# Patient Record
Sex: MALE | Race: WHITE | NOT HISPANIC OR LATINO | Employment: OTHER | URBAN - METROPOLITAN AREA
[De-identification: names, ages, dates, MRNs, and addresses within clinical notes are randomized per-mention and may not be internally consistent; named-entity substitution may affect disease eponyms.]

---

## 2017-05-09 ENCOUNTER — TRANSFERRED RECORDS (OUTPATIENT)
Dept: HEALTH INFORMATION MANAGEMENT | Facility: CLINIC | Age: 37
End: 2017-05-09

## 2017-07-11 ENCOUNTER — TRANSFERRED RECORDS (OUTPATIENT)
Dept: HEALTH INFORMATION MANAGEMENT | Facility: CLINIC | Age: 37
End: 2017-07-11

## 2017-11-27 ENCOUNTER — TRANSFERRED RECORDS (OUTPATIENT)
Dept: HEALTH INFORMATION MANAGEMENT | Facility: CLINIC | Age: 37
End: 2017-11-27

## 2017-12-26 ENCOUNTER — TRANSFERRED RECORDS (OUTPATIENT)
Dept: HEALTH INFORMATION MANAGEMENT | Facility: CLINIC | Age: 37
End: 2017-12-26

## 2018-01-09 ENCOUNTER — OFFICE VISIT (OUTPATIENT)
Dept: INTERNAL MEDICINE | Facility: CLINIC | Age: 38
End: 2018-01-09
Payer: COMMERCIAL

## 2018-01-09 ENCOUNTER — HOSPITAL ENCOUNTER (OUTPATIENT)
Dept: GENERAL RADIOLOGY | Facility: CLINIC | Age: 38
Discharge: HOME | End: 2018-01-09
Attending: INTERNAL MEDICINE | Admitting: INTERNAL MEDICINE
Payer: COMMERCIAL

## 2018-01-09 VITALS
OXYGEN SATURATION: 97 % | BODY MASS INDEX: 26.68 KG/M2 | SYSTOLIC BLOOD PRESSURE: 124 MMHG | HEART RATE: 100 BPM | TEMPERATURE: 97.4 F | HEIGHT: 66 IN | RESPIRATION RATE: 16 BRPM | WEIGHT: 166 LBS | DIASTOLIC BLOOD PRESSURE: 84 MMHG

## 2018-01-09 DIAGNOSIS — M25.521 RIGHT ELBOW PAIN: ICD-10-CM

## 2018-01-09 DIAGNOSIS — M25.572 PAIN IN JOINT, ANKLE AND FOOT, LEFT: ICD-10-CM

## 2018-01-09 DIAGNOSIS — G47.00 INSOMNIA, UNSPECIFIED TYPE: Primary | ICD-10-CM

## 2018-01-09 DIAGNOSIS — R23.2 ABNORMAL FLUSHING AND SWEATING: ICD-10-CM

## 2018-01-09 DIAGNOSIS — R61 ABNORMAL FLUSHING AND SWEATING: ICD-10-CM

## 2018-01-09 LAB
CORTIS SERPL-MCNC: 18.8 UG/DL (ref 4–22)
FERRITIN SERPL-MCNC: 92 NG/ML (ref 26–388)
TSH SERPL DL<=0.005 MIU/L-ACNC: 0.89 MU/L (ref 0.4–4)

## 2018-01-09 PROCEDURE — 73610 X-RAY EXAM OF ANKLE: CPT | Mod: TC

## 2018-01-09 PROCEDURE — 99000 SPECIMEN HANDLING OFFICE-LAB: CPT | Performed by: INTERNAL MEDICINE

## 2018-01-09 PROCEDURE — 82384 ASSAY THREE CATECHOLAMINES: CPT | Mod: 90 | Performed by: INTERNAL MEDICINE

## 2018-01-09 PROCEDURE — 82533 TOTAL CORTISOL: CPT | Performed by: INTERNAL MEDICINE

## 2018-01-09 PROCEDURE — 36415 COLL VENOUS BLD VENIPUNCTURE: CPT | Performed by: INTERNAL MEDICINE

## 2018-01-09 PROCEDURE — 82728 ASSAY OF FERRITIN: CPT | Performed by: INTERNAL MEDICINE

## 2018-01-09 PROCEDURE — 84443 ASSAY THYROID STIM HORMONE: CPT | Performed by: INTERNAL MEDICINE

## 2018-01-09 PROCEDURE — 99214 OFFICE O/P EST MOD 30 MIN: CPT | Performed by: INTERNAL MEDICINE

## 2018-01-09 RX ORDER — ZOLPIDEM TARTRATE 5 MG/1
5 TABLET ORAL
Qty: 30 TABLET | Refills: 0 | Status: SHIPPED | OUTPATIENT
Start: 2018-01-09 | End: 2018-02-19

## 2018-01-09 ASSESSMENT — PAIN SCALES - GENERAL: PAINLEVEL: MILD PAIN (3)

## 2018-01-09 NOTE — MR AVS SNAPSHOT
"              After Visit Summary   2018    Dipak Swartz    MRN: [id number]           Patient Information     Date Of Birth          1980        Visit Information        Provider Department      2018 8:30 AM Jason García MD [facility]         Follow-ups after your visit        Who to contact     If you have questions or need follow up information about today's clinic visit or your schedule please contact [facility] directly at [phone].  Normal or non-critical lab and imaging results will be communicated to you by [employer], letter or phone within 4 business days after the clinic has received the results. If you do not hear from us within 7 days, please contact the clinic through [employer] or phone. If you have a critical or abnormal lab result, we will notify you by phone as soon as possible.  Submit refill requests through [employer] or call your pharmacy and they will forward the refill request to us. Please allow 3 business days for your refill to be completed.          Additional Information About Your Visit        [employer] Information     [employer] lets you send messages to your doctor, view your test results, renew your prescriptions, schedule appointments and more. To sign up, go to [email or web address] . Click on \"Log in\" on the left side of the screen, which will take you to the Welcome page. Then click on \"Sign up Now\" on the right side of the page.     You will be asked to enter the access code listed below, as well as some personal information. Please follow the directions to create your username and password.     Your access code is: [email or web address]  Expires: 2018  8:50 AM     Your access code will  in 90 days. If you need help or a new code, please call your [facility] or [phone].        Care EveryWhere ID     This is your Care EveryWhere ID. This could be used by other organizations to access your [place] medical " "records  [id number]        Your Vitals Were     Pulse Temperature Respirations Height Pulse Oximetry BMI (Body Mass Index)    100 97.4  F (36.3  C) (Temporal) 16 5' 6\" (1.676 m) 97% 26.79 kg/m2       Blood Pressure from Last 3 Encounters:   01/09/18 124/84   05/27/16 (!) 138/92   04/08/16 120/74    Weight from Last 3 Encounters:   01/09/18 166 lb (75.3 kg)   06/21/16 148 lb (67.1 kg)   05/27/16 148 lb (67.1 kg)              Today, you had the following     No orders found for display       Primary Care Provider Office Phone # Fax #    Lazarus Jacob [phone]       [address] [place] [address]        Equal Access to Services     [facility]: Hadii toma dobson Sograce, waaxda luqadaha, qaybta kaalmada adeegyada, ivelisse servin . So [facility] [phone].    ATENCIÓN: Si habla español, tiene a johnson disposición servicios gratuitos de asistencia lingüística. Llame al [phone].    We comply with applicable federal civil rights laws and [place] laws. We do not discriminate on the basis of race, color, national origin, age, disability, sex, sexual orientation, or gender identity.            Thank you!     Thank you for choosing [facility]  for your care. Our goal is always to provide you with excellent care. Hearing back from our patients is one way we can continue to improve our services. Please take a few minutes to complete the written survey that you may receive in the mail after your visit with us. Thank you!             Your Updated Medication List - Protect others around you: Learn how to safely use, store and throw away your medicines at www.disposemymeds.org.      Notice  As of 1/9/2018  8:50 AM    You have not been prescribed any medications.      "

## 2018-01-09 NOTE — PROGRESS NOTES
"  SUBJECTIVE:   Dipak Swartz is a 37 year old male who presents to clinic today for the following health issues:    Chief Complaint   Patient presents with     Establish Care     right lower leg amputee - pain, stopped pain meds, not sleeping       Issues with sleep.  He was on oxycodone, gabapentin, and baclofen.  Basically stopped then a week ago, had weaned down some of the gabapentin.      This week sleep is worse, can't sleep much.      He has tried different sleep medications before, insomnia since his accident he says.      Worries about abd pain, more on the left side, like sore muscles.      Falls asleep, then wakes up easily.  Tries the dark room, quiet.      September stepped on something and twisted his good ankle, still sore and bothers him.  Still sore after 4 months.     Right elbow pain as well at night.  Hard to move his elbow.      Past Medical History:   Diagnosis Date     [place] 8[place]     Right leg amputee    Current Outpatient Prescriptions   Medication     zolpidem (AMBIEN) 5 MG tablet     No current facility-administered medications for this visit.      Review of Systems  Constitutional-No fevers, chills, or weight changes..  ENT-No earpain, sore throat, voice changes or rhinitis.  Cardiac-No chest pain or palpitations. Some sweats at times  Respiratory-No cough, sob, or hemoptysis.  GI-No nausea, vomitting, diarrhea, constipation, or blood in the stool.  Musculoskeletal-left ankle pain, right elbow pains.    Physical Exam  /84  Pulse 100  Temp 97.4  F (36.3  C) (Temporal)  Resp 16  Ht 5' 6\" (1.676 m)  Wt 166 lb (75.3 kg)  SpO2 97%  BMI 26.79 kg/m2  General Appearance-healthy, alert, no distress  Left ankle has normal range of motion, no swelling, minimal pain on palpation  Right elbow has good range of motion, no pain over the lateral epicondyle    ASSESSMENT:  Patient was a history of a right leg amputation.  He had chronic pain for 3 years with this.  He has been " in the pain clinic been on gabapentin, oxycodone, and baclofen.  He stopped all of these a week ago.  He reports always having issues with insomnia sleep.  But now this week is worse he can fall asleep but wakes up within half an hour to have some little irritation such as a TV or furnace.  She is very tired during the day.  He does not think he has obstructive sleep apnea.  We will try him on low-dose Ambien for 30 days with a plan of not using this long-term.  But hopefully them over the changes of stopping his other medications.    Patient also has left ankle pain from an injury, with his right leg amputated we will x-ray his ankle to make sure it is okay today.    Right elbow appears fine on exam he can use a brace at night to limit the pain.    Combination of insomnia and the flushing or sweating.  We will check some labs including thyroid, ferritin for restless legs, cortisol and catecholamines.  Eventually he may need a sleep study for possible sleep apnea or restless legs evaluation.    Electronically signed by Jason García MD

## 2018-01-09 NOTE — NURSING NOTE
"Chief Complaint   Patient presents with     \[facility]\"" Care     right lower leg amputee - pain, stopped pain meds, not sleeping       Initial /84  Pulse 100  Temp 97.4  F (36.3  C) (Temporal)  Resp 16  Ht 5' 6\" (1.676 m)  Wt 166 lb (75.3 kg)  SpO2 97%  BMI 26.79 kg/m2 Estimated body mass index is 26.79 kg/(m^2) as calculated from the following:    Height as of this encounter: 5' 6\" (1.676 m).    Weight as of this encounter: 166 lb (75.3 kg).  Medication Reconciliation: complete    "

## 2018-01-12 LAB
CATECHOLS PLAS-IMP: NORMAL
DOPAMINE SERPL-MCNC: <20 PG/ML (ref 0–20)
EPINEPH PLAS-MCNC: 23 PG/ML (ref 10–200)
NOREPINEPH PLAS-MCNC: 182 PG/ML (ref 80–520)

## 2018-02-19 DIAGNOSIS — G47.00 INSOMNIA, UNSPECIFIED TYPE: ICD-10-CM

## 2018-02-19 NOTE — TELEPHONE ENCOUNTER
ambien      Last Written Prescription Date:  1/9/18  Last Fill Quantity: 30,   # refills: 0  Last Office Visit: 1/9/18  Future Office visit:       Routing refill request to provider for review/approval because:  Drug not on the [facility], P or [facility] refill protocol or controlled substance

## 2018-02-21 RX ORDER — ZOLPIDEM TARTRATE 5 MG/1
5 TABLET ORAL
Qty: 30 TABLET | Refills: 0 | Status: SHIPPED | OUTPATIENT
Start: 2018-02-21 | End: 2020-08-05

## 2018-06-06 ENCOUNTER — OFFICE VISIT (OUTPATIENT)
Dept: FAMILY MEDICINE | Facility: CLINIC | Age: 38
End: 2018-06-06
Payer: COMMERCIAL

## 2018-06-06 VITALS
SYSTOLIC BLOOD PRESSURE: 112 MMHG | HEIGHT: 65 IN | TEMPERATURE: 97.7 F | BODY MASS INDEX: 28.51 KG/M2 | OXYGEN SATURATION: 99 % | DIASTOLIC BLOOD PRESSURE: 70 MMHG | WEIGHT: 171.1 LBS | HEART RATE: 85 BPM

## 2018-06-06 DIAGNOSIS — H10.32 ACUTE CONJUNCTIVITIS OF LEFT EYE, UNSPECIFIED ACUTE CONJUNCTIVITIS TYPE: Primary | ICD-10-CM

## 2018-06-06 PROCEDURE — 99213 OFFICE O/P EST LOW 20 MIN: CPT | Performed by: FAMILY MEDICINE

## 2018-06-06 RX ORDER — NEOMYCIN SULFATE, POLYMYXIN B SULFATE AND DEXAMETHASONE 3.5; 10000; 1 MG/ML; [USP'U]/ML; MG/ML
1-2 SUSPENSION/ DROPS OPHTHALMIC EVERY 4 HOURS
Qty: 1 BOTTLE | Refills: 0 | Status: SHIPPED | OUTPATIENT
Start: 2018-06-06 | End: 2020-09-14

## 2018-06-06 NOTE — PROGRESS NOTES
SUBJECTIVE:   Dipak Swartz is a 37 year old male who presents to clinic today for the following health issues:      Concern - Foreign body   Onset: 36 hours ago     Description:   Patient had gotten a foreign body in his left eye. He thought he had it out but it is still bothering him. Patient belives that it is a piece of stone in his eye.     Intensity: severe    Progression of Symptoms:  intermittent    Accompanying Signs & Symptoms:  None     Previous history of similar problem:   None     Precipitating factors:   Worsened by: nothing     Alleviating factors:  Improved by: nothing     Therapies Tried and outcome: flushing his eye         Problem list and histories reviewed & adjusted, as indicated.  Additional history: as documented        Reviewed and updated as needed this visit by clinical staff       Reviewed and updated as needed this visit by Provider        SUBJECTIVE:  Dipak  is a 37 year old male who presents for: Foreign body sensation in his left eye.  He was grinding some rock and felt he got something in there 2 days ago.  He thought he got everything out but yesterday was bothering him some again.  He has not had any mattering in the morning.  Still bothering him a little bit.    Past Medical History:   Diagnosis Date     [place]ident [place] farm 8/2014     Past Surgical History:   Procedure Laterality Date     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     Broken Right arm  1992     LEG SURGERY Right 8/2014     Social History   Substance Use Topics     Smoking status: Former Smoker     Quit date: 4/28/2003     Smokeless tobacco: Current User     Types: Chew     Alcohol use 0.0 oz/week     0 Standard drinks or equivalent per week      Comment: occ.     Current Outpatient Prescriptions   Medication Sig Dispense Refill     neomycin-polymyxin-dexamethasone (MAXITROL) 3.5-66493-1.1 SUSP ophthalmic susp Place 1-2 drops Into the left eye every 4 hours 1 Bottle 0     zolpidem (AMBIEN) 5 MG tablet Take 1  "tablet (5 mg) by mouth nightly as needed for sleep 30 tablet 0       REVIEW OF SYSTEMS:   5 point ROS negative except as noted above in HPI, including Gen., Resp, CV, GI &  system review.     OBJECTIVE:  Vitals: /70 (BP Location: Right arm, Patient Position: Chair, Cuff Size: Adult Large)  Pulse 85  Temp 97.7  F (36.5  C) (Temporal)  Ht 5' 5\" (1.651 m)  Wt 171 lb 1.6 oz (77.6 kg)  SpO2 99%  BMI 28.47 kg/m2  BMI= Body mass index is 28.47 kg/(m^2).  His left eye appears normal there is no conjunctival injection PERRLA.  Difficult to do eye exam on him he is very resistant.  What I can see by retracting the lids I can see no foreign objects.  No uptake of fluorescein stain.    ASSESSMENT:  Foreign body sensation    PLAN:  Treated with some Maxitrol eyedrops he may just have some irritation from this.  I recommended follow-up with ophthalmology in the next 36 hours or so if not improving.        Johnathon Arroyo MD  [facility]              "

## 2018-06-06 NOTE — MR AVS SNAPSHOT
"              After Visit Summary   6/6/2018    Dipak Swartz    MRN: [id number]           Patient Information     Date Of Birth          1980        Visit Information        Provider Department      6/6/2018 7:40 AM Johnathon Arroyo MD [facility]        Today's Diagnoses     Acute conjunctivitis of left eye, unspecified acute conjunctivitis type    -  1       Follow-ups after your visit        Who to contact     If you have questions or need follow up information about today's clinic visit or your schedule please contact [facility] directly at [phone].  Normal or non-critical lab and imaging results will be communicated to you by MyChart, letter or phone within 4 business days after the clinic has received the results. If you do not hear from us within 7 days, please contact the clinic through MyChart or phone. If you have a critical or abnormal lab result, we will notify you by phone as soon as possible.  Submit refill requests through [employer] or call your pharmacy and they will forward the refill request to us. Please allow 3 business days for your refill to be completed.          Additional Information About Your Visit        Care EveryWhere ID     This is your Care EveryWhere ID. This could be used by other organizations to access your [facility] medical records  [id number]        Your Vitals Were     Pulse Temperature Height Pulse Oximetry BMI (Body Mass Index)       85 97.7  F (36.5  C) (Temporal) 5' 5\" (1.651 m) 99% 28.47 kg/m2        Blood Pressure from Last 3 Encounters:   06/06/18 112/70   01/09/18 124/84   05/27/16 (!) 138/92    Weight from Last 3 Encounters:   06/06/18 171 lb 1.6 oz (77.6 kg)   01/09/18 166 lb (75.3 kg)   06/21/16 148 lb (67.1 kg)              Today, you had the following     No orders found for display         Today's Medication Changes          These changes are accurate as of 6/6/18  8:25 AM.  If you have any questions, ask your nurse " or doctor.               Start taking these medicines.        Dose/Directions    neomycin-polymyxin-dexamethasone 3.5-23868-4.1 Susp ophthalmic susp   Commonly known as:  MAXITROL   Used for:  Acute conjunctivitis of left eye, unspecified acute conjunctivitis type   Started by:  Johnathon Arroyo MD        Dose:  1-2 drop   Place 1-2 drops Into the left eye every 4 hours   Quantity:  1 Bottle   Refills:  0            Where to get your medicines      These medications were sent to [facility] - [place], [place] - [address]   [address] [facility] [address]     Phone:  [phone]     neomycin-polymyxin-dexamethasone 3.5-12120-2.1 Susp ophthalmic susp                Primary Care Provider Office Phone # Fax #    Lazarus Jacob [phone]       [address] [place] [address]        Equal Access to Services     [facility]: Hadii toma wilkerson hadasho Soomaali, waaxda luqadaha, qaybta kaalmada adeegyada, ivelisse kiran hayryan servin . So [facility] [phone].    ATENCIÓN: Si habla español, tiene a johnson disposición servicios gratuitos de asistencia lingüística. Llame al [phone].    We comply with applicable federal civil rights laws and [place] laws. We do not discriminate on the basis of race, color, national origin, age, disability, sex, sexual orientation, or gender identity.            Thank you!     Thank you for choosing [facility]  for your care. Our goal is always to provide you with excellent care. Hearing back from our patients is one way we can continue to improve our services. Please take a few minutes to complete the written survey that you may receive in the mail after your visit with us. Thank you!             Your Updated Medication List - Protect others around you: Learn how to safely use, store and throw away your medicines at www.disposemymeds.org.          This list is accurate as of 6/6/18  8:25 AM.  Always use  your most recent med list.                   Brand Name Dispense Instructions for use Diagnosis    neomycin-polymyxin-dexamethasone 3.5-37073-7.1 Susp ophthalmic susp    MAXITROL    1 Bottle    Place 1-2 drops Into the left eye every 4 hours    Acute conjunctivitis of left eye, unspecified acute conjunctivitis type       zolpidem 5 MG tablet    AMBIEN    30 tablet    Take 1 tablet (5 mg) by mouth nightly as needed for sleep    Insomnia, unspecified type

## 2020-08-05 ENCOUNTER — TELEPHONE (OUTPATIENT)
Dept: FAMILY MEDICINE | Facility: OTHER | Age: 40
End: 2020-08-05

## 2020-08-05 ENCOUNTER — VIRTUAL VISIT (OUTPATIENT)
Dept: FAMILY MEDICINE | Facility: OTHER | Age: 40
End: 2020-08-05
Payer: COMMERCIAL

## 2020-08-05 DIAGNOSIS — G47.00 INSOMNIA, UNSPECIFIED TYPE: ICD-10-CM

## 2020-08-05 DIAGNOSIS — Z89.511 HISTORY OF AMPUTATION OF RIGHT LEG THROUGH TIBIA AND FIBULA (H): Primary | ICD-10-CM

## 2020-08-05 DIAGNOSIS — M25.522 PAIN OF BOTH ELBOWS: ICD-10-CM

## 2020-08-05 DIAGNOSIS — M25.521 PAIN OF BOTH ELBOWS: ICD-10-CM

## 2020-08-05 DIAGNOSIS — E16.2 HYPOGLYCEMIA: Primary | ICD-10-CM

## 2020-08-05 DIAGNOSIS — R20.2 PARESTHESIAS: ICD-10-CM

## 2020-08-05 LAB
ALBUMIN SERPL-MCNC: 4 G/DL (ref 3.4–5)
ALP SERPL-CCNC: 72 U/L (ref 40–150)
ALT SERPL W P-5'-P-CCNC: 26 U/L (ref 0–70)
ANION GAP SERPL CALCULATED.3IONS-SCNC: 3 MMOL/L (ref 3–14)
AST SERPL W P-5'-P-CCNC: 13 U/L (ref 0–45)
BILIRUB SERPL-MCNC: <0.1 MG/DL (ref 0.2–1.3)
BUN SERPL-MCNC: 14 MG/DL (ref 7–30)
CALCIUM SERPL-MCNC: 9.3 MG/DL (ref 8.5–10.1)
CHLORIDE SERPL-SCNC: 105 MMOL/L (ref 94–109)
CO2 SERPL-SCNC: 30 MMOL/L (ref 20–32)
CREAT SERPL-MCNC: 1.12 MG/DL (ref 0.66–1.25)
CRP SERPL-MCNC: 4.4 MG/L (ref 0–8)
ERYTHROCYTE [DISTWIDTH] IN BLOOD BY AUTOMATED COUNT: 13.5 % (ref 10–15)
ERYTHROCYTE [SEDIMENTATION RATE] IN BLOOD BY WESTERGREN METHOD: 7 MM/H (ref 0–15)
GFR SERPL CREATININE-BSD FRML MDRD: 82 ML/MIN/{1.73_M2}
GLUCOSE SERPL-MCNC: 50 MG/DL (ref 70–99)
HCT VFR BLD AUTO: 45.1 % (ref 40–53)
HGB BLD-MCNC: 14.8 G/DL (ref 13.3–17.7)
MCH RBC QN AUTO: 30.9 PG (ref 26.5–33)
MCHC RBC AUTO-ENTMCNC: 32.8 G/DL (ref 31.5–36.5)
MCV RBC AUTO: 94 FL (ref 78–100)
PLATELET # BLD AUTO: 320 10E9/L (ref 150–450)
POTASSIUM SERPL-SCNC: 3.8 MMOL/L (ref 3.4–5.3)
PROT SERPL-MCNC: 8.2 G/DL (ref 6.8–8.8)
RBC # BLD AUTO: 4.79 10E12/L (ref 4.4–5.9)
SODIUM SERPL-SCNC: 138 MMOL/L (ref 133–144)
VIT B12 SERPL-MCNC: 298 PG/ML (ref 193–986)
WBC # BLD AUTO: 7.1 10E9/L (ref 4–11)

## 2020-08-05 PROCEDURE — 80053 COMPREHEN METABOLIC PANEL: CPT | Performed by: PHYSICIAN ASSISTANT

## 2020-08-05 PROCEDURE — 85652 RBC SED RATE AUTOMATED: CPT | Performed by: PHYSICIAN ASSISTANT

## 2020-08-05 PROCEDURE — 86140 C-REACTIVE PROTEIN: CPT | Performed by: PHYSICIAN ASSISTANT

## 2020-08-05 PROCEDURE — 82607 VITAMIN B-12: CPT | Performed by: PHYSICIAN ASSISTANT

## 2020-08-05 PROCEDURE — 99214 OFFICE O/P EST MOD 30 MIN: CPT | Mod: 95 | Performed by: PHYSICIAN ASSISTANT

## 2020-08-05 PROCEDURE — 36415 COLL VENOUS BLD VENIPUNCTURE: CPT | Performed by: PHYSICIAN ASSISTANT

## 2020-08-05 PROCEDURE — 85027 COMPLETE CBC AUTOMATED: CPT | Performed by: PHYSICIAN ASSISTANT

## 2020-08-05 PROCEDURE — 86618 LYME DISEASE ANTIBODY: CPT | Performed by: PHYSICIAN ASSISTANT

## 2020-08-05 RX ORDER — ZOLPIDEM TARTRATE 5 MG/1
5 TABLET ORAL
Qty: 30 TABLET | Refills: 2 | Status: SHIPPED | OUTPATIENT
Start: 2020-08-05 | End: 2021-01-12 | Stop reason: ALTCHOICE

## 2020-08-05 NOTE — PROGRESS NOTES
"Dipak Swartz is a 39 year old male who is being evaluated via a billable telephone visit.      The patient has been notified of following:     \"This telephone visit will be conducted via a call between you and your physician/provider. We have found that certain health care needs can be provided without the need for a physical exam.  This service lets us provide the care you need with a short phone conversation.  If a prescription is necessary we can send it directly to your pharmacy.  If lab work is needed we can place an order for that and you can then stop by our lab to have the test done at a later time.    Telephone visits are billed at different rates depending on your insurance coverage. During this emergency period, for some insurers they may be billed the same as an in-person visit.  Please reach out to your insurance provider with any questions.    If during the course of the call the physician/provider feels a telephone visit is not appropriate, you will not be charged for this service.\"    Patient has given verbal consent for Telephone visit?  Yes    What phone number would you like to be contacted at? [phone]    How would you like to obtain your AVS? Mail a copy    Subjective     Dipak Swartz is a 39 year old male who presents via phone visit today for the following health issues:    HPI    Concern - New prosthetic needed       Description:   Needs a dispensing prescription for a lower right leg prostethic.  Patients weight has fluctuated a lot in the past year and it isn't fitting correctly. 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       He has been experiencing symptoms of bilateral elbow pain for the past 1.5 months or so and it can be severe at times. He denies any injury to his elbows. A few weeks ago, he started to experience symptoms of intermittent painful tingling in his arms and legs randomly throughout the day and at night along with the " elbow pain. It seems to be a different extremity every time and it is causing a lot of difficult sleeping. He feels his  strength is affected when the symptoms occur. It always seems to occur at rest and never with activity. He denies any neck or low back pain or any saddle anaesthesia. He has never experienced these symptoms before until recently. He was researching his symptoms and is concerned about possible Lyme disease. He does a lot of work outdoors and has found multiple ticks on his body but none of them have been embedded as far as he knows. He has been more tired lately but has a history of chronic insomnia which has been worsened but his pain and paraesthesias. He was on Ambien a few years ago which seemed to work well. He denies rash, fevers, or chills. He has tried NSAIDs including naproxen without benefit.       Reviewed and updated as needed this visit by Provider  Allergies  Meds  Problems  Med Hx     Review of Systems   GENERAL: +Fatigue. Denies fever, weakness, weight gain, or weight loss.  CARDIOVASCULAR: Denies chest pain, shortness of breath, irregular heartbeats, palpitations, or edema.  RESPIRATORY: Denies cough, hemoptysis, and shortness of breath.  MUSCULOSKELETAL: +Intermittent bilateral elbow pain.   SKIN: Denies easy bruising, redness, rash, or dry skin.   NEUROLOGIC: +Intermittent painful tingling in his arms and legs, intermittent  strength weakness. Denies headache, fainting, dizziness, memory loss, or seizures.  PSYCHIATRIC: Denies depression, anxiety, mood swings, and thoughts of suicide.    Objective   Reported vitals:  There were no vitals taken for this visit.   General: alert and no distress over the phone  PSYCH: Alert and oriented times 3; coherent speech, normal   rate and volume, able to articulate logical thoughts, able   to abstract reason, no tangential thoughts, no hallucinations   or delusions. His affect is normal  RESP: No cough, no audible wheezing, able to  talk in full sentences  Remainder of exam unable to be completed due to telephone visits      Assessment/Plan:    ICD-10-CM    1. 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 Insomnia, unspecified type  G47.00 zolpidem (AMBIEN) 5 MG tablet   3. Pain of both elbows  M25.521 CBC with platelets    M25.522 Comprehensive metabolic panel (BMP + Alb, Alk Phos, ALT, AST, Total. Bili, TP)     ESR: Erythrocyte sedimentation rate     CRP, inflammation     Lyme Disease Danielle with reflex to WB Serum     Vitamin B12   4. Paresthesias  R20.2 CBC with platelets     Comprehensive metabolic panel (BMP + Alb, Alk Phos, ALT, AST, Total. Bili, TP)     ESR: Erythrocyte sedimentation rate     CRP, inflammation     Lyme Disease Danielle with reflex to WB Serum     Vitamin B12       1. Will place a DME order for a new right lower extremity prosthetic since weight changes have caused his current prosthetic not to fit properly.    2. Will refill Ambien to use nightly as needed given his chronic insomnia which is worse lately given his elbow pain and painful paraesthesias. Will refill for the next few months and then recommend follow up to discuss more chronic use of this medication.    3-4. New bilateral elbow pain and intermittent painful extremity paraesthesias over the past month or so. This certainly can be seen with Lyme disease but want to rule out other infectious or inflammatory causes as well so will start with labs including Lyme testing, CMP, CBC, ESR, CRP and vitamin B12. I recommend Tylenol or NSAIDs as needed and he is trying to stay better hydrated. If labs are normal, will need a face to face visit for a more thorough examination.     Phone call duration: 12 minutes    Nacho Abdi PA-C

## 2020-08-05 NOTE — TELEPHONE ENCOUNTER
Huddled with VALENTINA:    can you call and tell him that it is low and that he should make sure he eats and drinks something sugary/carb loaded  tell him we are still awaiting the rest of the labs and would like to recheck his glucose again tomorrow    I spoke with the pt who states he had eaten a quarter pounder with cheese. States he feels great. He agrees to recheck lab tomorrow at the [place] location. VALENTINA updated.     Shira Soria, MSN, RN

## 2020-08-06 DIAGNOSIS — E16.2 HYPOGLYCEMIA: ICD-10-CM

## 2020-08-06 LAB
B BURGDOR IGG+IGM SER QL: 0.09 (ref 0–0.89)
HBA1C MFR BLD: 5.2 % (ref 0–5.6)

## 2020-08-06 PROCEDURE — 36415 COLL VENOUS BLD VENIPUNCTURE: CPT | Performed by: PHYSICIAN ASSISTANT

## 2020-08-06 PROCEDURE — 82947 ASSAY GLUCOSE BLOOD QUANT: CPT | Performed by: PHYSICIAN ASSISTANT

## 2020-08-06 PROCEDURE — 83036 HEMOGLOBIN GLYCOSYLATED A1C: CPT | Performed by: PHYSICIAN ASSISTANT

## 2020-08-07 ENCOUNTER — TELEPHONE (OUTPATIENT)
Dept: FAMILY MEDICINE | Facility: OTHER | Age: 40
End: 2020-08-07

## 2020-08-07 LAB — GLUCOSE SERPL-MCNC: 77 MG/DL (ref 70–99)

## 2020-08-07 NOTE — TELEPHONE ENCOUNTER
----- Message from Nacho Abdi PA-C sent at 8/7/2020  9:43 AM CDT -----  Please call and notify patient that his glucose and hemoglobin A1c are completely normal so no signs of diabetes. I think the low glucose a few days ago was a false reading. Thanks.    Nacho Abdi PA-C

## 2020-08-11 ENCOUNTER — TELEPHONE (OUTPATIENT)
Dept: FAMILY MEDICINE | Facility: OTHER | Age: 40
End: 2020-08-11

## 2020-08-11 NOTE — TELEPHONE ENCOUNTER
Reason for Call:  Form, our goal is to have forms completed with 72 hours, however, some forms may require a visit or additional information.    Type of letter, form or note:  Code Specific for insurance    Who is the form from?: [employer] (if other please explain)    Where did the form come from: form was faxed in    What clinic location was the form placed at?: [facility] - [phone]    Where the form was placed: Dr. Bhatt/Folder    What number is listed as a contact on the form?: [phone]       Additional comments: Please review the form, sign,date and  fax to [phone]. Thank you    Call taken on 8/11/2020 at 4:17 PM by Christal Arroyo

## 2020-08-12 ENCOUNTER — MEDICAL CORRESPONDENCE (OUTPATIENT)
Dept: HEALTH INFORMATION MANAGEMENT | Facility: CLINIC | Age: 40
End: 2020-08-12

## 2020-08-12 NOTE — TELEPHONE ENCOUNTER
Cecilia called back again regarding the form from below. VALENTINA's signature can not be a stamp, he needs to actually sign it.

## 2020-08-12 NOTE — TELEPHONE ENCOUNTER
Signature was not stamped, signed by VALENTINA and sent through his doximity. I called and spoke to [employer] [employer] to speak with Cecilia, she said they cannot accept that signature, so will be resending form because they have to update the provider information and will give to AE to sign. This will be coming through on the pod [phone] fax number if it does not come through before I leave [facility], another MA will have to give it to AE from fax machine. She is aware this is coming.    Rissa Henderson MA

## 2020-08-12 NOTE — TELEPHONE ENCOUNTER
Cecilia called today and is wondering if we can refax over the form she said they received it but it only came as lines Thank you

## 2020-08-24 DIAGNOSIS — G47.00 INSOMNIA, UNSPECIFIED TYPE: ICD-10-CM

## 2020-08-24 RX ORDER — ZOLPIDEM TARTRATE 5 MG/1
5 TABLET ORAL
Qty: 30 TABLET | Refills: 2 | OUTPATIENT
Start: 2020-08-24

## 2020-08-24 NOTE — TELEPHONE ENCOUNTER
Reason for Call:  Medication or medication refill:    Do you use a [facility]?  Name of the pharmacy and phone number for the current request:  [facility]    Name of the medication requested: Ambien    Other request:     Can we leave a detailed message on this number? NO    Phone number patient can be reached at: [phone]    Best Time:     Call taken on 8/24/2020 at 9:45 AM by Zunilda Hill

## 2020-09-14 ENCOUNTER — VIRTUAL VISIT (OUTPATIENT)
Dept: INTERNAL MEDICINE | Facility: CLINIC | Age: 40
End: 2020-09-14
Payer: COMMERCIAL

## 2020-09-14 DIAGNOSIS — M79.642 BILATERAL HAND PAIN: Primary | ICD-10-CM

## 2020-09-14 DIAGNOSIS — G47.01 INSOMNIA DUE TO MEDICAL CONDITION: ICD-10-CM

## 2020-09-14 DIAGNOSIS — M79.641 BILATERAL HAND PAIN: Primary | ICD-10-CM

## 2020-09-14 PROCEDURE — 99213 OFFICE O/P EST LOW 20 MIN: CPT | Mod: 95 | Performed by: INTERNAL MEDICINE

## 2020-09-14 RX ORDER — ZOLPIDEM TARTRATE 6.25 MG/1
6.25 TABLET, FILM COATED, EXTENDED RELEASE ORAL
Qty: 30 TABLET | Refills: 3 | Status: SHIPPED | OUTPATIENT
Start: 2020-09-14 | End: 2021-01-12

## 2020-09-14 NOTE — PROGRESS NOTES
"Dipak Swartz is a 39 year old male who is being evaluated via a billable telephone visit.      The patient has been notified of following:     \"This telephone visit will be conducted via a call between you and your physician/provider. We have found that certain health care needs can be provided without the need for a physical exam.  This service lets us provide the care you need with a short phone conversation.  If a prescription is necessary we can send it directly to your pharmacy.  If lab work is needed we can place an order for that and you can then stop by our lab to have the test done at a later time.    Telephone visits are billed at different rates depending on your insurance coverage. During this emergency period, for some insurers they may be billed the same as an in-person visit.  Please reach out to your insurance provider with any questions.    If during the course of the call the physician/provider feels a telephone visit is not appropriate, you will not be charged for this service.\"    Patient has given verbal consent for Telephone visit?  Yes    What phone number would you like to be contacted at? [phone]    How would you like to obtain your AVS?     Subjective     Dipak Swartz is a 39 year old male who presents via phone visit today for the following health issues:    HPI    Chief Complaint   Patient presents with     Telephone     discuss ambien, increasing dose       He was in for labs in August and glucose was 50 and then recheck was ok.  He has lost quite a bit of weight went from 180 to 128.  Eats what ever he wants and not gaining weight, feels good.      Sleep issues at night, elbows hurt.  Arms go numb.  Elbow down to the hands with driving    Restarted ambien in August and legs now don't go numb. Ambien is working at 5 mg at 9 pm, wakes up at 12:30, then takes a second one. Wakes up at 5 AM,  With taking two then runs out.          Review of Systems   Constitutional, HEENT, " cardiovascular, pulmonary, gi and gu systems are negative, except as otherwise noted.       Objective          Vitals:  No vitals were obtained today due to virtual visit.    healthy, alert and no distress  PSYCH: Alert and oriented times 3; coherent speech, normal   rate and volume, able to articulate logical thoughts, able   to abstract reason, no tangential thoughts, no hallucinations   or delusions  His affect is normal  RESP: No cough, no audible wheezing, able to talk in full sentences  Remainder of exam unable to be completed due to telephone visits    Orders Only on 08/06/2020   Component Date Value Ref Range Status     Hemoglobin A1C 08/06/2020 5.2  0 - 5.6 % Final    Comment: Normal <5.7% Prediabetes 5.7-6.4%  Diabetes 6.5% or higher - adopted from ADA   consensus guidelines.       Glucose 08/06/2020 77  70 - 99 mg/dL Final           Assessment/Plan:    Assessment & Plan     Dipak was seen today for telephone.    Diagnoses and all orders for this visit:    Bilateral hand pain  -     NEUROLOGY ADULT REFERRAL    Insomnia due to medical condition  -     zolpidem ER (AMBIEN CR) 6.25 MG CR tablet; Take 1 tablet (6.25 mg) by mouth nightly as needed for sleep      Patient has insomnia due to arm pain.  He has been on Ambien 5 mg but it does not last long enough therefore he has taken 1 Ambien at midnight again.  We will change him to a controlled release 6.25 and see if this works for him.  If that is not covered by insurance would then try him on the 5 mg twice at night.  Hopefully if his arms get treated he will not need the Ambien long-term    Bilateral hand pain certainly could be carpal tunnel goes from his elbows down to his fingertips.  Discussed carpal tunnel and ulnar tunnel disease.  We will get an EMG of both arms to evaluate and probable orthopedics referral this fall.    B12 was slightly on the low side to 98 recommended oral B12 supplement to help, does not need B12 shots but this may help his  nerve irritation.        Return in about 2 weeks (around 9/28/2020), or emg.    Jason García MD  [facility]    Phone call duration:  14 minutes

## 2020-09-22 ENCOUNTER — TRANSFERRED RECORDS (OUTPATIENT)
Dept: HEALTH INFORMATION MANAGEMENT | Facility: CLINIC | Age: 40
End: 2020-09-22

## 2020-09-28 ENCOUNTER — TELEPHONE (OUTPATIENT)
Dept: INTERNAL MEDICINE | Facility: CLINIC | Age: 40
End: 2020-09-28

## 2020-09-28 NOTE — TELEPHONE ENCOUNTER
Received EMG report.  Dr. García reviewed and shows mild left carpal tunnel.  Pt can try wrist braces at night or see ortho for consult.  Pt informed of results and Dr. García's response/orders.  Pt will try braces at night and see how how he does, will call if he chooses to see ortho.

## 2021-01-12 ENCOUNTER — HOSPITAL ENCOUNTER (OUTPATIENT)
Dept: GENERAL RADIOLOGY | Facility: CLINIC | Age: 41
Discharge: HOME | End: 2021-01-12
Attending: INTERNAL MEDICINE | Admitting: INTERNAL MEDICINE
Payer: COMMERCIAL

## 2021-01-12 ENCOUNTER — OFFICE VISIT (OUTPATIENT)
Dept: INTERNAL MEDICINE | Facility: CLINIC | Age: 41
End: 2021-01-12
Payer: COMMERCIAL

## 2021-01-12 VITALS
SYSTOLIC BLOOD PRESSURE: 128 MMHG | HEART RATE: 78 BPM | OXYGEN SATURATION: 100 % | BODY MASS INDEX: 25.83 KG/M2 | TEMPERATURE: 97.1 F | HEIGHT: 65 IN | DIASTOLIC BLOOD PRESSURE: 74 MMHG | RESPIRATION RATE: 16 BRPM | WEIGHT: 155 LBS

## 2021-01-12 DIAGNOSIS — M79.642 PAIN IN BOTH HANDS: ICD-10-CM

## 2021-01-12 DIAGNOSIS — M25.522 PAIN OF BOTH ELBOWS: ICD-10-CM

## 2021-01-12 DIAGNOSIS — G47.01 INSOMNIA DUE TO MEDICAL CONDITION: ICD-10-CM

## 2021-01-12 DIAGNOSIS — M25.521 PAIN OF BOTH ELBOWS: Primary | ICD-10-CM

## 2021-01-12 DIAGNOSIS — M79.641 PAIN IN BOTH HANDS: ICD-10-CM

## 2021-01-12 DIAGNOSIS — M25.522 PAIN OF BOTH ELBOWS: Primary | ICD-10-CM

## 2021-01-12 DIAGNOSIS — M25.521 PAIN OF BOTH ELBOWS: ICD-10-CM

## 2021-01-12 PROCEDURE — 73130 X-RAY EXAM OF HAND: CPT | Mod: 50

## 2021-01-12 PROCEDURE — 99213 OFFICE O/P EST LOW 20 MIN: CPT | Performed by: INTERNAL MEDICINE

## 2021-01-12 RX ORDER — ZOLPIDEM TARTRATE 6.25 MG/1
6.25 TABLET, FILM COATED, EXTENDED RELEASE ORAL
Qty: 30 TABLET | Refills: 3 | Status: SHIPPED | OUTPATIENT
Start: 2021-01-12 | End: 2021-07-15

## 2021-01-12 ASSESSMENT — MIFFLIN-ST. JEOR: SCORE: 1539.96

## 2021-01-12 NOTE — PROGRESS NOTES
"  Assessment & Plan     Pain of both elbows  Nothing on the EMG, will check labs for tick disease and sed rate CRP and rheumatoid factor.  May need referral to rheumatology if nothing is found.  - XR Hand Bilateral G/E 3 Views; Future  - ESR: Erythrocyte sedimentation rate  - CRP, inflammation  - Rheumatoid factor  - Ehrlichia Anaplasma Sp by PCR  - Babesia Species by PCR    Pain in both hands  Hand pain we will check labs as mentioned above.  Also check x-rays of the hand for any joint erosions.  - XR Hand Bilateral G/E 3 Views; Future  - ESR: Erythrocyte sedimentation rate  - CRP, inflammation  - Rheumatoid factor  - Ehrlichia Anaplasma Sp by PCR  - Babesia Species by PCR    Insomnia due to medical condition  Insomnia we will continue Ambien refilled for the next 3 months.  - zolpidem ER (AMBIEN CR) 6.25 MG CR tablet; Take 1 tablet (6.25 mg) by mouth nightly as needed for sleep                       BMI:   Estimated body mass index is 25.79 kg/m  as calculated from the following:    Height as of this encounter: 1.651 m (5' 5\").    Weight as of this encounter: 70.3 kg (155 lb).             No follow-ups on file.    Jason García MD  [facility]BRANDO [facility]uesada is a 40 year old who presents to clinic today for the following health issues     HPI       Chief Complaint   Patient presents with     Elbow Pain     bilateral elbow pain     Elbows are painful.      EMG was ok, left median had mild carpel tunnel syndrome.      Hands swell, both sides, he is right handed.  Nighttime is worse with exerting strength, worse with more use.      Weight was up to 186, got exercising and got weight down to 155.       Daughter diagnosed with juvenile arthritis.     Legs were going numb but that has stopped.      No skin lesions.     Chronic sleep issues, ambien helps him, needs refill.     Past Medical History:   Diagnosis Date     [place]ident [place] 8/2014     Current Outpatient Medications " "  Medication     zolpidem ER (AMBIEN CR) 6.25 MG CR tablet     order for DME     No current facility-administered medications for this visit.      Social History     Tobacco Use     Smoking status: Former Smoker     Quit date: 2003     Years since quittin.7     Smokeless tobacco: Current User     Types: Chew   Substance Use Topics     Alcohol use: Yes     Alcohol/week: 0.0 standard drinks     Comment: occ.     Drug use: No         Review of Systems   CONSTITUTIONAL: NEGATIVE for fever, chills, change in weight  INTEGUMENTARY/SKIN: NEGATIVE for worrisome rashes, moles or lesions  EYES: NEGATIVE for vision changes or irritation  ENT/MOUTH: NEGATIVE for ear, mouth and throat problems  RESP: NEGATIVE for significant cough or SOB  CV: NEGATIVE for chest pain, palpitations or peripheral edema  GI: NEGATIVE for nausea, abdominal pain, heartburn, or change in bowel habits  : NEGATIVE for frequency, dysuria, or hematuria  MUSCULOSKELETAL:hand and elbow pains   NEURO: NEGATIVE for weakness, dizziness or paresthesias  ENDOCRINE: NEGATIVE for temperature intolerance, skin/hair changes  HEME: NEGATIVE for bleeding problems  PSYCHIATRIC: NEGATIVE for changes in mood or affect      Objective    /74   Pulse 78   Temp 97.1  F (36.2  C) (Temporal)   Resp 16   Ht 1.651 m (5' 5\")   Wt 70.3 kg (155 lb)   SpO2 100%   BMI 25.79 kg/m    Body mass index is 25.79 kg/m .  Physical Exam   No acute distress, obvious weight loss  Elbows are nontender without any swelling no olecranon bursitis today.  No tennis elbow  Hands and wrists have no swelling no nodes noted on the PIP or DIP joints.    Reviewed EMG and old labs.             "

## 2021-01-13 ENCOUNTER — TELEPHONE (OUTPATIENT)
Dept: INTERNAL MEDICINE | Facility: CLINIC | Age: 41
End: 2021-01-13

## 2021-01-13 NOTE — TELEPHONE ENCOUNTER
----- Message from Jason García MD sent at 1/13/2021 12:33 PM CST -----  Please let him know his x-rays look okay without significant arthritis.  Does show an old fracture or injury on the right little finger.

## 2021-01-13 NOTE — TELEPHONE ENCOUNTER
Called and LM for patient to call back. Please relay results below to patient from Dr. García.   Ciarra Rogel MA

## 2021-07-14 DIAGNOSIS — G47.01 INSOMNIA DUE TO MEDICAL CONDITION: ICD-10-CM

## 2021-07-14 NOTE — TELEPHONE ENCOUNTER
Zolpidem        Last Written Prescription Date:  1/12/2021  Last Fill Quantity: 30,   # refills: 3  Last Office Visit: 1/12/2021  Future Office visit:       Routing refill request to provider for review/approval because:  Drug not on the [facility], P or [facility] refill protocol or controlled substance

## 2021-07-15 RX ORDER — ZOLPIDEM TARTRATE 6.25 MG/1
TABLET, FILM COATED, EXTENDED RELEASE ORAL
Qty: 30 TABLET | Refills: 3 | Status: SHIPPED | OUTPATIENT
Start: 2021-07-15 | End: 2021-10-26

## 2021-08-05 ENCOUNTER — NURSE TRIAGE (OUTPATIENT)
Dept: INTERNAL MEDICINE | Facility: CLINIC | Age: 41
End: 2021-08-05

## 2021-08-05 NOTE — TELEPHONE ENCOUNTER
Reason for Disposition    Cough has been present for > 3 weeks    Additional Information    Negative: Bluish (or gray) lips or face    Negative: Severe difficulty breathing (e.g., struggling for each breath, speaks in single words)    Negative: Rapid onset of cough and has hives    Negative: Coughing started suddenly after medicine, an allergic food or bee sting    Negative: Difficulty breathing after exposure to flames, smoke, or fumes    Negative: Sounds like a life-threatening emergency to the triager    Negative: Previous asthma attacks and this feels like asthma attack    Negative: Chest pain present when not coughing    Negative: Difficulty breathing    Negative: Passed out (i.e., fainted, collapsed and was not responding)    Negative: Patient sounds very sick or weak to the triager    Negative: Coughed up > 1 tablespoon (15 ml) blood (Exception: blood-tinged sputum)    Negative: Fever > 103 F (39.4 C)    Negative: Fever > 101 F (38.3 C) and over 60 years of age    Negative: Fever > 100.0 F (37.8 C) and has diabetes mellitus or a weak immune system (e.g., HIV positive, cancer chemotherapy, organ transplant, splenectomy, chronic steroids)    Negative: Fever > 100.0 F (37.8 C) and bedridden (e.g., nursing home patient, stroke, chronic illness, recovering from surgery)    Negative: Increasing ankle swelling    Negative: Wheezing is present    Negative: SEVERE coughing spells (e.g., whooping sound after coughing, vomiting after coughing)    Negative: Coughing up ted-colored (reddish-brown) or blood-tinged sputum    Negative: Fever present > 3 days (72 hours)    Negative: Fever returns after gone for over 24 hours and symptoms worse or not improved    Negative: Using nasal washes and pain medicine > 24 hours and sinus pain persists    Negative: Known COPD or other severe lung disease (i.e., bronchiectasis, cystic fibrosis, lung surgery) and worsening symptoms (i.e., increased sputum purulence or amount,  increased breathing difficulty)    Negative: Continuous (nonstop) coughing interferes with work or school and no improvement using cough treatment per Care Advice    Negative: Patient wants to be seen    Protocols used: COUGH-A-OH

## 2021-09-28 ENCOUNTER — VIRTUAL VISIT (OUTPATIENT)
Dept: FAMILY MEDICINE | Facility: OTHER | Age: 41
End: 2021-09-28
Payer: COMMERCIAL

## 2021-09-28 DIAGNOSIS — F32.1 MODERATE MAJOR DEPRESSION (H): ICD-10-CM

## 2021-09-28 DIAGNOSIS — F41.1 GAD (GENERALIZED ANXIETY DISORDER): Primary | ICD-10-CM

## 2021-09-28 PROCEDURE — 99214 OFFICE O/P EST MOD 30 MIN: CPT | Mod: 95 | Performed by: PHYSICIAN ASSISTANT

## 2021-09-28 RX ORDER — HYDROXYZINE PAMOATE 50 MG/1
50-100 CAPSULE ORAL 3 TIMES DAILY PRN
Qty: 60 CAPSULE | Refills: 1 | Status: SHIPPED | OUTPATIENT
Start: 2021-09-28 | End: 2021-11-26

## 2021-09-28 RX ORDER — CITALOPRAM HYDROBROMIDE 10 MG/1
10 TABLET ORAL EVERY MORNING
Qty: 30 TABLET | Refills: 1 | Status: SHIPPED | OUTPATIENT
Start: 2021-09-28 | End: 2021-10-26

## 2021-09-28 ASSESSMENT — ANXIETY QUESTIONNAIRES
5. BEING SO RESTLESS THAT IT IS HARD TO SIT STILL: NEARLY EVERY DAY
6. BECOMING EASILY ANNOYED OR IRRITABLE: NEARLY EVERY DAY
7. FEELING AFRAID AS IF SOMETHING AWFUL MIGHT HAPPEN: NEARLY EVERY DAY
1. FEELING NERVOUS, ANXIOUS, OR ON EDGE: NEARLY EVERY DAY
3. WORRYING TOO MUCH ABOUT DIFFERENT THINGS: NEARLY EVERY DAY
2. NOT BEING ABLE TO STOP OR CONTROL WORRYING: NEARLY EVERY DAY
IF YOU CHECKED OFF ANY PROBLEMS ON THIS QUESTIONNAIRE, HOW DIFFICULT HAVE THESE PROBLEMS MADE IT FOR YOU TO DO YOUR WORK, TAKE CARE OF THINGS AT HOME, OR GET ALONG WITH OTHER PEOPLE: EXTREMELY DIFFICULT
GAD7 TOTAL SCORE: 21

## 2021-09-28 ASSESSMENT — PATIENT HEALTH QUESTIONNAIRE - PHQ9
SUM OF ALL RESPONSES TO PHQ QUESTIONS 1-9: 24
5. POOR APPETITE OR OVEREATING: NEARLY EVERY DAY

## 2021-09-28 NOTE — PATIENT INSTRUCTIONS
- Start Celexa 10 mg, 1 tablet in AM   - Hydroxyzine 1-2 tablets up to three times a day as needed for anxiety/sleep   - Recheck 1 month

## 2021-09-28 NOTE — PROGRESS NOTES
Dipak is a 40 year old who is being evaluated via a billable telephone visit.      What phone number would you like to be contacted at? [phone]  How would you like to obtain your AVS? Mail a copy    Assessment & Plan     ICD-10-CM    1. JESSICA (generalized anxiety disorder)  F41.1 hydrOXYzine (VISTARIL) 50 MG capsule     citalopram (CELEXA) 10 MG tablet   2. Moderate major depression (H)  F32.1 hydrOXYzine (VISTARIL) 50 MG capsule     citalopram (CELEXA) 10 MG tablet      - New diagnosis      Thinks has struggled for years (after losing his leg) but worse in last 2 weeks since found out wife is having an affair   - Discussed SSRI therapy at length     Will start low dose Celexa 10 mg, discussed use and side effects   - Will also give Hydroxyzine to use for panic/sleep      Discussed use and side effects  - Is already seeking out counseling, going alone and with wife      Encouraged this   - Encouraged cutting back on ETOH intake    - Recheck monthly until stable       Review of the result(s) of each unique test - PHQ9 & GAD7   Diagnosis or treatment significantly limited by social determinants of health - Depression     20 minutes spent on the date of the encounter doing chart review, history and exam, documentation and further activities as noted above    The patient indicates understanding of these issues and agrees with the plan.    Return in about 1 month (around 10/28/2021).    JOSE Sherman [facility]   Dipak is a 40 year old who presents for the following health issues     HPI   Abnormal Mood Symptoms  Onset/Duration: 2 weeks  Description: found out wife was having an affair   Depression (if yes, do PHQ-9): YES  Anxiety (if yes, do JESSICA-7): YES  Accompanying Signs & Symptoms:  Still participating in activities that you used to enjoy: YES  Fatigue: YES  Irritability: YES  Difficulty concentrating: YES  Changes in appetite: YES  Problems with  sleep: YES  Heart racing/beating fast: YES  Abnormally elevated, expansive, or irritable mood: YES  Persistently increased activity or energy: no  Thoughts of hurting yourself or others: no  History:  Recent stress or major life event: YES - found out wife was having an affair  Prior depression or anxiety: Not diagnosed but did lose his leg in 2014 and feels that his attitude has been very poor  Family history of depression or anxiety: YES - his mother   Alcohol/drug use: YES - alcohol   Difficulty sleeping: YES  Precipitating or alleviating factors: None  Therapies tried and outcome: none    - Trying to work on things, marriage counseling   - 2 small children   - Mood was bad before this, thinks bad since lost leg     Poor outlook on life due to this, lying to himself about this   - Stopped drinking   - Not sleeping   - Panic attacks - can't breathe, start sweating, heart feels like going to beat out of chest, last 10-15 min, other times seems to last all day   - Mom has depression/anxiety       PHQ 9/28/2021   PHQ-9 Total Score 24   Q9: Thoughts of better off dead/self-harm past 2 weeks Not at all     JESSICA-7 SCORE 9/28/2021   Total Score 21           Review of Systems   Constitutional, HEENT, cardiovascular, pulmonary, gi and gu systems are negative, except as otherwise noted.      Objective       Vitals:  No vitals were obtained today due to virtual visit.    Physical Exam   healthy, alert and no distress  PSYCH: Alert and oriented times 3; coherent speech, normal   rate and volume, able to articulate logical thoughts, able   to abstract reason, no tangential thoughts, no hallucinations   or delusions  His affect is normal  RESP: No cough, no audible wheezing, able to talk in full sentences  Remainder of exam unable to be completed due to telephone visits    Diagnostics: None         Phone call duration: 13 minutes and 15 seconds

## 2021-09-29 ASSESSMENT — ANXIETY QUESTIONNAIRES: GAD7 TOTAL SCORE: 21

## 2021-09-30 ENCOUNTER — TELEPHONE (OUTPATIENT)
Dept: INTERNAL MEDICINE | Facility: CLINIC | Age: 41
End: 2021-09-30

## 2021-09-30 NOTE — TELEPHONE ENCOUNTER
Yes, decrease or stop Vistaril. It is a PRN medication so doesn't have to take that high of an amount. But if having these thoughts, then should just stop and wait for the Celexa to work    Kong Farmer PA-C  [facility]

## 2021-09-30 NOTE — TELEPHONE ENCOUNTER
Patient is still taking medication says it works well for him other than the thoughts which he has not had today.  He will try to decrease to 2 a day instead of 3 and keep us posted on how he is feeling.

## 2021-09-30 NOTE — TELEPHONE ENCOUNTER
Received call from patient. He had a question about taking his medications. He began taking the Celexa two days ago.    Noticed after taking the newly prescribed Vistaril he was very sleepy, and while it was effective for his anxiety he noticed what he thought was a change in his thought process. After taking the full 100 mg x3 times in a day he had the thought and desire to ask his wife for a divorce which was something he says he had never thought about or asked her for. He felt this was different than he would have done without taking the Vistaril.    Discussed that sometimes medications that are sedating can cause changes in thinking and action, and advised he stop taking the Vistaril at this time.    Routing to provider for further recommendation.    Adrien Camargo, RN, BSN

## 2021-10-19 PROBLEM — F32.9 MAJOR DEPRESSION: Status: ACTIVE | Noted: 2021-09-28

## 2021-10-26 ENCOUNTER — VIRTUAL VISIT (OUTPATIENT)
Dept: FAMILY MEDICINE | Facility: OTHER | Age: 41
End: 2021-10-26
Payer: COMMERCIAL

## 2021-10-26 DIAGNOSIS — F41.1 GAD (GENERALIZED ANXIETY DISORDER): ICD-10-CM

## 2021-10-26 DIAGNOSIS — F32.1 MODERATE MAJOR DEPRESSION (H): ICD-10-CM

## 2021-10-26 DIAGNOSIS — G47.01 INSOMNIA DUE TO MEDICAL CONDITION: ICD-10-CM

## 2021-10-26 PROCEDURE — 99214 OFFICE O/P EST MOD 30 MIN: CPT | Mod: 95 | Performed by: PHYSICIAN ASSISTANT

## 2021-10-26 RX ORDER — CITALOPRAM HYDROBROMIDE 10 MG/1
10 TABLET ORAL EVERY MORNING
Qty: 30 TABLET | Refills: 1 | Status: SHIPPED | OUTPATIENT
Start: 2021-10-26 | End: 2021-12-14

## 2021-10-26 RX ORDER — ZOLPIDEM TARTRATE 6.25 MG/1
6.25 TABLET, FILM COATED, EXTENDED RELEASE ORAL
Qty: 30 TABLET | Refills: 5 | Status: SHIPPED | OUTPATIENT
Start: 2021-11-05 | End: 2022-05-12

## 2021-10-26 ASSESSMENT — PATIENT HEALTH QUESTIONNAIRE - PHQ9
SUM OF ALL RESPONSES TO PHQ QUESTIONS 1-9: 12
5. POOR APPETITE OR OVEREATING: NEARLY EVERY DAY

## 2021-10-26 ASSESSMENT — ANXIETY QUESTIONNAIRES
1. FEELING NERVOUS, ANXIOUS, OR ON EDGE: MORE THAN HALF THE DAYS
3. WORRYING TOO MUCH ABOUT DIFFERENT THINGS: NEARLY EVERY DAY
GAD7 TOTAL SCORE: 16
IF YOU CHECKED OFF ANY PROBLEMS ON THIS QUESTIONNAIRE, HOW DIFFICULT HAVE THESE PROBLEMS MADE IT FOR YOU TO DO YOUR WORK, TAKE CARE OF THINGS AT HOME, OR GET ALONG WITH OTHER PEOPLE: SOMEWHAT DIFFICULT
6. BECOMING EASILY ANNOYED OR IRRITABLE: MORE THAN HALF THE DAYS
5. BEING SO RESTLESS THAT IT IS HARD TO SIT STILL: NEARLY EVERY DAY
2. NOT BEING ABLE TO STOP OR CONTROL WORRYING: NEARLY EVERY DAY
7. FEELING AFRAID AS IF SOMETHING AWFUL MIGHT HAPPEN: NOT AT ALL

## 2021-10-26 ASSESSMENT — PAIN SCALES - GENERAL: PAINLEVEL: NO PAIN (0)

## 2021-10-26 NOTE — PROGRESS NOTES
Dipak is a 40 year old who is being evaluated via a billable telephone visit.      What phone number would you like to be contacted at? [phone]  How would you like to obtain your AVS? MyChart    Assessment & Plan     ICD-10-CM    1. JESSICA (generalized anxiety disorder)  F41.1 citalopram (CELEXA) 10 MG tablet   2. Moderate major depression (H)  F32.1 citalopram (CELEXA) 10 MG tablet   3. Insomnia due to medical condition  G47.01 zolpidem ER (AMBIEN CR) 6.25 MG CR tablet      - New diagnosis at last visit on 9/28/21      Thinks has struggled for years (after losing his leg) but worse since found out wife is having an affair      Was started on Celexa 10 mg and PRN Hydroxyzine      This has gone well on the Celexa but had weird side effects from Hydroxyzine so got rid of it      Discussed increase of Celexa vs. Giving more time since only been 1 month      Patient would like to give more time, reports better outlook on life now, getting  but this is a good thing   - Continues counseling, encouraged this   - Encouraged cutting back on ETOH intake    - Recheck 1-2 months   - Ambien       reviewed, was getting from previous provider      States has needed most nights since lost leg      Reviewed use and side effects, refilled     Recheck every 6 months for this medication due to controlled substance      Review of the result(s) of each unique test - PHQ9 & GAD7   Diagnosis or treatment significantly limited by social determinants of health - Depression     20 minutes spent on the date of the encounter doing chart review, history and exam, documentation and further activities as noted above    The patient indicates understanding of these issues and agrees with the plan.    Return in about 6 weeks (around 12/7/2021) for Recheck.    JOSE Sherman [facility]   Dipak is a 40 year old who presents for the following health issues     HPI     Depression  and Anxiety Follow-Up    How are you doing with your depression since your last visit? Improved     How are you doing with your anxiety since your last visit?  Improved     Are you having other symptoms that might be associated with depression or anxiety? Yes:  not sleeping    Have you had a significant life event? No     Do you have any concerns with your use of alcohol or other drugs? No    - Hydroxyzine tablets - was taking 3/day   - Had side effects so threw away     - Things going better at home     Getting       But this is a good thing      Kids are doing ok     - Ambien off and on since lost leg      Takes nightly (5-6 nights per week)      No side effects            Social History     Tobacco Use     Smoking status: Former Smoker     Quit date: 2003     Years since quittin.5     Smokeless tobacco: Current User     Types: Chew   Vaping Use     Vaping Use: Never used   Substance Use Topics     Alcohol use: Yes     Alcohol/week: 0.0 standard drinks     Comment: occ.     Drug use: No     PHQ 2021 10/26/2021   PHQ-9 Total Score 24 12   Q9: Thoughts of better off dead/self-harm past 2 weeks Not at all Not at all     JESSICA-7 SCORE 2021 10/26/2021   Total Score 21 16     Last PHQ-9 10/26/2021   1.  Little interest or pleasure in doing things 2   2.  Feeling down, depressed, or hopeless 0   3.  Trouble falling or staying asleep, or sleeping too much 3   4.  Feeling tired or having little energy 0   5.  Poor appetite or overeating 2   6.  Feeling bad about yourself 0   7.  Trouble concentrating 3   8.  Moving slowly or restless 2   Q9: Thoughts of better off dead/self-harm past 2 weeks 0   PHQ-9 Total Score 12   Difficulty at work, home, or with people Somewhat difficult     JESSICA-7  10/26/2021   1. Feeling nervous, anxious, or on edge 2   2. Not being able to stop or control worrying 3   3. Worrying too much about different things 3   4. Trouble relaxing 3   5. Being so restless that it is  hard to sit still 3   6. Becoming easily annoyed or irritable 2   7. Feeling afraid, as if something awful might happen 0   JESSICA-7 Total Score 16   If you checked any problems, how difficult have they made it for you to do your work, take care of things at home, or get along with other people? Somewhat difficult       Review of Systems   Constitutional, HEENT, cardiovascular, pulmonary, gi and gu systems are negative, except as otherwise noted.      Objective    Vitals - Patient Reported  Pain Score: No Pain (0)  Vitals:  No vitals were obtained today due to virtual visit.    Physical Exam   healthy, alert and no distress  PSYCH: Alert and oriented times 3; coherent speech, normal   rate and volume, able to articulate logical thoughts, able   to abstract reason, no tangential thoughts, no hallucinations   or delusions  His affect is normal  RESP: No cough, no audible wheezing, able to talk in full sentences  Remainder of exam unable to be completed due to telephone visits    Diagnostics: None         Phone call duration: 7 minutes and 30 seconds

## 2021-10-27 ASSESSMENT — ANXIETY QUESTIONNAIRES: GAD7 TOTAL SCORE: 16

## 2021-11-26 ENCOUNTER — VIRTUAL VISIT (OUTPATIENT)
Dept: FAMILY MEDICINE | Facility: CLINIC | Age: 41
End: 2021-11-26
Payer: COMMERCIAL

## 2021-11-26 ENCOUNTER — NURSE TRIAGE (OUTPATIENT)
Dept: NURSING | Facility: CLINIC | Age: 41
End: 2021-11-26
Payer: COMMERCIAL

## 2021-11-26 DIAGNOSIS — R05.3 PERSISTENT COUGH: Primary | ICD-10-CM

## 2021-11-26 DIAGNOSIS — R10.31 ABDOMINAL PAIN, RIGHT LOWER QUADRANT: ICD-10-CM

## 2021-11-26 DIAGNOSIS — R06.02 SHORTNESS OF BREATH: ICD-10-CM

## 2021-11-26 PROCEDURE — 99207 PR NO BILLABLE SERVICE THIS VISIT: CPT | Performed by: FAMILY MEDICINE

## 2021-11-26 NOTE — TELEPHONE ENCOUNTER
Triage Call:     3 weeks ago patient developed a cough  Not vaccinated  Dry Cough continuously   He reports that he is an active person and has noticed that when he exerts himself that he has SOB.   Pt stated he had COVID 1.5 years ago, but this feels different and more mild    Disposition: Call PCP within 24 hours. Pt was given care advice for his symptoms and was transferred to scheduling to make a virtual appt. With a provider to discuss.     Soledad Cramer RN  [facility] Nurse Advisor 10:54 AM 11/26/2021      Reason for Disposition    [1] Continuous (nonstop) coughing interferes with work or school AND [2] no improvement using cough treatment per protocol    Additional Information    Negative: SEVERE difficulty breathing (e.g., struggling for each breath, speaks in single words)    Negative: Difficult to awaken or acting confused (e.g., disoriented, slurred speech)    Negative: Bluish (or gray) lips or face now    Negative: Shock suspected (e.g., cold/pale/clammy skin, too weak to stand, low BP, rapid pulse)    Negative: Sounds like a life-threatening emergency to the triager    Negative: [1] COVID-19 exposure AND [2] no symptoms    Negative: COVID-19 vaccine reaction suspected (e.g., fever, headache, muscle aches) occurring 1 to 3 days after getting vaccine    Negative: COVID-19 vaccine, questions about    Negative: [1] Lives with someone known to have influenza (flu test positive) AND [2] flu-like symptoms (e.g., cough, runny nose, sore throat, SOB; with or without fever)    Negative: [1] Adult with possible COVID-19 symptoms AND [2] triager concerned about severity of symptoms or other causes    Negative: COVID-19 and breastfeeding, questions about    Negative: SEVERE or constant chest pain or pressure (Exception: mild central chest pain, present only when coughing)    Negative: MODERATE difficulty breathing (e.g., speaks in phrases, SOB even at rest, pulse 100-120)    Negative: [1] Headache AND [2] stiff  neck (can't touch chin to chest)    Negative: MILD difficulty breathing (e.g., minimal/no SOB at rest, SOB with walking, pulse <100)    Negative: Chest pain or pressure    Negative: Patient sounds very sick or weak to the triager    Negative: Fever > 103 F (39.4 C)    Negative: [1] Fever > 101 F (38.3 C) AND [2] age > 60 years    Negative: [1] Fever > 100.0 F (37.8 C) AND [2] bedridden (e.g., nursing home patient, CVA, chronic illness, recovering from surgery)    Negative: HIGH RISK for severe COVID complications (e.g., age > 64 years, obesity with BMI > 25, pregnant, chronic lung disease or other chronic medical condition)  (Exception: Already seen by PCP and no new or worsening symptoms.)    Negative: [1] HIGH RISK patient AND [2] influenza is widespread in the community AND [3] ONE OR MORE respiratory symptoms: cough, sore throat, runny or stuffy nose    Negative: [1] HIGH RISK patient AND [2] influenza exposure within the last 7 days AND [3] ONE OR MORE respiratory symptoms: cough, sore throat, runny or stuffy nose    Negative: [1] COVID-19 infection suspected by caller or triager AND [2] mild symptoms (cough, fever, or others) AND [3] negative COVID-19 rapid test    Negative: Fever present > 3 days (72 hours)    Negative: [1] Fever returns after gone for over 24 hours AND [2] symptoms worse or not improved    Protocols used: CORONAVIRUS (COVID-19) DIAGNOSED OR [id number] 8.25.2021    COVID 19 Nurse Triage Plan/Patient Instructions    Please be aware that novel coronavirus (COVID-19) may be circulating in the community. If you develop symptoms such as fever, cough, or SOB or if you have concerns about the presence of another infection including coronavirus (COVID-19), please contact your health care provider or visit [email or web address].     Disposition/Instructions    Virtual Visit with provider recommended. Reference Visit Selection Guide.    Thank you for taking steps to prevent the spread of  this virus.  o Limit your contact with others.  o Wear a simple mask to cover your cough.  o Wash your hands well and often.    Resources    [facility]: About COVID-19: [email or web address]    CDC: What to Do If You're Sick: www.cdc.gov/coronavirus/2019-ncov/about/steps-when-sick.html    CDC: Ending Home Isolation: www.cdc.gov/coronavirus/2019-ncov/hcp/disposition-in-home-patients.html     CDC: Caring for Someone: www.cdc.gov/coronavirus/2019-ncov/if-you-are-sick/care-for-someone.html     [facility]: Interim Guidance for Hospital Discharge to Home: [email or web address]/[email or web address]    [facility] clinical trials (COVID-19 research studies): [email or web address]     Below are the COVID-19 hotlines at the [place] [facility] ([facility]). Interpreters are available.   o For health questions: Call 588-592-3264 or 1-779.640.4505 (7 a.m. to 7 p.m.)  o For questions about schools and childcare: Call 789-603-4823 or 1-407.342.3740 (7 a.m. to 7 p.m.)

## 2021-11-26 NOTE — PATIENT INSTRUCTIONS
Patient Education     Abdominal Pain  Abdominal pain is pain in the stomach or belly area. Everyone has this pain from time to time. In many cases it goes away on its own. But abdominal pain can sometimes be due to a serious problem, such as appendicitis. So it s important to know when to get help.    Causes of abdominal pain  There are many possible causes of abdominal pain. Common causes in adults include:    Constipation, diarrhea, or gas    Stomach acid flowing back up into the esophagus (acid reflux or heartburn)    Severe acid reflux, called GERD (gastroesophageal reflux disease)    A sore in the lining of the stomach or small intestine (peptic ulcer)    Inflammation of the gallbladder, liver, or pancreas    Gallstones or kidney stones    Appendicitis     Intestinal blockage     An internal organ pushing through a muscle or other tissue (hernia)    Urinary tract infections    In women, menstrual cramps, fibroids, ovarian cysts, pelvic inflammatory disease, or endometriosis    Inflammation or infection of the intestines, including Crohn's disease and ulcerative colitis    Irritable bowel syndrome  Diagnosing the cause of abdominal pain  Your healthcare provider will give you a physical exam help find the cause of your pain. If needed, you will have tests. Belly pain has many possible causes. So it can be hard to find the reason for your pain. Giving details about your pain can help. Tell your provider where and when you feel the pain, and what makes it better or worse. Also let your provider know if you have other symptoms such as:    Fever    Tiredness    Upset stomach (nausea)    Vomiting    Changes in bathroom habits    Blood in the stool or black, tarry stool    Weight loss that you can't explain (involuntary weight loss?)  Also report any family history of stomach or intestinal problems, or cancers. Tell your provider about all your alcohol use and drug use. Tell your provider about all medicines you  use, including herbs, vitamins, and supplements.  Treating abdominal pain  Some causes of pain need emergency medical treatment right away. These include appendicitis or a bowel blockage. Other problems can be treated with rest, fluids, or medicines. Your healthcare provider can give you specific instructions for treatment or self-care based on what is causing your pain.     If you have vomiting or diarrhea, sip water or other clear fluids. When you are ready to eat solid foods again, start with small amounts of easy-to-digest, low-fat foods. These include apple sauce, toast, or crackers.  When to get medical care  Call 911 or go to the hospital right away if you:    Can t pass stool and are vomiting    Are vomiting blood or have bloody diarrhea or black, tarry diarrhea    Have chest, neck, or shoulder pain    Feel like you might pass out    Have pain in your shoulder blades with nausea    Have sudden, severe belly pain    Have new, severe pain unlike any you have felt before    Have a belly that is rigid, hard, and hurts to touch  Call your healthcare provider if you have:    Pain for more than 5 days    Bloating for more than 2 days    Diarrhea for more than 5 days    A fever of 100.4 F (38 C) or higher, or as directed by your healthcare provider    Pain that gets worse    Weight loss for no reason    Continued lack of appetite    Blood in your stool  How to prevent abdominal pain  Here are some tips to help prevent abdominal pain:    Eat smaller amounts of food at each meal.    Don't eat greasy, fried, or other high-fat foods.    Don't eat foods that give you gas.    Exercise regularly.    Drink plenty of fluids.  To help prevent GERD symptoms:    Quit smoking.    Reduce alcohol and foods that increase stomach acid.    Don't use aspirin or over-the-counter pain and fever medicines, if possible. This includes nonsteroidal anti-inflammatory drugs (NSAIDs).    Lose excess weight.    Finish eating at least 2 hours  before you go to bed or lie down.    Raise the head of your bed.  [facility] last reviewed this educational content on 4/1/2019 2000-2021 [employer]. All rights reserved. This information is not intended as a substitute for professional medical care. Always follow your healthcare professional's instructions.           Patient Education     Chronic Cough with Uncertain Cause (Adult)  Everyone has had a cough as part of the common cold, flu, or bronchitis. This kind of cough occurs along with an achy feeling, low-grade fever, nasal and sinus congestion, and a scratchy or sore throat. This usually gets better in 2 to 3 weeks. A cough that lasts longer than 3 weeks may be due to other causes. Your healthcare provider may refer to this as a chronic cough.   If your cough does not improve over the next 2 weeks, further testing may be needed. Follow up with your healthcare provider as advised. Cough suppressants may be recommended. Based on your exam today, the exact cause of your cough is not certain. Below are some common causes for persistent cough.   Smoker's cough  Smoker s cough doesn t go away. If you continue to smoke, it only gets worse. The cough is from irritation in the air passages. Talk to your healthcare provider about quitting. Medicines or nicotine-replacement products, like gum or the patch, may make quitting easier.   Postnasal drip  A cough that is worse at night may be due to postnasal drip. Excess mucus in the nose drains from the back of your nose to your throat. This triggers the cough reflex. Postnasal drip may be due to a sinus infection or allergy. Common allergens include dust, tobacco smoke (both inhaled and secondhand smoke), environmental pollutants, pollen, mold, pets, cleaning agents, room deodorizers, and chemical fumes. Over-the-counter antihistamines or decongestants may be helpful for allergies. A sinus infection may requires antibiotic treatment. See your healthcare  provider if symptoms continue.     Medicines  Certain prescribed medicines can cause a chronic cough in some people:    ACE inhibitors for high blood pressure. These include benazepril, captopril, enalapril, fosinopril, lisinopril, quinapril, ramipril, and others.    Beta-blockers for high blood pressure and other conditions. These include propranolol, atenolol, metoprolol, nadolol, and others.  Let your healthcare provider know if you are taking any of these. The chronic cough may mean your medicine needs to be changed.   Asthma  Cough may be the only sign of mild asthma. You may have tests to find out if asthma is causing your cough. You may also take asthma medicine on a trial basis.   Acid reflux (heartburn, GERD)  The esophagus is the tube that carries food from the mouth to the stomach. A valve at its lower end prevents stomach acids from flowing upward. If this valve does not work properly, acid from the stomach enters the esophagus. This may cause a burning pain in the upper abdomen or lower chest, belching, or cough. Symptoms are often worse when lying flat. Avoid eating or drinking before bedtime. Try using extra pillows to raise your upper body, or place 4-inch blocks under the head of your bed. You may try an over-the-counter (OTC) antacid or an acid-blocking medicine such as famotidine, cimetidine, esomeprazole, lansoprazole, or omeprazole. Stronger medicines for this condition can be prescribed by your healthcare provider. Ask your healthcare provider which OTC medicine to use. Depending on your current medicines, some OTC medicines may cause drug interactions and should be avoided.   Follow-up care  Follow up with your healthcare provider, or as advised, if your cough does not improve. Further testing may be needed.   Note: If an X-ray was taken, a specialist will review it. You will be notified of any new findings that may affect your care.   When to seek medical advice  Call your healthcare provider  right away if any of these occur:    Mild wheezing or difficulty breathing    Fever of 100.4 F (38 C) or higher, or as directed by your healthcare provider    Unexpected weight loss    Coughing up large amounts of colored sputum or blood-tinged sputum    Night sweats (sheets and pajamas get soaking wet)  Call 911  Call 911 if any of these occur:     Coughing up blood    Moderate to severe trouble breathing or wheezing  [employer] last reviewed this educational content on 6/1/2018 2000-2021 [employer]. All rights reserved. This information is not intended as a substitute for professional medical care. Always follow your healthcare professional's instructions.

## 2021-11-26 NOTE — PROGRESS NOTES
"Dipak is a 41 year old who is being evaluated via a billable telephone visit.      What phone number would you like to be contacted at? home  How would you like to obtain your AVS? MyChart    Assessment & Plan     Persistent cough  Recommend evaluation in clinic with exam and chest xray     Shortness of breath  Worsening and this requires in person check.     Abdominal pain, right lower quadrant  Unable to stand due to pain. Recommend emergency department evaluation and treatment at this time.               BMI:   Estimated body mass index is 25.79 kg/m  as calculated from the following:    Height as of 1/12/21: 1.651 m (5' 5\").    Weight as of 1/12/21: 70.3 kg (155 lb).       CONSULTATION/REFERRAL to emergency department   FUTURE APPOINTMENTS:       - Follow-up visit in 1-2 weeks with primary care provider   See Patient Instructions    No follow-ups on file.    Kathrine Solis MD  [facility]    Alicia Quesada is a 41 year old who presents for the following health issues  accompanied by his .    HPI     Cough for past month that has been getting worse, non-productive with increased dyspnea on exertion. Dyspneic last night while trying to sleep. No fever or chills. No exposure to COVID but daughter had hand-foot and mouth disease last month. Also developed right lower abdominal pain in the last 4 hours.          Review of Systems   Constitutional, HEENT, cardiovascular, pulmonary, gi and gu systems are negative, except as otherwise noted.      Objective           Vitals:  No vitals were obtained today due to virtual visit.    Physical Exam   healthy, alert and no distress  PSYCH: Alert and oriented times 3; coherent speech, normal   rate and volume, able to articulate logical thoughts, able   to abstract reason, no tangential thoughts, no hallucinations   or delusions  His affect is normal  RESP: No cough, no audible wheezing, able to talk in full sentences  Remainder of exam " unable to be completed due to telephone visits    Orders Only on 08/06/2020   Component Date Value Ref Range Status     Hemoglobin A1C 08/06/2020 5.2  0 - 5.6 % Final    Comment: Normal <5.7% Prediabetes 5.7-6.4%  Diabetes 6.5% or higher - adopted from ADA   consensus guidelines.       Glucose 08/06/2020 77  70 - 99 mg/dL Final               Phone call duration: 8 minutes

## 2021-11-29 ENCOUNTER — HOSPITAL ENCOUNTER (OUTPATIENT)
Dept: GENERAL RADIOLOGY | Facility: CLINIC | Age: 41
Discharge: HOME | End: 2021-11-29
Attending: FAMILY MEDICINE | Admitting: FAMILY MEDICINE
Payer: COMMERCIAL

## 2021-11-29 ENCOUNTER — OFFICE VISIT (OUTPATIENT)
Dept: FAMILY MEDICINE | Facility: CLINIC | Age: 41
End: 2021-11-29
Payer: COMMERCIAL

## 2021-11-29 VITALS
HEART RATE: 107 BPM | RESPIRATION RATE: 10 BRPM | OXYGEN SATURATION: 100 % | WEIGHT: 164 LBS | TEMPERATURE: 98.1 F | DIASTOLIC BLOOD PRESSURE: 80 MMHG | SYSTOLIC BLOOD PRESSURE: 106 MMHG | BODY MASS INDEX: 27.29 KG/M2

## 2021-11-29 DIAGNOSIS — I51.7 CARDIOMEGALY: ICD-10-CM

## 2021-11-29 DIAGNOSIS — R05.9 COUGH: Primary | ICD-10-CM

## 2021-11-29 DIAGNOSIS — R05.9 COUGH: ICD-10-CM

## 2021-11-29 LAB
ALBUMIN SERPL-MCNC: 3.3 G/DL (ref 3.4–5)
ALP SERPL-CCNC: 63 U/L (ref 40–150)
ALT SERPL W P-5'-P-CCNC: 66 U/L (ref 0–70)
ANION GAP SERPL CALCULATED.3IONS-SCNC: 1 MMOL/L (ref 3–14)
AST SERPL W P-5'-P-CCNC: 24 U/L (ref 0–45)
BASOPHILS # BLD AUTO: 0.1 10E3/UL (ref 0–0.2)
BASOPHILS NFR BLD AUTO: 1 %
BILIRUB SERPL-MCNC: 0.5 MG/DL (ref 0.2–1.3)
BUN SERPL-MCNC: 11 MG/DL (ref 7–30)
CALCIUM SERPL-MCNC: 8.3 MG/DL (ref 8.5–10.1)
CHLORIDE BLD-SCNC: 108 MMOL/L (ref 94–109)
CO2 SERPL-SCNC: 30 MMOL/L (ref 20–32)
CREAT SERPL-MCNC: 0.99 MG/DL (ref 0.66–1.25)
CRP SERPL-MCNC: 4.7 MG/L (ref 0–8)
D DIMER PPP FEU-MCNC: 0.45 UG/ML FEU (ref 0–0.5)
EOSINOPHIL # BLD AUTO: 0.1 10E3/UL (ref 0–0.7)
EOSINOPHIL NFR BLD AUTO: 1 %
ERYTHROCYTE [DISTWIDTH] IN BLOOD BY AUTOMATED COUNT: 13.4 % (ref 10–15)
ERYTHROCYTE [SEDIMENTATION RATE] IN BLOOD BY WESTERGREN METHOD: 6 MM/HR (ref 0–15)
GFR SERPL CREATININE-BSD FRML MDRD: >90 ML/MIN/1.73M2
GLUCOSE BLD-MCNC: 115 MG/DL (ref 70–99)
HCT VFR BLD AUTO: 42 % (ref 40–53)
HGB BLD-MCNC: 14.1 G/DL (ref 13.3–17.7)
IMM GRANULOCYTES # BLD: 0 10E3/UL
IMM GRANULOCYTES NFR BLD: 0 %
LYMPHOCYTES # BLD AUTO: 1.8 10E3/UL (ref 0.8–5.3)
LYMPHOCYTES NFR BLD AUTO: 20 %
MCH RBC QN AUTO: 32.6 PG (ref 26.5–33)
MCHC RBC AUTO-ENTMCNC: 33.6 G/DL (ref 31.5–36.5)
MCV RBC AUTO: 97 FL (ref 78–100)
MONOCYTES # BLD AUTO: 0.5 10E3/UL (ref 0–1.3)
MONOCYTES NFR BLD AUTO: 6 %
NEUTROPHILS # BLD AUTO: 6.3 10E3/UL (ref 1.6–8.3)
NEUTROPHILS NFR BLD AUTO: 72 %
NRBC # BLD AUTO: 0 10E3/UL
NRBC BLD AUTO-RTO: 0 /100
PLATELET # BLD AUTO: 254 10E3/UL (ref 150–450)
POTASSIUM BLD-SCNC: 4.3 MMOL/L (ref 3.4–5.3)
PROT SERPL-MCNC: 6.8 G/DL (ref 6.8–8.8)
RBC # BLD AUTO: 4.33 10E6/UL (ref 4.4–5.9)
SODIUM SERPL-SCNC: 139 MMOL/L (ref 133–144)
WBC # BLD AUTO: 8.8 10E3/UL (ref 4–11)

## 2021-11-29 PROCEDURE — 86140 C-REACTIVE PROTEIN: CPT | Performed by: FAMILY MEDICINE

## 2021-11-29 PROCEDURE — 99214 OFFICE O/P EST MOD 30 MIN: CPT | Performed by: FAMILY MEDICINE

## 2021-11-29 PROCEDURE — 36415 COLL VENOUS BLD VENIPUNCTURE: CPT | Performed by: FAMILY MEDICINE

## 2021-11-29 PROCEDURE — 85025 COMPLETE CBC W/AUTO DIFF WBC: CPT | Performed by: FAMILY MEDICINE

## 2021-11-29 PROCEDURE — 85652 RBC SED RATE AUTOMATED: CPT | Performed by: FAMILY MEDICINE

## 2021-11-29 PROCEDURE — 71046 X-RAY EXAM CHEST 2 VIEWS: CPT

## 2021-11-29 PROCEDURE — 80053 COMPREHEN METABOLIC PANEL: CPT | Performed by: FAMILY MEDICINE

## 2021-11-29 PROCEDURE — 85379 FIBRIN DEGRADATION QUANT: CPT | Performed by: FAMILY MEDICINE

## 2021-11-29 NOTE — PROGRESS NOTES
Assessment & Plan     Cough  Persistent nonproductive cough.  Dyspnea on exertion.  Now some right-sided chest pain right at the lateral costochondral border.  With movement and twisting.  Also with coughing and deep breath.  Chest x-ray shows cardiomegaly.  Labs are otherwise unremarkable.  - XR Chest 2 Views; Future  - D dimer, quantitative; Future  - CBC with platelets and differential; Future  - ESR: Erythrocyte sedimentation rate; Future  - CRP, inflammation; Future  - Comprehensive metabolic panel (BMP + Alb, Alk Phos, ALT, AST, Total. Bili, TP); Future  - D dimer, quantitative  - CBC with platelets and differential  - ESR: Erythrocyte sedimentation rate  - CRP, inflammation  - Comprehensive metabolic panel (BMP + Alb, Alk Phos, ALT, AST, Total. Bili, TP)  - Echocardiogram Complete; Future    Cardiomegaly  We will work the enlarged cardiac shadow up on the x-ray with an echocardiogram.  - Echocardiogram Complete; Future      Addendum 12/3/2021 spoke with patient on the phone and gave the results of the echocardiogram.  We contacted cardiology here who was going to make arrangements for him to be seen.  But at the end of the day today we had not heard anything.  I did tell the patient to not do any strenuous activity over the weekend and if he becomes short of breath or chest pain he needs to come in to the emergency room at once.  He understands.  He has been stable his O2 sats were 100% when I saw him and he is feeling pretty good still discontinue his cough.  I called him back at 4:30 in the afternoon and he had not heard anything from cardiology.  He is okay with waiting till Monday and will get right on it.           No follow-ups on file.    Johnathon Arroyo MD  [facility]ada is a 41 year old who presents for the following health issues: As noted below.  Has been persistent.  Certainly worse with activities.  Also has dyspnea on exertion but no chest pain.   He has what feels like chest wall pain on the right anterior lateral edge of the rib cage and just below.  Twisting makes it worse deep breathing makes it worse with coughing sneezing makes it worse.  Intermittent.  No fevers.  No loss of appetite or taste.  Nonproductive cough.  No change in his bowel movements.  No environmental exposures of any significance.  Does work in [employer].    HPI     Concern - Cough and right side pain  Onset: Spring for cough, 4 days for side pain  Description: Cough is  non productive, getting worse per patient  Intensity: moderate  Progression of Symptoms:  worsening  Accompanying Signs & Symptoms: Short of breath  Previous history of similar problem: none  Precipitating factors:        Worsened by: no  Alleviating factors:        Improved by: no  Therapies tried and outcome: Cough drops, hot toddy        Review of Systems   Constitutional, HEENT, cardiovascular, pulmonary, gi and gu systems are negative, except as otherwise noted.      Objective    /80   Pulse 107   Temp 98.1  F (36.7  C)   Resp 10   Wt 74.4 kg (164 lb)   SpO2 100%   BMI 27.29 kg/m    Body mass index is 27.29 kg/m .  Physical Exam   GENERAL: healthy, alert and no distress  EYES: Eyes grossly normal to inspection, PERRL and conjunctivae and sclerae normal  NECK: no adenopathy, no asymmetry, masses, or scars and thyroid normal to palpation  RESP: lungs clear to auscultation - no rales, rhonchi or wheezes  CV: occasional premature beats, normal S1 S2, no S3 or S4, no murmur, click or rub, peripheral pulses strong and no peripheral edema  ABDOMEN: bowel sounds normal and slight discomfort to deep palpation lateral right upper quadrant just below the costochondral margin.  MS: no gross musculoskeletal defects noted, no edema  SKIN: no suspicious lesions or rashes  NEURO: Normal strength and tone, mentation intact and speech normal    Xray - Reviewed and interpreted by me.  Chest x-ray with  cardiomegaly  Results for orders placed or performed in visit on 11/29/21 (from the past 24 hour(s))   D dimer, quantitative   Result Value Ref Range    D-Dimer Quantitative 0.45 0.00 - 0.50 ug/mL FEU    Narrative    This D-dimer assay is intended for use in conjunction with a clinical pretest probability assessment model to exclude pulmonary embolism (PE) and deep venous thrombosis (DVT) in outpatients suspected of PE or DVT. The cut-off value is 0.50 ug/mL FEU.   CBC with platelets and differential    Narrative    The following orders were created for panel order CBC with platelets and differential.  Procedure                               Abnormality         Status                     ---------                               -----------         ------                     CBC with platelets and d...[[id number]]  Abnormal            Final result                 Please view results for these tests on the individual orders.   ESR: Erythrocyte sedimentation rate   Result Value Ref Range    Erythrocyte Sedimentation Rate 6 0 - 15 mm/hr   CRP, inflammation   Result Value Ref Range    CRP Inflammation 4.7 0.0 - 8.0 mg/L   Comprehensive metabolic panel (BMP + Alb, Alk Phos, ALT, AST, Total. Bili, TP)   Result Value Ref Range    Sodium 139 133 - 144 mmol/L    Potassium 4.3 3.4 - 5.3 mmol/L    Chloride 108 94 - 109 mmol/L    Carbon Dioxide (CO2) 30 20 - 32 mmol/L    Anion Gap 1 (L) 3 - 14 mmol/L    Urea Nitrogen 11 7 - 30 mg/dL    Creatinine 0.99 0.66 - 1.25 mg/dL    Calcium 8.3 (L) 8.5 - 10.1 mg/dL    Glucose 115 (H) 70 - 99 mg/dL    Alkaline Phosphatase 63 40 - 150 U/L    AST 24 0 - 45 U/L    ALT 66 0 - 70 U/L    Protein Total 6.8 6.8 - 8.8 g/dL    Albumin 3.3 (L) 3.4 - 5.0 g/dL    Bilirubin Total 0.5 0.2 - 1.3 mg/dL    GFR Estimate >90 >60 mL/min/1.73m2   CBC with platelets and differential   Result Value Ref Range    WBC Count 8.8 4.0 - 11.0 10e3/uL    RBC Count 4.33 (L) 4.40 - 5.90 10e6/uL    Hemoglobin 14.1 13.3 - 17.7  g/dL    Hematocrit 42.0 40.0 - 53.0 %    MCV 97 78 - 100 fL    MCH 32.6 26.5 - 33.0 pg    MCHC 33.6 31.5 - 36.5 g/dL    RDW 13.4 10.0 - 15.0 %    Platelet Count 254 150 - 450 10e3/uL    % Neutrophils 72 %    % Lymphocytes 20 %    % Monocytes 6 %    % Eosinophils 1 %    % Basophils 1 %    % Immature Granulocytes 0 %    NRBCs per 100 WBC 0 <1 /100    Absolute Neutrophils 6.3 1.6 - 8.3 10e3/uL    Absolute Lymphocytes 1.8 0.8 - 5.3 10e3/uL    Absolute Monocytes 0.5 0.0 - 1.3 10e3/uL    Absolute Eosinophils 0.1 0.0 - 0.7 10e3/uL    Absolute Basophils 0.1 0.0 - 0.2 10e3/uL    Absolute Immature Granulocytes 0.0 <=0.0 10e3/uL    Absolute NRBCs 0.0 10e3/uL

## 2021-12-02 ENCOUNTER — TELEPHONE (OUTPATIENT)
Dept: FAMILY MEDICINE | Facility: CLINIC | Age: 41
End: 2021-12-02

## 2021-12-02 ENCOUNTER — HOSPITAL ENCOUNTER (OUTPATIENT)
Dept: CARDIOLOGY | Facility: CLINIC | Age: 41
Discharge: HOME | End: 2021-12-02
Attending: FAMILY MEDICINE | Admitting: FAMILY MEDICINE
Payer: COMMERCIAL

## 2021-12-02 DIAGNOSIS — R05.9 COUGH: ICD-10-CM

## 2021-12-02 DIAGNOSIS — I51.7 CARDIOMEGALY: ICD-10-CM

## 2021-12-02 LAB — LVEF ECHO: NORMAL

## 2021-12-02 PROCEDURE — 93306 TTE W/DOPPLER COMPLETE: CPT

## 2021-12-02 PROCEDURE — 93306 TTE W/DOPPLER COMPLETE: CPT | Mod: 26 | Performed by: INTERNAL MEDICINE

## 2021-12-02 NOTE — TELEPHONE ENCOUNTER
Patient had an Echo today, 12/2/21.  Results are abnormal and the cardiologist that read results is calling to report patient should be referred to Cardiology.    Routing to Dr. Arroyo as he ordered the Echo.  AMALIA RodriguezN, RN

## 2021-12-06 ENCOUNTER — OFFICE VISIT (OUTPATIENT)
Dept: CARDIOLOGY | Facility: CLINIC | Age: 41
End: 2021-12-06
Payer: COMMERCIAL

## 2021-12-06 ENCOUNTER — HOSPITAL ENCOUNTER (EMERGENCY)
Facility: CLINIC | Age: 41
Discharge: ANOTHER HOSPITAL | End: 2021-12-08
Attending: EMERGENCY MEDICINE | Admitting: EMERGENCY MEDICINE
Payer: COMMERCIAL

## 2021-12-06 VITALS
BODY MASS INDEX: 25.87 KG/M2 | DIASTOLIC BLOOD PRESSURE: 82 MMHG | HEIGHT: 67 IN | OXYGEN SATURATION: 99 % | SYSTOLIC BLOOD PRESSURE: 128 MMHG | RESPIRATION RATE: 12 BRPM | HEART RATE: 112 BPM | WEIGHT: 164.8 LBS

## 2021-12-06 DIAGNOSIS — F43.23 ADJUSTMENT DISORDER WITH MIXED ANXIETY AND DEPRESSED MOOD: ICD-10-CM

## 2021-12-06 DIAGNOSIS — F10.10 ETOH ABUSE: ICD-10-CM

## 2021-12-06 DIAGNOSIS — I51.7 CARDIOMEGALY: Primary | ICD-10-CM

## 2021-12-06 DIAGNOSIS — I50.21 ACUTE SYSTOLIC CONGESTIVE HEART FAILURE (H): ICD-10-CM

## 2021-12-06 DIAGNOSIS — I20.0 UNSTABLE ANGINA PECTORIS (H): Primary | ICD-10-CM

## 2021-12-06 DIAGNOSIS — I42.9 CARDIOMYOPATHY, UNSPECIFIED TYPE (H): ICD-10-CM

## 2021-12-06 LAB
ALBUMIN SERPL-MCNC: 3.8 G/DL (ref 3.4–5)
ALBUMIN UR-MCNC: NEGATIVE MG/DL
ALP SERPL-CCNC: 61 U/L (ref 40–150)
ALT SERPL W P-5'-P-CCNC: 51 U/L (ref 0–70)
AMPHETAMINES UR QL: NOT DETECTED
ANION GAP SERPL CALCULATED.3IONS-SCNC: 5 MMOL/L (ref 3–14)
APPEARANCE UR: CLEAR
APTT PPP: 26 SECONDS (ref 22–38)
AST SERPL W P-5'-P-CCNC: 28 U/L (ref 0–45)
BARBITURATES UR QL SCN: NOT DETECTED
BASOPHILS # BLD AUTO: 0 10E3/UL (ref 0–0.2)
BASOPHILS NFR BLD AUTO: 0 %
BENZODIAZ UR QL SCN: NOT DETECTED
BILIRUB SERPL-MCNC: 0.7 MG/DL (ref 0.2–1.3)
BILIRUB UR QL STRIP: NEGATIVE
BUN SERPL-MCNC: 16 MG/DL (ref 7–30)
BUPRENORPHINE UR QL: NOT DETECTED
CALCIUM SERPL-MCNC: 8.5 MG/DL (ref 8.5–10.1)
CANNABINOIDS UR QL: NOT DETECTED
CHLORIDE BLD-SCNC: 106 MMOL/L (ref 94–109)
CO2 SERPL-SCNC: 25 MMOL/L (ref 20–32)
COCAINE UR QL SCN: NOT DETECTED
COLOR UR AUTO: ABNORMAL
CREAT SERPL-MCNC: 0.99 MG/DL (ref 0.66–1.25)
CRP SERPL-MCNC: <2.9 MG/L (ref 0–8)
D-METHAMPHET UR QL: NOT DETECTED
EOSINOPHIL # BLD AUTO: 0.1 10E3/UL (ref 0–0.7)
EOSINOPHIL NFR BLD AUTO: 1 %
ERYTHROCYTE [DISTWIDTH] IN BLOOD BY AUTOMATED COUNT: 13.3 % (ref 10–15)
ERYTHROCYTE [SEDIMENTATION RATE] IN BLOOD BY WESTERGREN METHOD: 5 MM/HR (ref 0–15)
ETHANOL SERPL-MCNC: <0.01 G/DL
GFR SERPL CREATININE-BSD FRML MDRD: >90 ML/MIN/1.73M2
GLUCOSE BLD-MCNC: 93 MG/DL (ref 70–99)
GLUCOSE UR STRIP-MCNC: NEGATIVE MG/DL
HCT VFR BLD AUTO: 46.1 % (ref 40–53)
HGB BLD-MCNC: 15.8 G/DL (ref 13.3–17.7)
HGB UR QL STRIP: NEGATIVE
HOLD SPECIMEN: NORMAL
IMM GRANULOCYTES # BLD: 0 10E3/UL
IMM GRANULOCYTES NFR BLD: 0 %
INR PPP: 0.92 (ref 0.85–1.15)
KETONES UR STRIP-MCNC: NEGATIVE MG/DL
LEUKOCYTE ESTERASE UR QL STRIP: NEGATIVE
LYMPHOCYTES # BLD AUTO: 2.2 10E3/UL (ref 0.8–5.3)
LYMPHOCYTES NFR BLD AUTO: 22 %
MCH RBC QN AUTO: 32.8 PG (ref 26.5–33)
MCHC RBC AUTO-ENTMCNC: 34.3 G/DL (ref 31.5–36.5)
MCV RBC AUTO: 96 FL (ref 78–100)
METHADONE UR QL SCN: NOT DETECTED
MONOCYTES # BLD AUTO: 0.7 10E3/UL (ref 0–1.3)
MONOCYTES NFR BLD AUTO: 7 %
MUCOUS THREADS #/AREA URNS LPF: PRESENT /LPF
NEUTROPHILS # BLD AUTO: 7.1 10E3/UL (ref 1.6–8.3)
NEUTROPHILS NFR BLD AUTO: 70 %
NITRATE UR QL: NEGATIVE
NRBC # BLD AUTO: 0 10E3/UL
NRBC BLD AUTO-RTO: 0 /100
NT-PROBNP SERPL-MCNC: 2588 PG/ML (ref 0–450)
OPIATES UR QL SCN: NOT DETECTED
OXYCODONE UR QL SCN: NOT DETECTED
PCP UR QL SCN: NOT DETECTED
PH UR STRIP: 7 [PH] (ref 5–7)
PLATELET # BLD AUTO: 316 10E3/UL (ref 150–450)
POTASSIUM BLD-SCNC: 4 MMOL/L (ref 3.4–5.3)
PROPOXYPH UR QL: NOT DETECTED
PROT SERPL-MCNC: 7.6 G/DL (ref 6.8–8.8)
RBC # BLD AUTO: 4.81 10E6/UL (ref 4.4–5.9)
RBC URINE: <1 /HPF
SARS-COV-2 RNA RESP QL NAA+PROBE: NEGATIVE
SODIUM SERPL-SCNC: 136 MMOL/L (ref 133–144)
SP GR UR STRIP: 1.01 (ref 1–1.03)
TRICYCLICS UR QL SCN: NOT DETECTED
TROPONIN I SERPL HS-MCNC: 53 NG/L
TROPONIN I SERPL HS-MCNC: 55 NG/L
UROBILINOGEN UR STRIP-MCNC: NORMAL MG/DL
WBC # BLD AUTO: 10.2 10E3/UL (ref 4–11)
WBC URINE: 0 /HPF

## 2021-12-06 PROCEDURE — 99285 EMERGENCY DEPT VISIT HI MDM: CPT | Mod: 25 | Performed by: EMERGENCY MEDICINE

## 2021-12-06 PROCEDURE — 96366 THER/PROPH/DIAG IV INF ADDON: CPT | Performed by: EMERGENCY MEDICINE

## 2021-12-06 PROCEDURE — 80306 DRUG TEST PRSMV INSTRMNT: CPT | Performed by: EMERGENCY MEDICINE

## 2021-12-06 PROCEDURE — 93000 ELECTROCARDIOGRAM COMPLETE: CPT | Performed by: INTERNAL MEDICINE

## 2021-12-06 PROCEDURE — 85730 THROMBOPLASTIN TIME PARTIAL: CPT | Performed by: EMERGENCY MEDICINE

## 2021-12-06 PROCEDURE — 250N000011 HC RX IP 250 OP 636: Performed by: EMERGENCY MEDICINE

## 2021-12-06 PROCEDURE — 36415 COLL VENOUS BLD VENIPUNCTURE: CPT | Performed by: EMERGENCY MEDICINE

## 2021-12-06 PROCEDURE — 84484 ASSAY OF TROPONIN QUANT: CPT | Performed by: EMERGENCY MEDICINE

## 2021-12-06 PROCEDURE — 85652 RBC SED RATE AUTOMATED: CPT | Performed by: EMERGENCY MEDICINE

## 2021-12-06 PROCEDURE — 81001 URINALYSIS AUTO W/SCOPE: CPT | Mod: 91 | Performed by: EMERGENCY MEDICINE

## 2021-12-06 PROCEDURE — 250N000013 HC RX MED GY IP 250 OP 250 PS 637: Performed by: EMERGENCY MEDICINE

## 2021-12-06 PROCEDURE — 87635 SARS-COV-2 COVID-19 AMP PRB: CPT | Performed by: EMERGENCY MEDICINE

## 2021-12-06 PROCEDURE — 82077 ASSAY SPEC XCP UR&BREATH IA: CPT | Performed by: EMERGENCY MEDICINE

## 2021-12-06 PROCEDURE — 86140 C-REACTIVE PROTEIN: CPT | Performed by: EMERGENCY MEDICINE

## 2021-12-06 PROCEDURE — 85025 COMPLETE CBC W/AUTO DIFF WBC: CPT | Performed by: EMERGENCY MEDICINE

## 2021-12-06 PROCEDURE — 96375 TX/PRO/DX INJ NEW DRUG ADDON: CPT | Performed by: EMERGENCY MEDICINE

## 2021-12-06 PROCEDURE — 85520 HEPARIN ASSAY: CPT | Performed by: EMERGENCY MEDICINE

## 2021-12-06 PROCEDURE — 82040 ASSAY OF SERUM ALBUMIN: CPT | Performed by: EMERGENCY MEDICINE

## 2021-12-06 PROCEDURE — 83880 ASSAY OF NATRIURETIC PEPTIDE: CPT | Performed by: EMERGENCY MEDICINE

## 2021-12-06 PROCEDURE — 85610 PROTHROMBIN TIME: CPT | Performed by: EMERGENCY MEDICINE

## 2021-12-06 PROCEDURE — 99205 OFFICE O/P NEW HI 60 MIN: CPT | Performed by: INTERNAL MEDICINE

## 2021-12-06 PROCEDURE — C9803 HOPD COVID-19 SPEC COLLECT: HCPCS | Performed by: EMERGENCY MEDICINE

## 2021-12-06 PROCEDURE — 96365 THER/PROPH/DIAG IV INF INIT: CPT | Performed by: EMERGENCY MEDICINE

## 2021-12-06 RX ORDER — LISINOPRIL 2.5 MG/1
5 TABLET ORAL 2 TIMES DAILY
Status: DISCONTINUED | OUTPATIENT
Start: 2021-12-06 | End: 2021-12-08 | Stop reason: HOSPADM

## 2021-12-06 RX ORDER — CARVEDILOL 3.12 MG/1
3.12 TABLET ORAL 2 TIMES DAILY WITH MEALS
Status: DISCONTINUED | OUTPATIENT
Start: 2021-12-06 | End: 2021-12-08 | Stop reason: HOSPADM

## 2021-12-06 RX ORDER — FUROSEMIDE 10 MG/ML
20 INJECTION INTRAMUSCULAR; INTRAVENOUS ONCE
Status: COMPLETED | OUTPATIENT
Start: 2021-12-06 | End: 2021-12-06

## 2021-12-06 RX ORDER — HEPARIN SODIUM 10000 [USP'U]/100ML
0-5000 INJECTION, SOLUTION INTRAVENOUS CONTINUOUS
Status: DISCONTINUED | OUTPATIENT
Start: 2021-12-06 | End: 2021-12-08 | Stop reason: HOSPADM

## 2021-12-06 RX ADMIN — FUROSEMIDE 20 MG: 10 INJECTION, SOLUTION INTRAVENOUS at 17:33

## 2021-12-06 RX ADMIN — HEPARIN SODIUM 900 UNITS/HR: 10000 INJECTION, SOLUTION INTRAVENOUS at 17:38

## 2021-12-06 RX ADMIN — CARVEDILOL 3.12 MG: 3.12 TABLET, FILM COATED ORAL at 17:32

## 2021-12-06 RX ADMIN — LISINOPRIL 5 MG: 2.5 TABLET ORAL at 20:51

## 2021-12-06 ASSESSMENT — ENCOUNTER SYMPTOMS
EYES NEGATIVE: 1
GASTROINTESTINAL NEGATIVE: 1
PALPITATIONS: 0
FATIGUE: 1
MUSCULOSKELETAL NEGATIVE: 1
FEVER: 0
ACTIVITY CHANGE: 1
PSYCHIATRIC NEGATIVE: 1
COUGH: 0
SHORTNESS OF BREATH: 1

## 2021-12-06 ASSESSMENT — MIFFLIN-ST. JEOR: SCORE: 1611.16

## 2021-12-06 ASSESSMENT — PAIN SCALES - GENERAL: PAINLEVEL: NO PAIN (0)

## 2021-12-06 NOTE — PROGRESS NOTES
"HISTORY:    Dipak Swartz is a pleasant 41-year-old male who is unaccompanied today.  He has a history of chest pain dating back to 2016 at which time he underwent a ECG, echocardiogram, and nuclear stress test all of which are reviewed and were normal.  He recently developed more complaints and had a echocardiogram repeated.  This study showed an ejection fraction of 15 to 20% with left ventricular enlargement, moderate to severe RV systolic dysfunction, borderline biatrial enlargement but no significant valvular disease.    Dipak describes dull midsternal chest pain for the past few months brought on by exertion and frequently at rest.  There is a pleuritic component to this discomfort at times.  It often radiates to the left arm and lasts about 15 to 20 minutes at a time.  He believes this is similar to the chest discomfort he experienced 5 years ago when his stress test was done and was normal.  His chest discomfort is associated with worsening shortness of breath but not nausea.  He generally is a person that sweats heavily but he thinks this is much worse in the past 6 months, particularly when he started on Celexa recently.    As for the frequency of his chest pain, this has become particularly more frequent over the last week or 2, with frequent episodes occurring at rest.  His last rest episode was a few hours ago.  He had been doing some mild chores in his garage and was fatigued and short of breath and sat down to rest and developed chest pain while sitting quietly.  Again that lasted 15 or 20 minutes.    For the past 3 days the patient has had difficulty sleeping because he wakes up short of breath almost immediately on trying to lie flat.  He also has noticed a fairly dramatic decrease in his exercise capacity.  He has also had about a 15 pound weight gain and has noticed more \"swelling\".  He gets short of breath with mild activity in the recent past.  He has not had syncope or near syncope.    ECG " done today shows sinus tachycardia with a nonspecific conduction delay, left anterior hemiblock, inferior ST depression, lateral Q waves, poor R wave progression across the precordium.  This is markedly changed from his last ECG 5 years ago.    Dipak is a fairly heavy drinker, having 6-10 drinks for 5 times a week.  The last time he gave up alcohol for a week or more was this summer and he had no problems with alcohol withdrawal.  He reports that he has been a heavy drinker on and off for the years.  As discussed above he had an echocardiogram done 5 years ago and he thinks that over the past 5 years he probably drank heavy 2 of those years and minimally 3 of the years.    Dipak does not have a history of hypertension, hyperlipidemia (although the last lipid profile was done 5 years ago), diabetes, or current smoking (quit 2003).  There is no family history of coronary artery disease.      ASSESSMENT/PLAN:    1.  Heart failure with reduced ejection fraction.  Currently volume overloaded with class IV heart failure.  He will require diuresis and initiation of beta-blocker and ACE inhibitor.  The cause of reduced EF is unknown.  Most likely statistically is coronary artery disease although this patient does not have as many risk factors as we would expect to see in a 41-year-old with severe disease.  It is also possible that this is a viral cardiomyopathy or even alcoholic cardiomyopathy, although the latter seems unlikely given the totality of his drinking history (at least as he tells it).  2.  Chest pain.  This patient has been having exertional chest pain dating back a couple of months with intermittent resting chest pain, his chest pain has worsened in the last few days with resting chest pain as recently as a few hours ago.  I am concerned that this may represent unstable angina and be life-threatening.  He needs observation and evaluation before going home today.  I am sending him to the emergency room for  diuresis and further evaluation.  He will require coronary angiography, although if his troponin is negative and his symptoms resolve with diuresis, he could be sent home with plans for outpatient coronary angiography.    Given the recent worsening of both his heart failure and chest pain symptoms, I do not believe it is safe for this patient to go home, so I am sending him to the emergency room.  I understand that currently the medical system is backed up and his care will likely be delayed, but I still believe that is the best and safest alternative for him.    Thank you for inviting me to participate in the care of your patient.  Please don't hesitate to call if I can be of further assistance.  >60 minutes were spent today reviewing the chart and other records, interviewing and examining the patient, coordinating care, and documenting our visit.    Chart documentation was completed, in part, with [employer] voice-recognition software. Even though reviewed, some grammatical, spelling, and word errors may remain.       No orders of the defined types were placed in this encounter.    No orders of the defined types were placed in this encounter.    There are no discontinued medications.    10 year ASCVD risk: The ASCVD Risk score ([place] DC Jr., et al., 2013) failed to calculate for the following reasons:    Cannot find a previous HDL lab    Cannot find a previous total cholesterol lab    No diagnosis found.    CURRENT MEDICATIONS:  Current Outpatient Medications   Medication Sig Dispense Refill     zolpidem ER (AMBIEN CR) 6.25 MG CR tablet Take 1 tablet (6.25 mg) by mouth nightly as needed for sleep 30 tablet 5     citalopram (CELEXA) 10 MG tablet Take 1 tablet (10 mg) by mouth every morning (Patient not taking: Reported on 11/29/2021) 30 tablet 1     order for DME Equipment being ordered: right lower extremity prosthetic     [employer] [facility]  [phone] (Patient not taking: Reported on 11/29/2021) 1 each 0        ALLERGIES     Allergies   Allergen Reactions     No Known Allergies        PAST MEDICAL HISTORY:  Past Medical History:   Diagnosis Date     [place] 2014       PAST SURGICAL HISTORY:  Past Surgical History:   Procedure Laterality Date     AMPUTATION BELOW KNEE RT/LT Right 2014     Broken Right arm  1992     LEG SURGERY Right 2014       FAMILY HISTORY:  No family history on file.    SOCIAL HISTORY:  Social History     Socioeconomic History     Marital status:      Spouse name: Not on file     Number of children: Not on file     Years of education: Not on file     Highest education level: Not on file   Occupational History     Employer: UNKNOWN   Tobacco Use     Smoking status: Former Smoker     Quit date: 2003     Years since quittin.6     Smokeless tobacco: Current User     Types: Chew   Vaping Use     Vaping Use: Never used   Substance and Sexual Activity     Alcohol use: Yes     Alcohol/week: 0.0 standard drinks     Comment: occ.     Drug use: No     Sexual activity: Yes     Partners: Female   Other Topics Concern     Parent/sibling w/ CABG, MI or angioplasty before 65F 55M? Not Asked   Social History Narrative     Not on file     Social Determinants of Health     Financial Resource Strain: Not on file   Food Insecurity: Not on file   Transportation Needs: Not on file   Physical Activity: Not on file   Stress: Not on file   Social Connections: Not on file   Intimate Partner Violence: Not on file   Housing Stability: Not on file       Review of Systems:  Skin:  Negative     Eyes:  Negative    ENT:  Negative    Respiratory:  Positive for shortness of breath;dyspnea on exertion;cough;wheezing  Cardiovascular:  Negative Positive for;palpitations;chest pain;edema;fatigue;lightheadedness  Gastroenterology: Positive for heartburn  Genitourinary:  Negative    Musculoskeletal:  Positive for back pain;neck pain;joint pain  Neurologic:  Positive for numbness or tingling of  "hands  Psychiatric:  Positive for sleep disturbances;anxiety  Heme/Lymph/Imm:  Negative    Endocrine:  Negative      Physical Exam:  Vitals: /82 (BP Location: Left arm, Cuff Size: Adult Regular)   Pulse 112   Resp 12   Ht 1.702 m (5' 7\")   Wt 74.8 kg (164 lb 12.8 oz)   SpO2 99%   BMI 25.81 kg/m      Constitutional:  cooperative, alert and oriented, well developed, well nourished, in no acute distress        Skin:  warm and dry to the touch, no apparent skin lesions or masses noted        Head:  normocephalic        Eyes:  sclera white;no xanthalasma        ENT:  no pallor or cyanosis   masked    Neck:  no carotid bruit JVP elevated      Chest:      fine basilar crackles    Cardiac: regular rhythm   S3;S4;apical impulse displaced   systolic murmur;grade 1          Abdomen:  abdomen soft;BS normoactive        Vascular: pulses full and equal                                      Extremities and Muscular Skeletal:        bilateral LE edema;trace     Neurological:  no gross motor deficits        Psych:  affect appropriate, oriented to time, person and place     Recent Lab Results:  LIPID RESULTS:  Lab Results   Component Value Date    CHOL 208 (H) 03/03/2016    HDL 99 03/03/2016    LDL 98 03/03/2016    TRIG 53 03/03/2016       LIVER ENZYME RESULTS:  Lab Results   Component Value Date    AST 24 11/29/2021    AST 13 08/05/2020    ALT 66 11/29/2021    ALT 26 08/05/2020       CBC RESULTS:  Lab Results   Component Value Date    WBC 8.8 11/29/2021    WBC 7.1 08/05/2020    RBC 4.33 (L) 11/29/2021    RBC 4.79 08/05/2020    HGB 14.1 11/29/2021    HGB 14.8 08/05/2020    HCT 42.0 11/29/2021    HCT 45.1 08/05/2020    MCV 97 11/29/2021    MCV 94 08/05/2020    MCH 32.6 11/29/2021    MCH 30.9 08/05/2020    MCHC 33.6 11/29/2021    MCHC 32.8 08/05/2020    RDW 13.4 11/29/2021    RDW 13.5 08/05/2020     11/29/2021     08/05/2020       BMP RESULTS:  Lab Results   Component Value Date     11/29/2021     " 08/05/2020    POTASSIUM 4.3 11/29/2021    POTASSIUM 3.8 08/05/2020    CHLORIDE 108 11/29/2021    CHLORIDE 105 08/05/2020    CO2 30 11/29/2021    CO2 30 08/05/2020    ANIONGAP 1 (L) 11/29/2021    ANIONGAP 3 08/05/2020     (H) 11/29/2021    GLC 77 08/06/2020    BUN 11 11/29/2021    BUN 14 08/05/2020    CR 0.99 11/29/2021    CR 1.12 08/05/2020    GFRESTIMATED >90 11/29/2021    GFRESTIMATED 82 08/05/2020    GFRESTBLACK >90 08/05/2020    SHAYNA 8.3 (L) 11/29/2021    SHAYNA 9.3 08/05/2020        A1C RESULTS:  Lab Results   Component Value Date    A1C 5.2 08/06/2020       INR RESULTS:  No results found for: INR      Roger Irizarry MD, [facility]    CC  No referring provider defined for this encounter.

## 2021-12-06 NOTE — LETTER
12/6/2021    Jason García MD  [address], [place] [address]    RE: Dipak Swartz       Dear Colleague,     I had the pleasure of seeing Dipak Swartz in the [facility].  HISTORY:    Dipak Swartz is a pleasant 41-year-old male who is unaccompanied today.  He has a history of chest pain dating back to 2016 at which time he underwent a ECG, echocardiogram, and nuclear stress test all of which are reviewed and were normal.  He recently developed more complaints and had a echocardiogram repeated.  This study showed an ejection fraction of 15 to 20% with left ventricular enlargement, moderate to severe RV systolic dysfunction, borderline biatrial enlargement but no significant valvular disease.    Dipak describes dull midsternal chest pain for the past few months brought on by exertion and frequently at rest.  There is a pleuritic component to this discomfort at times.  It often radiates to the left arm and lasts about 15 to 20 minutes at a time.  He believes this is similar to the chest discomfort he experienced 5 years ago when his stress test was done and was normal.  His chest discomfort is associated with worsening shortness of breath but not nausea.  He generally is a person that sweats heavily but he thinks this is much worse in the past 6 months, particularly when he started on Celexa recently.    As for the frequency of his chest pain, this has become particularly more frequent over the last week or 2, with frequent episodes occurring at rest.  His last rest episode was a few hours ago.  He had been doing some mild chores in his garage and was fatigued and short of breath and sat down to rest and developed chest pain while sitting quietly.  Again that lasted 15 or 20 minutes.    For the past 3 days the patient has had difficulty sleeping because he wakes up short of breath almost immediately on trying to lie flat.  He also has noticed a fairly dramatic decrease in his  "exercise capacity.  He has also had about a 15 pound weight gain and has noticed more \"swelling\".  He gets short of breath with mild activity in the recent past.  He has not had syncope or near syncope.    ECG done today shows sinus tachycardia with a nonspecific conduction delay, left anterior hemiblock, inferior ST depression, lateral Q waves, poor R wave progression across the precordium.  This is markedly changed from his last ECG 5 years ago.    Dipak is a fairly heavy drinker, having 6-10 drinks for 5 times a week.  The last time he gave up alcohol for a week or more was this summer and he had no problems with alcohol withdrawal.  He reports that he has been a heavy drinker on and off for the years.  As discussed above he had an echocardiogram done 5 years ago and he thinks that over the past 5 years he probably drank heavy 2 of those years and minimally 3 of the years.    Dipak does not have a history of hypertension, hyperlipidemia (although the last lipid profile was done 5 years ago), diabetes, or current smoking (quit 2003).  There is no family history of coronary artery disease.      ASSESSMENT/PLAN:    1.  Heart failure with reduced ejection fraction.  Currently volume overloaded with class IV heart failure.  He will require diuresis and initiation of beta-blocker and ACE inhibitor.  The cause of reduced EF is unknown.  Most likely statistically is coronary artery disease although this patient does not have as many risk factors as we would expect to see in a 41-year-old with severe disease.  It is also possible that this is a viral cardiomyopathy or even alcoholic cardiomyopathy, although the latter seems unlikely given the totality of his drinking history (at least as he tells it).  2.  Chest pain.  This patient has been having exertional chest pain dating back a couple of months with intermittent resting chest pain, his chest pain has worsened in the last few days with resting chest pain as recently " as a few hours ago.  I am concerned that this may represent unstable angina and be life-threatening.  He needs observation and evaluation before going home today.  I am sending him to the emergency room for diuresis and further evaluation.  He will require coronary angiography, although if his troponin is negative and his symptoms resolve with diuresis, he could be sent home with plans for outpatient coronary angiography.    Given the recent worsening of both his heart failure and chest pain symptoms, I do not believe it is safe for this patient to go home, so I am sending him to the emergency room.  I understand that currently the medical system is backed up and his care will likely be delayed, but I still believe that is the best and safest alternative for him.    Thank you for inviting me to participate in the care of your patient.  Please don't hesitate to call if I can be of further assistance.  >60 minutes were spent today reviewing the chart and other records, interviewing and examining the patient, coordinating care, and documenting our visit.    Chart documentation was completed, in part, with [employer] voice-recognition software. Even though reviewed, some grammatical, spelling, and word errors may remain.       No orders of the defined types were placed in this encounter.    No orders of the defined types were placed in this encounter.    There are no discontinued medications.    10 year ASCVD risk: The ASCVD Risk score ([place] DC Jr., et al., 2013) failed to calculate for the following reasons:    Cannot find a previous HDL lab    Cannot find a previous total cholesterol lab    No diagnosis found.    CURRENT MEDICATIONS:  Current Outpatient Medications   Medication Sig Dispense Refill     zolpidem ER (AMBIEN CR) 6.25 MG CR tablet Take 1 tablet (6.25 mg) by mouth nightly as needed for sleep 30 tablet 5     citalopram (CELEXA) 10 MG tablet Take 1 tablet (10 mg) by mouth every morning (Patient not taking:  Reported on 2021) 30 tablet 1     order for DME Equipment being ordered: right lower extremity prosthetic     [employer] [facility]  [phone] (Patient not taking: Reported on 2021) 1 each 0       ALLERGIES     Allergies   Allergen Reactions     No Known Allergies        PAST MEDICAL HISTORY:  Past Medical History:   Diagnosis Date     [place] 2014       PAST SURGICAL HISTORY:  Past Surgical History:   Procedure Laterality Date     AMPUTATION BELOW KNEE RT/LT Right 2014     Broken Right arm  1992     LEG SURGERY Right 2014       FAMILY HISTORY:  No family history on file.    SOCIAL HISTORY:  Social History     Socioeconomic History     Marital status:      Spouse name: Not on file     Number of children: Not on file     Years of education: Not on file     Highest education level: Not on file   Occupational History     Employer: UNKNOWN   Tobacco Use     Smoking status: Former Smoker     Quit date: 2003     Years since quittin.6     Smokeless tobacco: Current User     Types: Chew   Vaping Use     Vaping Use: Never used   Substance and Sexual Activity     Alcohol use: Yes     Alcohol/week: 0.0 standard drinks     Comment: occ.     Drug use: No     Sexual activity: Yes     Partners: Female   Other Topics Concern     Parent/sibling w/ CABG, MI or angioplasty before 65F 55M? Not Asked   Social History Narrative     Not on file     Social Determinants of Health     Financial Resource Strain: Not on file   Food Insecurity: Not on file   Transportation Needs: Not on file   Physical Activity: Not on file   Stress: Not on file   Social Connections: Not on file   Intimate Partner Violence: Not on file   Housing Stability: Not on file       Review of Systems:  Skin:  Negative     Eyes:  Negative    ENT:  Negative    Respiratory:  Positive for shortness of breath;dyspnea on exertion;cough;wheezing  Cardiovascular:  Negative Positive for;palpitations;chest  "pain;edema;fatigue;lightheadedness  Gastroenterology: Positive for heartburn  Genitourinary:  Negative    Musculoskeletal:  Positive for back pain;neck pain;joint pain  Neurologic:  Positive for numbness or tingling of hands  Psychiatric:  Positive for sleep disturbances;anxiety  Heme/Lymph/Imm:  Negative    Endocrine:  Negative      Physical Exam:  Vitals: /82 (BP Location: Left arm, Cuff Size: Adult Regular)   Pulse 112   Resp 12   Ht 1.702 m (5' 7\")   Wt 74.8 kg (164 lb 12.8 oz)   SpO2 99%   BMI 25.81 kg/m      Constitutional:  cooperative, alert and oriented, well developed, well nourished, in no acute distress        Skin:  warm and dry to the touch, no apparent skin lesions or masses noted        Head:  normocephalic        Eyes:  sclera white;no xanthalasma        ENT:  no pallor or cyanosis   masked    Neck:  no carotid bruit JVP elevated      Chest:      fine basilar crackles    Cardiac: regular rhythm   S3;S4;apical impulse displaced   systolic murmur;grade 1          Abdomen:  abdomen soft;BS normoactive        Vascular: pulses full and equal                                      Extremities and Muscular Skeletal:        bilateral LE edema;trace     Neurological:  no gross motor deficits        Psych:  affect appropriate, oriented to time, person and place     Recent Lab Results:  LIPID RESULTS:  Lab Results   Component Value Date    CHOL 208 (H) 03/03/2016    HDL 99 03/03/2016    LDL 98 03/03/2016    TRIG 53 03/03/2016       LIVER ENZYME RESULTS:  Lab Results   Component Value Date    AST 24 11/29/2021    AST 13 08/05/2020    ALT 66 11/29/2021    ALT 26 08/05/2020       CBC RESULTS:  Lab Results   Component Value Date    WBC 8.8 11/29/2021    WBC 7.1 08/05/2020    RBC 4.33 (L) 11/29/2021    RBC 4.79 08/05/2020    HGB 14.1 11/29/2021    HGB 14.8 08/05/2020    HCT 42.0 11/29/2021    HCT 45.1 08/05/2020    MCV 97 11/29/2021    MCV 94 08/05/2020    MCH 32.6 11/29/2021    MCH 30.9 08/05/2020    " MCHC 33.6 11/29/2021    MCHC 32.8 08/05/2020    RDW 13.4 11/29/2021    RDW 13.5 08/05/2020     11/29/2021     08/05/2020       BMP RESULTS:  Lab Results   Component Value Date     11/29/2021     08/05/2020    POTASSIUM 4.3 11/29/2021    POTASSIUM 3.8 08/05/2020    CHLORIDE 108 11/29/2021    CHLORIDE 105 08/05/2020    CO2 30 11/29/2021    CO2 30 08/05/2020    ANIONGAP 1 (L) 11/29/2021    ANIONGAP 3 08/05/2020     (H) 11/29/2021    GLC 77 08/06/2020    BUN 11 11/29/2021    BUN 14 08/05/2020    CR 0.99 11/29/2021    CR 1.12 08/05/2020    GFRESTIMATED >90 11/29/2021    GFRESTIMATED 82 08/05/2020    GFRESTBLACK >90 08/05/2020    SHAYNA 8.3 (L) 11/29/2021    SHAYNA 9.3 08/05/2020        A1C RESULTS:  Lab Results   Component Value Date    A1C 5.2 08/06/2020       INR RESULTS:  No results found for: INR        Roger Irizarry MD, [facility]

## 2021-12-06 NOTE — ED PROVIDER NOTES
History     Chief Complaint   Patient presents with     Chest Pain     HPI  Dipak Swartz is a 41 year old male who was transferred from the cardiology clinic to the emergency department to facilitate placement to the hospital for further cardiac work-up.    Patient has had exertional dyspnea has been progressive over the last 2-3 months.  In the last week he has had difficulty climbing a flight of stairs.  He was seen by his primary care provider last week that noted some cardiomegaly.  He was referred to cardiology today who completed an echocardiogram that shows EF to be estimated between 15-20%.  He was found to be in a sinus tachycardia with a rate of 112 bpm and /82.  He is demonstrated no hypoxia.  Identified of no valvular heart disease.  Patient has had no acute febrile illness including no Covid-like symptoms in the last 2-4months.  He has had no chest discomfort or palpitations with exertion or at rest.  Patient does acknowledge that he drinks alcohol on a fairly heavy basis.  States that a liter of vodka will last approximate 4 days in a case of beer approximately 5 to 6days.  Usually has 6-12 drinks on average between 4 to 5 days/week.  He has had no known prior health complications from his alcohol use.  He states that he has used cocaine but has only been on 3 occasions.  It was almost 2 decades ago.  He did snort some cocaine about 2 months ago on 1 occasion.  He denies any methamphetamine use.  No travel to the country to suggest helminthic etiology.  No priors for Lyme disease.      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    Social History:  Marital Status:   [2]  Social History     Tobacco Use     Smoking status: Former Smoker     Quit date: 2003     Years since quittin.6     Smokeless tobacco: Current User     Types: Chew   Vaping Use     Vaping Use: Never used   Substance Use Topics     Alcohol use: Yes     Alcohol/week: 0.0 standard drinks     Comment: occ.     Drug use: No        Medications:    citalopram (CELEXA) 10 MG tablet  order for DME  zolpidem ER (AMBIEN CR) 6.25 MG CR tablet          Review of Systems   Constitutional: Positive for activity change and fatigue. Negative for fever.   HENT: Negative.    Eyes: Negative.    Respiratory: Positive for shortness of breath. Negative for cough.    Cardiovascular: Negative for chest pain, palpitations and leg swelling.   Gastrointestinal: Negative.    Genitourinary: Negative.    Musculoskeletal: Negative.    Psychiatric/Behavioral: Negative.    All other systems reviewed and are negative.      Physical Exam   BP: (!) 138/105  Pulse: 111  Temp: 98.4  F (36.9  C)  Resp: 19  Weight: 76.6 kg (168 lb 14.4 oz)  SpO2: 99 %      Physical Exam  Vitals and nursing note reviewed.   Constitutional:       Appearance: He is normal weight. He is not toxic-appearing.   HENT:      Head: Normocephalic.   Eyes:      General: No scleral icterus.     Conjunctiva/sclera:      Right eye: Right conjunctiva is not injected.      Left eye: Left conjunctiva is not injected.      Pupils: Pupils are equal,  round, and reactive to light.   Neck:      Thyroid: No thyromegaly.      Vascular: No JVD.   Cardiovascular:      Rate and Rhythm: Regular rhythm. Tachycardia present.      Heart sounds: Normal heart sounds.   Pulmonary:      Effort: Pulmonary effort is normal.      Breath sounds: Normal breath sounds. No decreased breath sounds, wheezing, rhonchi or rales.   Abdominal:      Palpations: There is no hepatomegaly or splenomegaly.   Musculoskeletal:         General: Normal range of motion.      Cervical back: Normal range of motion and neck supple.      Left lower leg: No tenderness. No edema.      Comments: Right lower extremity is a prosthesis in the knee  down   Lymphadenopathy:      Cervical: No cervical adenopathy.   Skin:     General: Skin is warm and dry.      Coloration: Skin is not cyanotic.      Comments: No jaundice   Neurological:      Mental Status: He is alert.         ED Course                 Procedures         EKG:  Interpretation by Amilcar Arroyo DO.   Indication:  Sinus  Tachycardia  - frequent PAC s   # PACs = 4.  -Left atrial enlargement.    -Lateral Q-waves may be septal -consider lateral infarct  -Intraventricular conduction defect -may be secondary to infarct.     ABNORMAL     XR CHEST TWO VIEWS   11/29/2021 11:01 AM      HISTORY: Cough     COMPARISON: Chest x-rays dated 5/27/2016     FINDINGS:  The cardiac silhouette is enlarged, which represents a  change since the prior study. There are increased interstitial  markings in the bilateral lungs with Kerley B lines noted bilaterally.  Lungs are otherwise clear. No pneumothorax, significant pleural fluid  collection, or acute osseous fracture.                                                                      IMPRESSION:  1. Mild cardiomegaly and interstitial edema could be associated with  mild congestive heart failure. No pleural effusions, however, to  confirm congestive heart failure. Given the new apparent cardiomegaly  and the patient's  young age, further evaluation with echocardiogram is  recommended.  2. Subtle infiltrative process in the lungs is considered less likely,  but possible.     MAGDALENE MEIER MD                Results for orders placed or performed during the hospital encounter of 12/06/21 (from the past 24 hour(s))   [place] Draw    Narrative    The following orders were created for panel order [place] Draw.  Procedure                               Abnormality         Status                     ---------                               -----------         ------                     Extra Blue Top Tube[[id number]]                              In process                 Extra Green Top (Lithium...[[id number]]                      In process                 Extra Purple Top Tube[[id number]]                            In process                   Please view results for these tests on the individual orders.   CBC with platelets differential    Narrative    The following orders were created for panel order CBC with platelets differential.  Procedure                               Abnormality         Status                     ---------                               -----------         ------                     CBC with platelets and d...[[id number]]                      Final result                 Please view results for these tests on the individual orders.   CBC with platelets and differential   Result Value Ref Range    WBC Count 10.2 4.0 - 11.0 10e3/uL    RBC Count 4.81 4.40 - 5.90 10e6/uL    Hemoglobin 15.8 13.3 - 17.7 g/dL    Hematocrit 46.1 40.0 - 53.0 %    MCV 96 78 - 100 fL    MCH 32.8 26.5 - 33.0 pg    MCHC 34.3 31.5 - 36.5 g/dL    RDW 13.3 10.0 - 15.0 %    Platelet Count 316 150 - 450 10e3/uL    % Neutrophils 70 %    % Lymphocytes 22 %    % Monocytes 7 %    % Eosinophils 1 %    % Basophils 0 %    % Immature Granulocytes 0 %    NRBCs per 100 WBC 0 <1 /100    Absolute Neutrophils 7.1 1.6 - 8.3 10e3/uL    Absolute Lymphocytes 2.2 0.8 -  5.3 10e3/uL    Absolute Monocytes 0.7 0.0 - 1.3 10e3/uL    Absolute Eosinophils 0.1 0.0 - 0.7 10e3/uL    Absolute Basophils 0.0 0.0 - 0.2 10e3/uL    Absolute Immature Granulocytes 0.0 <=0.4 10e3/uL    Absolute NRBCs 0.0 10e3/uL       Medications   heparin loading dose for LOW INTENSITY TREATMENT * Give BEFORE starting heparin infusion (has no administration in time range)   heparin 25,000 units in 0.45% NaCl 250 mL ANTICOAGULANT infusion (has no administration in time range)   carvedilol (COREG) tablet 3.125 mg (has no administration in time range)   furosemide (LASIX) injection 20 mg (has no administration in time range)   lisinopril (ZESTRIL) tablet 5 mg (has no administration in time range)       Assessments & Plan (with Medical Decision Making)  Dipak is 41 years of age.  Sent from the cardiology department for evaluation of a cardiomyopathy.  He is presenting with progressive dyspnea over the last few months.  Now transitioning to include orthopnea.  He has had some resting chest discomfort described as pressure.  Transthoracic echocardiogram today identified EF to be estimated at 15-20%.  He was noted to be in a sinus tachycardia.  No valvular heart disease identified.  Differential diagnosis includes   EtOH cardiomyopathy, ischemic cardiomyopathy.  No history  To suggest infectious, autoimmune process.  Plan: Hospitalization.  Will remain a border until we have available cardiac bed.  BMP, troponin, CRP, sed rate, urine toxicology screen, EtOH.  Start carvedilol 3.125 mg twice daily and lisinopril 5 mg twice daily on with Lasix 20 g IV per cardiology.  Heparin low intensity initiated.  Covid screening.  Patient will eventually require transfer to a Medical Center this got cardiology for consultation and availability of the Cath Lab.  Screen serial troponins.  6:43 PM  Care plan initiated.  No further complaint any chest discomfort.  Currently not complaining any dyspnea.  At this time he remains a border in  the ED overnight.  We continue to search for inpatient bed/facility with cardiology consultation and possible cardiac cath capabilities.     I have reviewed the nursing notes.    I have reviewed the findings, diagnosis, plan and need for follow up with the patient.      New Prescriptions    No medications on file       Final diagnoses:   Cardiomyopathy, unspecified type (H) - Alcohol toxicity versus ischemic-EF 15-20% per transthoracic echocardiogram completed 12/6/2021   ETOH abuse   Adjustment disorder with mixed anxiety and depressed mood       12/6/2021   [facility] EMERGENCY DEPT     Amilcar Arroyo,   12/06/21 [id number]

## 2021-12-07 LAB
TROPONIN I SERPL HS-MCNC: 28 NG/L
TROPONIN I SERPL HS-MCNC: 38 NG/L
UFH PPP CHRO-ACNC: 0.13 IU/ML
UFH PPP CHRO-ACNC: 0.18 IU/ML
UFH PPP CHRO-ACNC: 0.19 IU/ML
UFH PPP CHRO-ACNC: 0.42 IU/ML

## 2021-12-07 PROCEDURE — 93010 ELECTROCARDIOGRAM REPORT: CPT | Performed by: EMERGENCY MEDICINE

## 2021-12-07 PROCEDURE — 96366 THER/PROPH/DIAG IV INF ADDON: CPT | Performed by: EMERGENCY MEDICINE

## 2021-12-07 PROCEDURE — 84484 ASSAY OF TROPONIN QUANT: CPT | Performed by: EMERGENCY MEDICINE

## 2021-12-07 PROCEDURE — 250N000011 HC RX IP 250 OP 636: Performed by: EMERGENCY MEDICINE

## 2021-12-07 PROCEDURE — 93005 ELECTROCARDIOGRAM TRACING: CPT | Performed by: EMERGENCY MEDICINE

## 2021-12-07 PROCEDURE — 250N000013 HC RX MED GY IP 250 OP 250 PS 637: Performed by: EMERGENCY MEDICINE

## 2021-12-07 PROCEDURE — 85520 HEPARIN ASSAY: CPT | Performed by: FAMILY MEDICINE

## 2021-12-07 PROCEDURE — 36415 COLL VENOUS BLD VENIPUNCTURE: CPT | Performed by: EMERGENCY MEDICINE

## 2021-12-07 PROCEDURE — 36415 COLL VENOUS BLD VENIPUNCTURE: CPT | Performed by: FAMILY MEDICINE

## 2021-12-07 PROCEDURE — 250N000013 HC RX MED GY IP 250 OP 250 PS 637: Performed by: FAMILY MEDICINE

## 2021-12-07 PROCEDURE — 96376 TX/PRO/DX INJ SAME DRUG ADON: CPT | Performed by: EMERGENCY MEDICINE

## 2021-12-07 RX ORDER — ZOLPIDEM TARTRATE 5 MG/1
5 TABLET ORAL
Status: DISCONTINUED | OUTPATIENT
Start: 2021-12-07 | End: 2021-12-08 | Stop reason: HOSPADM

## 2021-12-07 RX ADMIN — LISINOPRIL 5 MG: 2.5 TABLET ORAL at 20:49

## 2021-12-07 RX ADMIN — ZOLPIDEM TARTRATE 5 MG: 5 TABLET ORAL at 01:14

## 2021-12-07 RX ADMIN — CARVEDILOL 3.12 MG: 3.12 TABLET, FILM COATED ORAL at 18:19

## 2021-12-07 RX ADMIN — HEPARIN SODIUM 1350 UNITS/HR: 10000 INJECTION, SOLUTION INTRAVENOUS at 20:53

## 2021-12-07 RX ADMIN — CARVEDILOL 3.12 MG: 3.12 TABLET, FILM COATED ORAL at 08:38

## 2021-12-07 RX ADMIN — LISINOPRIL 5 MG: 2.5 TABLET ORAL at 08:37

## 2021-12-07 RX ADMIN — HEPARIN SODIUM 1200 UNITS/HR: 10000 INJECTION, SOLUTION INTRAVENOUS at 07:43

## 2021-12-07 RX ADMIN — ZOLPIDEM TARTRATE 5 MG: 5 TABLET ORAL at 23:32

## 2021-12-07 NOTE — PROGRESS NOTES
Pt currently denies any chest pain or arm radiation only minor pain at IV site on left arm.  NO other complaints at this time.  Provided a charge block and sack lunch for patient for dinner.

## 2021-12-07 NOTE — ED PROVIDER NOTES
Going into the patient's room to introduce myself and to evaluate the patient.  Patient was signed out to me at shift change by Dr. Josue Arroyo.  Dr. Arroyo stated the patient presented to the ER from the cardiology clinic after being seen by the cardiologist, Dr. Maverick sosa this morning.  Patient had an echocardiogram secondary to concerns of exertional dyspnea that is been present for 3 months but getting worse over the last 2 weeks.  Patient was found to have significant global hypokinesis with cardiomyopathy with ejection fraction of 20%.  Felt that he is possibly cardiomyopathy secondary to either viral or possible alcohol etiology.  Patient reportedly drinks about 6-10 alcoholic beverages a day about 5 to 6 days a week.  His last drink was about 36 hours ago.  He has had no prior history for any alcohol withdrawal symptoms.  Patient was initiated on Lasix, lisinopril, heparin, and Coreg.  Patient is awaiting transfer to a facility with a cardiologist and cardiac cath facility.  It was reported that the patient's tropes have been negative here in the emergency room.  Patient states that he is doing well and has no concerns at this time.  His major concern was making sure that he gets his breakfast in the morning.  We will continue to monitor through the night or until a cardiac bed is found for transfer.    6:15 AM  Patient has had an uneventful night.  We still have not been able to identify a facility with a cardiac bed available.  He continues to board in the emergency room.  Patient will be signed out to Dr. Shawn Doty at shift change.    Electronically signed, Shahab Bloom,   12/07/21 0615

## 2021-12-07 NOTE — ED PROVIDER NOTES
Patient was signed out to me at change of shift to find placement and disposition for the patient.  Patient had no issues throughout my stay while here.  Patient remained on the heparin remained stable.  Patient continues on the carvedilol and lisinopril and has tolerated this well.  Unfortunately we have called all over the [facility] and have been unable to find a bed with a Cath Lab.  We have called multiple times throughout my shift with no luck.  We will continue to monitor the patient will try and find bed placement.  Patient remained stable.  Patient will be signed out at change of shift.     Shawn Doty MD  12/07/21 1331

## 2021-12-07 NOTE — ED PROVIDER NOTES
Emergency Department Patient Sign-out       Brief HPI:  This is a 41 year old male signed out to me by Dr. Doty .  See initial ED Provider note for details of the presentation.            Significant Events prior to my assuming care:   41 year old male who was signed out to me at shift change, and patient has been present in the emergency department now for 25 hours.  Awaiting bed availability at hospital with catheterization lab.    Patient's history is significant for presentation to the emergency department after patient had been seen in cardiology clinic by .  Patient had echocardiogram that was performed secondary to exertional dyspnea present over the prior 3 months, worsened over the the last 2 weeks, and found to have global hypokinesis with cardiomyopathy and ejection fraction of 15 to 20%.  This was thought to be either secondary to alcohol versus viral etiology.  Patient does drink 6-10 alcoholic drinks 5 to 6 days/week.  No prior history of alcohol withdrawal symptoms.  Patient started on Lasix, lisinopril, heparin, and Coreg.  Troponins have been negative.    Plan is to continue patient on heparin infusion.  If patient continues in the emergency department, we will repeat laboratory tests in the morning.  Still awaiting bed availability at facility with catheterization lab.    Cardiology rec's:   ASSESSMENT/PLAN:     1.  Heart failure with reduced ejection fraction.  Currently volume overloaded with class IV heart failure.  He will require diuresis and initiation of beta-blocker and ACE inhibitor.  The cause of reduced EF is unknown.  Most likely statistically is coronary artery disease although this patient does not have as many risk factors as we would expect to see in a 41-year-old with severe disease.  It is also possible that this is a viral cardiomyopathy or even alcoholic cardiomyopathy, although the latter seems unlikely given the totality of his drinking history (at least as he  tells it).  2.  Chest pain.  This patient has been having exertional chest pain dating back a couple of months with intermittent resting chest pain, his chest pain has worsened in the last few days with resting chest pain as recently as a few hours ago.  I am concerned that this may represent unstable angina and be life-threatening.  He needs observation and evaluation before going home today.  I am sending him to the emergency room for diuresis and further evaluation.  He will require coronary angiography, although if his troponin is negative and his symptoms resolve with diuresis, he could be sent home with plans for outpatient coronary angiography.     Given the recent worsening of both his heart failure and chest pain symptoms, I do not believe it is safe for this patient to go home, so I am sending him to the emergency room.  I understand that currently the medical system is backed up and his care will likely be delayed, but I still believe that is the best and safest alternative for him.        Echo 12/2/21:  Interpretation Summary     1. Moderate-severe LV dilation with severe global hypokinesis. LVEF estimated  at 15-20%.  2. RV is normal size with moderate-severe RV systolic dysfunction.  3. Borderline bi-atrial dilation.  4. No evidence of significant valve disease.  5. Trivial pericardial effusion.     Compared to the prior study from 3/21/2016, the severe bi-venticular  dysfunction is new.    Exam:   Patient Vitals for the past 24 hrs:   BP Pulse Resp SpO2   12/07/21 1819 112/67 95 -- --   12/07/21 1800 112/67 92 16 94 %   12/07/21 1730 112/65 89 17 97 %   12/07/21 1700 111/64 96 10 95 %   12/07/21 1630 113/71 87 10 96 %   12/07/21 1600 112/63 87 11 94 %   12/07/21 1530 102/64 81 9 98 %   12/07/21 1500 92/67 89 19 90 %   12/07/21 1430 103/69 95 15 96 %   12/07/21 1400 (!) 87/61 106 16 --   12/07/21 1330 93/72 87 13 95 %   12/07/21 1300 97/70 92 8 --   12/07/21 1230 104/76 104 25 --   12/07/21 1200 102/65  91 9 --   12/07/21 1130 99/63 81 10 --   12/07/21 1030 112/77 93 18 --   12/07/21 1000 97/52 94 13 --   12/07/21 0930 (!) 145/109 91 20 --   12/07/21 0900 (!) 122/103 95 23 --   12/07/21 0830 113/75 95 11 96 %   12/07/21 0800 104/69 94 -- 96 %   12/07/21 0730 94/84 90 -- 95 %   12/07/21 0700 (!) 122/93 74 -- 96 %   12/07/21 0630 112/70 87 -- 96 %   12/07/21 0600 113/77 88 12 94 %   12/07/21 0530 (!) 128/94 97 15 94 %   12/07/21 0400 133/73 77 18 94 %   12/07/21 0300 (!) 135/93 85 -- 96 %   12/07/21 0230 125/89 88 17 96 %   12/07/21 0130 115/77 92 18 95 %   12/07/21 0100 118/79 93 -- 96 %   12/07/21 0030 113/78 92 -- 95 %   12/07/21 0000 100/80 90 17 94 %   12/06/21 2330 131/81 90 -- 96 %   12/06/21 2300 122/81 96 23 98 %   12/06/21 2230 107/73 95 -- 97 %   12/06/21 2200 111/75 98 (!) 7 93 %   12/06/21 2100 123/83 93 19 95 %   12/06/21 2051 126/80 -- -- --           ED RESULTS:   Results for orders placed or performed during the hospital encounter of 12/06/21 (from the past 24 hour(s))   Heparin Unfractionated Anti Xa Level     Status: Normal    Collection Time: 12/06/21 11:09 PM   Result Value Ref Range    Anti Xa Unfractionated Heparin 0.13 For Reference Range, See Comment IU/mL    Narrative    Therapeutic Range: UFH: 0.25-0.50 IU/mL for low intensity dosing,  0.30-0.70 IU/mL for high intensity dosing DVT and PE.  This test is not validated for other direct factor X inhibitors (e.g. rivaroxaban, apixaban, edoxaban, betrixaban, fondaparinux) and should not be used for monitoring of other medications.   Troponin I + q6 & q12 hrs     Status: Normal    Collection Time: 12/06/21 11:09 PM   Result Value Ref Range    Troponin I High Sensitivity 55 <79 ng/L   Troponin I + q6 & q12 hrs     Status: Normal    Collection Time: 12/07/21  7:09 AM   Result Value Ref Range    Troponin I High Sensitivity 38 <79 ng/L   Heparin Unfractionated Anti Xa Level     Status: Normal    Collection Time: 12/07/21  7:09 AM   Result Value Ref  Range    Anti Xa Unfractionated Heparin 0.19 For Reference Range, See Comment IU/mL    Narrative    Therapeutic Range: UFH: 0.25-0.50 IU/mL for low intensity dosing,  0.30-0.70 IU/mL for high intensity dosing DVT and PE.  This test is not validated for other direct factor X inhibitors (e.g. rivaroxaban, apixaban, edoxaban, betrixaban, fondaparinux) and should not be used for monitoring of other medications.   Troponin I + q6 & q12 hrs     Status: Normal    Collection Time: 12/07/21  1:25 PM   Result Value Ref Range    Troponin I High Sensitivity 28 <79 ng/L   Heparin Unfractionated Anti Xa Level     Status: Normal    Collection Time: 12/07/21  1:25 PM   Result Value Ref Range    Anti Xa Unfractionated Heparin 0.42 For Reference Range, See Comment IU/mL    Narrative    Therapeutic Range: UFH: 0.25-0.50 IU/mL for low intensity dosing,  0.30-0.70 IU/mL for high intensity dosing DVT and PE.  This test is not validated for other direct factor X inhibitors (e.g. rivaroxaban, apixaban, edoxaban, betrixaban, fondaparinux) and should not be used for monitoring of other medications.       ED MEDICATIONS:   Medications   heparin 25,000 units in 0.45% NaCl 250 mL ANTICOAGULANT infusion (1,200 Units/hr Intravenous Rate/Dose Verify 12/7/21 1639)   carvedilol (COREG) tablet 3.125 mg (3.125 mg Oral Given 12/7/21 1819)   lisinopril (ZESTRIL) tablet 5 mg (5 mg Oral Given 12/7/21 0837)   zolpidem (AMBIEN) tablet 5 mg (5 mg Oral Given 12/7/21 0114)   heparin loading dose for LOW INTENSITY TREATMENT * Give BEFORE starting heparin infusion (4,600 Units Intravenous Given 12/6/21 1738)   furosemide (LASIX) injection 20 mg (20 mg Intravenous Given 12/6/21 1733)   heparin - BOLUS DOSE from infusion (2,300 Units Intravenous Given 12/7/21 0048)   heparin - BOLUS DOSE from infusion (2,300 Units Intravenous Given 12/7/21 0738)         Impression:    ICD-10-CM    1. Cardiomyopathy, unspecified type (H)  I42.9     Alcohol toxicity versus  ischemic-EF 15-20% per transthoracic echocardiogram completed 12/6/2021   2. ETOH abuse  F10.10    3. Adjustment disorder with mixed anxiety and depressed mood  F43.23          MD Kings Petty David James, MD  12/07/21 2017

## 2021-12-08 ENCOUNTER — HOSPITAL ENCOUNTER (INPATIENT)
Facility: CLINIC | Age: 41
LOS: 2 days | Discharge: HOME | End: 2021-12-10
Attending: INTERNAL MEDICINE | Admitting: INTERNAL MEDICINE
Payer: COMMERCIAL

## 2021-12-08 VITALS
DIASTOLIC BLOOD PRESSURE: 78 MMHG | RESPIRATION RATE: 20 BRPM | SYSTOLIC BLOOD PRESSURE: 101 MMHG | WEIGHT: 168.9 LBS | TEMPERATURE: 98 F | BODY MASS INDEX: 26.45 KG/M2 | OXYGEN SATURATION: 100 % | HEART RATE: 93 BPM

## 2021-12-08 DIAGNOSIS — Z72.0 TOBACCO ABUSE: ICD-10-CM

## 2021-12-08 DIAGNOSIS — I50.20 HFREF (HEART FAILURE WITH REDUCED EJECTION FRACTION) (H): Primary | ICD-10-CM

## 2021-12-08 LAB
ANION GAP SERPL CALCULATED.3IONS-SCNC: 5 MMOL/L (ref 3–14)
BUN SERPL-MCNC: 20 MG/DL (ref 7–30)
CALCIUM SERPL-MCNC: 8 MG/DL (ref 8.5–10.1)
CHLORIDE BLD-SCNC: 109 MMOL/L (ref 94–109)
CO2 SERPL-SCNC: 24 MMOL/L (ref 20–32)
CREAT SERPL-MCNC: 1.06 MG/DL (ref 0.66–1.25)
ERYTHROCYTE [DISTWIDTH] IN BLOOD BY AUTOMATED COUNT: 13.6 % (ref 10–15)
GFR SERPL CREATININE-BSD FRML MDRD: 87 ML/MIN/1.73M2
GLUCOSE BLD-MCNC: 109 MG/DL (ref 70–99)
HCT VFR BLD AUTO: 44.8 % (ref 40–53)
HGB BLD-MCNC: 15.1 G/DL (ref 13.3–17.7)
MCH RBC QN AUTO: 32.4 PG (ref 26.5–33)
MCHC RBC AUTO-ENTMCNC: 33.7 G/DL (ref 31.5–36.5)
MCV RBC AUTO: 96 FL (ref 78–100)
NT-PROBNP SERPL-MCNC: 592 PG/ML (ref 0–450)
PLATELET # BLD AUTO: 306 10E3/UL (ref 150–450)
POTASSIUM BLD-SCNC: 4.2 MMOL/L (ref 3.4–5.3)
RBC # BLD AUTO: 4.66 10E6/UL (ref 4.4–5.9)
SODIUM SERPL-SCNC: 138 MMOL/L (ref 133–144)
TROPONIN I SERPL HS-MCNC: 21 NG/L
TROPONIN I SERPL HS-MCNC: 30 NG/L
UFH PPP CHRO-ACNC: 0.2 IU/ML
UFH PPP CHRO-ACNC: 0.31 IU/ML
UFH PPP CHRO-ACNC: 0.56 IU/ML
WBC # BLD AUTO: 7.8 10E3/UL (ref 4–11)

## 2021-12-08 PROCEDURE — 84484 ASSAY OF TROPONIN QUANT: CPT | Performed by: EMERGENCY MEDICINE

## 2021-12-08 PROCEDURE — 250N000013 HC RX MED GY IP 250 OP 250 PS 637: Performed by: EMERGENCY MEDICINE

## 2021-12-08 PROCEDURE — 250N000011 HC RX IP 250 OP 636: Performed by: EMERGENCY MEDICINE

## 2021-12-08 PROCEDURE — 85027 COMPLETE CBC AUTOMATED: CPT | Performed by: PHYSICIAN ASSISTANT

## 2021-12-08 PROCEDURE — 99223 1ST HOSP IP/OBS HIGH 75: CPT | Mod: AI | Performed by: INTERNAL MEDICINE

## 2021-12-08 PROCEDURE — 80048 BASIC METABOLIC PNL TOTAL CA: CPT | Performed by: EMERGENCY MEDICINE

## 2021-12-08 PROCEDURE — 250N000013 HC RX MED GY IP 250 OP 250 PS 637: Performed by: PHYSICIAN ASSISTANT

## 2021-12-08 PROCEDURE — 250N000011 HC RX IP 250 OP 636: Performed by: PHYSICIAN ASSISTANT

## 2021-12-08 PROCEDURE — 85520 HEPARIN ASSAY: CPT | Performed by: PHYSICIAN ASSISTANT

## 2021-12-08 PROCEDURE — 258N000003 HC RX IP 258 OP 636: Performed by: PHYSICIAN ASSISTANT

## 2021-12-08 PROCEDURE — 36415 COLL VENOUS BLD VENIPUNCTURE: CPT | Performed by: PHYSICIAN ASSISTANT

## 2021-12-08 PROCEDURE — 85520 HEPARIN ASSAY: CPT | Performed by: EMERGENCY MEDICINE

## 2021-12-08 PROCEDURE — 85520 HEPARIN ASSAY: CPT | Mod: 91 | Performed by: EMERGENCY MEDICINE

## 2021-12-08 PROCEDURE — 99207 PR APP CREDIT; MD BILLING SHARED VISIT: CPT | Performed by: PHYSICIAN ASSISTANT

## 2021-12-08 PROCEDURE — 83880 ASSAY OF NATRIURETIC PEPTIDE: CPT | Performed by: EMERGENCY MEDICINE

## 2021-12-08 PROCEDURE — 96366 THER/PROPH/DIAG IV INF ADDON: CPT | Performed by: EMERGENCY MEDICINE

## 2021-12-08 PROCEDURE — 96376 TX/PRO/DX INJ SAME DRUG ADON: CPT | Performed by: EMERGENCY MEDICINE

## 2021-12-08 PROCEDURE — 36415 COLL VENOUS BLD VENIPUNCTURE: CPT | Performed by: EMERGENCY MEDICINE

## 2021-12-08 PROCEDURE — 36415 COLL VENOUS BLD VENIPUNCTURE: CPT | Mod: 59 | Performed by: EMERGENCY MEDICINE

## 2021-12-08 PROCEDURE — 210N000002 HC R&B HEART CARE

## 2021-12-08 RX ORDER — AMOXICILLIN 250 MG
2 CAPSULE ORAL 2 TIMES DAILY PRN
Status: DISCONTINUED | OUTPATIENT
Start: 2021-12-08 | End: 2021-12-10 | Stop reason: HOSPADM

## 2021-12-08 RX ORDER — NICOTINE 21 MG/24HR
1 PATCH, TRANSDERMAL 24 HOURS TRANSDERMAL DAILY PRN
Status: DISCONTINUED | OUTPATIENT
Start: 2021-12-08 | End: 2021-12-10 | Stop reason: HOSPADM

## 2021-12-08 RX ORDER — ONDANSETRON 4 MG/1
4 TABLET, ORALLY DISINTEGRATING ORAL EVERY 6 HOURS PRN
Status: DISCONTINUED | OUTPATIENT
Start: 2021-12-08 | End: 2021-12-10 | Stop reason: HOSPADM

## 2021-12-08 RX ORDER — ACETAMINOPHEN 650 MG/1
650 SUPPOSITORY RECTAL EVERY 4 HOURS PRN
Status: DISCONTINUED | OUTPATIENT
Start: 2021-12-08 | End: 2021-12-10 | Stop reason: HOSPADM

## 2021-12-08 RX ORDER — ONDANSETRON 2 MG/ML
4 INJECTION INTRAMUSCULAR; INTRAVENOUS EVERY 6 HOURS PRN
Status: DISCONTINUED | OUTPATIENT
Start: 2021-12-08 | End: 2021-12-10 | Stop reason: HOSPADM

## 2021-12-08 RX ORDER — HEPARIN SODIUM 10000 [USP'U]/100ML
0-5000 INJECTION, SOLUTION INTRAVENOUS CONTINUOUS
Status: DISCONTINUED | OUTPATIENT
Start: 2021-12-08 | End: 2021-12-09

## 2021-12-08 RX ORDER — AMOXICILLIN 250 MG
1 CAPSULE ORAL 2 TIMES DAILY PRN
Status: DISCONTINUED | OUTPATIENT
Start: 2021-12-08 | End: 2021-12-10 | Stop reason: HOSPADM

## 2021-12-08 RX ORDER — OXYCODONE HYDROCHLORIDE 5 MG/1
5 TABLET ORAL EVERY 4 HOURS PRN
Status: DISCONTINUED | OUTPATIENT
Start: 2021-12-08 | End: 2021-12-09

## 2021-12-08 RX ORDER — NALOXONE HYDROCHLORIDE 0.4 MG/ML
0.2 INJECTION, SOLUTION INTRAMUSCULAR; INTRAVENOUS; SUBCUTANEOUS
Status: DISCONTINUED | OUTPATIENT
Start: 2021-12-08 | End: 2021-12-09

## 2021-12-08 RX ORDER — ZOLPIDEM TARTRATE 6.25 MG/1
6.25 TABLET, FILM COATED, EXTENDED RELEASE ORAL
Status: DISCONTINUED | OUTPATIENT
Start: 2021-12-08 | End: 2021-12-10 | Stop reason: HOSPADM

## 2021-12-08 RX ORDER — CARVEDILOL 3.12 MG/1
3.12 TABLET ORAL 2 TIMES DAILY WITH MEALS
Status: DISCONTINUED | OUTPATIENT
Start: 2021-12-08 | End: 2021-12-10 | Stop reason: HOSPADM

## 2021-12-08 RX ORDER — ACETAMINOPHEN 325 MG/1
650 TABLET ORAL EVERY 4 HOURS PRN
Status: DISCONTINUED | OUTPATIENT
Start: 2021-12-08 | End: 2021-12-10 | Stop reason: HOSPADM

## 2021-12-08 RX ORDER — MAGNESIUM HYDROXIDE/ALUMINUM HYDROXICE/SIMETHICONE 120; 1200; 1200 MG/30ML; MG/30ML; MG/30ML
30 SUSPENSION ORAL EVERY 4 HOURS PRN
Status: DISCONTINUED | OUTPATIENT
Start: 2021-12-08 | End: 2021-12-10 | Stop reason: HOSPADM

## 2021-12-08 RX ORDER — NALOXONE HYDROCHLORIDE 0.4 MG/ML
0.4 INJECTION, SOLUTION INTRAMUSCULAR; INTRAVENOUS; SUBCUTANEOUS
Status: DISCONTINUED | OUTPATIENT
Start: 2021-12-08 | End: 2021-12-09

## 2021-12-08 RX ORDER — NITROGLYCERIN 0.4 MG/1
0.4 TABLET SUBLINGUAL EVERY 5 MIN PRN
Status: DISCONTINUED | OUTPATIENT
Start: 2021-12-08 | End: 2021-12-10 | Stop reason: HOSPADM

## 2021-12-08 RX ORDER — LISINOPRIL 5 MG/1
5 TABLET ORAL 2 TIMES DAILY
Status: DISCONTINUED | OUTPATIENT
Start: 2021-12-08 | End: 2021-12-10 | Stop reason: HOSPADM

## 2021-12-08 RX ORDER — POLYETHYLENE GLYCOL 3350 17 G/17G
17 POWDER, FOR SOLUTION ORAL DAILY PRN
Status: DISCONTINUED | OUTPATIENT
Start: 2021-12-08 | End: 2021-12-10 | Stop reason: HOSPADM

## 2021-12-08 RX ORDER — ASPIRIN 81 MG/1
81 TABLET ORAL DAILY
Status: DISCONTINUED | OUTPATIENT
Start: 2021-12-09 | End: 2021-12-10 | Stop reason: HOSPADM

## 2021-12-08 RX ORDER — HYDROMORPHONE HCL IN WATER/PF 6 MG/30 ML
0.2 PATIENT CONTROLLED ANALGESIA SYRINGE INTRAVENOUS
Status: DISCONTINUED | OUTPATIENT
Start: 2021-12-08 | End: 2021-12-10 | Stop reason: HOSPADM

## 2021-12-08 RX ORDER — BISACODYL 10 MG
10 SUPPOSITORY, RECTAL RECTAL DAILY PRN
Status: DISCONTINUED | OUTPATIENT
Start: 2021-12-08 | End: 2021-12-10 | Stop reason: HOSPADM

## 2021-12-08 RX ORDER — LIDOCAINE 40 MG/G
CREAM TOPICAL
Status: DISCONTINUED | OUTPATIENT
Start: 2021-12-08 | End: 2021-12-10 | Stop reason: HOSPADM

## 2021-12-08 RX ADMIN — ZOLPIDEM TARTRATE 6.25 MG: 6.25 TABLET, FILM COATED, EXTENDED RELEASE ORAL at 22:47

## 2021-12-08 RX ADMIN — CARVEDILOL 3.12 MG: 3.12 TABLET, FILM COATED ORAL at 07:43

## 2021-12-08 RX ADMIN — CARVEDILOL 3.12 MG: 3.12 TABLET, FILM COATED ORAL at 20:00

## 2021-12-08 RX ADMIN — NICOTINE 1 PATCH: 14 PATCH, EXTENDED RELEASE TRANSDERMAL at 20:11

## 2021-12-08 RX ADMIN — HEPARIN SODIUM 1300 UNITS/HR: 1000 INJECTION, SOLUTION INTRAVENOUS; SUBCUTANEOUS at 20:01

## 2021-12-08 RX ADMIN — LISINOPRIL 5 MG: 5 TABLET ORAL at 20:11

## 2021-12-08 RX ADMIN — HEPARIN SODIUM 1350 UNITS/HR: 10000 INJECTION, SOLUTION INTRAVENOUS at 01:40

## 2021-12-08 ASSESSMENT — ACTIVITIES OF DAILY LIVING (ADL)
DOING_ERRANDS_INDEPENDENTLY_DIFFICULTY: NO
ADLS_ACUITY_SCORE: 3
ADLS_ACUITY_SCORE: 3
DRESSING/BATHING_DIFFICULTY: NO
ADLS_ACUITY_SCORE: 3
CONCENTRATING,_REMEMBERING_OR_MAKING_DECISIONS_DIFFICULTY: NO
EQUIPMENT_CURRENTLY_USED_AT_HOME: PROSTHESIS
ADLS_ACUITY_SCORE: 3
HEARING_DIFFICULTY_OR_DEAF: NO
DIFFICULTY_COMMUNICATING: NO
TOILETING_ISSUES: NO
ADLS_ACUITY_SCORE: 3
DIFFICULTY_EATING/SWALLOWING: NO
WALKING_OR_CLIMBING_STAIRS_DIFFICULTY: NO
FALL_HISTORY_WITHIN_LAST_SIX_MONTHS: NO
WEAR_GLASSES_OR_BLIND: NO

## 2021-12-08 NOTE — H&P
[facility]    History and Physical - Hospitalist Service       Date of Admission:  12/8/2021  PRIMARY CARE PROVIDER:    Jason García & Ashia CALHOUN Maxime is a 41 year old male admitted on 12/8/2021.    Past medical history significant for MDD, JESSICA, Insomnia, Tobacco use D/O, Alcohol use D/O who was directly admitted from [facility] ED with newly diagnosed HFrEF, new LBBB and suspected unstable angina.      Patient presented to [facility] ED on 12/6/2021 after being evaluated by Cardiology as an outpatient for exertional dyspnea.  Patient reported experiencing exertional dyspnea for the past 2-3 months.  He underwent a complete ECHO in clinic that revealed a reduced EF of 15-20% which was new.  He was also found to be in sinus tachycardia.      In the ED, patient stated he does consume alcohol regularly and heavily.  He indicated a liter of vodka lasts him approximately 4 days and a case of beer lasts 5-6 days.  He typically consumes 6-12 drinks 4-5 days a week.  He also used cocaine about 2 months ago and in total 3 times over the last 2 decades.  EKG in the ED confirmed sinus tachycardia with frequent PAC's and lateral Q-waves present.  Chest Xray with mild cardiomegaly and interstitial edema noted as well as subtle infiltrative process.  CBC with differential was unremarkable.  CMP was unremarkable.  Serial troponin collected and negative x5.  Pro-BNP elevated at 2,588.  INR and PTT within normal limits.  UA and urine drug screen negative.      Patient has been boarding in the ED since 12/6 and repeat labs show a creatinine of 1.06 with GFR of 87, calcium of 8.0 and glucose of 109.  Pro-BNP improved to 592.  He has since been started on Coreg 3.125 mg BID, lisinopril 5 mg BID and single dose of IV lasix 20 mg as well as started on heparin drip.      Unstable angina  New LBBB  New HFrEF  - Cardiology consult requested.    - Heparin drip continued.    -  Continue Coreg 3.125 mg BID and lisinopril 5 mg BID.  Hold parameters in place.    - Start ASA 81 mg/d.    - Check lipid panel.    - Daily weights.  - I's and O's.    - Telemetry.      MDD  JESSICA  Insomnia  - Resumed Ambien 6.25 mg PRN at bedtime (he stated he takes this nightly per report).    - After discussion with the patient he is no longer taking Celexa and as such will not resume.      Tobacco use D/O  He is currently using chewing tobacco.    - Counseled regarding cessation.    - PRN Nicoderm ordered.      Alcohol use D/O  - Counseled regarding cutting back/cessation.    - Monitor for withdrawal symptoms.      Clinically Significant Risk Factors Present on Admission          # Hypocalcemia: Ca = 8.0 mg/dL (Ref range: 8.5 - 10.1 mg/dL) and/or iCa = N/A on admission, will replace as needed               Diet: 2 Gram Sodium Diet  DVT Prophylaxis: Heparin drip  Ybarra Catheter: Not present  Code Status: Full Code       Disposition Plan   Expected Discharge: 12/10/2021   Anticipated discharge location:  Awaiting care coordination huddle   Delays: Cardiac work-up    Entered: Tim Henderson PA-C 12/08/2021, 6:44 PM     The patient's care was discussed with the Bedside Nurse and Patient.    The patient has been discussed with Dr. Stafford, who agrees with the assessment and plan at this time.    Tim Henderson PA-C  [facility]  Securely message with the Vocera Web Console (learn more here)  Text page via [employer] Paging/Directory    ______________________________________________________________________    Chief Complaint   Direct admit due to newly diagnosed HFrEF, new LBBB and suspected unstable angina.    History is obtained from the patient and EMR.      History of Present Illness   Dipak Swartz is a 41 year old male with a past medical history significant for MDD, JESSICA, Insomnia, Tobacco use D/O, Alcohol use D/O who was directly admitted from [facility] ED with  newly diagnosed HFrEF, new LBBB and suspected unstable angina.        Patient presented to [facility] ED on 12/6/2021 after being evaluated by Cardiology as an outpatient for exertional dyspnea.  Patient reported experiencing exertional dyspnea for the past 2-3 months.  He underwent a complete ECHO in clinic that revealed a reduced EF of 15-20% which was new.  He was also found to be in sinus tachycardia.      In the ED, patient stated he does consume alcohol regularly and heavily.  He indicated a liter of vodka lasts him approximately 4 days and a case of beer lasts 5-6 days.  He typically consumes 6-12 drinks 4-5 days a week.  He also used cocaine about 2 months ago and in total 3 times over the last 2 decades.  EKG in the ED confirmed sinus tachycardia with frequent PAC's and lateral Q-waves present.  Chest Xray with mild cardiomegaly and interstitial edema noted as well as subtle infiltrative process.  CBC with differential was unremarkable.  CMP was unremarkable.  Serial troponin collected and negative x5.  Pro-BNP elevated at 2,588.  INR and PTT within normal limits.  UA and urine rug screen negative.      Patient has been boarding in the ED since 12/6 and repeat labs show a creatinine of 1.06 with GFR of 87, calcium of 8.0 and glucose of 109.  Pro-BNP improved to 592.  He has since been started on Coreg 3.125 mg BID, lisinopril 5 mg BID and single dose of IV lasix 20 mg as well as started on heparin drip.     Patient was seen in his hospital room where he was resting comfortably in bed upon arrival.  Initially, we reviewed medical and surgical history as well as events that led to admission.      Upon questioning, he indicated he has nightly sweating spells.  He has had worsening fatigue and weakness for some time.  He has had a mild headache since Monday afternoon.  He has a had a dry cough since August and occasional abdominal pain.  When asked if he has had a seizure, he answer yes and then stated it  occurred about 3 months ago and was related to a new anxiety med which he is no longer taking.      He has been having chest pain that is mild in severity and comes and goes.  He described it as both a pressure like discomfort with stabbing.  It does not radiate anywhere but does seem to cause shortness of breath, clamminess and light headedness due to being short of breath.  He has also been having shortness of breath for some time which is occurring at rest and with activity.  He described having swelling everywhere and stated his right lower extremity prosthesis had to be changed because of it.  Patient has also been experiencing palpitations.      Review of Systems    The 10 point Review of Systems is negative other than noted in the HPI.      Past Medical History    I have reviewed this patient's medical history and updated it with pertinent information if needed.   Past Medical History:   Diagnosis Date     [place]ident [place] 2014   MDD, JESSICA, Insomnia, Alcohol use D/O, Tobacco use D/O    Past Surgical History   I have reviewed this patient's surgical history and updated it with pertinent information if needed.  Past Surgical History:   Procedure Laterality Date     AMPUTATION BELOW KNEE RT/LT Right 2014     Broken Right arm  1992     LEG SURGERY Right 2014       Social History   I have reviewed this patient's social history and updated it with pertinent information if needed.  Patient resides in a house in [place], [place] with his wife and 2 kids.  He has never smoked cigarettes but is using chewing tobacco.  He consume alcohol regularly and heavily.  He indicated a liter of vodka lasts him approximately 4 days and a case of beer lasts 5-6 days.  He typically consumes 6-12 drinks 4-5 days a week.  He also used cocaine about 2 months ago and in total 3 times over the last 2 decades.  Social History     Tobacco Use     Smoking status: Former Smoker     Quit date: 2003     Years since quittin.6      Smokeless tobacco: Current User     Types: Chew   Vaping Use     Vaping Use: Never used   Substance Use Topics     Alcohol use: Yes     Alcohol/week: 0.0 standard drinks     Comment: occ.     Drug use: No        Family History   I have reviewed this patient's family history and updated it with pertinent information if needed.   No family history on file.   When asked he denied any cardiac history, diabetes, CVA's or cancer with his immediate family (parents or siblings).      Prior to Admission Medications   Prior to Admission Medications   Prescriptions Last Dose Informant Patient Reported? Taking?   Multiple Vitamins-Minerals (MENS MULTIVITAMIN) TABS  Self Yes No   Sig: Take 1 tablet by mouth daily   citalopram (CELEXA) 10 MG tablet  Self No No   Sig: Take 1 tablet (10 mg) by mouth every morning   order for DME  Self No No   Sig: Equipment being ordered: right lower extremity prosthetic     [employer] [facility]  [phone]   zolpidem ER (AMBIEN CR) 6.25 MG CR tablet  Self No No   Sig: Take 1 tablet (6.25 mg) by mouth nightly as needed for sleep      Facility-Administered Medications: None     Allergies   Allergies   Allergen Reactions     No Known Allergies        Physical Exam   /64   Pulse 86   Temp 97.6  F (36.4  C) ([place])   Resp 20   Wt 69.2 kg (152 lb 8 oz)   SpO2 96%   BMI 23.88 kg/m      Constitutional: Awake, alert, cooperative, no apparent distress.    ENT: Normocephalic, without obvious abnormality, atraumatic, oral pharynx with moist mucus membranes, tonsils without erythema or exudates.  Eyes pupils are equal, round and reactive to light; extra occular movements intact.  Normal sclera.    Neck: Supple, symmetrical, trachea midline, no adenopathy.  Pulmonary: No increased work of breathing, good air exchange, clear to auscultation bilaterally, no crackles or wheezing.  Cardiovascular: Regular rate and rhythm, normal S1 and S2, no S3 or S4, and no murmur noted.  GI: Normal bowel sounds,  soft, non-distended, non-tender.    Skin/Integumen: Clear.  Neuro: CN II-XII grossly intact.  Upper and lower extremities strength, coordination and sensation intact bilaterally.    Psych:  Alert and oriented x 3. Normal affect.  Extremities: No lower extremity edema noted, and left calf is non-tender to palpation. Right BKA noted.      Data   Data reviewed today: I reviewed all medications, new labs and imaging results over the last 24 hours. I personally reviewed no images or EKG's today.    Recent Labs   Lab 12/08/21  0557 12/06/21  1634   WBC  --  10.2   HGB  --  15.8   MCV  --  96   PLT  --  316   INR  --  0.92    136   POTASSIUM 4.2 4.0   CHLORIDE 109 106   CO2 24 25   BUN 20 16   CR 1.06 0.99   ANIONGAP 5 5   SHAYNA 8.0* 8.5   * 93   ALBUMIN  --  3.8   PROTTOTAL  --  7.6   BILITOTAL  --  0.7   ALKPHOS  --  61   ALT  --  51   AST  --  28     No results found for this or any previous visit (from the past 24 hour(s)).

## 2021-12-08 NOTE — ED PROVIDER NOTES
Emergency Department Patient Sign-out       Brief HPI:  This is a 41 year old male signed out to me by Dr. Ku .  See initial ED Provider note for details of the presentation.      ==================================================================    CHART REVIEW:    Interpretation Summary     1. Moderate-severe LV dilation with severe global hypokinesis. LVEF estimated  at 15-20%.  2. RV is normal size with moderate-severe RV systolic dysfunction.  3. Borderline bi-atrial dilation.  4. No evidence of significant valve disease.  5. Trivial pericardial effusion.     Compared to the prior study from 3/21/2016, the severe bi-venticular  dysfunction is new.      Cardiology note:  1.  Heart failure with reduced ejection fraction.  Currently volume overloaded with class IV heart failure.  He will require diuresis and initiation of beta-blocker and ACE inhibitor.  The cause of reduced EF is unknown.  Most likely statistically is coronary artery disease although this patient does not have as many risk factors as we would expect to see in a 41-year-old with severe disease.  It is also possible that this is a viral cardiomyopathy or even alcoholic cardiomyopathy, although the latter seems unlikely given the totality of his drinking history (at least as he tells it).  2.  Chest pain.  This patient has been having exertional chest pain dating back a couple of months with intermittent resting chest pain, his chest pain has worsened in the last few days with resting chest pain as recently as a few hours ago.  I am concerned that this may represent unstable angina and be life-threatening.  He needs observation and evaluation before going home today.  I am sending him to the emergency room for diuresis and further evaluation.  He will require coronary angiography, although if his troponin is negative and his symptoms resolve with diuresis, he could be sent home with plans for outpatient coronary angiography.     Given the  recent worsening of both his heart failure and chest pain symptoms, I do not believe it is safe for this patient to go home, so I am sending him to the emergency room.  I understand that currently the medical system is backed up and his care will likely be delayed, but I still believe that is the best and safest alternative for him.    END CHART REVIEW  ==================================================================        Significant Events prior to my assuming care: awaiting transfer      Exam:   Patient Vitals for the past 24 hrs:   BP Pulse Resp SpO2   12/07/21 2200 103/74 94 16 98 %   12/07/21 2000 108/67 90 12 93 %   12/07/21 1819 112/67 95 -- --   12/07/21 1800 112/67 92 16 94 %   12/07/21 1730 112/65 89 17 97 %   12/07/21 1700 111/64 96 10 95 %   12/07/21 1630 113/71 87 10 96 %   12/07/21 1600 112/63 87 11 94 %   12/07/21 1530 102/64 81 9 98 %   12/07/21 1500 92/67 89 19 90 %   12/07/21 1430 103/69 95 15 96 %   12/07/21 1400 (!) 87/61 106 16 --   12/07/21 1330 93/72 87 13 95 %   12/07/21 1300 97/70 92 8 --   12/07/21 1230 104/76 104 25 --   12/07/21 1200 102/65 91 9 --   12/07/21 1130 99/63 81 10 --   12/07/21 1030 112/77 93 18 --   12/07/21 1000 97/52 94 13 --   12/07/21 0930 (!) 145/109 91 20 --   12/07/21 0900 (!) 122/103 95 23 --   12/07/21 0830 113/75 95 11 96 %   12/07/21 0800 104/69 94 -- 96 %   12/07/21 0730 94/84 90 -- 95 %   12/07/21 0700 (!) 122/93 74 -- 96 %   12/07/21 0630 112/70 87 -- 96 %   12/07/21 0600 113/77 88 12 94 %   12/07/21 0530 (!) 128/94 97 15 94 %   12/07/21 0400 133/73 77 18 94 %   12/07/21 0300 (!) 135/93 85 -- 96 %   12/07/21 0230 125/89 88 17 96 %   12/07/21 0130 115/77 92 18 95 %   12/07/21 0100 118/79 93 -- 96 %   12/07/21 0030 113/78 92 -- 95 %   12/07/21 0000 100/80 90 17 94 %   12/06/21 2330 131/81 90 -- 96 %   12/06/21 2300 122/81 96 23 98 %           ED RESULTS:   Results for orders placed or performed during the hospital encounter of 12/06/21 (from the past 24  hour(s))   Heparin Unfractionated Anti Xa Level     Status: Normal    Collection Time: 12/06/21 11:09 PM   Result Value Ref Range    Anti Xa Unfractionated Heparin 0.13 For Reference Range, See Comment IU/mL    Narrative    Therapeutic Range: UFH: 0.25-0.50 IU/mL for low intensity dosing,  0.30-0.70 IU/mL for high intensity dosing DVT and PE.  This test is not validated for other direct factor X inhibitors (e.g. rivaroxaban, apixaban, edoxaban, betrixaban, fondaparinux) and should not be used for monitoring of other medications.   Troponin I + q6 & q12 hrs     Status: Normal    Collection Time: 12/06/21 11:09 PM   Result Value Ref Range    Troponin I High Sensitivity 55 <79 ng/L   Troponin I + q6 & q12 hrs     Status: Normal    Collection Time: 12/07/21  7:09 AM   Result Value Ref Range    Troponin I High Sensitivity 38 <79 ng/L   Heparin Unfractionated Anti Xa Level     Status: Normal    Collection Time: 12/07/21  7:09 AM   Result Value Ref Range    Anti Xa Unfractionated Heparin 0.19 For Reference Range, See Comment IU/mL    Narrative    Therapeutic Range: UFH: 0.25-0.50 IU/mL for low intensity dosing,  0.30-0.70 IU/mL for high intensity dosing DVT and PE.  This test is not validated for other direct factor X inhibitors (e.g. rivaroxaban, apixaban, edoxaban, betrixaban, fondaparinux) and should not be used for monitoring of other medications.   Troponin I + q6 & q12 hrs     Status: Normal    Collection Time: 12/07/21  1:25 PM   Result Value Ref Range    Troponin I High Sensitivity 28 <79 ng/L   Heparin Unfractionated Anti Xa Level     Status: Normal    Collection Time: 12/07/21  1:25 PM   Result Value Ref Range    Anti Xa Unfractionated Heparin 0.42 For Reference Range, See Comment IU/mL    Narrative    Therapeutic Range: UFH: 0.25-0.50 IU/mL for low intensity dosing,  0.30-0.70 IU/mL for high intensity dosing DVT and PE.  This test is not validated for other direct factor X inhibitors (e.g. rivaroxaban, apixaban,  edoxaban, betrixaban, fondaparinux) and should not be used for monitoring of other medications.   Heparin Unfractionated Anti Xa Level     Status: Normal    Collection Time: 12/07/21  8:24 PM   Result Value Ref Range    Anti Xa Unfractionated Heparin 0.18 For Reference Range, See Comment IU/mL    Narrative    Therapeutic Range: UFH: 0.25-0.50 IU/mL for low intensity dosing,  0.30-0.70 IU/mL for high intensity dosing DVT and PE.  This test is not validated for other direct factor X inhibitors (e.g. rivaroxaban, apixaban, edoxaban, betrixaban, fondaparinux) and should not be used for monitoring of other medications.       ED MEDICATIONS:   Medications   heparin 25,000 units in 0.45% NaCl 250 mL ANTICOAGULANT infusion (1,350 Units/hr Intravenous New Bag 12/7/21 2053)   carvedilol (COREG) tablet 3.125 mg (3.125 mg Oral Given 12/7/21 1819)   lisinopril (ZESTRIL) tablet 5 mg (5 mg Oral Given 12/7/21 2049)   zolpidem (AMBIEN) tablet 5 mg (5 mg Oral Given 12/7/21 0114)   heparin loading dose for LOW INTENSITY TREATMENT * Give BEFORE starting heparin infusion (4,600 Units Intravenous Given 12/6/21 1738)   furosemide (LASIX) injection 20 mg (20 mg Intravenous Given 12/6/21 1733)   heparin - BOLUS DOSE from infusion (2,300 Units Intravenous Given 12/7/21 0048)   heparin - BOLUS DOSE from infusion (2,300 Units Intravenous Given 12/7/21 0738)   heparin - BOLUS DOSE from infusion (2,300 Units Intravenous Given 12/7/21 2055)         Impression:    ICD-10-CM    1. Cardiomyopathy, unspecified type (H)  I42.9     Alcohol toxicity versus ischemic-EF 15-20% per transthoracic echocardiogram completed 12/6/2021   2. ETOH abuse  F10.10    3. Adjustment disorder with mixed anxiety and depressed mood  F43.23        Plan:    Pending studies include none. Awaiting transfer for cath lab evaluation of new onset heart failure. AM labs ordered - may benefit from further diuresis.       12:14 AM: Nurse had evaluated the patient and he complained  of 1/10 mild chest discomfort.  I requested a repeat EKG.  Repeat EKG performed and continues to show a left bundle branch block.  The ST elevations on the previous one have improved as have the depressions inferiorly.  Still with Q waves in V1 and V2 as well as laterally in lead I and aVL.  These dynamic changes suggests there may be a more subacute process going on.  He has had 2 - troponins so far and is on a heparin drip.    7:34 AM: Patient was signed out at shift change to Dr Bernal.     MD David Yao, Geremias Presley MD  12/08/21 0750

## 2021-12-09 LAB
ANION GAP SERPL CALCULATED.3IONS-SCNC: 5 MMOL/L (ref 3–14)
BUN SERPL-MCNC: 18 MG/DL (ref 7–30)
CALCIUM SERPL-MCNC: 8.9 MG/DL (ref 8.5–10.1)
CHLORIDE BLD-SCNC: 111 MMOL/L (ref 94–109)
CHOLEST SERPL-MCNC: 229 MG/DL
CO2 SERPL-SCNC: 22 MMOL/L (ref 20–32)
CREAT SERPL-MCNC: 1.01 MG/DL (ref 0.66–1.25)
ERYTHROCYTE [DISTWIDTH] IN BLOOD BY AUTOMATED COUNT: 13.5 % (ref 10–15)
GFR SERPL CREATININE-BSD FRML MDRD: >90 ML/MIN/1.73M2
GLUCOSE BLD-MCNC: 106 MG/DL (ref 70–99)
HCT VFR BLD AUTO: 44.2 % (ref 40–53)
HDLC SERPL-MCNC: 77 MG/DL
HGB BLD-MCNC: 14.9 G/DL (ref 13.3–17.7)
LDLC SERPL CALC-MCNC: 131 MG/DL
MCH RBC QN AUTO: 32.5 PG (ref 26.5–33)
MCHC RBC AUTO-ENTMCNC: 33.7 G/DL (ref 31.5–36.5)
MCV RBC AUTO: 97 FL (ref 78–100)
NONHDLC SERPL-MCNC: 152 MG/DL
PLATELET # BLD AUTO: 274 10E3/UL (ref 150–450)
POTASSIUM BLD-SCNC: 4.2 MMOL/L (ref 3.4–5.3)
RBC # BLD AUTO: 4.58 10E6/UL (ref 4.4–5.9)
SODIUM SERPL-SCNC: 138 MMOL/L (ref 133–144)
TRIGL SERPL-MCNC: 105 MG/DL
UFH PPP CHRO-ACNC: 0.19 IU/ML
UFH PPP CHRO-ACNC: 0.23 IU/ML
UFH PPP CHRO-ACNC: 0.48 IU/ML
WBC # BLD AUTO: 9.1 10E3/UL (ref 4–11)

## 2021-12-09 PROCEDURE — 36415 COLL VENOUS BLD VENIPUNCTURE: CPT | Performed by: PHYSICIAN ASSISTANT

## 2021-12-09 PROCEDURE — 250N000011 HC RX IP 250 OP 636: Performed by: INTERNAL MEDICINE

## 2021-12-09 PROCEDURE — 85520 HEPARIN ASSAY: CPT | Performed by: PHYSICIAN ASSISTANT

## 2021-12-09 PROCEDURE — 250N000013 HC RX MED GY IP 250 OP 250 PS 637: Performed by: PHYSICIAN ASSISTANT

## 2021-12-09 PROCEDURE — 99254 IP/OBS CNSLTJ NEW/EST MOD 60: CPT | Mod: 25 | Performed by: INTERNAL MEDICINE

## 2021-12-09 PROCEDURE — C1894 INTRO/SHEATH, NON-LASER: HCPCS | Performed by: INTERNAL MEDICINE

## 2021-12-09 PROCEDURE — 85520 HEPARIN ASSAY: CPT | Performed by: INTERNAL MEDICINE

## 2021-12-09 PROCEDURE — 250N000009 HC RX 250: Performed by: INTERNAL MEDICINE

## 2021-12-09 PROCEDURE — 93454 CORONARY ARTERY ANGIO S&I: CPT | Performed by: INTERNAL MEDICINE

## 2021-12-09 PROCEDURE — 272N000001 HC OR GENERAL SUPPLY STERILE: Performed by: INTERNAL MEDICINE

## 2021-12-09 PROCEDURE — 80061 LIPID PANEL: CPT | Performed by: PHYSICIAN ASSISTANT

## 2021-12-09 PROCEDURE — 85027 COMPLETE CBC AUTOMATED: CPT | Performed by: PHYSICIAN ASSISTANT

## 2021-12-09 PROCEDURE — 82310 ASSAY OF CALCIUM: CPT | Performed by: PHYSICIAN ASSISTANT

## 2021-12-09 PROCEDURE — 210N000002 HC R&B HEART CARE

## 2021-12-09 PROCEDURE — 99207 PR APP CREDIT; MD BILLING SHARED VISIT: CPT | Performed by: PHYSICIAN ASSISTANT

## 2021-12-09 PROCEDURE — B2111ZZ FLUOROSCOPY OF MULTIPLE CORONARY ARTERIES USING LOW OSMOLAR CONTRAST: ICD-10-PCS | Performed by: INTERNAL MEDICINE

## 2021-12-09 PROCEDURE — C1887 CATHETER, GUIDING: HCPCS | Performed by: INTERNAL MEDICINE

## 2021-12-09 PROCEDURE — 258N000003 HC RX IP 258 OP 636: Performed by: INTERNAL MEDICINE

## 2021-12-09 PROCEDURE — 250N000013 HC RX MED GY IP 250 OP 250 PS 637: Performed by: INTERNAL MEDICINE

## 2021-12-09 PROCEDURE — 99152 MOD SED SAME PHYS/QHP 5/>YRS: CPT | Performed by: INTERNAL MEDICINE

## 2021-12-09 PROCEDURE — 99232 SBSQ HOSP IP/OBS MODERATE 35: CPT | Performed by: HOSPITALIST

## 2021-12-09 PROCEDURE — 93454 CORONARY ARTERY ANGIO S&I: CPT | Mod: 26 | Performed by: INTERNAL MEDICINE

## 2021-12-09 RX ORDER — EPTIFIBATIDE 2 MG/ML
180 INJECTION, SOLUTION INTRAVENOUS EVERY 10 MIN PRN
Status: DISCONTINUED | OUTPATIENT
Start: 2021-12-09 | End: 2021-12-09 | Stop reason: HOSPADM

## 2021-12-09 RX ORDER — OXYCODONE HYDROCHLORIDE 5 MG/1
5 TABLET ORAL EVERY 4 HOURS PRN
Status: DISCONTINUED | OUTPATIENT
Start: 2021-12-09 | End: 2021-12-10 | Stop reason: HOSPADM

## 2021-12-09 RX ORDER — NALOXONE HYDROCHLORIDE 0.4 MG/ML
0.4 INJECTION, SOLUTION INTRAMUSCULAR; INTRAVENOUS; SUBCUTANEOUS
Status: DISCONTINUED | OUTPATIENT
Start: 2021-12-09 | End: 2021-12-09

## 2021-12-09 RX ORDER — NALOXONE HYDROCHLORIDE 0.4 MG/ML
0.4 INJECTION, SOLUTION INTRAMUSCULAR; INTRAVENOUS; SUBCUTANEOUS
Status: DISCONTINUED | OUTPATIENT
Start: 2021-12-09 | End: 2021-12-10 | Stop reason: HOSPADM

## 2021-12-09 RX ORDER — ACETAMINOPHEN 325 MG/1
650 TABLET ORAL EVERY 4 HOURS PRN
Status: DISCONTINUED | OUTPATIENT
Start: 2021-12-09 | End: 2021-12-09

## 2021-12-09 RX ORDER — OXYCODONE HYDROCHLORIDE 5 MG/1
10 TABLET ORAL EVERY 4 HOURS PRN
Status: DISCONTINUED | OUTPATIENT
Start: 2021-12-09 | End: 2021-12-10 | Stop reason: HOSPADM

## 2021-12-09 RX ORDER — NALOXONE HYDROCHLORIDE 0.4 MG/ML
0.2 INJECTION, SOLUTION INTRAMUSCULAR; INTRAVENOUS; SUBCUTANEOUS
Status: DISCONTINUED | OUTPATIENT
Start: 2021-12-09 | End: 2021-12-10 | Stop reason: HOSPADM

## 2021-12-09 RX ORDER — FUROSEMIDE 10 MG/ML
20 INJECTION INTRAMUSCULAR; INTRAVENOUS EVERY 8 HOURS
Status: DISCONTINUED | OUTPATIENT
Start: 2021-12-09 | End: 2021-12-10

## 2021-12-09 RX ORDER — DOBUTAMINE HYDROCHLORIDE 200 MG/100ML
2-20 INJECTION INTRAVENOUS CONTINUOUS PRN
Status: DISCONTINUED | OUTPATIENT
Start: 2021-12-09 | End: 2021-12-09 | Stop reason: HOSPADM

## 2021-12-09 RX ORDER — LORAZEPAM 2 MG/ML
0.5 INJECTION INTRAMUSCULAR
Status: DISCONTINUED | OUTPATIENT
Start: 2021-12-09 | End: 2021-12-09 | Stop reason: HOSPADM

## 2021-12-09 RX ORDER — EPTIFIBATIDE 2 MG/ML
2 INJECTION, SOLUTION INTRAVENOUS CONTINUOUS PRN
Status: DISCONTINUED | OUTPATIENT
Start: 2021-12-09 | End: 2021-12-09 | Stop reason: HOSPADM

## 2021-12-09 RX ORDER — LORAZEPAM 0.5 MG/1
0.5 TABLET ORAL
Status: DISCONTINUED | OUTPATIENT
Start: 2021-12-09 | End: 2021-12-09 | Stop reason: HOSPADM

## 2021-12-09 RX ORDER — IOPAMIDOL 755 MG/ML
INJECTION, SOLUTION INTRAVASCULAR
Status: DISCONTINUED | OUTPATIENT
Start: 2021-12-09 | End: 2021-12-09 | Stop reason: HOSPADM

## 2021-12-09 RX ORDER — SODIUM CHLORIDE 9 MG/ML
INJECTION, SOLUTION INTRAVENOUS CONTINUOUS
Status: DISCONTINUED | OUTPATIENT
Start: 2021-12-09 | End: 2021-12-09 | Stop reason: HOSPADM

## 2021-12-09 RX ORDER — HEPARIN SODIUM 1000 [USP'U]/ML
INJECTION, SOLUTION INTRAVENOUS; SUBCUTANEOUS
Status: DISCONTINUED | OUTPATIENT
Start: 2021-12-09 | End: 2021-12-09 | Stop reason: HOSPADM

## 2021-12-09 RX ORDER — NALOXONE HYDROCHLORIDE 0.4 MG/ML
0.2 INJECTION, SOLUTION INTRAMUSCULAR; INTRAVENOUS; SUBCUTANEOUS
Status: DISCONTINUED | OUTPATIENT
Start: 2021-12-09 | End: 2021-12-09

## 2021-12-09 RX ORDER — VERAPAMIL HYDROCHLORIDE 2.5 MG/ML
INJECTION, SOLUTION INTRAVENOUS
Status: DISCONTINUED | OUTPATIENT
Start: 2021-12-09 | End: 2021-12-09

## 2021-12-09 RX ORDER — LIDOCAINE 40 MG/G
CREAM TOPICAL
Status: DISCONTINUED | OUTPATIENT
Start: 2021-12-09 | End: 2021-12-09 | Stop reason: HOSPADM

## 2021-12-09 RX ORDER — FENTANYL CITRATE 50 UG/ML
25 INJECTION, SOLUTION INTRAMUSCULAR; INTRAVENOUS
Status: DISCONTINUED | OUTPATIENT
Start: 2021-12-09 | End: 2021-12-10 | Stop reason: HOSPADM

## 2021-12-09 RX ORDER — ASPIRIN 325 MG
325 TABLET ORAL ONCE
Status: COMPLETED | OUTPATIENT
Start: 2021-12-09 | End: 2021-12-09

## 2021-12-09 RX ORDER — ATORVASTATIN CALCIUM 40 MG/1
40 TABLET, FILM COATED ORAL EVERY EVENING
Status: DISCONTINUED | OUTPATIENT
Start: 2021-12-09 | End: 2021-12-10 | Stop reason: HOSPADM

## 2021-12-09 RX ORDER — FLUMAZENIL 0.1 MG/ML
0.2 INJECTION, SOLUTION INTRAVENOUS
Status: ACTIVE | OUTPATIENT
Start: 2021-12-09 | End: 2021-12-10

## 2021-12-09 RX ORDER — POTASSIUM CHLORIDE 1500 MG/1
20 TABLET, EXTENDED RELEASE ORAL
Status: DISCONTINUED | OUTPATIENT
Start: 2021-12-09 | End: 2021-12-09 | Stop reason: HOSPADM

## 2021-12-09 RX ORDER — ATROPINE SULFATE 0.1 MG/ML
0.5 INJECTION INTRAVENOUS
Status: ACTIVE | OUTPATIENT
Start: 2021-12-09 | End: 2021-12-10

## 2021-12-09 RX ORDER — DOPAMINE HYDROCHLORIDE 160 MG/100ML
2-20 INJECTION, SOLUTION INTRAVENOUS CONTINUOUS PRN
Status: DISCONTINUED | OUTPATIENT
Start: 2021-12-09 | End: 2021-12-09 | Stop reason: HOSPADM

## 2021-12-09 RX ORDER — ARGATROBAN 1 MG/ML
350 INJECTION, SOLUTION INTRAVENOUS
Status: DISCONTINUED | OUTPATIENT
Start: 2021-12-09 | End: 2021-12-09 | Stop reason: HOSPADM

## 2021-12-09 RX ORDER — FENTANYL CITRATE 50 UG/ML
INJECTION, SOLUTION INTRAMUSCULAR; INTRAVENOUS
Status: DISCONTINUED | OUTPATIENT
Start: 2021-12-09 | End: 2021-12-09 | Stop reason: HOSPADM

## 2021-12-09 RX ORDER — SODIUM CHLORIDE 9 MG/ML
75 INJECTION, SOLUTION INTRAVENOUS CONTINUOUS
Status: ACTIVE | OUTPATIENT
Start: 2021-12-09 | End: 2021-12-09

## 2021-12-09 RX ORDER — NITROGLYCERIN 5 MG/ML
VIAL (ML) INTRAVENOUS
Status: DISCONTINUED | OUTPATIENT
Start: 2021-12-09 | End: 2021-12-10 | Stop reason: HOSPADM

## 2021-12-09 RX ORDER — HEPARIN SODIUM 10000 [USP'U]/100ML
100-1000 INJECTION, SOLUTION INTRAVENOUS CONTINUOUS PRN
Status: DISCONTINUED | OUTPATIENT
Start: 2021-12-09 | End: 2021-12-09 | Stop reason: HOSPADM

## 2021-12-09 RX ORDER — ARGATROBAN 1 MG/ML
150 INJECTION, SOLUTION INTRAVENOUS
Status: DISCONTINUED | OUTPATIENT
Start: 2021-12-09 | End: 2021-12-09 | Stop reason: HOSPADM

## 2021-12-09 RX ORDER — ASPIRIN 81 MG/1
243 TABLET, CHEWABLE ORAL ONCE
Status: COMPLETED | OUTPATIENT
Start: 2021-12-09 | End: 2021-12-09

## 2021-12-09 RX ADMIN — ATORVASTATIN CALCIUM 40 MG: 40 TABLET, FILM COATED ORAL at 20:40

## 2021-12-09 RX ADMIN — FUROSEMIDE 20 MG: 10 INJECTION, SOLUTION INTRAVENOUS at 20:40

## 2021-12-09 RX ADMIN — LISINOPRIL 5 MG: 5 TABLET ORAL at 20:40

## 2021-12-09 RX ADMIN — CARVEDILOL 3.12 MG: 3.12 TABLET, FILM COATED ORAL at 18:03

## 2021-12-09 RX ADMIN — ASPIRIN 81 MG CHEWABLE TABLET 243 MG: 81 TABLET CHEWABLE at 13:34

## 2021-12-09 RX ADMIN — LISINOPRIL 5 MG: 5 TABLET ORAL at 08:54

## 2021-12-09 RX ADMIN — ASPIRIN 81 MG: 81 TABLET, COATED ORAL at 08:54

## 2021-12-09 RX ADMIN — FUROSEMIDE 20 MG: 10 INJECTION, SOLUTION INTRAVENOUS at 13:34

## 2021-12-09 RX ADMIN — SODIUM CHLORIDE: 9 INJECTION, SOLUTION INTRAVENOUS at 13:34

## 2021-12-09 RX ADMIN — ZOLPIDEM TARTRATE 6.25 MG: 6.25 TABLET, FILM COATED, EXTENDED RELEASE ORAL at 23:07

## 2021-12-09 ASSESSMENT — ACTIVITIES OF DAILY LIVING (ADL)
ADLS_ACUITY_SCORE: 3

## 2021-12-09 NOTE — PRE-PROCEDURE
GENERAL PRE-PROCEDURE:     Risks and benefits: Risks, benefits and alternatives were discussed    Patient states understanding of procedure being performed: Yes    Patient's understanding of procedure matches consent: Yes    Procedure consent matches procedure scheduled: Yes    Appropriately NPO:  Yes  Mallampati  :  Grade 2- soft palate, base of uvula, tonsillar pillars, and portion of posterior pharyngeal wall visible  Lungs:  Lungs clear with good breath sounds bilaterally  Heart:  Normal heart sounds and rate  History & Physical reviewed:  History and physical reviewed and no updates needed  Statement of review:  I have reviewed the lab findings, diagnostic data, medications, and the plan for sedation

## 2021-12-09 NOTE — PROGRESS NOTES
[facility]    Medicine Progress Note - Hospitalist Service       Date of Admission:  12/8/2021  Assessment & Plan   Dipak Swartz is a 41 year old male admitted on 12/8/2021.     Past medical history significant for MDD, JESSICA, Insomnia, Tobacco use D/O, Alcohol use D/O who was directly admitted from [facility] ED with newly diagnosed HFrEF, new LBBB and suspected unstable angina.        Unstable angina  New LBBB  New HFrEF - 15-20%  Presented to [facility] ED 12/6 from outpt cardiology clinic for eval of RAMOS x2-3 months. ECHO in clinic revealed reduced EF 15-20% which was new and sinus tachycardia.   In the ED, pt reported EtOH consumption regularly and heavily - 1L vodka every 4 days, case of beer lasts 5-6 days, ~6-12 drinks 4-5 days a week. Reported using cocaine x2 months ago, in total x3 over the last 2 decades. EKG confirmed ST with with frequent PAC's and lateral Q-waves present. CXR mild cardiomegaly and interstitial edema noted as well as subtle infiltrative process. Basic labs unremarkable. Serial troponin Negative x5. Pro-BNP elevated at 2,588. INR/PTT WNL. UA and urine drug screen negative. Pt boarded in the ED 1286-12/8 when transferred to [facility]. Pro-BNP improved to 592. He was started on Coreg 3.125 mg BID, lisinopril 5 mg BID and single dose of IV lasix 20 mg, and heparin gtt.   * FLP: , HDL 77, ,   * ProBNP 2,588-->592  - Admitted to heart center  - Telemetry  - Cardiology consult requested.    - Heparin drip continued.    - Continue Coreg 3.125 mg BID and lisinopril 5 mg BID.  Hold parameters in place.    - Started ASA 81 mg/d.  - Start atorvastatin 40mg/d for  and cardiac risk    - Daily weights. Uncear if weights are accurate?  - I's and O's.    - NPO until seen by cardiology     Wt Readings from Last 4 Encounters:   12/09/21 69.2 kg (152 lb 9.6 oz)   12/06/21 76.6 kg (168 lb 14.4 oz)   12/06/21 74.8 kg (164 lb 12.8 oz)   11/29/21 74.4 kg  (164 lb)     Vitals:    12/08/21 1738 12/09/21 0600   Weight: 69.2 kg (152 lb 8 oz) 69.2 kg (152 lb 9.6 oz)       Intake/Output Summary (Last 24 hours) at 12/9/2021 1004  Last data filed at 12/9/2021 0020  Gross per 24 hour   Intake 240 ml   Output 425 ml   Net -185 ml        MDD  JESSICA  Insomnia  - Resumed Ambien 6.25 mg PRN at bedtime (he stated he takes this nightly per report).    - After discussion with the patient he is no longer taking Celexa and as such will not resume.       Tobacco use D/O  He is currently using chewing tobacco.    - Counseled regarding cessation.    - PRN Nicoderm ordered.       Alcohol use D/O  - Counseled regarding cutting back/cessation.    - Monitor for withdrawal symptoms. CIWA scale Q shift, if any concerns will order full CIWA protocol    Asymptomatic COVID-19 admission screen: Negative 12/6/2021.  - No vaccine record in [facility], will encourage vaccination especially given high risk conditions as above for severe COVID-19.      Diet: NPO per Anesthesia Guidelines for Procedure/Surgery Except for: Meds    DVT Prophylaxis: heparin gtt  Ybarra Catheter: Not present  Code Status: Full Code      Disposition Plan   Expected Discharge: 12/11/2021     Anticipated discharge location:  Awaiting care coordination huddle  Delays:     Specialist Consult      Entered: Kelley Perdomo PA-C 12/09/2021, 10:11 AM        The patient's care was discussed with the Attending Physician, Dr. Salcido, Bedside Nurse, Patient and Patient's Family.    Kelley Perdomo ([place]), JOSE BRUNER  Hospitalist ESTEPHANIA  [facility]  Securely message with the Vocera Web Console (learn more here)  Text page via [facility] Paging/Directory  ______________________________________________________________________    Interval History   Seen and examined.  Reports he continues to have intermittent chest pain approximately twice per day which last seconds to 5 to 10 minutes, dull substernal in nature.  Resolves  on its own however worse when exerting himself, also occurs while at rest.  He notes some heart racing and shortness of breath at times, sometimes during bouts of chest pain and sometimes separate.  No nausea or vomiting.  He endorses some lightheadedness this morning.  No N/V. Denies any BLE edema however reports his RLE BKE stump has been more swollen. Awaiting cardiology consultation. Plan discussed with patient and his wife at the bedside.    Data reviewed today: I reviewed all medications, new labs and imaging results over the last 24 hours. I personally reviewed no images or EKG's today.    Physical Exam   Vital Signs: Temp: 98  F (36.7  C) Temp src: Oral BP: 100/65 Pulse: 95   Resp: 18 SpO2: 97 % O2 Device: None (Room air)    Weight: 152 lbs 9.6 oz    Physical Exam    General: Awake, alert, very pleasant middle aged man who appears stated age. Looks comfortable sitting up in bed. No acute distress.  HEENT: Normocephalic, atraumatic. Extraocular movements intact. Pupils equal round and reactive to light bilaterally. Oropharynx clear without exudates.   Respiratory: Clear to auscultation bilaterally, no rales, wheezing, or rhonchi.  Cardiovascular: Regular rate and rhythm, +S1 and S2, no murmur auscultated. No peripheral edema to LLE. RLE with BKA, stump with mild edema, non pitting.  Gastrointestinal: Soft, non-tender, non-distended. Bowel sounds present.  Skin: Warm, dry. No obvious rashes or lesions on exposed skin. Dorsalis pedis pulses palpable bilaterally.  Musculoskeletal: No joint swelling, erythema or tenderness. Moves all extremities equally.   Neurologic: AAO x3. Cranial nerves 2-12 grossly intact, normal strength and sensation.  Psychiatric: Appropriate mood and affect. No obvious anxiety or depression.    Data   Recent Labs   Lab 12/09/21  0558 12/08/21  1849 12/08/21  0557 12/06/21  1634   WBC 9.1 7.8  --  10.2   HGB 14.9 15.1  --  15.8   MCV 97 96  --  96    306  --  316   INR  --   --    --  0.92     --  138 136   POTASSIUM 4.2  --  4.2 4.0   CHLORIDE 111*  --  109 106   CO2 22  --  24 25   BUN 18  --  20 16   CR 1.01  --  1.06 0.99   ANIONGAP 5  --  5 5   SHAYNA 8.9  --  8.0* 8.5   *  --  109* 93   ALBUMIN  --   --   --  3.8   PROTTOTAL  --   --   --  7.6   BILITOTAL  --   --   --  0.7   ALKPHOS  --   --   --  61   ALT  --   --   --  51   AST  --   --   --  28     No results found for this or any previous visit (from the past 24 hour(s)).  Medications     heparin 1,450 Units/hr (12/09/21 0336)     - MEDICATION INSTRUCTIONS -       - MEDICATION INSTRUCTIONS -         aspirin  81 mg Oral Daily     atorvastatin  40 mg Oral QPM     carvedilol  3.125 mg Oral BID w/meals     lisinopril  5 mg Oral BID     nicotine   Transdermal Q8H     sodium chloride (PF)  3 mL Intracatheter Q8H

## 2021-12-09 NOTE — PLAN OF CARE
See Flowsheets for more specific documentation, lab and imaging results, and vitals.    Shift Summary    Ambulation: Independent w/ prosthesis  Neuro: No Neuro deficits  Respiratory: LS clear equal bilat on RA. Pt reports dyspnea on exertion  Cardiac: SR w/ BBB ST elevation - no change since admission  GI: WDL NPO for angio  : WDL  Musculoskeletal: Right BKA  Skin: WDL    LDAs: 2 PIVs    Vital signs:  Temp: 98.7  F (37.1  C) Temp src: Oral BP: 102/71 Pulse: 94   Resp: 20 SpO2: 97 % O2 Device: None (Room air)     Significant Labs:   Significant Events: No significant events overnight. Pt slept well. Heparin rate increased and bolus given. Recheck ordered for 0930  Treatment/Care Plan: possible angio tomorrow. Pt NPO    Discharge Disposition: home TBD

## 2021-12-09 NOTE — CONSULTS
Cardiology Consultation      Dipak Swartz MRN# [id number]   YOB: 1980 Age: 41 year old   Date of Admission: 12/8/2021     Reason for consult:            Assessment and Plan:   Active Problems:    HFrEF (heart failure with reduced ejection fraction) (H)        1. Acute systolic heart failure    Etiology unclear DDx including CAD, ETOH, Cocaine, idiopathic    Plan coronary angiogram, [facility] today. R & B explained    Diuresis, low dose ACE inhibitor    BB when euvolemic      2. ETOH abuse    Amount used varies tells me 6-12 beers every 2-3 days    Much larger amount obtained thru H & P    Abstinence      3. Hyperlipidemia              4. Right BKA    traumatic             Chief Complaint:   No chief complaint on file.           History of Present Illness:   This patient is a 41 year old male who follows in our clinic with Dr. Roger Irizarry at [facility].  He has a previous history of chest pain evaluation in 2016 with an echocardiogram and nuclear stress test all of which were normal.  He actually saw Dr. Irizarry in clinic 2 days ago and was complaining of  2 month history of increasing shortness of breath dyspnea on exertion fatigue and nonproductive cough.  It has gotten so bad that he has orthopnea and PND and has noted some swelling of his prosthetic right below the knee amputation.    Austin is referred to Dr. Irizarry' excellent clinic note for further details.  He has had intermittent brief episodes of chest discomfort was actually worse couple of years ago but still has intermittent upper chest discomfort.  EKG shows nonspecific ventricular conduction delay which I personally reviewed as well as echocardiogram which shows marked LV dysfunction with ejection fraction of 20% as well as right ventricular dysfunction.  His GFR is normal.  He has hyperlipidemia with total cholesterol 229 and LDL of 131.  His BNP is elevated at 592.  Troponins are normal.         Physical Exam:   Vitals were  reviewed  Blood pressure 94/58, pulse 80, temperature 97.8  F (36.6  C), temperature source Oral, resp. rate 18, weight 69.2 kg (152 lb 9.6 oz), SpO2 97 %.  Temperatures:  Current - Temp: 97.8  F (36.6  C); Max - Temp  Av  F (36.7  C)  Min: 97.6  F (36.4  C)  Max: 98.7  F (37.1  C)  Respiration range: Resp  Av.9  Min: 17  Max: 20  Pulse range: Pulse  Av  Min: 80  Max: 95  Blood pressure range: Systolic (24hrs), Av , Min:94 , Max:113   ; Diastolic (24hrs), Av, Min:58, Max:86    Pulse oximetry range: SpO2  Av.7 %  Min: 93 %  Max: 100 %    Intake/Output Summary (Last 24 hours) at 2021 1244  Last data filed at 2021 0020  Gross per 24 hour   Intake 240 ml   Output 425 ml   Net -185 ml     Constitutional:   awake, alert, cooperative, no apparent distress, and appears stated age     Eyes:   Lids and lashes normal, pupils equal, round and reactive to light, extra ocular muscles intact, sclera clear, conjunctiva normal     Neck:   supple, symmetrical, trachea midline, no JVD     Back:   symmetric     Lungs:   No increased work of breathing, good air exchange, clear to auscultation bilaterally, no crackles or wheezing  crackles right base and left base     Cardiovascular:     displaced, regular rate and rhythm, no murmur noted, pulses 2 plus all extermities bilaterally  carotids without bruits bilaterally     Abdomen:   non-tender     Musculoskeletal:   motor strength is 5 out of 5 all extremities bilaterally     Neurologic:   Grossly nonfocal     Skin:   no bruising or bleeding     Additional findings:   Edema                Past Medical History:   I have reviewed this patient's past medical history  Past Medical History:   Diagnosis Date     Accident on [place] 2014             Past Surgical History:   I have reviewed this patient's past surgical history  Past Surgical History:   Procedure Laterality Date     AMPUTATION BELOW KNEE RT/LT Right 2014     Broken Right arm  1992     LEG  SURGERY Right 2014               Social History:   I have reviewed this patient's social history  Social History     Tobacco Use     Smoking status: Former Smoker     Quit date: 2003     Years since quittin.6     Smokeless tobacco: Current User     Types: Chew   Substance Use Topics     Alcohol use: Yes     Alcohol/week: 0.0 standard drinks     Comment: occ.             Family History:   I have reviewed this patient's family history  No family history on file.          Allergies:     Allergies   Allergen Reactions     No Known Allergies              Medications:   I have reviewed this patient's current medications  Medications Prior to Admission   Medication Sig Dispense Refill Last Dose     Multiple Vitamins-Minerals (MENS MULTIVITAMIN) TABS Take 1 tablet by mouth daily   2021     zolpidem ER (AMBIEN CR) 6.25 MG CR tablet Take 1 tablet (6.25 mg) by mouth nightly as needed for sleep 30 tablet 5 2021 at HS     citalopram (CELEXA) 10 MG tablet Take 1 tablet (10 mg) by mouth every morning (Patient not taking: Reported on 2021) 30 tablet 1 Not Taking at Unknown time     order for DME Equipment being ordered: right lower extremity prosthetic     [employer] [facility]  [phone] 1 each 0      Current Facility-Administered Medications Ordered in Epic   Medication Dose Route Frequency Last Rate Last Admin     acetaminophen (TYLENOL) Suppository 650 mg  650 mg Rectal Q4H PRN         acetaminophen (TYLENOL) tablet 650 mg  650 mg Oral Q4H PRN         alum & mag hydroxide-simethicone (MAALOX) suspension 30 mL  30 mL Oral Q4H PRN         aspirin EC tablet 81 mg  81 mg Oral Daily   81 mg at 21 0854     atorvastatin (LIPITOR) tablet 40 mg  40 mg Oral QPM         bisacodyl (DULCOLAX) Suppository 10 mg  10 mg Rectal Daily PRN         carvedilol (COREG) tablet 3.125 mg  3.125 mg Oral BID w/meals   3.125 mg at 21 2000     heparin infusion 25,000 units in D5W 250 mL ANTICOAGULANT  0-5,000  Units/hr Intravenous Continuous 14.5 mL/hr at 12/09/21 0339 1,450 Units/hr at 12/09/21 0339     HYDROmorphone (DILAUDID) injection 0.2 mg  0.2 mg Intravenous Q2H PRN         lidocaine (LMX4) cream   Topical Q1H PRN         lidocaine 1 % 0.1-1 mL  0.1-1 mL Other Q1H PRN         lisinopril (ZESTRIL) tablet 5 mg  5 mg Oral BID   5 mg at 12/09/21 0854     medication instruction   Does not apply Continuous PRN         naloxone (NARCAN) injection 0.2 mg  0.2 mg Intravenous Q2 Min PRN        Or     naloxone (NARCAN) injection 0.4 mg  0.4 mg Intravenous Q2 Min PRN        Or     naloxone (NARCAN) injection 0.2 mg  0.2 mg Intramuscular Q2 Min PRN        Or     naloxone (NARCAN) injection 0.4 mg  0.4 mg Intramuscular Q2 Min PRN         nicotine (NICODERM CQ) 14 MG/24HR 24 hr patch 1 patch  1 patch Transdermal Daily PRN   1 patch at 12/08/21 2011     nicotine Patch in Place   Transdermal Q8H         nitroGLYcerin (NITROSTAT) sublingual tablet 0.4 mg  0.4 mg Sublingual Q5 Min PRN         ondansetron (ZOFRAN-ODT) ODT tab 4 mg  4 mg Oral Q6H PRN        Or     ondansetron (ZOFRAN) injection 4 mg  4 mg Intravenous Q6H PRN         oxyCODONE (ROXICODONE) tablet 5 mg  5 mg Oral Q4H PRN         Patient is already receiving anticoagulation with heparin, enoxaparin (LOVENOX), warfarin (COUMADIN)  or other anticoagulant medication   Does not apply Continuous PRN         polyethylene glycol (MIRALAX) Packet 17 g  17 g Oral Daily PRN         senna-docusate (SENOKOT-S/PERICOLACE) 8.6-50 MG per tablet 1 tablet  1 tablet Oral BID PRN        Or     senna-docusate (SENOKOT-S/PERICOLACE) 8.6-50 MG per tablet 2 tablet  2 tablet Oral BID PRN         sodium chloride (PF) 0.9% PF flush 3 mL  3 mL Intracatheter Q8H   3 mL at 12/09/21 0017     sodium chloride (PF) 0.9% PF flush 3 mL  3 mL Intracatheter q1 min prn         zolpidem ER (AMBIEN CR) CR tablet 6.25 mg  6.25 mg Oral At Bedtime PRN   6.25 mg at 12/08/21 1120     No current Epic-ordered  outpatient medications on file.             Review of Systems:   The 10 point Review of Systems is negative other than noted in the HPI            Data:   All laboratory data reviewed  Results for orders placed or performed during the hospital encounter of 12/08/21 (from the past 24 hour(s))   CBC with platelets   Result Value Ref Range    WBC Count 7.8 4.0 - 11.0 10e3/uL    RBC Count 4.66 4.40 - 5.90 10e6/uL    Hemoglobin 15.1 13.3 - 17.7 g/dL    Hematocrit 44.8 40.0 - 53.0 %    MCV 96 78 - 100 fL    MCH 32.4 26.5 - 33.0 pg    MCHC 33.7 31.5 - 36.5 g/dL    RDW 13.6 10.0 - 15.0 %    Platelet Count 306 150 - 450 10e3/uL   Heparin Unfractionated Anti Xa Level   Result Value Ref Range    Anti Xa Unfractionated Heparin 0.31 For Reference Range, See Comment IU/mL    Narrative    Therapeutic Range: UFH: 0.25-0.50 IU/mL for low intensity dosing,  0.30-0.70 IU/mL for high intensity dosing DVT and PE.  This test is not validated for other direct factor X inhibitors (e.g. rivaroxaban, apixaban, edoxaban, betrixaban, fondaparinux) and should not be used for monitoring of other medications.   Heparin Unfractionated Anti Xa Level   Result Value Ref Range    Anti Xa Unfractionated Heparin 0.19 For Reference Range, See Comment IU/mL    Narrative    Therapeutic Range: UFH: 0.25-0.50 IU/mL for low intensity dosing,  0.30-0.70 IU/mL for high intensity dosing DVT and PE.  This test is not validated for other direct factor X inhibitors (e.g. rivaroxaban, apixaban, edoxaban, betrixaban, fondaparinux) and should not be used for monitoring of other medications.   Lipid panel reflex to direct LDL   Result Value Ref Range    Cholesterol 229 (H) <200 mg/dL    Triglycerides 105 <150 mg/dL    Direct Measure HDL 77 >=40 mg/dL    LDL Cholesterol Calculated 131 (H) <=100 mg/dL    Non HDL Cholesterol 152 (H) <130 mg/dL    Narrative    Cholesterol  Desirable:  <200 mg/dL    Triglycerides  Normal:  Less than 150 mg/dL  Borderline High:  150-199  mg/dL  High:  200-499 mg/dL  Very High:  Greater than or equal to 500 mg/dL    Direct Measure HDL  Female:  Greater than or equal to 50 mg/dL   Male:  Greater than or equal to 40 mg/dL    LDL Cholesterol  Desirable:  <100mg/dL  Above Desirable:  100-129 mg/dL   Borderline High:  130-159 mg/dL   High:  160-189 mg/dL   Very High:  >= 190 mg/dL    Non HDL Cholesterol  Desirable:  130 mg/dL  Above Desirable:  130-159 mg/dL  Borderline High:  160-189 mg/dL  High:  190-219 mg/dL  Very High:  Greater than or equal to 220 mg/dL   Basic metabolic panel   Result Value Ref Range    Sodium 138 133 - 144 mmol/L    Potassium 4.2 3.4 - 5.3 mmol/L    Chloride 111 (H) 94 - 109 mmol/L    Carbon Dioxide (CO2) 22 20 - 32 mmol/L    Anion Gap 5 3 - 14 mmol/L    Urea Nitrogen 18 7 - 30 mg/dL    Creatinine 1.01 0.66 - 1.25 mg/dL    Calcium 8.9 8.5 - 10.1 mg/dL    Glucose 106 (H) 70 - 99 mg/dL    GFR Estimate >90 >60 mL/min/1.73m2   CBC with platelets   Result Value Ref Range    WBC Count 9.1 4.0 - 11.0 10e3/uL    RBC Count 4.58 4.40 - 5.90 10e6/uL    Hemoglobin 14.9 13.3 - 17.7 g/dL    Hematocrit 44.2 40.0 - 53.0 %    MCV 97 78 - 100 fL    MCH 32.5 26.5 - 33.0 pg    MCHC 33.7 31.5 - 36.5 g/dL    RDW 13.5 10.0 - 15.0 %    Platelet Count 274 150 - 450 10e3/uL   Heparin Unfractionated Anti Xa Level   Result Value Ref Range    Anti Xa Unfractionated Heparin 0.48 For Reference Range, See Comment IU/mL    Narrative    Therapeutic Range: UFH: 0.25-0.50 IU/mL for low intensity dosing,  0.30-0.70 IU/mL for high intensity dosing DVT and PE.  This test is not validated for other direct factor X inhibitors (e.g. rivaroxaban, apixaban, edoxaban, betrixaban, fondaparinux) and should not be used for monitoring of other medications.     EKG results:   I have reviewed this patient's EKG with the following interpretation:        See HPPI     Echocardiology:   See HPI.  Personally reviewed.         Results:

## 2021-12-09 NOTE — PLAN OF CARE
[id number] Rcvd pt from [place] for SOB/CP. A&Ox4, denies pain. Tele SR BBB, VSS on RA. Non productive cough, RAMOS. Heparin infusing at 1300 units/hr, recheck at 0100. Independent in room. Cardiology consult pending, angiogram likely tomorrow.

## 2021-12-09 NOTE — PHARMACY-ADMISSION MEDICATION HISTORY
Pharmacy Medication History  Admission medication history interview status for the 12/8/2021  admission is complete. See EPIC admission navigator for prior to admission medications     Location of Interview: Patient room  Medication history sources: Patient    Significant changes made to the medication list:      In the past week, patient estimated taking medication this percent of the time:    Takes AMBIEN most nights  Has not been using Citalopram x 3 weeks    Additional medication history information:   Pt stopped Citalopram as he was associating sweating and chest discomfort to that med.    Medication reconciliation completed by provider prior to medication history? Yes    Time spent in this activity: 10 minutes    Prior to Admission medications    Medication Sig Last Dose Taking? Auth Provider   Multiple Vitamins-Minerals (MENS MULTIVITAMIN) TABS Take 1 tablet by mouth daily 12/5/2021 Yes Reported, Patient   zolpidem ER (AMBIEN CR) 6.25 MG CR tablet Take 1 tablet (6.25 mg) by mouth nightly as needed for sleep 12/7/2021 at HS Yes Lois Farmer PA-C   citalopram (CELEXA) 10 MG tablet Take 1 tablet (10 mg) by mouth every morning  Patient not taking: Reported on 12/8/2021 Not Taking at Unknown time  Lois Farmer PA-C   order for DME Equipment being ordered: right lower extremity prosthetic     [employer] [facility]  [phone]   Nacho Abdi PA-C       The information provided in this note is only as accurate as the sources available at the time of update(s)

## 2021-12-10 ENCOUNTER — MEDICAL CORRESPONDENCE (OUTPATIENT)
Dept: HEALTH INFORMATION MANAGEMENT | Facility: CLINIC | Age: 41
End: 2021-12-10
Payer: COMMERCIAL

## 2021-12-10 VITALS
HEART RATE: 91 BPM | SYSTOLIC BLOOD PRESSURE: 118 MMHG | RESPIRATION RATE: 18 BRPM | OXYGEN SATURATION: 99 % | DIASTOLIC BLOOD PRESSURE: 79 MMHG | WEIGHT: 153 LBS | TEMPERATURE: 97.7 F | BODY MASS INDEX: 23.96 KG/M2

## 2021-12-10 LAB
ALBUMIN SERPL-MCNC: 3.5 G/DL (ref 3.4–5)
ALP SERPL-CCNC: 57 U/L (ref 40–150)
ALT SERPL W P-5'-P-CCNC: 46 U/L (ref 0–70)
ANION GAP SERPL CALCULATED.3IONS-SCNC: 7 MMOL/L (ref 3–14)
AST SERPL W P-5'-P-CCNC: 22 U/L (ref 0–45)
BILIRUB SERPL-MCNC: 0.3 MG/DL (ref 0.2–1.3)
BUN SERPL-MCNC: 20 MG/DL (ref 7–30)
CALCIUM SERPL-MCNC: 8.9 MG/DL (ref 8.5–10.1)
CHLORIDE BLD-SCNC: 107 MMOL/L (ref 94–109)
CO2 SERPL-SCNC: 23 MMOL/L (ref 20–32)
CREAT SERPL-MCNC: 1.15 MG/DL (ref 0.66–1.25)
GFR SERPL CREATININE-BSD FRML MDRD: 79 ML/MIN/1.73M2
GLUCOSE BLD-MCNC: 112 MG/DL (ref 70–99)
MAGNESIUM SERPL-MCNC: 2.3 MG/DL (ref 1.6–2.3)
POTASSIUM BLD-SCNC: 3.8 MMOL/L (ref 3.4–5.3)
PROT SERPL-MCNC: 6.8 G/DL (ref 6.8–8.8)
SODIUM SERPL-SCNC: 137 MMOL/L (ref 133–144)

## 2021-12-10 PROCEDURE — 99239 HOSP IP/OBS DSCHRG MGMT >30: CPT | Performed by: HOSPITALIST

## 2021-12-10 PROCEDURE — 250N000013 HC RX MED GY IP 250 OP 250 PS 637: Performed by: PHYSICIAN ASSISTANT

## 2021-12-10 PROCEDURE — 250N000013 HC RX MED GY IP 250 OP 250 PS 637: Performed by: NURSE PRACTITIONER

## 2021-12-10 PROCEDURE — 83735 ASSAY OF MAGNESIUM: CPT | Performed by: PHYSICIAN ASSISTANT

## 2021-12-10 PROCEDURE — 99232 SBSQ HOSP IP/OBS MODERATE 35: CPT | Performed by: INTERNAL MEDICINE

## 2021-12-10 PROCEDURE — 80053 COMPREHEN METABOLIC PANEL: CPT | Performed by: PHYSICIAN ASSISTANT

## 2021-12-10 PROCEDURE — 36415 COLL VENOUS BLD VENIPUNCTURE: CPT | Performed by: PHYSICIAN ASSISTANT

## 2021-12-10 PROCEDURE — 99207 PR APP CREDIT; MD BILLING SHARED VISIT: CPT | Performed by: PHYSICIAN ASSISTANT

## 2021-12-10 PROCEDURE — 250N000011 HC RX IP 250 OP 636: Performed by: INTERNAL MEDICINE

## 2021-12-10 RX ORDER — NICOTINE 21 MG/24HR
1 PATCH, TRANSDERMAL 24 HOURS TRANSDERMAL DAILY PRN
Qty: 30 PATCH | Refills: 0 | Status: SHIPPED | OUTPATIENT
Start: 2021-12-10 | End: 2022-03-10

## 2021-12-10 RX ORDER — FUROSEMIDE 40 MG
40 TABLET ORAL DAILY
Qty: 30 TABLET | Refills: 3 | Status: SHIPPED | OUTPATIENT
Start: 2021-12-11 | End: 2021-12-14

## 2021-12-10 RX ORDER — CARVEDILOL 3.12 MG/1
3.12 TABLET ORAL 2 TIMES DAILY WITH MEALS
Qty: 60 TABLET | Refills: 3 | Status: SHIPPED | OUTPATIENT
Start: 2021-12-10 | End: 2021-12-14

## 2021-12-10 RX ORDER — ATORVASTATIN CALCIUM 40 MG/1
40 TABLET, FILM COATED ORAL EVERY EVENING
Qty: 30 TABLET | Refills: 3 | Status: SHIPPED | OUTPATIENT
Start: 2021-12-10 | End: 2021-12-14

## 2021-12-10 RX ORDER — FUROSEMIDE 40 MG
40 TABLET ORAL DAILY
Status: DISCONTINUED | OUTPATIENT
Start: 2021-12-10 | End: 2021-12-10 | Stop reason: HOSPADM

## 2021-12-10 RX ORDER — LISINOPRIL 5 MG/1
5 TABLET ORAL 2 TIMES DAILY
Qty: 30 TABLET | Refills: 3 | Status: SHIPPED | OUTPATIENT
Start: 2021-12-10 | End: 2021-12-14

## 2021-12-10 RX ADMIN — FUROSEMIDE 40 MG: 40 TABLET ORAL at 10:08

## 2021-12-10 RX ADMIN — CARVEDILOL 3.12 MG: 3.12 TABLET, FILM COATED ORAL at 10:08

## 2021-12-10 RX ADMIN — ASPIRIN 81 MG: 81 TABLET, COATED ORAL at 10:08

## 2021-12-10 RX ADMIN — LISINOPRIL 5 MG: 5 TABLET ORAL at 10:08

## 2021-12-10 RX ADMIN — FUROSEMIDE 20 MG: 10 INJECTION, SOLUTION INTRAVENOUS at 05:05

## 2021-12-10 ASSESSMENT — ACTIVITIES OF DAILY LIVING (ADL)
ADLS_ACUITY_SCORE: 3

## 2021-12-10 NOTE — PLAN OF CARE
Heart Failure Care Map  GOALS TO BE MET BEFORE DISCHARGE:    1. Decrease congestion and/or edema with diuretic therapy to achieve near optimal volume status.     Dyspnea improved: Yes, satisfactory for discharge.   Edema improved: Yes, satisfactory for discharge.        Net I/O and Weights since admission:   11/10 0700 - 12/10 0659  In: 240 [P.O.:240]  Out: 1120 [Urine:1120]  Net: -880     Vitals:    12/08/21 1738 12/09/21 0600 12/10/21 0502   Weight: 69.2 kg (152 lb 8 oz) 69.2 kg (152 lb 9.6 oz) 69.4 kg (153 lb)       2.  O2 sats > 90% on room air, or at prior home O2 therapy level.      Able to wean O2 this shift to keep sats above 90%?: Yes, satisfactory for discharge.   Does patient use Home O2? No          Current oxygenation status:   SpO2: 96 %     O2 Device: None (Room air),      3.  Tolerates ambulation and mobility near baseline.     Ambulation: Yes, satisfactory for discharge.   Times patient ambulated with staff this shift: 0    Please review the Heart Failure Care Map for additional HF goal outcomes.    Bre Funk RN  12/10/2021       Patient is alert and oriented, independent in room. Denies chest pain and shortness of breath. Fine crackles in bases, remains on room air. Right radial anigo site clean, dry, and intact. Continues on IV lasix with good output.

## 2021-12-10 NOTE — PLAN OF CARE
A&O, VSS on room air. Tele:  w/ BBB, HR 80's. Denies CP and SOB. Cardiology signed off. Adequate for discharge per hospitalist, discharge orders placed. Independent. R radial site C/D/I, CMS intact, good pulses. Education material printed and reviewed with pt regarding radial site restrictions post angiogram, CHF education and new medication education. Discharge medications sent up from discharge pharmacy and given to pt. All belongings have been collected and sent home with pt and family. Wife here to drive pt home. IV's removed. All questions have been answered. Pt to discharge with lifevest per cardiology recommendation, lifevest has been approved by insurance and rep will be reaching out to pt today to arrange time to deliver to patient at home and review education with pt and wife.

## 2021-12-10 NOTE — DISCHARGE INSTRUCTIONS
Cardiac Angiogram Discharge Instructions - Radial    After you go home:      Have an adult stay with you until tomorrow.    Drink extra fluids for 2 days.    You may resume your normal diet.    No smoking       For 24 hours - due to the sedation you received:    Relax and take it easy.    Do NOT make any important or legal decisions.    Do NOT drive or operate machines at home or at work.    Do NOT drink alcohol.    Care of Wrist Puncture Site:      For the first 24 hrs - check the puncture site every 1-2 hours while awake.    It is normal to have soreness at the puncture site and mild tingling in your hand for up to 3 days.    Remove the bandaid after 24 hours. If there is minor oozing, apply another bandaid and remove it after 12 hours.    You may shower tomorrow.  Do NOT take a bath, or use a hot tub or pool for at least 3 days. Do NOT scrub the site. Do not use lotion or powder near the puncture site.           Activity:        For 2 days:     do not use your hand or arm to support your weight (such as rising from a chair)     do not bend your wrist (such as lifting a garage door).    do not lift more than 5 pounds or exercise your arm (such as tennis, golf or bowling).    Do NOT do any heavy activity such as exercise, lifting, or straining.     Bleeding:      If you start bleeding from the site in your wrist, sit down and press firmly on/above the site for 10 minutes.     Once bleeding stops, keep arm still for 2 hours.     Call [facility] as soon as you can.       Call 911 right away if you have heavy bleeding or bleeding that does not stop.      Medicines:      If you are taking an antiplatelet medication such as Plavix, Brilinta or Effient, do not stop taking it until you talk to your cardiologist.        If you are on Metformin (Glucophage), do not restart it until you have blood tests (within 2 to 3 days after discharge).  After you have your blood drawn, you may restart the Metformin.     Take your  medications, including blood thinners, unless your provider tells you not to.      If you take Coumadin (Warfarin), have your INR checked by your provider in  3-5 days. Call your clinic to schedule this.    If you have stopped any medicines, check with your provider about when to restart them.    Follow Up Appointments:      Follow up with [facility] Nurse Practitioner at [facility] of patient preference in 7-10 days.    Call the clinic if:      You have a large or growing hard lump around the site.    The site is red, swollen, hot or tender.    Blood or fluid is draining from the site.    You have chills or a fever greater than 101 F (38 C).    Your arm feels numb, cool or changes color.    You have hives, a rash or unusual itching.    Any questions or concerns.          [facility] [place]:    [phone] [facility] (7 days a week)

## 2021-12-10 NOTE — PROGRESS NOTES
[facility]  Cardiology Progress Note    Date of Service (when I saw the patient): 12/10/2021     Assessment & Plan   Dipak Swartz is a 41 year old male who was admitted on 12/8/2021.     1.  : Acute systolic heart failure  - Nt pro , 2588 (12/6/2021)  - Echocardiogram showed moderate-severe global hypokinesis, severely reduced EF 15-20%, moderate/severe RV systolic dysfunction, no valvular disease.   - Breathing significantly improved with IV lasix 20mg TID  - net neg 695, Wt 153 (baseline 148)  - Euvolemic upon exam  - Continue low dose lisinopril and carvedilol.   - Strict low sodium diet <1500 mg   - Daily weights  - Fluid restriction 50oz/day    -2.  : NICM  - Coronary angiogram negative for CAD    3.  : ETOH/ Tobacco abuse  - Abstinence  4. Hyperlipidemia  - On statin  5. R BKA    Plan  1. Euvolemic upon exam, Switch lasix to PO 40mg daily  2.Continue Aspirin, lisinopril and carvedilol. Will continue with uptitration of GDMT outpatient   3. Recommend Lifevest if insurance approves. Patient agreeable.   4. Follow up with [facility] in 1 week as scheduled   5. OK to discharge per cardiology     JOSE Judd CNP  Text Page  (M-F, 7:30 am - 4:00 pm)    Interval History   Breathing now normal. Continue with mild cough. Denies chest pain, palpitations, edema or presyncope. Transition to PO diuretics. Anticipate home today with Lifevest if able.     Physical Exam   Temp: 97.7  F (36.5  C) Temp src: Oral BP: 110/84 Pulse: 84   Resp: 18 SpO2: 96 % O2 Device: None (Room air)    Vitals:    12/08/21 1738 12/09/21 0600 12/10/21 0502   Weight: 69.2 kg (152 lb 8 oz) 69.2 kg (152 lb 9.6 oz) 69.4 kg (153 lb)     Vital Signs with Ranges  Temp:  [97.7  F (36.5  C)-97.8  F (36.6  C)] 97.7  F (36.5  C)  Pulse:  [77-93] 84  Resp:  [16-20] 18  BP: ()/(58-84) 110/84  SpO2:  [96 %-98 %] 96 %  I/O last 3 completed shifts:  In: -   Out: 695 [Urine:695]    GEN:  In general, this is a well  nourished male in no acute distress  Patient ambulatory.  HEENT:  Pupils equal, round. Sclerae nonicteric. Clear oropharynx. Mucous membranes moist.  NECK: Supple, no masses appreciated. Trachea midline. No JVD .  C/V:  Regular rate and rhythm, no murmur, rub or gallop. No S3 or RV heave.   RESP: Respirations are unlabored. No use of accessory muscles. Clear to auscultation bilaterally without wheezing, rales, or rhonchi.  GI: Abdomen soft, nontender, nondistended. No HSM appreciated.   EXTREM: No LE edema. No cyanosis or clubbing.  NEURO: Alert and oriented, cooperative.  No obvious focal deficits.   PSYCH: Normal affect.  SKIN: Warm and dry. No rashes or petechiae appreciated.       Medications     - MEDICATION INSTRUCTIONS -       - MEDICATION INSTRUCTIONS -         aspirin  81 mg Oral Daily     atorvastatin  40 mg Oral QPM     carvedilol  3.125 mg Oral BID w/meals     furosemide  40 mg Oral Daily     lisinopril  5 mg Oral BID     nicotine   Transdermal Q8H     sodium chloride (PF)  3 mL Intracatheter Q8H       Data   Reviewed       Molly Carrasco, JOSE CNP 12/10/2021

## 2021-12-10 NOTE — PLAN OF CARE
A&O x4. Pt denied pain. VSS, on RA, sbp 90's-110's. Up w/ IND. Tele: SR BBB. Angio today w/ no intervention. R radial site WDL, W/ 11. Plan for CMRI tomorrow to assess for myopericarditis. Continue to monitor.

## 2021-12-10 NOTE — PROGRESS NOTES
Orders for Lifevest and supporting documentation faxed and emailed to Gita Merritt and Rosangela Bray for Gita.    Kaelyn Adams RN  Care Coordinator  [facility]  [phone] (text or call)

## 2021-12-10 NOTE — PROGRESS NOTES
Per Molly CÁRDENAS with cardiology, ok to have Lifevest placed at patient's home by Gita conner this evening.   Rec'd call from Rosangela with [employer] and patient was approved by insurance for LifeVest and Anuradha (rep) will meet patient at his home to fit for Lifevest this evening.    Kaelyn Admas RN  Care Coordinator  [facility]  [phone] (text or call)

## 2021-12-10 NOTE — DISCHARGE SUMMARY
[facility]  Hospitalist Discharge Summary      Date of Admission:  12/8/2021  Date of Discharge:  12/10/2021  Discharging Provider: Kelley Perdomo PA-C      Discharge Diagnoses   Chest pain without coronary artery disease  Acute systolic HFrEF  Non ischemic cardiomopathy - 15-20%  New LBBB  Hyperlipidemia  Tobacco use D/O  Alcohol use D/O    Follow-ups Needed After Discharge   Follow-up Appointments     Follow-up and recommended labs and tests       Follow up with primary care provider, Jason García, within 7 days to   evaluate medication change and for hospital follow- up.  The following   labs/tests are recommended: repeat BMP, CBC in 1 month.    Follow up with [facility] in 1 week as scheduled             Discharge Disposition   Discharged to home  Condition at discharge: Stable    Hospital Course   Dipak Swartz is a 41 year old male with PMH significant for MDD, JESSICA, Insomnia, Tobacco use D/O, Alcohol use D/O who was directly admitted on 12/8/21 from [facility] ED with newly diagnosed HFrEF, new LBBB and suspected unstable angina. Fur full HPI please see H&P from Anthony [place], dated 12/8/21.     Chest pain without coronary artery disease  Acute HFrEF  Non ischemic cardiomopathy - 15-20%  New LBBB  Hyperlipidemia  Presented to [facility] ED 12/6 from outpt cardiology clinic for eval of RAMOS x2-3 months. ECHO in clinic revealed reduced EF 15-20% which was new and sinus tachycardia.   In the ED, pt reported EtOH consumption regularly and heavily - 1L vodka every 4 days, case of beer lasts 5-6 days, ~6-12 drinks 4-5 days a week. Reported using cocaine x2 months ago, in total x3 over the last 2 decades. EKG confirmed ST with with frequent PAC's and lateral Q-waves present. CXR mild cardiomegaly and interstitial edema noted as well as subtle infiltrative process. Basic labs unremarkable. Serial troponin Negative x5. Pro-BNP elevated at 2,588. INR/PTT WNL. UA and urine drug screen  negative. Pt boarded in the ED 1286-12/8 when transferred to [facility]. Pro-BNP improved to 592. He was started on Coreg 3.125 mg BID, lisinopril 5 mg BID and single dose of IV lasix 20 mg, and heparin gtt. Underwent cardiac cath on 12/9 and no stents required. Etiology of new cardiomyopathy unclear - EtOH, cocaine, idiopathic ddx. Started on diuresis by cardiology.  * FLP: , HDL 77, ,   * ProBNP 2,588-->592  * Coronary angiogram 12/9: No stents and clean coronaries.  Patient was admitted to the heart center and underwent the above testing.  He is not telemetry.  Cardiology was consulted.  Patient was initially started on a heparin drip which was discontinued: Post cath as patient had clean coronaries.  He was started on IV diuresis with furosemide 20 mg every 8 hours which was transitioned to 40 mg p.o. daily on discharge.  He is instructed to have daily weights and close follow-up with core clinic, appointment scheduled for next week.  He was initiated on aspirin 81 mg, atorvastatin 40 mg, lisinopril 5 mg twice daily, and carvedilol 3.125 mg twice daily.  He was instructed to have alcohol, tobacco, and drug abstinence.  Is recommended to have LifeVest if insurance approves and this was arranged for patient prior to discharge.           Wt Readings from Last 4 Encounters:   12/09/21 69.2 kg (152 lb 9.6 oz)   12/06/21 76.6 kg (168 lb 14.4 oz)   12/06/21 74.8 kg (164 lb 12.8 oz)   11/29/21 74.4 kg (164 lb)             Vitals:    12/08/21 1738 12/09/21 0600 12/10/21 0502   Weight: 69.2 kg (152 lb 8 oz) 69.2 kg (152 lb 9.6 oz) 69.4 kg (153 lb)       Intake/Output Summary (Last 24 hours) at 12/10/2021 1146  Last data filed at 12/10/2021 0540  Gross per 24 hour   Intake --   Output 1095 ml   Net -1095 ml        Alcohol use D/O  Counseled regarding cessation. No withdrawal symptoms while hospitalized. Recommended complete cessation on discharge, family support and can consider AA if  needed.    MDD  JESSICA  Insomnia  Resumed Ambien 6.25 mg PRN at bedtime (he stated he takes this nightly per report).  After discussion with the patient he is no longer taking Celexa and as such will not resume.       Tobacco use D/O  He is currently using chewing tobacco. Counseled regarding cessation. PRN Nicoderm ordered in house and 1 month supply given on discharge. Follow up with PCP.       Right traumatic BKA      Asymptomatic COVID-19 admission screen: Negative 12/6/2021.  - No vaccine record in [facility], will encourage vaccination especially given high risk conditions as above for severe COVID-19.       Consultations This Hospital Stay   CARDIOLOGY IP CONSULT  PHARMACY IP CONSULT  PHARMACY IP CONSULT  PHARMACY IP CONSULT  PHARMACY IP CONSULT  SMOKING CESSATION PROGRAM IP CONSULT  SMOKING CESSATION PROGRAM IP CONSULT    Code Status   Full Code    Time Spent on this Encounter   I, Kelley Perdomo PA-C, personally saw the patient today and spent greater than 30 minutes discharging this patient.       Kelley Perdomo PA-C  [facility]  [address] [place], [place] [address], [address] [address]  Phone: [phone]  ______________________________________________________________________    Physical Exam   Vital Signs: Temp: 97.7  F (36.5  C) Temp src: Oral BP: 118/79 Pulse: 91   Resp: 18 SpO2: 99 % O2 Device: None (Room air)    Weight: 153 lbs 0 oz    General: Awake, alert, very pleasant middle aged man who appears stated age. Looks comfortable sitting up in bed. No acute distress.  HEENT: Normocephalic, atraumatic. Extraocular movements intact.   Respiratory: Clear to auscultation bilaterally, no rales, wheezing, or rhonchi.  Cardiovascular: Regular rate and rhythm, +S1 and S2, no murmur auscultated. No peripheral edema to LLE. RLE with BKA, stump with mild edema, non pitting.  Gastrointestinal: Soft, non-tender, non-distended. Bowel sounds present.  Skin: Warm, dry. No obvious  rashes or lesions on exposed skin. Dorsalis pedis pulses palpable bilaterally.  Musculoskeletal: No joint swelling, erythema or tenderness. Moves all extremities equally.   Neurologic: AAO x3. Cranial nerves 2-12 grossly intact, normal strength and sensation.  Psychiatric: Appropriate mood and affect. No obvious anxiety or depression.       Primary Care Physician   Jason García    Discharge Orders      Discharge Order: F/U with Cardiac  [facility]      Reason for your hospital stay    You were admitted to the hospital for chest pain, your blood vessels looked good but your heart pumping is low. You were started on new medications and seen by the heart doctors.     Follow-up and recommended labs and tests     Follow up with primary care provider, Jason García, within 7 days to evaluate medication change and for hospital follow- up.  The following labs/tests are recommended: repeat BMP, CBC in 1 month.    Follow up with [facility] in 1 week as scheduled     Activity    Your activity upon discharge: activity as tolerated     Diet    Follow this diet upon discharge: Orders Placed This Encounter      Low Saturated Fat Na <2400 mg       Significant Results and Procedures   Results for orders placed or performed during the hospital encounter of 12/08/21   Cardiac Catheterization    Narrative    1. Normal coronary arteries  2. 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 Jason García with phone number [phone].  Associated Diagnoses: HFrEF (heart failure with reduced ejection fraction) (H)      atorvastatin (LIPITOR) 40 MG tablet Take 1 tablet (40 mg) by mouth every evening  Qty: 30 tablet, Refills: 3    Comments: Future refills by PCP Dr. Jason García with phone  number [phone].  Associated Diagnoses: HFrEF (heart failure with reduced ejection fraction) (H)      carvedilol (COREG) 3.125 MG tablet Take 1 tablet (3.125 mg) by mouth 2 times daily (with meals)  Qty: 60 tablet, Refills: 3    Comments: Future refills by PCP Dr. Jason García with phone number [phone].  Associated Diagnoses: HFrEF (heart failure with reduced ejection fraction) (H)      furosemide (LASIX) 40 MG tablet Take 1 tablet (40 mg) by mouth daily  Qty: 30 tablet, Refills: 3    Comments: Future refills by PCP Dr. Jason García with phone number [phone].  Associated Diagnoses: HFrEF (heart failure with reduced ejection fraction) (H)      lisinopril (ZESTRIL) 5 MG tablet Take 1 tablet (5 mg) by mouth 2 times daily  Qty: 30 tablet, Refills: 3    Comments: Future refills by PCP Dr. Jason García with phone number [phone].  Associated Diagnoses: HFrEF (heart failure with reduced ejection fraction) (H)      nicotine (NICODERM CQ) 14 MG/24HR 24 hr patch Place 1 patch onto the skin daily as needed for smoking cessation  Qty: 30 patch, Refills: 0    Comments: Future refills by PCP Dr. Jason García with phone number [phone].  Associated Diagnoses: Tobacco abuse         CONTINUE these medications which have NOT CHANGED    Details   Multiple Vitamins-Minerals (MENS MULTIVITAMIN) TABS Take 1 tablet by mouth daily      zolpidem ER (AMBIEN CR) 6.25 MG CR tablet Take 1 tablet (6.25 mg) by mouth nightly as needed for sleep  Qty: 30 tablet, Refills: 5    Associated Diagnoses: Insomnia due to medical condition      citalopram (CELEXA) 10 MG tablet Take 1 tablet (10 mg) by mouth every morning  Qty: 30 tablet, Refills: 1    Associated Diagnoses: JESSICA (generalized anxiety disorder); Moderate major depression (H)      order for DME Equipment being ordered: right lower extremity prosthetic     [employer] [facility]  [phone]  Qty: 1 each, Refills: 0    Associated Diagnoses: History of amputation of  right leg through tibia and fibula (H)           Allergies   Allergies   Allergen Reactions     No Known Allergies

## 2021-12-11 ENCOUNTER — PATIENT OUTREACH (OUTPATIENT)
Dept: CARE COORDINATION | Facility: CLINIC | Age: 41
End: 2021-12-11
Payer: COMMERCIAL

## 2021-12-11 DIAGNOSIS — Z71.89 OTHER SPECIFIED COUNSELING: ICD-10-CM

## 2021-12-11 NOTE — PROGRESS NOTES
Clinic Care Coordination Contact  [facility]: Post-Discharge Note  SITUATION                                                      Admission:    Admission Date: 12/08/21   Reason for Admission: Chest pain without coronary artery diseaseAcute systolic HFrEF  Discharge:   Discharge Date: 12/10/21  Discharge Diagnosis: Chest pain without coronary artery diseaseAcute systolic HFrEF    BACKGROUND                                                       Dipak Swartz is a 41 year old male with a past medical history significant for MDD, JESSICA, Insomnia, Tobacco use D/O, Alcohol use D/O who was directly admitted from [facility] ED with newly diagnosed HFrEF, new LBBB and suspected unstable angina.         Patient presented to [facility] ED on 12/6/2021 after being evaluated by Cardiology as an outpatient for exertional dyspnea.  Patient reported experiencing exertional dyspnea for the past 2-3 months.  He underwent a complete ECHO in clinic that revealed a reduced EF of 15-20% which was new.  He was also found to be in sinus tachycardia.       In the ED, patient stated he does consume alcohol regularly and heavily.  He indicated a liter of vodka lasts him approximately 4 days and a case of beer lasts 5-6 days.  He typically consumes 6-12 drinks 4-5 days a week.  He also used cocaine about 2 months ago and in total 3 times over the last 2 decades.  EKG in the ED confirmed sinus tachycardia with frequent PAC's and lateral Q-waves present.  Chest Xray with mild cardiomegaly and interstitial edema noted as well as subtle infiltrative process.  CBC with differential was unremarkable.  CMP was unremarkable.  Serial troponin collected and negative x5.  Pro-BNP elevated at 2,588.  INR and PTT within normal limits.  UA and urine rug screen negative.       Patient has been boarding in the ED since 12/6 and repeat labs show a creatinine of 1.06 with GFR of 87, calcium of 8.0 and glucose of 109.  Pro-BNP improved to 592.  " He has since been started on Coreg 3.125 mg BID, lisinopril 5 mg BID and single dose of IV lasix 20 mg as well as started on heparin drip.      Patient was seen in his hospital room where he was resting comfortably in bed upon arrival.  Initially, we reviewed medical and surgical history as well as events that led to admission.       Upon questioning, he indicated he has nightly sweating spells.  He has had worsening fatigue and weakness for some time.  He has had a mild headache since Monday afternoon.  He has a had a dry cough since August and occasional abdominal pain.  When asked if he has had a seizure, he answer yes and then stated it occurred about 3 months ago and was related to a new anxiety med which he is no longer taking.       He has been having chest pain that is mild in severity and comes and goes.  He described it as both a pressure like discomfort with stabbing.  It does not radiate anywhere but does seem to cause shortness of breath, clamminess and light headedness due to being short of breath.  He has also been having shortness of breath for some time which is occurring at rest and with activity.  He described having swelling everywhere and stated his right lower extremity prosthesis had to be changed because of it.  Patient has also been experiencing palpitations.      ASSESSMENT      Enrollment  Primary Care Care Coordination Status: Declined    Discharge Assessment  How are you doing now that you are home?: \" I'm doing well just at a [employer] party right now\"  How are your symptoms? (Red Flag symptoms escalate to triage hotline per guidelines): Improved  Do you feel your condition is stable enough to be safe at home until your provider visit?: Yes  Does the patient have their discharge instructions? : Yes  Does the patient have questions regarding their discharge instructions? : No  Were you started on any new medications or were there changes to any of your previous medications? : " Yes  Does the patient have all of their medications?: Yes  Do you have questions regarding any of your medications? : No  Do you have all of your needed medical supplies or equipment (DME)?  (i.e. oxygen tank, CPAP, cane, etc.): Yes                  PLAN                                                      Outpatient Plan:    Follow up with primary care provider, Jason García, within 7 days to evaluate medication change and for hospital follow- up.  The following labs/tests are recommended: repeat BMP, CBC in 1 month.    Follow up with [facility] in 1 week as scheduled          Activity     Your activity upon discharge: activity as tolerated          Diet     Follow this diet upon discharge: Orders Placed This Encounter      Low Saturated Fat Na <2400 mg          Future Appointments   Date Time Provider Department Center   12/14/2021  9:30 AM Joanna Camacho, [facility]N [facility]         For any urgent concerns, please contact our 24 hour nurse triage line: [phone] ([phone])         Dede Morgan MA

## 2021-12-14 ENCOUNTER — OFFICE VISIT (OUTPATIENT)
Dept: CARDIOLOGY | Facility: CLINIC | Age: 41
End: 2021-12-14
Payer: COMMERCIAL

## 2021-12-14 ENCOUNTER — TELEPHONE (OUTPATIENT)
Dept: CARDIOLOGY | Facility: CLINIC | Age: 41
End: 2021-12-14

## 2021-12-14 ENCOUNTER — LAB (OUTPATIENT)
Dept: LAB | Facility: CLINIC | Age: 41
End: 2021-12-14
Payer: COMMERCIAL

## 2021-12-14 VITALS
BODY MASS INDEX: 25.35 KG/M2 | OXYGEN SATURATION: 100 % | SYSTOLIC BLOOD PRESSURE: 105 MMHG | DIASTOLIC BLOOD PRESSURE: 75 MMHG | HEIGHT: 67 IN | HEART RATE: 96 BPM | WEIGHT: 161.5 LBS

## 2021-12-14 DIAGNOSIS — I50.20 HFREF (HEART FAILURE WITH REDUCED EJECTION FRACTION) (H): Primary | ICD-10-CM

## 2021-12-14 DIAGNOSIS — I42.8 NONISCHEMIC CARDIOMYOPATHY (H): ICD-10-CM

## 2021-12-14 DIAGNOSIS — I50.20 HFREF (HEART FAILURE WITH REDUCED EJECTION FRACTION) (H): ICD-10-CM

## 2021-12-14 DIAGNOSIS — F10.10 ALCOHOL ABUSE: ICD-10-CM

## 2021-12-14 DIAGNOSIS — Z72.0 TOBACCO ABUSE: ICD-10-CM

## 2021-12-14 DIAGNOSIS — I50.82 BIVENTRICULAR HEART FAILURE (H): ICD-10-CM

## 2021-12-14 DIAGNOSIS — E78.5 HYPERLIPIDEMIA LDL GOAL <100: ICD-10-CM

## 2021-12-14 LAB
ANION GAP SERPL CALCULATED.3IONS-SCNC: 4 MMOL/L (ref 3–14)
BUN SERPL-MCNC: 22 MG/DL (ref 7–30)
CALCIUM SERPL-MCNC: 9.8 MG/DL (ref 8.5–10.1)
CHLORIDE BLD-SCNC: 98 MMOL/L (ref 94–109)
CO2 SERPL-SCNC: 29 MMOL/L (ref 20–32)
CREAT SERPL-MCNC: 1.21 MG/DL (ref 0.66–1.25)
GFR SERPL CREATININE-BSD FRML MDRD: 74 ML/MIN/1.73M2
GLUCOSE BLD-MCNC: 102 MG/DL (ref 70–99)
POTASSIUM BLD-SCNC: 5.2 MMOL/L (ref 3.4–5.3)
SODIUM SERPL-SCNC: 131 MMOL/L (ref 133–144)

## 2021-12-14 PROCEDURE — 99215 OFFICE O/P EST HI 40 MIN: CPT | Performed by: NURSE PRACTITIONER

## 2021-12-14 PROCEDURE — 36415 COLL VENOUS BLD VENIPUNCTURE: CPT

## 2021-12-14 PROCEDURE — 80048 BASIC METABOLIC PNL TOTAL CA: CPT

## 2021-12-14 RX ORDER — CARVEDILOL 3.12 MG/1
3.12 TABLET ORAL 2 TIMES DAILY WITH MEALS
Qty: 180 TABLET | Refills: 3 | Status: SHIPPED | OUTPATIENT
Start: 2021-12-14 | End: 2022-02-10

## 2021-12-14 RX ORDER — LISINOPRIL 5 MG/1
5 TABLET ORAL 2 TIMES DAILY
Qty: 180 TABLET | Refills: 3 | Status: SHIPPED | OUTPATIENT
Start: 2021-12-14 | End: 2022-01-04

## 2021-12-14 RX ORDER — FUROSEMIDE 40 MG
40 TABLET ORAL DAILY
Qty: 90 TABLET | Refills: 3 | Status: SHIPPED | OUTPATIENT
Start: 2021-12-14 | End: 2021-12-15 | Stop reason: DRUGHIGH

## 2021-12-14 RX ORDER — ATORVASTATIN CALCIUM 40 MG/1
40 TABLET, FILM COATED ORAL EVERY EVENING
Qty: 90 TABLET | Refills: 3 | Status: SHIPPED | OUTPATIENT
Start: 2021-12-14 | End: 2022-02-10

## 2021-12-14 ASSESSMENT — MIFFLIN-ST. JEOR: SCORE: 1596.19

## 2021-12-14 NOTE — LETTER
12/14/2021    Jason García MD  [address] [facility] [address]    RE: Dipak Swartz       Dear Colleague,     I had the pleasure of seeing Dipak Swartz in the [facility].  Cardiology Clinic Progress Note  Dipak Swartz MRN# [id number]   YOB: 1980 Age: 41 year old   Primary Cardiologist: Dr. Irizarry Reason for visit: CORE enrollment            Assessment and Plan:   Dipak Swartz is a very pleasant 41 year old male with a history of systolic heart failure, hypertension, hyperlipidemia, tobacco use and alcohol use.     1.  Chronic biventricular heart failure/HFrEF, nonischemic cardiomyopathy - LVEF 15-20%,  RV normal size with moderate-severe RV systolic dysfunction.   - NYHA class II, stage C   - Etiology : nonischemic, alcohol vs. idiopathic   - Fluid status : euvolemic (home weight ~ 147-149#, clinic weight higher due to 12# prosthetic).    - Diuretic regimen : furosemide 40mg daily    - Ischemic evaluation : cardiac cath 12/9/21    - Guideline directed medical therapy    - Betablocker: carvedilol 3.125mg BID     - ACEI/ARB/ARNI: lisinopril 5mg BID    - Aldactone antagonist: will consider in future depending on blood pressure   - Sudden cardiac death prophylaxis : Lifevest in place, reassess LVEF in 3 months after GDMT optimized, if LVEF remains < 35% will have evaluated for ICD consideration.   2. Hypertension   3. Hyperlipidemia   - continue atorvastatin 40mg daily   4.Tobacco use  6. Alcohol use   7. Right BKA - r/t farm accident 2014    I saw Jorge L today for CORE enrollment, he is doing well from a heart failure standpoint since hospital discharge. Continuing to note improvement in symptoms. Clinic weight today showed weight of 161# but that is with his 12# prosthetic. He reports home weight has been 147-149#. His blood pressure in clinic today is 105/75 with complaints of dizziness/lightheadedness, more prominent with position changes. I had no labs  available since hospital discharge. Given soft blood pressures and lightheadedness complaints will get BMP on way out of clinic today. Depending on results will consider decrease in diuretic if decline in renal function otherwise will possibly titrate his GDMT. LifeVest in place today. We reviewed HF education and I counseled him on alcohol sobriety and smoking cessation. Provided patient with home BP monitor today.      Will reach place pharmacy liaison request to price out Entresto.     Changes today: none, will determine after labs are reviewed if any medication changes are needed.     Follow up plan:     Labs after clinic visit today - BMP    CORE follow up in [place] with NICOLA Serrano PA-C on 1/4/21 @ 9:45am    Pharmacy liaison consultation for Entresto pricing     Consideration for MTM referral for SGLT2 inhibitor consideration in the future.     Repeat echocardiogram in 3month         History of Presenting Illness:    Dipak Swartz is a very pleasant 41 year old male with a history of systolic heart failure, hypertension, hyperlipidemia, tobacco use and alcohol use.     Primary cardiologist Dr. Irizarry whom he has followed for many years due to history of chest pain, initially being evaluated in 2016 at which time he underwent an EKG, echocardiogram and nuclear stress test, all of which were normal. He saw Dr. Irizarry 12/6/21 with worsening chest pain. He had underwent an echocardiogram a few days earlier after family medicine visit. Echocardiogram showed LVEF 15-20%, RV normal size with moderate-severe RV systolic dysfunction, no significant valvular disease. He had complaints of 15# weight gain, increased swelling and dyspnea. Given his volume overload state with worsening heart failure and chest pain symptoms he was advised to go to the emergency department for further evaluation.     He was hospitalized at 12/8-12/10/21 acute systolic heart failure. NTproBNP 2,588. 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 Admission weight 168# and discharge weight 153#. He was started on carvedilol 3.125mg BID, lisinopril 5mg BID, furosemide 40mg daily, aspirin 81mg and atorvastatin 40mg daily. He was also discharged with a LifeVest.     Patient is here today for CORE enrollment.     Patient reports feeling good. Monitoring weights daily a home he reports has been stable 147-149#. Weight today in clinic is 161# (this is with prosthetic which weighs ~ 12#). Increased edema in right stump. Feels his breathing is improved, but still having some exertional dyspnea. Denies orthopnea or PND. Some \"light\" \"infrequent\" chest pain which is a significant improvement. Feeling dizzy/lightheadedness at times, \"comes and goes\", noticing more with position changes. Denies tachycardia or palpitations. Taking medications daily.     No labs since hospital discharge, [facility] 12/10 showed stable kidney function and electrolytes. Blood pressure 105/75 and HR 96 in clinic today.    Appetite good, following low sodium diet, \"hard to find things to eat that aren't drenched in salt\". Only eats dinner. Drinking 1 cup of coffee, water and pineapple juice, trying to limit fluid intake. Riding exercise bicycle for 20mins each morning. Drank 2 drinks Saturday and 2 drinks last night. Had been drinking 1L vodka every 4 days, case of beer lasts 5-6 days and drinking ~ 6-12 drinks 4-5 days per week. States has had 2 cigarettes since hospital discharge, had been smoking 5-7 per day.         Social History    , 3 children, self-employed .   Social History     Socioeconomic History     Marital status:      Spouse name: Not on file     Number of children: Not on file     Years of education: Not on file     Highest education level: Not on file   Occupational History     Employer: UNKNOWN   Tobacco Use     Smoking status: Former Smoker     Quit date: " "2003     Years since quittin.6     Smokeless tobacco: Current User     Types: Chew   Vaping Use     Vaping Use: Never used   Substance and Sexual Activity     Alcohol use: Yes     Alcohol/week: 0.0 standard drinks     Comment: occ.     Drug use: No     Sexual activity: Yes     Partners: Female   Other Topics Concern     Parent/sibling w/ CABG, MI or angioplasty before 65F 55M? Not Asked   Social History Narrative     Not on file     Social Determinants of Health     Financial Resource Strain: Not on file   Food Insecurity: Not on file   Transportation Needs: Not on file   Physical Activity: Not on file   Stress: Not on file   Social Connections: Not on file   Intimate Partner Violence: Not on file   Housing Stability: Not on file            Review of Systems:   Skin:  Negative     Eyes:  Negative    ENT:  Negative    Respiratory:  Positive for shortness of breath;dyspnea on exertion;cough;wheezing  Cardiovascular:  Negative Positive for;palpitations;chest pain;edema;fatigue;lightheadedness  Gastroenterology: Positive for heartburn  Genitourinary:  Negative    Musculoskeletal:  Positive for back pain;neck pain;joint pain  Neurologic:  Positive for numbness or tingling of hands  Psychiatric:  Positive for sleep disturbances;anxiety  Heme/Lymph/Imm:  Negative    Endocrine:  Negative           Physical Exam:   Vitals: /75   Pulse 96   Ht 1.702 m (5' 7\")   Wt 73.3 kg (161 lb 8 oz)   SpO2 100%   BMI 25.29 kg/m     Wt Readings from Last 4 Encounters:   21 73.3 kg (161 lb 8 oz)   12/10/21 69.4 kg (153 lb)   21 76.6 kg (168 lb 14.4 oz)   21 74.8 kg (164 lb 12.8 oz)     GEN: well nourished, in no acute distress.  HEENT:  Pupils equal, round. Sclerae nonicteric.   NECK: Supple, no masses appreciated. JVP appears normal  C/V:  Regular rate and rhythm, no murmur, rub or gallop.    RESP: Respirations are unlabored. Clear to auscultation bilaterally without wheezing, rales, or rhonchi.  GI: " Abdomen soft, nontender.  EXTREM: No LLE edema. Trace LE in right below the knee stump  NEURO: Alert and oriented, cooperative.  SKIN: Warm and dry.        Data:     LIPID RESULTS:  Lab Results   Component Value Date    CHOL 229 (H) 12/09/2021    CHOL 208 (H) 03/03/2016    HDL 77 12/09/2021    HDL 99 03/03/2016     (H) 12/09/2021    LDL 98 03/03/2016    TRIG 105 12/09/2021    TRIG 53 03/03/2016     LIVER ENZYME RESULTS:  Lab Results   Component Value Date    AST 22 12/10/2021    AST 13 08/05/2020    ALT 46 12/10/2021    ALT 26 08/05/2020     CBC RESULTS:  Lab Results   Component Value Date    WBC 9.1 12/09/2021    WBC 7.1 08/05/2020    RBC 4.58 12/09/2021    RBC 4.79 08/05/2020    HGB 14.9 12/09/2021    HGB 14.8 08/05/2020    HCT 44.2 12/09/2021    HCT 45.1 08/05/2020    MCV 97 12/09/2021    MCV 94 08/05/2020    MCH 32.5 12/09/2021    MCH 30.9 08/05/2020    MCHC 33.7 12/09/2021    MCHC 32.8 08/05/2020    RDW 13.5 12/09/2021    RDW 13.5 08/05/2020     12/09/2021     08/05/2020     BMP RESULTS:  Lab Results   Component Value Date     12/10/2021     08/05/2020    POTASSIUM 3.8 12/10/2021    POTASSIUM 3.8 08/05/2020    CHLORIDE 107 12/10/2021    CHLORIDE 105 08/05/2020    CO2 23 12/10/2021    CO2 30 08/05/2020    ANIONGAP 7 12/10/2021    ANIONGAP 3 08/05/2020     (H) 12/10/2021    GLC 77 08/06/2020    BUN 20 12/10/2021    BUN 14 08/05/2020    CR 1.15 12/10/2021    CR 1.12 08/05/2020    GFRESTIMATED 79 12/10/2021    GFRESTIMATED 82 08/05/2020    GFRESTBLACK >90 08/05/2020    SHAYNA 8.9 12/10/2021    SHAYNA 9.3 08/05/2020      A1C RESULTS:  Lab Results   Component Value Date    A1C 5.2 08/06/2020     INR RESULTS:  Lab Results   Component Value Date    INR 0.92 12/06/2021            Medications     Current Outpatient Medications   Medication Sig Dispense Refill     aspirin (ASA) 81 MG EC tablet Take 1 tablet (81 mg) by mouth daily 30 tablet 3     atorvastatin (LIPITOR) 40 MG tablet Take 1  tablet (40 mg) by mouth every evening 30 tablet 3     carvedilol (COREG) 3.125 MG tablet Take 1 tablet (3.125 mg) by mouth 2 times daily (with meals) 60 tablet 3     furosemide (LASIX) 40 MG tablet Take 1 tablet (40 mg) by mouth daily 30 tablet 3     lisinopril (ZESTRIL) 5 MG tablet Take 1 tablet (5 mg) by mouth 2 times daily 30 tablet 3     Multiple Vitamins-Minerals (MENS MULTIVITAMIN) TABS Take 1 tablet by mouth daily       nicotine (NICODERM CQ) 14 MG/24HR 24 hr patch Place 1 patch onto the skin daily as needed for smoking cessation 30 patch 0     order for DME Equipment being ordered: right lower extremity prosthetic     [employer]  [phone] 1 each 0     zolpidem ER (AMBIEN CR) 6.25 MG CR tablet Take 1 tablet (6.25 mg) by mouth nightly as needed for sleep 30 tablet 5          Past Medical History     Past Medical History:   Diagnosis Date     Accident on farm 8/2014     Past Surgical History:   Procedure Laterality Date     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     Broken Right arm  1992     CV HEART CATHETERIZATION WITH POSSIBLE INTERVENTION N/A 12/9/2021    Procedure: Heart Catheterization with Possible Intervention;  Surgeon: Leonard Granados MD;  Location:  HEART CARDIAC CATH LAB     LEG SURGERY Right 8/2014     No family history on file.         Allergies   No known allergies    65 minutes spent on the date of the encounter doing chart review, history and exam, documentation and further activities as noted above      JOSE Mcmanus CNP  [facility]  Pager: [phone]    Thank you for allowing me to participate in the care of your patient.      Sincerely,     JOSE Mcmanus [facility]  cc:   No referring provider defined for this encounter.

## 2021-12-14 NOTE — PROGRESS NOTES
Cardiology Clinic Progress Note  Dipak Swartz MRN# [id number]   YOB: 1980 Age: 41 year old   Primary Cardiologist: Dr. Irizarry Reason for visit: CORE enrollment            Assessment and Plan:   Dipak Swartz is a very pleasant 41 year old male with a history of systolic heart failure, hypertension, hyperlipidemia, tobacco use and alcohol use.     1.  Chronic systolic heart failure/HFrEF, nonischemic cardiomyopathy   - NYHA class ***, stage C   - Etiology ***   - Fluid status  ***   - Diuretic regimen : furosemide 40mg daily    - Ischemic evaluation *** complete/pending/not within the goals of care   - Guideline directed medical therapy    - Betablocker: carvedilol 3.125mg BID     - ACEI/ARB/ARNI:     - Aldactone antagonist:    - Sudden cardiac death prophylaxis ***   - Sleep apnea *** yes uses CPAP/ yes refuses CPAP/ no/pending evaluation  2. Hypertension   3. Hyperlipidemia  4.Tobacco use  6. Alcohol use        Changes today: ***    Follow up plan:     Labs after clinic visit today    CORE follow up in [place] with NICOLA Serrano PA-C on 1/4/21 @ 9:45am    Pharmacy liaison consultation for Entresto pricing     Consideration for MTM referral for SGLT2 inhibitor consideration          History of Presenting Illness:    Dipak Swartz is a very pleasant 41 year old male with a history of systolic heart failure, hypertension, hyperlipidemia, tobacco use and alcohol use.     Primary cardiologist Dr. Irizarry whom he has followed for many years due to history of chest pain, initially being evaluated in 2016 at which time he underwent an EKG, echocardiogram and nuclear stress test, all of which were normal. He saw Dr. Irizarry 12/6/21 with worsening chest pain. He had underwent an echocardiogram a few days earlier after family medicine visit. Echocardiogram showed LVEF 15-20%, RV normal size with moderate-severe RV systolic dysfunction, no significant valvular disease. He had complaints of 15# weight  gain, increased swelling and dyspnea. Given his volume overload state with worsening heart failure and chest pain symptoms he was advised to go to the emergency department for further evaluation.     He was hospitalized at -12/10/21 acute systolic heart failure. NTproBNP 2,588. 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 Admission weight 168# and discharge weight 153#. He was started on carvedilol 3.125mg BID, lisinopril 5mg BID, furosemide 40mg daily, aspirin 81mg and atorvastatin 40mg daily. He was also discharged with a LifeVest.     Patient is here today for CORE enrollment.     Patient reports feeling good. Monitoring weights daily a home***. Weight today in clinic is back up to 161#.     Patient denies chest pain or chest tightness. Denies dizziness, lightheadedness or other presyncopal symptoms. Denies tachycardia or palpitations.     No labs since hospital discharge, BMP 12/10 showed stable kidney function and electrolytes. Blood pressure 105/75 and HR 96 in clinic today.    Diet ***  Exercise ***  Alcohol***. Had been drinking 1L vodka every 4 days, case of beer lasts 5-6 days and drinking ~ 6-12 drinks 4-5 days per week.   Tobacco***        Social History    *** , ***children, ***grandchildren, ***living, ***enjoys *** in their spare time.   Social History     Socioeconomic History     Marital status:      Spouse name: Not on file     Number of children: Not on file     Years of education: Not on file     Highest education level: Not on file   Occupational History     Employer: UNKNOWN   Tobacco Use     Smoking status: Former Smoker     Quit date: 2003     Years since quittin.6     Smokeless tobacco: Current User     Types: Chew   Vaping Use     Vaping Use: Never used   Substance and Sexual Activity     Alcohol use: Yes     Alcohol/week: 0.0 standard drinks     Comment: occ.     Drug use: No  "    Sexual activity: Yes     Partners: Female   Other Topics Concern     Parent/sibling w/ CABG, MI or angioplasty before 65F 55M? Not Asked   Social History Narrative     Not on file     Social Determinants of Health     Financial Resource Strain: Not on file   Food Insecurity: Not on file   Transportation Needs: Not on file   Physical Activity: Not on file   Stress: Not on file   Social Connections: Not on file   Intimate Partner Violence: Not on file   Housing Stability: Not on file            Review of Systems:   Skin:  Negative     Eyes:  Negative    ENT:  Negative    Respiratory:  Positive for shortness of breath;dyspnea on exertion;cough;wheezing  Cardiovascular:  Negative Positive for;palpitations;chest pain;edema;fatigue;lightheadedness  Gastroenterology: Positive for heartburn  Genitourinary:  Negative    Musculoskeletal:  Positive for back pain;neck pain;joint pain  Neurologic:  Positive for numbness or tingling of hands  Psychiatric:  Positive for sleep disturbances;anxiety  Heme/Lymph/Imm:  Negative    Endocrine:  Negative           Physical Exam:   Vitals: /75   Pulse 96   Ht 1.702 m (5' 7\")   Wt 73.3 kg (161 lb 8 oz)   SpO2 100%   BMI 25.29 kg/m     Wt Readings from Last 4 Encounters:   12/14/21 73.3 kg (161 lb 8 oz)   12/10/21 69.4 kg (153 lb)   12/06/21 76.6 kg (168 lb 14.4 oz)   12/06/21 74.8 kg (164 lb 12.8 oz)     GEN: well nourished, in no acute distress.  HEENT:  Pupils equal, round. Sclerae nonicteric.   NECK: Supple, no masses appreciated. ***JVD with patient ***.  C/V:  Regular rate and rhythm, no murmur, rub or gallop.  ***  RESP: Respirations are unlabored. Clear to auscultation bilaterally without wheezing, rales, or rhonchi.  GI: Abdomen soft, nontender.  EXTREM: *** LE edema.  NEURO: Alert and oriented, cooperative.  SKIN: Warm and dry. ***       Data:     LIPID RESULTS:  Lab Results   Component Value Date    CHOL 229 (H) 12/09/2021    CHOL 208 (H) 03/03/2016    HDL 77 " 12/09/2021    HDL 99 03/03/2016     (H) 12/09/2021    LDL 98 03/03/2016    TRIG 105 12/09/2021    TRIG 53 03/03/2016     LIVER ENZYME RESULTS:  Lab Results   Component Value Date    AST 22 12/10/2021    AST 13 08/05/2020    ALT 46 12/10/2021    ALT 26 08/05/2020     CBC RESULTS:  Lab Results   Component Value Date    WBC 9.1 12/09/2021    WBC 7.1 08/05/2020    RBC 4.58 12/09/2021    RBC 4.79 08/05/2020    HGB 14.9 12/09/2021    HGB 14.8 08/05/2020    HCT 44.2 12/09/2021    HCT 45.1 08/05/2020    MCV 97 12/09/2021    MCV 94 08/05/2020    MCH 32.5 12/09/2021    MCH 30.9 08/05/2020    MCHC 33.7 12/09/2021    MCHC 32.8 08/05/2020    RDW 13.5 12/09/2021    RDW 13.5 08/05/2020     12/09/2021     08/05/2020     BMP RESULTS:  Lab Results   Component Value Date     12/10/2021     08/05/2020    POTASSIUM 3.8 12/10/2021    POTASSIUM 3.8 08/05/2020    CHLORIDE 107 12/10/2021    CHLORIDE 105 08/05/2020    CO2 23 12/10/2021    CO2 30 08/05/2020    ANIONGAP 7 12/10/2021    ANIONGAP 3 08/05/2020     (H) 12/10/2021    GLC 77 08/06/2020    BUN 20 12/10/2021    BUN 14 08/05/2020    CR 1.15 12/10/2021    CR 1.12 08/05/2020    GFRESTIMATED 79 12/10/2021    GFRESTIMATED 82 08/05/2020    GFRESTBLACK >90 08/05/2020    SHAYNA 8.9 12/10/2021    SHAYNA 9.3 08/05/2020      A1C RESULTS:  Lab Results   Component Value Date    A1C 5.2 08/06/2020     INR RESULTS:  Lab Results   Component Value Date    INR 0.92 12/06/2021            Medications     Current Outpatient Medications   Medication Sig Dispense Refill     aspirin (ASA) 81 MG EC tablet Take 1 tablet (81 mg) by mouth daily 30 tablet 3     atorvastatin (LIPITOR) 40 MG tablet Take 1 tablet (40 mg) by mouth every evening 30 tablet 3     carvedilol (COREG) 3.125 MG tablet Take 1 tablet (3.125 mg) by mouth 2 times daily (with meals) 60 tablet 3     furosemide (LASIX) 40 MG tablet Take 1 tablet (40 mg) by mouth daily 30 tablet 3     lisinopril (ZESTRIL) 5 MG  tablet Take 1 tablet (5 mg) by mouth 2 times daily 30 tablet 3     Multiple Vitamins-Minerals (MENS MULTIVITAMIN) TABS Take 1 tablet by mouth daily       nicotine (NICODERM CQ) 14 MG/24HR 24 hr patch Place 1 patch onto the skin daily as needed for smoking cessation 30 patch 0     order for DME Equipment being ordered: right lower extremity prosthetic     [employer]  [phone] 1 each 0     zolpidem ER (AMBIEN CR) 6.25 MG CR tablet Take 1 tablet (6.25 mg) by mouth nightly as needed for sleep 30 tablet 5          Past Medical History     Past Medical History:   Diagnosis Date     [place]     Past Surgical History:   Procedure Laterality Date     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     Broken Right arm  1992     CV HEART CATHETERIZATION WITH POSSIBLE INTERVENTION N/A 12/9/2021    Procedure: Heart Catheterization with Possible Intervention;  Surgeon: Leonard Granados MD;  Location:  HEART CARDIAC CATH LAB     LEG SURGERY Right 8/2014     No family history on file.         Allergies   No known allergies    *** minutes spent on the date of the encounter doing chart review, history and exam, documentation and further activities as noted above      JOSE Mcmanus [facility] HEART CARE  Pager: [phone]

## 2021-12-14 NOTE — PROGRESS NOTES
Cardiology Clinic Progress Note  Dipak Swartz MRN# [id number]   YOB: 1980 Age: 41 year old   Primary Cardiologist: Dr. Irizarry Reason for visit: CORE enrollment            Assessment and Plan:   Dipak Swartz is a very pleasant 41 year old male with a history of systolic heart failure, hypertension, hyperlipidemia, tobacco use and alcohol use.     1.  Chronic biventricular heart failure/HFrEF, nonischemic cardiomyopathy - LVEF 15-20%,  RV normal size with moderate-severe RV systolic dysfunction.   - NYHA class II, stage C   - Etiology : nonischemic, alcohol vs. idiopathic   - Fluid status : euvolemic (home weight ~ 147-149#, clinic weight higher due to 12# prosthetic).    - Diuretic regimen : furosemide 40mg daily    - Ischemic evaluation : cardiac cath 12/9/21    - Guideline directed medical therapy    - Betablocker: carvedilol 3.125mg BID     - ACEI/ARB/ARNI: lisinopril 5mg BID    - Aldactone antagonist: will consider in future depending on blood pressure   - Sudden cardiac death prophylaxis : Lifevest in place, reassess LVEF in 3 months after GDMT optimized, if LVEF remains < 35% will have evaluated for ICD consideration.   2. Hypertension   3. Hyperlipidemia   - continue atorvastatin 40mg daily   4.Tobacco use  6. Alcohol use   7. Right BKA - r/t farm accident 2014    I saw Jorge L today for CORE enrollment, he is doing well from a heart failure standpoint since hospital discharge. Continuing to note improvement in symptoms. Clinic weight today showed weight of 161# but that is with his 12# prosthetic. He reports home weight has been 147-149#. His blood pressure in clinic today is 105/75 with complaints of dizziness/lightheadedness, more prominent with position changes. I had no labs available since hospital discharge. Given soft blood pressures and lightheadedness complaints will get BMP on way out of clinic today. Depending on results will consider decrease in diuretic if decline in renal  function otherwise will possibly titrate his GDMT. LifeVest in place today. We reviewed HF education and I counseled him on alcohol sobriety and smoking cessation. Provided patient with home BP monitor today.      Will reach place pharmacy liaison request to price out Entresto.     Changes today: none, will determine after labs are reviewed if any medication changes are needed.     Follow up plan:     Labs after clinic visit today - [facility]    CORE follow up in [place] with NICOLA Serrano PA-C on 1/4/21 @ 9:45am    Pharmacy liaison consultation for Entresto pricing     Consideration for MTM referral for SGLT2 inhibitor consideration in the future.     Repeat echocardiogram in 3month         History of Presenting Illness:    Dipak Swartz is a very pleasant 41 year old male with a history of systolic heart failure, hypertension, hyperlipidemia, tobacco use and alcohol use.     Primary cardiologist Dr. Irizarry whom he has followed for many years due to history of chest pain, initially being evaluated in 2016 at which time he underwent an EKG, echocardiogram and nuclear stress test, all of which were normal. He saw Dr. Irizarry 12/6/21 with worsening chest pain. He had underwent an echocardiogram a few days earlier after family medicine visit. Echocardiogram showed LVEF 15-20%, RV normal size with moderate-severe RV systolic dysfunction, no significant valvular disease. He had complaints of 15# weight gain, increased swelling and dyspnea. Given his volume overload state with worsening heart failure and chest pain symptoms he was advised to go to the emergency department for further evaluation.     He was hospitalized at 12/8-12/10/21 acute systolic heart failure. NTproBNP 2,588. 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 Admission weight 168# and discharge weight 153#. He was started on carvedilol 3.125mg BID, lisinopril 5mg BID,  "furosemide 40mg daily, aspirin 81mg and atorvastatin 40mg daily. He was also discharged with a LifeVest.     Patient is here today for CORE enrollment.     Patient reports feeling good. Monitoring weights daily a home he reports has been stable 147-149#. Weight today in clinic is 161# (this is with prosthetic which weighs ~ 12#). Increased edema in right stump. Feels his breathing is improved, but still having some exertional dyspnea. Denies orthopnea or PND. Some \"light\" \"infrequent\" chest pain which is a significant improvement. Feeling dizzy/lightheadedness at times, \"comes and goes\", noticing more with position changes. Denies tachycardia or palpitations. Taking medications daily.     No labs since hospital discharge, [facility] 12/10 showed stable kidney function and electrolytes. Blood pressure 105/75 and HR 96 in clinic today.    Appetite good, following low sodium diet, \"hard to find things to eat that aren't drenched in salt\". Only eats dinner. Drinking 1 cup of coffee, water and pineapple juice, trying to limit fluid intake. Riding exercise bicycle for 20mins each morning. Drank 2 drinks Saturday and 2 drinks last night. Had been drinking 1L vodka every 4 days, case of beer lasts 5-6 days and drinking ~ 6-12 drinks 4-5 days per week. States has had 2 cigarettes since hospital discharge, had been smoking 5-7 per day.         Social History    , 3 children, self-employed .   Social History     Socioeconomic History     Marital status:      Spouse name: Not on file     Number of children: Not on file     Years of education: Not on file     Highest education level: Not on file   Occupational History     Employer: UNKNOWN   Tobacco Use     Smoking status: Former Smoker     Quit date: 2003     Years since quittin.6     Smokeless tobacco: Current User     Types: Chew   Vaping Use     Vaping Use: Never used   Substance and Sexual Activity     Alcohol use: Yes     Alcohol/week: 0.0 " "standard drinks     Comment: occ.     Drug use: No     Sexual activity: Yes     Partners: Female   Other Topics Concern     Parent/sibling w/ CABG, MI or angioplasty before 65F 55M? Not Asked   Social History Narrative     Not on file     Social Determinants of Health     Financial Resource Strain: Not on file   Food Insecurity: Not on file   Transportation Needs: Not on file   Physical Activity: Not on file   Stress: Not on file   Social Connections: Not on file   Intimate Partner Violence: Not on file   Housing Stability: Not on file            Review of Systems:   Skin:  Negative     Eyes:  Negative    ENT:  Negative    Respiratory:  Positive for shortness of breath;dyspnea on exertion;cough;wheezing  Cardiovascular:  Negative Positive for;palpitations;chest pain;edema;fatigue;lightheadedness  Gastroenterology: Positive for heartburn  Genitourinary:  Negative    Musculoskeletal:  Positive for back pain;neck pain;joint pain  Neurologic:  Positive for numbness or tingling of hands  Psychiatric:  Positive for sleep disturbances;anxiety  Heme/Lymph/Imm:  Negative    Endocrine:  Negative           Physical Exam:   Vitals: /75   Pulse 96   Ht 1.702 m (5' 7\")   Wt 73.3 kg (161 lb 8 oz)   SpO2 100%   BMI 25.29 kg/m     Wt Readings from Last 4 Encounters:   12/14/21 73.3 kg (161 lb 8 oz)   12/10/21 69.4 kg (153 lb)   12/06/21 76.6 kg (168 lb 14.4 oz)   12/06/21 74.8 kg (164 lb 12.8 oz)     GEN: well nourished, in no acute distress.  HEENT:  Pupils equal, round. Sclerae nonicteric.   NECK: Supple, no masses appreciated. JVP appears normal  C/V:  Regular rate and rhythm, no murmur, rub or gallop.    RESP: Respirations are unlabored. Clear to auscultation bilaterally without wheezing, rales, or rhonchi.  GI: Abdomen soft, nontender.  EXTREM: No LLE edema. Trace LE in right below the knee stump  NEURO: Alert and oriented, cooperative.  SKIN: Warm and dry.        Data:     LIPID RESULTS:  Lab Results   Component " Value Date    CHOL 229 (H) 12/09/2021    CHOL 208 (H) 03/03/2016    HDL 77 12/09/2021    HDL 99 03/03/2016     (H) 12/09/2021    LDL 98 03/03/2016    TRIG 105 12/09/2021    TRIG 53 03/03/2016     LIVER ENZYME RESULTS:  Lab Results   Component Value Date    AST 22 12/10/2021    AST 13 08/05/2020    ALT 46 12/10/2021    ALT 26 08/05/2020     CBC RESULTS:  Lab Results   Component Value Date    WBC 9.1 12/09/2021    WBC 7.1 08/05/2020    RBC 4.58 12/09/2021    RBC 4.79 08/05/2020    HGB 14.9 12/09/2021    HGB 14.8 08/05/2020    HCT 44.2 12/09/2021    HCT 45.1 08/05/2020    MCV 97 12/09/2021    MCV 94 08/05/2020    MCH 32.5 12/09/2021    MCH 30.9 08/05/2020    MCHC 33.7 12/09/2021    MCHC 32.8 08/05/2020    RDW 13.5 12/09/2021    RDW 13.5 08/05/2020     12/09/2021     08/05/2020     BMP RESULTS:  Lab Results   Component Value Date     12/10/2021     08/05/2020    POTASSIUM 3.8 12/10/2021    POTASSIUM 3.8 08/05/2020    CHLORIDE 107 12/10/2021    CHLORIDE 105 08/05/2020    CO2 23 12/10/2021    CO2 30 08/05/2020    ANIONGAP 7 12/10/2021    ANIONGAP 3 08/05/2020     (H) 12/10/2021    GLC 77 08/06/2020    BUN 20 12/10/2021    BUN 14 08/05/2020    CR 1.15 12/10/2021    CR 1.12 08/05/2020    GFRESTIMATED 79 12/10/2021    GFRESTIMATED 82 08/05/2020    GFRESTBLACK >90 08/05/2020    SHAYNA 8.9 12/10/2021    SHAYNA 9.3 08/05/2020      A1C RESULTS:  Lab Results   Component Value Date    A1C 5.2 08/06/2020     INR RESULTS:  Lab Results   Component Value Date    INR 0.92 12/06/2021            Medications     Current Outpatient Medications   Medication Sig Dispense Refill     aspirin (ASA) 81 MG EC tablet Take 1 tablet (81 mg) by mouth daily 30 tablet 3     atorvastatin (LIPITOR) 40 MG tablet Take 1 tablet (40 mg) by mouth every evening 30 tablet 3     carvedilol (COREG) 3.125 MG tablet Take 1 tablet (3.125 mg) by mouth 2 times daily (with meals) 60 tablet 3     furosemide (LASIX) 40 MG tablet Take 1  tablet (40 mg) by mouth daily 30 tablet 3     lisinopril (ZESTRIL) 5 MG tablet Take 1 tablet (5 mg) by mouth 2 times daily 30 tablet 3     Multiple Vitamins-Minerals (MENS MULTIVITAMIN) TABS Take 1 tablet by mouth daily       nicotine (NICODERM CQ) 14 MG/24HR 24 hr patch Place 1 patch onto the skin daily as needed for smoking cessation 30 patch 0     order for DME Equipment being ordered: right lower extremity prosthetic     [employer] [facility]  [phone] 1 each 0     zolpidem ER (AMBIEN CR) 6.25 MG CR tablet Take 1 tablet (6.25 mg) by mouth nightly as needed for sleep 30 tablet 5          Past Medical History     Past Medical History:   Diagnosis Date     [place]ident [place] farm 8/2014     Past Surgical History:   Procedure Laterality Date     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     Broken Right arm  1992     CV HEART CATHETERIZATION WITH POSSIBLE INTERVENTION N/A 12/9/2021    Procedure: Heart Catheterization with Possible Intervention;  Surgeon: Leonard Granados MD;  Location:  HEART CARDIAC CATH LAB     LEG SURGERY Right 8/2014     No family history on file.         Allergies   No known allergies    65 minutes spent on the date of the encounter doing chart review, history and exam, documentation and further activities as noted above      JOSE Mcmanus [facility] HEART CARE  Pager: [phone]

## 2021-12-14 NOTE — TELEPHONE ENCOUNTER
"Adding to chart to be reviewed at follow up appointment with Ivory Serrano PA-C.     ----- Message from Kaelyn Rose sent at 12/14/2021  2:29 PM CST -----  Regarding: RE: Entresto pricing  The patient will pay $585/mo upfront for Entresto at the pharmacy.  The patient can then submit a form to the insurance ([facility] [employer]) for reimbursement.  The person I spoke with at the plan said the patient would be reimbursed 100%.    The other issue is that when I process the Entresto, it bumps up against the previously filled lisinopril, flagging an interaction.  This happens frequently with insurances and up until now, the pharmacist is always able to override these type of issues, but this insurance requires that the doctor call [phone].      However, the rep I spoke to was able to put in an override to allow the Entresto to go through \"for the next two days.\"  If possible, you may want to send an RX for Entresto to the patient's pharmacy of choice sometime within that timeframe to avoid further hassle.  If you aren't able to do that, you'll probably get a notification from the pharmacy to call the number above.    -NICOLA Rose, Pharmacy Technician/Liaison, Discharge Pharmacy [phone]    ----- Message -----  From: Joanna Camacho APRN CNP  Sent: 12/14/2021  12:07 PM CST  To: Kaelyn Rose  Subject: Entresto pricing                                 HI,    Can you please price out Entresto for patient?     ThanksJoanna         "

## 2021-12-14 NOTE — PATIENT INSTRUCTIONS
1. Get labs drawn on way out of clinic today, we will call you with results  2. No medication changes  3. Follow up with Ivory Serrano PA-C on 1/4/22 @ 9:45am   4. Please call with any questions/concerns [phone]

## 2021-12-15 RX ORDER — FUROSEMIDE 20 MG
20 TABLET ORAL DAILY
Qty: 100 TABLET | Refills: 3 | Status: SHIPPED | OUTPATIENT
Start: 2021-12-15 | End: 2022-01-04

## 2021-12-20 NOTE — TELEPHONE ENCOUNTER
----- Message from JOSE Simmons CNP sent at 12/14/2021 11:21 AM CST -----  Reviewed BMP, sodium low at 131 (asymptomatic), normal kidney function but creatinine up-trending to 1.21, BUN 22.     RECOMMENDATION  1. DECREASE furosemide to 20mg daily with additional 20mg PRN for weight gain > 2# overnight or increased HF symptoms.   2. CORE RN team - please call next week to follow up after diuretic adjustments  3. Provided patient with blood pressure cuff today, please ask for a few home blood pressure readings.   
[facility] Heart - CORE Clinic    -Spoke with Jorge L regarding his lab results and Joanna's recommended changes. Asked that he check his BP daily and keep a log.      -Asked that Jorge L come to his appointment with GAURAV Rodriguez 30 minutes early for lab draws.     Will send message to the board to reassess him early next week.    Yvette Davison RN on 12/14/2021 at 1:56 PM      
[facility] Heart-CORE Clinic  HF follow up phone assessment    Background: Reassess r/t Furosemide decrease      Recent weights:    148-150 #    Current diuretic plan:   DECREASE furosemide to 20mg daily with additional 20mg PRN for weight gain > 2# overnight  or increased HF symptoms.     Breathing: denies  Edema: denies  Diet:  Watching sodium and fluid intake    Assessment and Plan:    Jorge L states his weekend went well. He did not need to take any additional Furosemide for sx's or weight changes. BPs / 70-80s.  States he feels good and denies any symptoms of hypotension.  Instructed him to track weights and BPs and bring them with to his appt with GAURAV Rodriguez on 1/4. Reiterated the importance of calling the clinic if he experiences an increase in HF sx's (increase in SOB, leg swelling, abdominal bloating) or weight gain of more than 2 lbs in a day or 5 lbs in a week.      Future Appointments   Date Time Provider Department Center   1/4/2022  9:45 AM Ivory Serrano PA-C [facility]       Yvette Davison RN on 12/20/2021 at 12:36 PM      
[facility] Heart - CORE Clinic    Incoming call from patient to clarify new lasix dose(=20mg/d + 20mg prn for weight gain/sx). Updated med list and Rx to his preferred pharmacy. I also confirmed patient is doing daily BP checks - today he was 99/52. Patient had no other questions/concerns.  Katelyn Felix RN on 12/15/2021 at 8:14 AM    
No

## 2022-01-04 ENCOUNTER — OFFICE VISIT (OUTPATIENT)
Dept: CARDIOLOGY | Facility: CLINIC | Age: 42
End: 2022-01-04
Payer: COMMERCIAL

## 2022-01-04 VITALS
WEIGHT: 164.2 LBS | DIASTOLIC BLOOD PRESSURE: 78 MMHG | SYSTOLIC BLOOD PRESSURE: 118 MMHG | OXYGEN SATURATION: 100 % | BODY MASS INDEX: 25.77 KG/M2 | HEIGHT: 67 IN | HEART RATE: 82 BPM

## 2022-01-04 DIAGNOSIS — I50.20 HFREF (HEART FAILURE WITH REDUCED EJECTION FRACTION) (H): ICD-10-CM

## 2022-01-04 LAB
ANION GAP SERPL CALCULATED.3IONS-SCNC: 4 MMOL/L (ref 3–14)
BUN SERPL-MCNC: 18 MG/DL (ref 7–30)
CALCIUM SERPL-MCNC: 10 MG/DL (ref 8.5–10.1)
CHLORIDE BLD-SCNC: 105 MMOL/L (ref 94–109)
CO2 SERPL-SCNC: 27 MMOL/L (ref 20–32)
CREAT SERPL-MCNC: 1.04 MG/DL (ref 0.66–1.25)
GFR SERPL CREATININE-BSD FRML MDRD: >90 ML/MIN/1.73M2
GLUCOSE BLD-MCNC: 97 MG/DL (ref 70–99)
POTASSIUM BLD-SCNC: 4.7 MMOL/L (ref 3.4–5.3)
SODIUM SERPL-SCNC: 136 MMOL/L (ref 133–144)

## 2022-01-04 PROCEDURE — 80048 BASIC METABOLIC PNL TOTAL CA: CPT | Performed by: PHYSICIAN ASSISTANT

## 2022-01-04 PROCEDURE — 36415 COLL VENOUS BLD VENIPUNCTURE: CPT | Performed by: PHYSICIAN ASSISTANT

## 2022-01-04 PROCEDURE — 99215 OFFICE O/P EST HI 40 MIN: CPT | Performed by: PHYSICIAN ASSISTANT

## 2022-01-04 RX ORDER — FUROSEMIDE 20 MG
20 TABLET ORAL DAILY PRN
Qty: 90 TABLET | Refills: 3 | Status: SHIPPED | OUTPATIENT
Start: 2022-01-04 | End: 2023-01-27

## 2022-01-04 RX ORDER — DAPAGLIFLOZIN 5 MG/1
5 TABLET, FILM COATED ORAL DAILY
COMMUNITY
End: 2022-02-10

## 2022-01-04 ASSESSMENT — MIFFLIN-ST. JEOR: SCORE: 1608.44

## 2022-01-04 NOTE — PROGRESS NOTES
[id number]      HPI and Plan:   See dictation    Orders this Visit:  Orders Placed This Encounter   Procedures     Basic metabolic panel     Follow-Up with Electrophysiologist     Follow-Up with CORE Clinic - [place] visit     Orders Placed This Encounter   Medications     dapagliflozin (FARXIGA) 5 MG TABS tablet     Sig: Take 5 mg by mouth daily     DISCONTD: sacubitril-valsartan (ENTRESTO) 24-26 MG per tablet     Sig: Take 1 tablet by mouth 2 times daily     furosemide (LASIX) 20 MG tablet     Sig: Take 1 tablet (20 mg) by mouth daily as needed (wt gain, swelling)     Dispense:  90 tablet     Refill:  3     sacubitril-valsartan (ENTRESTO) 24-26 MG per tablet     Sig: Take 1 tablet by mouth 2 times daily     Dispense:  60 tablet     Refill:  3     Medications Discontinued During This Encounter   Medication Reason     lisinopril (ZESTRIL) 5 MG tablet      furosemide (LASIX) 20 MG tablet      sacubitril-valsartan (ENTRESTO) 24-26 MG per tablet          Encounter Diagnosis   Name Primary?     HFrEF (heart failure with reduced ejection fraction) (H)        CURRENT MEDICATIONS:  Current Outpatient Medications   Medication Sig Dispense Refill     aspirin (ASA) 81 MG EC tablet Take 1 tablet (81 mg) by mouth daily 30 tablet 3     atorvastatin (LIPITOR) 40 MG tablet Take 1 tablet (40 mg) by mouth every evening 90 tablet 3     carvedilol (COREG) 3.125 MG tablet Take 1 tablet (3.125 mg) by mouth 2 times daily (with meals) 180 tablet 3     dapagliflozin (FARXIGA) 5 MG TABS tablet Take 5 mg by mouth daily       furosemide (LASIX) 20 MG tablet Take 1 tablet (20 mg) by mouth daily as needed (wt gain, swelling) 90 tablet 3     Multiple Vitamins-Minerals (MENS MULTIVITAMIN) TABS Take 1 tablet by mouth daily       nicotine (NICODERM CQ) 14 MG/24HR 24 hr patch Place 1 patch onto the skin daily as needed for smoking cessation 30 patch 0     order for DME Equipment being ordered: right lower extremity prosthetic     [employer]  [facility]  [phone] 1 each 0     sacubitril-valsartan (ENTRESTO) 24-26 MG per tablet Take 1 tablet by mouth 2 times daily 60 tablet 3     zolpidem ER (AMBIEN CR) 6.25 MG CR tablet Take 1 tablet (6.25 mg) by mouth nightly as needed for sleep 30 tablet 5       ALLERGIES     Allergies   Allergen Reactions     No Known Allergies        PAST MEDICAL HISTORY:  Past Medical History:   Diagnosis Date     [place]ident [place] 2014       PAST SURGICAL HISTORY:  Past Surgical History:   Procedure Laterality Date     AMPUTATION BELOW KNEE RT/LT Right 2014     Broken Right arm       CV HEART CATHETERIZATION WITH POSSIBLE INTERVENTION N/A 2021    Procedure: Heart Catheterization with Possible Intervention;  Surgeon: Leonard Granados MD;  Location:  HEART CARDIAC CATH LAB     LEG SURGERY Right 2014       FAMILY HISTORY:  No family history on file.    SOCIAL HISTORY:  Social History     Socioeconomic History     Marital status:      Spouse name: None     Number of children: None     Years of education: None     Highest education level: None   Occupational History     Employer: UNKNOWN   Tobacco Use     Smoking status: Former Smoker     Quit date: 2003     Years since quittin.7     Smokeless tobacco: Current User     Types: Chew   Vaping Use     Vaping Use: Never used   Substance and Sexual Activity     Alcohol use: Yes     Alcohol/week: 0.0 standard drinks     Comment: occ.     Drug use: No     Sexual activity: Yes     Partners: Female   Other Topics Concern     Parent/sibling w/ CABG, MI or angioplasty before 65F 55M? Not Asked   Social History Narrative     None     Social Determinants of Health     Financial Resource Strain: Not on file   Food Insecurity: Not on file   Transportation Needs: Not on file   Physical Activity: Not on file   Stress: Not on file   Social Connections: Not on file   Intimate Partner Violence: Not on file   Housing Stability: Not on file       Review of Systems:  Skin:      "   Eyes:       ENT:       Respiratory:  Positive for dyspnea on exertion  Cardiovascular:  Negative;palpitations;chest pain;edema Positive for;syncope or near-syncope;lightheadedness;dizziness  Gastroenterology:      Genitourinary:       Musculoskeletal:       Neurologic:  Positive for numbness or tingling of hands  Psychiatric:       Heme/Lymph/Imm:       Endocrine:         Physical Exam:  Vitals: /78 (BP Location: Left arm, Patient Position: Sitting, Cuff Size: Adult Regular)   Pulse 82   Ht 1.702 m (5' 7\")   Wt 74.5 kg (164 lb 3.2 oz)   SpO2 100%   BMI 25.72 kg/m     Please refer to dictation for physical exam    Recent Lab Results: all reviewed today  CBC RESULTS:  Lab Results   Component Value Date    WBC 9.1 12/09/2021    WBC 7.1 08/05/2020    RBC 4.58 12/09/2021    RBC 4.79 08/05/2020    HGB 14.9 12/09/2021    HGB 14.8 08/05/2020    HCT 44.2 12/09/2021    HCT 45.1 08/05/2020    MCV 97 12/09/2021    MCV 94 08/05/2020    MCH 32.5 12/09/2021    MCH 30.9 08/05/2020    MCHC 33.7 12/09/2021    MCHC 32.8 08/05/2020    RDW 13.5 12/09/2021    RDW 13.5 08/05/2020     12/09/2021     08/05/2020       BMP RESULTS:  Lab Results   Component Value Date     (L) 12/14/2021     08/05/2020    POTASSIUM 5.2 12/14/2021    POTASSIUM 3.8 08/05/2020    CHLORIDE 98 12/14/2021    CHLORIDE 105 08/05/2020    CO2 29 12/14/2021    CO2 30 08/05/2020    ANIONGAP 4 12/14/2021    ANIONGAP 3 08/05/2020     (H) 12/14/2021    GLC 77 08/06/2020    BUN 22 12/14/2021    BUN 14 08/05/2020    CR 1.21 12/14/2021    CR 1.12 08/05/2020    GFRESTIMATED 74 12/14/2021    GFRESTIMATED 82 08/05/2020    GFRESTBLACK >90 08/05/2020    SHANYA 9.8 12/14/2021    SHAYNA 9.3 08/05/2020        INR RESULTS:  Lab Results   Component Value Date    INR 0.92 12/06/2021           CC  Joanna Camacho, APRN CNP  [address]  [place] [id number]      "

## 2022-01-04 NOTE — LETTER
1/4/2022       RE: Dipak Swartz  [address]  [facility] [address]     Dear Colleague,    Thank you for referring your patient, Dipak Swartz, to the [facility] [place] at [facility]. Please see a copy of my visit note below.    [id number]      HPI and Plan:   See dictation    Orders this Visit:  Orders Placed This Encounter   Procedures     Basic metabolic panel     Follow-Up with Electrophysiologist     Follow-Up with [facility] [place] visit     Orders Placed This Encounter   Medications     dapagliflozin (FARXIGA) 5 MG TABS tablet     Sig: Take 5 mg by mouth daily     DISCONTD: sacubitril-valsartan (ENTRESTO) 24-26 MG per tablet     Sig: Take 1 tablet by mouth 2 times daily     furosemide (LASIX) 20 MG tablet     Sig: Take 1 tablet (20 mg) by mouth daily as needed (wt gain, swelling)     Dispense:  90 tablet     Refill:  3     sacubitril-valsartan (ENTRESTO) 24-26 MG per tablet     Sig: Take 1 tablet by mouth 2 times daily     Dispense:  60 tablet     Refill:  3     Medications Discontinued During This Encounter   Medication Reason     lisinopril (ZESTRIL) 5 MG tablet      furosemide (LASIX) 20 MG tablet      sacubitril-valsartan (ENTRESTO) 24-26 MG per tablet          Encounter Diagnosis   Name Primary?     HFrEF (heart failure with reduced ejection fraction) (H)        CURRENT MEDICATIONS:  Current Outpatient Medications   Medication Sig Dispense Refill     aspirin (ASA) 81 MG EC tablet Take 1 tablet (81 mg) by mouth daily 30 tablet 3     atorvastatin (LIPITOR) 40 MG tablet Take 1 tablet (40 mg) by mouth every evening 90 tablet 3     carvedilol (COREG) 3.125 MG tablet Take 1 tablet (3.125 mg) by mouth 2 times daily (with meals) 180 tablet 3     dapagliflozin (FARXIGA) 5 MG TABS tablet Take 5 mg by mouth daily       furosemide (LASIX) 20 MG tablet Take 1 tablet (20 mg) by mouth daily as needed (wt gain, swelling) 90 tablet 3      Multiple Vitamins-Minerals (MENS MULTIVITAMIN) TABS Take 1 tablet by mouth daily       nicotine (NICODERM CQ) 14 MG/24HR 24 hr patch Place 1 patch onto the skin daily as needed for smoking cessation 30 patch 0     order for DME Equipment being ordered: right lower extremity prosthetic     [employer]  [phone] 1 each 0     sacubitril-valsartan (ENTRESTO) 24-26 MG per tablet Take 1 tablet by mouth 2 times daily 60 tablet 3     zolpidem ER (AMBIEN CR) 6.25 MG CR tablet Take 1 tablet (6.25 mg) by mouth nightly as needed for sleep 30 tablet 5       ALLERGIES     Allergies   Allergen Reactions     No Known Allergies        PAST MEDICAL HISTORY:  Past Medical History:   Diagnosis Date     Accident [place] farm 2014       PAST SURGICAL HISTORY:  Past Surgical History:   Procedure Laterality Date     AMPUTATION BELOW KNEE RT/LT Right 2014     Broken Right arm       CV HEART CATHETERIZATION WITH POSSIBLE INTERVENTION N/A 2021    Procedure: Heart Catheterization with Possible Intervention;  Surgeon: Leonard Granados MD;  Location:  HEART CARDIAC CATH LAB     LEG SURGERY Right 2014       FAMILY HISTORY:  No family history on file.    SOCIAL HISTORY:  Social History     Socioeconomic History     Marital status:      Spouse name: None     Number of children: None     Years of education: None     Highest education level: None   Occupational History     Employer: UNKNOWN   Tobacco Use     Smoking status: Former Smoker     Quit date: 2003     Years since quittin.7     Smokeless tobacco: Current User     Types: Chew   Vaping Use     Vaping Use: Never used   Substance and Sexual Activity     Alcohol use: Yes     Alcohol/week: 0.0 standard drinks     Comment: occ.     Drug use: No     Sexual activity: Yes     Partners: Female   Other Topics Concern     Parent/sibling w/ CABG, MI or angioplasty before 65F 55M? Not Asked   Social History Narrative     None     Social Determinants of Health  "    Financial Resource Strain: Not on file   Food Insecurity: Not on file   Transportation Needs: Not on file   Physical Activity: Not on file   Stress: Not on file   Social Connections: Not on file   Intimate Partner Violence: Not on file   Housing Stability: Not on file       Review of Systems:  Skin:        Eyes:       ENT:       Respiratory:  Positive for dyspnea on exertion  Cardiovascular:  Negative;palpitations;chest pain;edema Positive for;syncope or near-syncope;lightheadedness;dizziness  Gastroenterology:      Genitourinary:       Musculoskeletal:       Neurologic:  Positive for numbness or tingling of hands  Psychiatric:       Heme/Lymph/Imm:       Endocrine:         Physical Exam:  Vitals: /78 (BP Location: Left arm, Patient Position: Sitting, Cuff Size: Adult Regular)   Pulse 82   Ht 1.702 m (5' 7\")   Wt 74.5 kg (164 lb 3.2 oz)   SpO2 100%   BMI 25.72 kg/m     Please refer to dictation for physical exam    Recent Lab Results: all reviewed today  CBC RESULTS:  Lab Results   Component Value Date    WBC 9.1 12/09/2021    WBC 7.1 08/05/2020    RBC 4.58 12/09/2021    RBC 4.79 08/05/2020    HGB 14.9 12/09/2021    HGB 14.8 08/05/2020    HCT 44.2 12/09/2021    HCT 45.1 08/05/2020    MCV 97 12/09/2021    MCV 94 08/05/2020    MCH 32.5 12/09/2021    MCH 30.9 08/05/2020    MCHC 33.7 12/09/2021    MCHC 32.8 08/05/2020    RDW 13.5 12/09/2021    RDW 13.5 08/05/2020     12/09/2021     08/05/2020       BMP RESULTS:  Lab Results   Component Value Date     (L) 12/14/2021     08/05/2020    POTASSIUM 5.2 12/14/2021    POTASSIUM 3.8 08/05/2020    CHLORIDE 98 12/14/2021    CHLORIDE 105 08/05/2020    CO2 29 12/14/2021    CO2 30 08/05/2020    ANIONGAP 4 12/14/2021    ANIONGAP 3 08/05/2020     (H) 12/14/2021    GLC 77 08/06/2020    BUN 22 12/14/2021    BUN 14 08/05/2020    CR 1.21 12/14/2021    CR 1.12 08/05/2020    GFRESTIMATED 74 12/14/2021    GFRESTIMATED 82 08/05/2020    " GFRESTBLACK >90 08/05/2020    SHAYNA 9.8 12/14/2021    SHAYNA 9.3 08/05/2020        INR RESULTS:  Lab Results   Component Value Date    INR 0.92 12/06/2021           CC  Joanna Camacho, APRN CNP  [address]  [place] [address]        Service Date: 01/04/2022    PRIMARY CARDIOLOGIST:  Dr. Irizarry.    REASON FOR VISIT:  C.O.R.E. Clinic followup.    HISTORY OF PRESENT ILLNESS:  Mr. Swartz is a delightful 41-year-old gentleman, who had past medical history significant for tobacco use, alcohol abuse and right BKA, secondary to a farm accident in 2014, when he had presented to clinic with progressive shortness of breath to Dr. Irizarry and was found to have an EF of 15%-20%.  He was admitted to the hospital and underwent coronary angiogram, which showed normal coronaries, and started on medical therapy.  He also has a history of hypertension and hyperlipidemia.    He was initially seen by Ms. Camacho in followup for C.O.R.E. enrollment and is now following at [place].  He continues to get better and his cough is improving, although he is not back to himself.  He is wearing his LifeVest most of the time, but not at night.  If he does heavy exertion, like carrying paint or wood or shoveling, it alerts and he has to stop.  He has not been wearing it at night because it is uncomfortable.  He has had home blood pressures in the 80s or 90s occasionally, and he was lightheaded and dizzy with these.  More recently, they are in the 110s-100s, and he has not had lightheadedness and dizziness.  He occasionally has chest discomfort and it sometimes feels like heartburn and it sometimes feels like a pulled muscle.  He has had this long term and that is improving.  He was on Prilosec before and it did not seem to help.  He has not been taking Lasix.  He is only taking a half a pill as needed, and this he last took on New Year's Day as his weight was up 2 pounds to 152.  Otherwise, his weights have been between 147 at 150 pounds at  home.    SOCIAL HISTORY:  He was previously drinking a significant amount, a liter of vodka every couple days, beer 4-5 days.  He was a heavy drinker in his 20 years, quit drinking for 8 years in his 20s and then restarted.  He has 2 children, 7 and 5.  He is , but he is in a stressful position with his wife and they have considered divorce recently.  He continues to smoke.  He has cut his alcohol back to about 4 a week, with the exception of New Year's.    PHYSICAL EXAMINATION:    GENERAL:  Well-developed, well-nourished gentleman in no acute distress.  HEENT:  Normocephalic, atraumatic.  HEART:  Regular in rate and rhythm.  I do not appreciate murmur, rub or gallop.  LUNGS:  Clear, without wheezes, rales, or rhonchi.  EXTREMITIES:  Without peripheral edema.  Right BKA prosthetic noted.  SKIN:  Warm and dry.  NECK:  The neck veins are flat at 30 degrees.    STUDIES REVIEWED:  Include echocardiogram dated 12/02/2021 showing an EF of 15%-20% with severe global hypokinesis, with RV dysfunction noted as well as borderline biatrial dilatation.  No valvular disease.    Angiogram dated 12/09/2021 shows normal coronary arteries.    EKG dated 12/06/2021 showing sinus with frequent PACs, LVH with a QRS of 132 with left bundle branch block.    ASSESSMENT AND PLAN:  1.  Recent diagnosis of biventricular heart failure with an EF of 15%-20%, nonischemic, with likely cause being alcohol.  The patient is motivated to change and, fortunately, his symptoms are improving, and he is more of a class IIIA symptoms.  Unfortunately, he has had a little bit of hypotension, so we are going to have to be very gentle with up-titration of his medications.  At this point, we are going to increase his Entresto from 12/13 mg b.i.d. to 12/13 in the morning and a full pill at night.  If after 1 week, he continues to feel well, we will increase it to 24/26 mg b.i.d.  We may need to go slow, but we discussed how important it is to further  optimize medications.  He will otherwise remain on carvedilol and take Lasix just on a p.r.n. basis.  He has a LifeVest in place, but he is not wearing it consistently.  I told him the risk of sudden death is high and I would like him to wear it all day if possible.  He will consider.  We also had a nice conversation about life expectancy.  He has done some research on his own and recognized his life expectancy is not good at 5 years.  We went over the [place] Heart Failure Model today noting that his baseline survival is about 61% for 5 years, but with intervention, we can increase that to 91%.  He is motivated to take medications and improve things.  We also discussed stopping alcohol completely and he is willing to do that.  2.  Hypertension, now with hypotension.  3.  Hyperlipidemia, on Lipitor 40.  4.  Tobacco abuse, deferred for today.  5.  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  Home weights are without his prosthetic.  Prior to his admissions, he had severe swelling in his stump; this is the primary site of his swelling, so we will have to keep an eye on that going forward.    Given he is wearing a LifeVest, we will reassess his EF at the exactly 3 month marv, which would be .  I would like to do a cardiac MRI at that time and also get him set up for early EP followup.  If his EF has improved past 35%, we certainly can cancel the EP visit.  I will otherwise see him back in 3 weeks with a BMP today and again before that visit, call sooner with concerns.    Thank you for allowing me to participate in this delightful patient's care.      Ivory Serrano PA-C        D: 2022   T: 2022   MT: al    Name:     SHASHANK TREADWELL  MRN:      [id number]        Account:      [id number]   :      1980           Service Date: 2022       Document: [id number]

## 2022-01-04 NOTE — LETTER
1/4/2022    Jason García MD  [address] [facility] [address]    RE: Dipak Swartz       Dear Colleague,     I had the pleasure of seeing Dipak Swartz in the [facility].  [id number]      HPI and Plan:   See dictation    Orders this Visit:  Orders Placed This Encounter   Procedures     Basic metabolic panel     Follow-Up with Electrophysiologist     Follow-Up with [facility] [place] visit     Orders Placed This Encounter   Medications     dapagliflozin (FARXIGA) 5 MG TABS tablet     Sig: Take 5 mg by mouth daily     DISCONTD: sacubitril-valsartan (ENTRESTO) 24-26 MG per tablet     Sig: Take 1 tablet by mouth 2 times daily     furosemide (LASIX) 20 MG tablet     Sig: Take 1 tablet (20 mg) by mouth daily as needed (wt gain, swelling)     Dispense:  90 tablet     Refill:  3     sacubitril-valsartan (ENTRESTO) 24-26 MG per tablet     Sig: Take 1 tablet by mouth 2 times daily     Dispense:  60 tablet     Refill:  3     Medications Discontinued During This Encounter   Medication Reason     lisinopril (ZESTRIL) 5 MG tablet      furosemide (LASIX) 20 MG tablet      sacubitril-valsartan (ENTRESTO) 24-26 MG per tablet          Encounter Diagnosis   Name Primary?     HFrEF (heart failure with reduced ejection fraction) (H)        CURRENT MEDICATIONS:  Current Outpatient Medications   Medication Sig Dispense Refill     aspirin (ASA) 81 MG EC tablet Take 1 tablet (81 mg) by mouth daily 30 tablet 3     atorvastatin (LIPITOR) 40 MG tablet Take 1 tablet (40 mg) by mouth every evening 90 tablet 3     carvedilol (COREG) 3.125 MG tablet Take 1 tablet (3.125 mg) by mouth 2 times daily (with meals) 180 tablet 3     dapagliflozin (FARXIGA) 5 MG TABS tablet Take 5 mg by mouth daily       furosemide (LASIX) 20 MG tablet Take 1 tablet (20 mg) by mouth daily as needed (wt gain, swelling) 90 tablet 3     Multiple Vitamins-Minerals (MENS MULTIVITAMIN) TABS Take 1 tablet by mouth daily       nicotine (NICODERM CQ)  14 MG/24HR 24 hr patch Place 1 patch onto the skin daily as needed for smoking cessation 30 patch 0     order for DME Equipment being ordered: right lower extremity prosthetic     [employer]  [phone] 1 each 0     sacubitril-valsartan (ENTRESTO) 24-26 MG per tablet Take 1 tablet by mouth 2 times daily 60 tablet 3     zolpidem ER (AMBIEN CR) 6.25 MG CR tablet Take 1 tablet (6.25 mg) by mouth nightly as needed for sleep 30 tablet 5       ALLERGIES     Allergies   Allergen Reactions     No Known Allergies        PAST MEDICAL HISTORY:  Past Medical History:   Diagnosis Date     Accident on farm 2014       PAST SURGICAL HISTORY:  Past Surgical History:   Procedure Laterality Date     AMPUTATION BELOW KNEE RT/LT Right 2014     Broken Right arm       CV HEART CATHETERIZATION WITH POSSIBLE INTERVENTION N/A 2021    Procedure: Heart Catheterization with Possible Intervention;  Surgeon: Leonard Granados MD;  Location:  HEART CARDIAC CATH LAB     LEG SURGERY Right 2014       FAMILY HISTORY:  No family history on file.    SOCIAL HISTORY:  Social History     Socioeconomic History     Marital status:      Spouse name: None     Number of children: None     Years of education: None     Highest education level: None   Occupational History     Employer: UNKNOWN   Tobacco Use     Smoking status: Former Smoker     Quit date: 2003     Years since quittin.7     Smokeless tobacco: Current User     Types: Chew   Vaping Use     Vaping Use: Never used   Substance and Sexual Activity     Alcohol use: Yes     Alcohol/week: 0.0 standard drinks     Comment: occ.     Drug use: No     Sexual activity: Yes     Partners: Female   Other Topics Concern     Parent/sibling w/ CABG, MI or angioplasty before 65F 55M? Not Asked   Social History Narrative     None     Social Determinants of Health     Financial Resource Strain: Not on file   Food Insecurity: Not on file   Transportation Needs: Not on file  "  Physical Activity: Not on file   Stress: Not on file   Social Connections: Not on file   Intimate Partner Violence: Not on file   Housing Stability: Not on file       Review of Systems:  Skin:        Eyes:       ENT:       Respiratory:  Positive for dyspnea on exertion  Cardiovascular:  Negative;palpitations;chest pain;edema Positive for;syncope or near-syncope;lightheadedness;dizziness  Gastroenterology:      Genitourinary:       Musculoskeletal:       Neurologic:  Positive for numbness or tingling of hands  Psychiatric:       Heme/Lymph/Imm:       Endocrine:         Physical Exam:  Vitals: /78 (BP Location: Left arm, Patient Position: Sitting, Cuff Size: Adult Regular)   Pulse 82   Ht 1.702 m (5' 7\")   Wt 74.5 kg (164 lb 3.2 oz)   SpO2 100%   BMI 25.72 kg/m     Please refer to dictation for physical exam    Recent Lab Results: all reviewed today  CBC RESULTS:  Lab Results   Component Value Date    WBC 9.1 12/09/2021    WBC 7.1 08/05/2020    RBC 4.58 12/09/2021    RBC 4.79 08/05/2020    HGB 14.9 12/09/2021    HGB 14.8 08/05/2020    HCT 44.2 12/09/2021    HCT 45.1 08/05/2020    MCV 97 12/09/2021    MCV 94 08/05/2020    MCH 32.5 12/09/2021    MCH 30.9 08/05/2020    MCHC 33.7 12/09/2021    MCHC 32.8 08/05/2020    RDW 13.5 12/09/2021    RDW 13.5 08/05/2020     12/09/2021     08/05/2020       BMP RESULTS:  Lab Results   Component Value Date     (L) 12/14/2021     08/05/2020    POTASSIUM 5.2 12/14/2021    POTASSIUM 3.8 08/05/2020    CHLORIDE 98 12/14/2021    CHLORIDE 105 08/05/2020    CO2 29 12/14/2021    CO2 30 08/05/2020    ANIONGAP 4 12/14/2021    ANIONGAP 3 08/05/2020     (H) 12/14/2021    GLC 77 08/06/2020    BUN 22 12/14/2021    BUN 14 08/05/2020    CR 1.21 12/14/2021    CR 1.12 08/05/2020    GFRESTIMATED 74 12/14/2021    GFRESTIMATED 82 08/05/2020    GFRESTBLACK >90 08/05/2020    SHAYNA 9.8 12/14/2021    SHAYNA 9.3 08/05/2020        INR RESULTS:  Lab Results   Component " Value Date    INR 0.92 12/06/2021           CC  Joanna Camacho, APRN CNP  [address]  [place] [address]        Service Date: 01/04/2022    PRIMARY CARDIOLOGIST:  Dr. Irizarry.    REASON FOR VISIT:  C.O.R.E. Clinic followup.    HISTORY OF PRESENT ILLNESS:  Mr. Swartz is a delightful 41-year-old gentleman, who had past medical history significant for tobacco use, alcohol abuse and right BKA, secondary to a farm accident in 2014, when he had presented to clinic with progressive shortness of breath to Dr. Irizarry and was found to have an EF of 15%-20%.  He was admitted to the hospital and underwent coronary angiogram, which showed normal coronaries, and started on medical therapy.  He also has a history of hypertension and hyperlipidemia.    He was initially seen by Ms. Camacho in followup for C.O.R.E. enrollment and is now following at [place].  He continues to get better and his cough is improving, although he is not back to himself.  He is wearing his LifeVest most of the time, but not at night.  If he does heavy exertion, like carrying paint or wood or shoveling, it alerts and he has to stop.  He has not been wearing it at night because it is uncomfortable.  He has had home blood pressures in the 80s or 90s occasionally, and he was lightheaded and dizzy with these.  More recently, they are in the 110s-100s, and he has not had lightheadedness and dizziness.  He occasionally has chest discomfort and it sometimes feels like heartburn and it sometimes feels like a pulled muscle.  He has had this long term and that is improving.  He was on Prilosec before and it did not seem to help.  He has not been taking Lasix.  He is only taking a half a pill as needed, and this he last took on New Year's Day as his weight was up 2 pounds to 152.  Otherwise, his weights have been between 147 at 150 pounds at home.    SOCIAL HISTORY:  He was previously drinking a significant amount, a liter of vodka every couple days, beer  4-5 days.  He was a heavy drinker in his 20 years, quit drinking for 8 years in his 20s and then restarted.  He has 2 children, 7 and 5.  He is , but he is in a stressful position with his wife and they have considered divorce recently.  He continues to smoke.  He has cut his alcohol back to about 4 a week, with the exception of New Year's.    PHYSICAL EXAMINATION:    GENERAL:  Well-developed, well-nourished gentleman in no acute distress.  HEENT:  Normocephalic, atraumatic.  HEART:  Regular in rate and rhythm.  I do not appreciate murmur, rub or gallop.  LUNGS:  Clear, without wheezes, rales, or rhonchi.  EXTREMITIES:  Without peripheral edema.  Right BKA prosthetic noted.  SKIN:  Warm and dry.  NECK:  The neck veins are flat at 30 degrees.    STUDIES REVIEWED:  Include echocardiogram dated 12/02/2021 showing an EF of 15%-20% with severe global hypokinesis, with RV dysfunction noted as well as borderline biatrial dilatation.  No valvular disease.    Angiogram dated 12/09/2021 shows normal coronary arteries.    EKG dated 12/06/2021 showing sinus with frequent PACs, LVH with a QRS of 132 with left bundle branch block.    ASSESSMENT AND PLAN:  1.  Recent diagnosis of biventricular heart failure with an EF of 15%-20%, nonischemic, with likely cause being alcohol.  The patient is motivated to change and, fortunately, his symptoms are improving, and he is more of a class IIIA symptoms.  Unfortunately, he has had a little bit of hypotension, so we are going to have to be very gentle with up-titration of his medications.  At this point, we are going to increase his Entresto from 12/13 mg b.i.d. to 12/13 in the morning and a full pill at night.  If after 1 week, he continues to feel well, we will increase it to 24/26 mg b.i.d.  We may need to go slow, but we discussed how important it is to further optimize medications.  He will otherwise remain on carvedilol and take Lasix just on a p.r.n. basis.  He has a LifeVest  in place, but he is not wearing it consistently.  I told him the risk of sudden death is high and I would like him to wear it all day if possible.  He will consider.  We also had a nice conversation about life expectancy.  He has done some research on his own and recognized his life expectancy is not good at 5 years.  We went over the [place] Heart Failure Model today noting that his baseline survival is about 61% for 5 years, but with intervention, we can increase that to 91%.  He is motivated to take medications and improve things.  We also discussed stopping alcohol completely and he is willing to do that.  2.  Hypertension, now with hypotension.  3.  Hyperlipidemia, on Lipitor 40.  4.  Tobacco abuse, deferred for today.  5.  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  Home weights are without his prosthetic.  Prior to his admissions, he had severe swelling in his stump; this is the primary site of his swelling, so we will have to keep an eye on that going forward.    Given he is wearing a LifeVest, we will reassess his EF at the exactly 3 month marv, which would be .  I would like to do a cardiac MRI at that time and also get him set up for early EP followup.  If his EF has improved past 35%, we certainly can cancel the EP visit.  I will otherwise see him back in 3 weeks with a BMP today and again before that visit, call sooner with concerns.    Thank you for allowing me to participate in this delightful patient's care.    Ivory Serrano PA-C        D: 2022   T: 2022   MT: al    Name:     SHASHANK TREADWELL  MRN:      [id number]        Account:      [id number]   :      1980           Service Date: 2022       Document: [id number]      Thank you for allowing me to participate in the care of your patient.      Sincerely,     Ivory Serrano PA-C     [facility]  cc:   Joanna Camacho, APRN CNP  [address]  [address]  [place] [address]

## 2022-01-04 NOTE — PROGRESS NOTES
Service Date: 01/04/2022    PRIMARY CARDIOLOGIST:  Dr. Irizarry.    REASON FOR VISIT:  [facility] followup.    HISTORY OF PRESENT ILLNESS:  Mr. Swartz is a delightful 41-year-old gentleman, who had past medical history significant for tobacco use, alcohol abuse and right BKA, secondary to a farm accident in 2014, when he had presented to clinic with progressive shortness of breath to Dr. Irizarry and was found to have an EF of 15%-20%.  He was admitted to the hospital and underwent coronary angiogram, which showed normal coronaries, and started on medical therapy.  He also has a history of hypertension and hyperlipidemia.    He was initially seen by Ms. Camacho in followup for C.O.R.E. enrollment and is now following at [facility].  He continues to get better and his cough is improving, although he is not back to himself.  He is wearing his LifeVest most of the time, but not at night.  If he does heavy exertion, like carrying paint or wood or shoveling, it alerts and he has to stop.  He has not been wearing it at night because it is uncomfortable.  He has had home blood pressures in the 80s or 90s occasionally, and he was lightheaded and dizzy with these.  More recently, they are in the 110s-100s, and he has not had lightheadedness and dizziness.  He occasionally has chest discomfort and it sometimes feels like heartburn and it sometimes feels like a pulled muscle.  He has had this long term and that is improving.  He was on Prilosec before and it did not seem to help.  He has not been taking Lasix.  He is only taking a half a pill as needed, and this he last took on New Year's Day as his weight was up 2 pounds to 152.  Otherwise, his weights have been between 147 at 150 pounds at home.    SOCIAL HISTORY:  He was previously drinking a significant amount, a liter of vodka every couple days, beer 4-5 days.  He was a heavy drinker in his 20 years, quit drinking for 8 years in his 20s and then restarted.  He has 2  children, 7 and 5.  He is , but he is in a stressful position with his wife and they have considered divorce recently.  He continues to smoke.  He has cut his alcohol back to about 4 a week, with the exception of New Year's.    PHYSICAL EXAMINATION:    GENERAL:  Well-developed, well-nourished gentleman in no acute distress.  HEENT:  Normocephalic, atraumatic.  HEART:  Regular in rate and rhythm.  I do not appreciate murmur, rub or gallop.  LUNGS:  Clear, without wheezes, rales, or rhonchi.  EXTREMITIES:  Without peripheral edema.  Right BKA prosthetic noted.  SKIN:  Warm and dry.  NECK:  The neck veins are flat at 30 degrees.    STUDIES REVIEWED:  Include echocardiogram dated 12/02/2021 showing an EF of 15%-20% with severe global hypokinesis, with RV dysfunction noted as well as borderline biatrial dilatation.  No valvular disease.    Angiogram dated 12/09/2021 shows normal coronary arteries.    EKG dated 12/06/2021 showing sinus with frequent PACs, LVH with a QRS of 132 with left bundle branch block.    ASSESSMENT AND PLAN:  1.  Recent diagnosis of biventricular heart failure with an EF of 15%-20%, nonischemic, with likely cause being alcohol.  The patient is motivated to change and, fortunately, his symptoms are improving, and he is more of a class IIIA symptoms.  Unfortunately, he has had a little bit of hypotension, so we are going to have to be very gentle with up-titration of his medications.  At this point, we are going to increase his Entresto from 12/13 mg b.i.d. to 12/13 in the morning and a full pill at night.  If after 1 week, he continues to feel well, we will increase it to 24/26 mg b.i.d.  We may need to go slow, but we discussed how important it is to further optimize medications.  He will otherwise remain on carvedilol and take Lasix just on a p.r.n. basis.  He has a LifeVest in place, but he is not wearing it consistently.  I told him the risk of sudden death is high and I would like him to  wear it all day if possible.  He will consider.  We also had a nice conversation about life expectancy.  He has done some research on his own and recognized his life expectancy is not good at 5 years.  We went over the [place] Heart Failure Model today noting that his baseline survival is about 61% for 5 years, but with intervention, we can increase that to 91%.  He is motivated to take medications and improve things.  We also discussed stopping alcohol completely and he is willing to do that.  2.  Hypertension, now with hypotension.  3.  Hyperlipidemia, on Lipitor 40.  4.  Tobacco abuse, deferred for today.  5.  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  Home weights are without his prosthetic.  Prior to his admissions, he had severe swelling in his stump; this is the primary site of his swelling, so we will have to keep an eye on that going forward.    Given he is wearing a LifeVest, we will reassess his EF at the exactly 3 month marv, which would be .  I would like to do a cardiac MRI at that time and also get him set up for early EP followup.  If his EF has improved past 35%, we certainly can cancel the EP visit.  I will otherwise see him back in 3 weeks with a BMP today and again before that visit, call sooner with concerns.    Thank you for allowing me to participate in this delightful patient's care.    Ivory Serrano PA-C        D: 2022   T: 2022   MT: al    Name:     SHASHANK TREADWELL  MRN:      [id number]        Account:      [id number]   :      1980           Service Date: 2022       Document: [id number]

## 2022-01-04 NOTE — PATIENT INSTRUCTIONS
Call [facility] nurse for any questions or concerns Mon-Fri 8am-4pm:                                                #[phone]                                       For concerns after hours:                                               #[phone]     1: Medication changes: increase your Entresto to 1 full pill before bed.  Continue on 1/2 pill in the morning.  IF after a week you're not too lightheaded increase this to a full pill twice a day.      2: Plan from today: labs today and again before you see me in about 3 weeks.   We'll do an MRI of your heart after March 3rd and set you up to see an electrophysiologist.    Quit drinking completely for now.

## 2022-01-11 ENCOUNTER — DOCUMENTATION ONLY (OUTPATIENT)
Dept: CARDIOLOGY | Facility: CLINIC | Age: 42
End: 2022-01-11
Payer: COMMERCIAL

## 2022-01-11 NOTE — PROGRESS NOTES
[facility]    -Received fax from [facility] requesting cardiac clearance for Dipak to have a tooth extracted. Sent return fax with documentation and signature. Copy sent to be scanned into the chart.     Yvette Davison RN on 1/11/2022 at 4:46 PM

## 2022-02-10 ENCOUNTER — OFFICE VISIT (OUTPATIENT)
Dept: CARDIOLOGY | Facility: CLINIC | Age: 42
End: 2022-02-10
Payer: COMMERCIAL

## 2022-02-10 VITALS
BODY MASS INDEX: 26.29 KG/M2 | SYSTOLIC BLOOD PRESSURE: 110 MMHG | DIASTOLIC BLOOD PRESSURE: 82 MMHG | HEART RATE: 74 BPM | OXYGEN SATURATION: 100 % | WEIGHT: 167.5 LBS | HEIGHT: 67 IN

## 2022-02-10 DIAGNOSIS — I50.20 HFREF (HEART FAILURE WITH REDUCED EJECTION FRACTION) (H): ICD-10-CM

## 2022-02-10 LAB
ANION GAP SERPL CALCULATED.3IONS-SCNC: 3 MMOL/L (ref 3–14)
BUN SERPL-MCNC: 14 MG/DL (ref 7–30)
CALCIUM SERPL-MCNC: 9.4 MG/DL (ref 8.5–10.1)
CHLORIDE BLD-SCNC: 106 MMOL/L (ref 94–109)
CO2 SERPL-SCNC: 28 MMOL/L (ref 20–32)
CREAT SERPL-MCNC: 1.06 MG/DL (ref 0.66–1.25)
GFR SERPL CREATININE-BSD FRML MDRD: 90 ML/MIN/1.73M2
GLUCOSE BLD-MCNC: 113 MG/DL (ref 70–99)
POTASSIUM BLD-SCNC: 4.6 MMOL/L (ref 3.4–5.3)
SODIUM SERPL-SCNC: 137 MMOL/L (ref 133–144)

## 2022-02-10 PROCEDURE — 80048 BASIC METABOLIC PNL TOTAL CA: CPT | Performed by: PHYSICIAN ASSISTANT

## 2022-02-10 PROCEDURE — 99214 OFFICE O/P EST MOD 30 MIN: CPT | Performed by: PHYSICIAN ASSISTANT

## 2022-02-10 PROCEDURE — 36415 COLL VENOUS BLD VENIPUNCTURE: CPT | Performed by: PHYSICIAN ASSISTANT

## 2022-02-10 RX ORDER — DAPAGLIFLOZIN 5 MG/1
5 TABLET, FILM COATED ORAL DAILY
Qty: 90 TABLET | Refills: 3 | Status: SHIPPED | OUTPATIENT
Start: 2022-02-10 | End: 2022-12-21

## 2022-02-10 RX ORDER — SPIRONOLACTONE 25 MG/1
12.5 TABLET ORAL DAILY
Qty: 45 TABLET | Refills: 3 | Status: SHIPPED | OUTPATIENT
Start: 2022-02-10 | End: 2022-04-13 | Stop reason: DRUGHIGH

## 2022-02-10 RX ORDER — METOPROLOL SUCCINATE 25 MG/1
25 TABLET, EXTENDED RELEASE ORAL AT BEDTIME
Qty: 30 TABLET | Refills: 11 | Status: SHIPPED | OUTPATIENT
Start: 2022-02-10 | End: 2022-03-17

## 2022-02-10 ASSESSMENT — MIFFLIN-ST. JEOR: SCORE: 1623.41

## 2022-02-10 NOTE — PROGRESS NOTES
[id number]      HPI and Plan:   See dictation    Orders this Visit:  Orders Placed This Encounter   Procedures     Hemoglobin A1c     Basic metabolic panel     Basic metabolic panel     Orders Placed This Encounter   Medications     metoprolol succinate ER (TOPROL XL) 25 MG 24 hr tablet     Sig: Take 1 tablet (25 mg) by mouth At Bedtime     Dispense:  30 tablet     Refill:  11     spironolactone (ALDACTONE) 25 MG tablet     Sig: Take 0.5 tablets (12.5 mg) by mouth daily     Dispense:  45 tablet     Refill:  3     dapagliflozin (FARXIGA) 5 MG TABS tablet     Sig: Take 1 tablet (5 mg) by mouth daily     Dispense:  90 tablet     Refill:  3     sacubitril-valsartan (ENTRESTO) 24-26 MG per tablet     Sig: Take 1 in the morning and 2 at night.     Dispense:  90 tablet     Refill:  3     Medications Discontinued During This Encounter   Medication Reason     atorvastatin (LIPITOR) 40 MG tablet      carvedilol (COREG) 3.125 MG tablet      dapagliflozin (FARXIGA) 5 MG TABS tablet      sacubitril-valsartan (ENTRESTO) 24-26 MG per tablet          Encounter Diagnosis   Name Primary?     HFrEF (heart failure with reduced ejection fraction) (H)        CURRENT MEDICATIONS:  Current Outpatient Medications   Medication Sig Dispense Refill     aspirin (ASA) 81 MG EC tablet Take 1 tablet (81 mg) by mouth daily 30 tablet 3     dapagliflozin (FARXIGA) 5 MG TABS tablet Take 1 tablet (5 mg) by mouth daily 90 tablet 3     furosemide (LASIX) 20 MG tablet Take 1 tablet (20 mg) by mouth daily as needed (wt gain, swelling) 90 tablet 3     metoprolol succinate ER (TOPROL XL) 25 MG 24 hr tablet Take 1 tablet (25 mg) by mouth At Bedtime 30 tablet 11     Multiple Vitamins-Minerals (MENS MULTIVITAMIN) TABS Take 1 tablet by mouth daily       sacubitril-valsartan (ENTRESTO) 24-26 MG per tablet Take 1 in the morning and 2 at night. 90 tablet 3     spironolactone (ALDACTONE) 25 MG tablet Take 0.5 tablets (12.5 mg) by mouth daily 45 tablet 3     zolpidem ER  (AMBIEN CR) 6.25 MG CR tablet Take 1 tablet (6.25 mg) by mouth nightly as needed for sleep 30 tablet 5     nicotine (NICODERM CQ) 14 MG/24HR 24 hr patch Place 1 patch onto the skin daily as needed for smoking cessation (Patient not taking: Reported on 2/10/2022) 30 patch 0     order for DME Equipment being ordered: right lower extremity prosthetic     [employer] [facility]  [phone] 1 each 0       ALLERGIES     Allergies   Allergen Reactions     No Known Allergies        PAST MEDICAL HISTORY:  Past Medical History:   Diagnosis Date     Accident on farm 2014       PAST SURGICAL HISTORY:  Past Surgical History:   Procedure Laterality Date     AMPUTATION BELOW KNEE RT/LT Right 2014     Broken Right arm       CV HEART CATHETERIZATION WITH POSSIBLE INTERVENTION N/A 2021    Procedure: Heart Catheterization with Possible Intervention;  Surgeon: Leonard Granados MD;  Location:  HEART CARDIAC CATH LAB     LEG SURGERY Right 2014       FAMILY HISTORY:  No family history on file.    SOCIAL HISTORY:  Social History     Socioeconomic History     Marital status:      Spouse name: Not on file     Number of children: Not on file     Years of education: Not on file     Highest education level: Not on file   Occupational History     Employer: UNKNOWN   Tobacco Use     Smoking status: Former Smoker     Quit date: 2003     Years since quittin.8     Smokeless tobacco: Current User     Types: Chew   Vaping Use     Vaping Use: Never used   Substance and Sexual Activity     Alcohol use: Yes     Alcohol/week: 0.0 standard drinks     Comment: occ.     Drug use: No     Sexual activity: Yes     Partners: Female   Other Topics Concern     Parent/sibling w/ CABG, MI or angioplasty before 65F 55M? Not Asked   Social History Narrative     Not on file     Social Determinants of Health     Financial Resource Strain: Not on file   Food Insecurity: Not on file   Transportation Needs: Not on file   Physical  "Activity: Not on file   Stress: Not on file   Social Connections: Not on file   Intimate Partner Violence: Not on file   Housing Stability: Not on file       Review of Systems:  Skin:  Negative     Eyes:  Negative    ENT:  Negative    Respiratory:  Positive for dyspnea on exertion  Cardiovascular:  Negative for;palpitations;edema Positive for;chest pain;lightheadedness;dizziness;fatigue  Gastroenterology: Negative    Genitourinary:  Negative    Musculoskeletal:  Negative    Neurologic:  Positive for headaches;numbness or tingling of hands;numbness or tingling of feet  Psychiatric:  Positive for sleep disturbances  Heme/Lymph/Imm:  Negative    Endocrine:  Negative      Physical Exam:  Vitals: /82 (BP Location: Right arm, Patient Position: Sitting, Cuff Size: Adult Regular)   Pulse 74   Ht 1.702 m (5' 7\")   Wt 76 kg (167 lb 8 oz)   SpO2 100%   BMI 26.23 kg/m     Please refer to dictation for physical exam    Recent Lab Results: all reviewed today  CBC RESULTS:  Lab Results   Component Value Date    WBC 9.1 12/09/2021    WBC 7.1 08/05/2020    RBC 4.58 12/09/2021    RBC 4.79 08/05/2020    HGB 14.9 12/09/2021    HGB 14.8 08/05/2020    HCT 44.2 12/09/2021    HCT 45.1 08/05/2020    MCV 97 12/09/2021    MCV 94 08/05/2020    MCH 32.5 12/09/2021    MCH 30.9 08/05/2020    MCHC 33.7 12/09/2021    MCHC 32.8 08/05/2020    RDW 13.5 12/09/2021    RDW 13.5 08/05/2020     12/09/2021     08/05/2020       BMP RESULTS:  Lab Results   Component Value Date     02/10/2022     08/05/2020    POTASSIUM 4.6 02/10/2022    POTASSIUM 3.8 08/05/2020    CHLORIDE 106 02/10/2022    CHLORIDE 105 08/05/2020    CO2 28 02/10/2022    CO2 30 08/05/2020    ANIONGAP 3 02/10/2022    ANIONGAP 3 08/05/2020     (H) 02/10/2022    GLC 77 08/06/2020    BUN 14 02/10/2022    BUN 14 08/05/2020    CR 1.06 02/10/2022    CR 1.12 08/05/2020    GFRESTIMATED 90 02/10/2022    GFRESTIMATED 82 08/05/2020    GFRESTBLACK >90 08/05/2020 "    SHAYNA 9.4 02/10/2022    SHAYNA 9.3 08/05/2020        INR RESULTS:  Lab Results   Component Value Date    INR 0.92 12/06/2021           CC  No referring provider defined for this encounter.

## 2022-02-10 NOTE — PROGRESS NOTES
Service Date: 02/10/2022    PRIMARY CARDIOLOGIST:  Roger Irizarry MD    REASON FOR VISIT:  [facility] Clinic followup.    HISTORY OF PRESENT ILLNESS:  Mr. 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  He was admitted to the hospital and underwent a coronary angiogram that showed normal coronaries and started on medical therapy.  Additionally, he had past medical history significant for hypertension, hyperlipidemia.  He was discharged on LifeVest.    I saw him back and we adjusted medications.  I learned today that he is also seeing a cardiologist at [facility] who has been adjusting his medications.  She has recommended adding spironolactone and increasing the Entresto.  He has increased the Entresto, but he has not yet added spironolactone.  In addition, he has been off Farxiga as he ran out.    Today he comes in saying that he is still fairly lightheaded and dizzy.  It is worse with standing, but it can also happen when he is sitting.  His vision is also fuzzy with close up.  He denies orthopnea or PND, but his energy is low and he is dizzy off and on all day, many times a day.  He gets winded going up stairs and also will get winded if he had to carry something up from the basement.  This is improving slowly, but certainly is not normal for him.  He also notes he gets short of breath with eating.    SOCIAL HISTORY:  He comes in today with his wife, Natty.  They have 2 children, 7 and 5.  Last visit, he noted to me that they have a stressful relationship and they have considered divorce.  He was a heavy drinker of about 4-5 beers a day plus a liter of vodka every couple of days.  He has cut back and did not drink most times, but did drink while he was on vacation in [place]  recently.  He also had a couple of beers for another special occasion.  He continues to smoke.  No street drugs.    PHYSICAL EXAMINATION:    GENERAL:  Well-developed, well-nourished gentleman in no acute distress.  HEENT:  Normocephalic, atraumatic.  HEART:  Regular in rate and rhythm.  I do not appreciate murmur, rub or gallop.  LUNGS:  Clear, without wheezes, rales, or rhonchi.  EXTREMITIES:  With 1+ edema at his stump, his right BKA.  SKIN:  Warm and dry.  NECK:  The neck veins are flat at 30 degrees.    Echocardiogram reviewed dated 12/02/2021 shows an EF of 15%-20% with biventricular failure and moderate severe RV dysfunction, no valvular disease.    Cardiac angiogram on 12/09/2021 shows normal coronary arteries.    EKG done 12/08/2021 and shows a sinus rhythm with a QRS of 132.  Incomplete left bundle.    ASSESSMENT AND PLAN:    1.  Severe cardiomyopathy with biventricular heart failure, EF 15%-20%, nonischemic, likely secondary to alcohol.  He is doing better with alcohol, but continues to have class III symptoms with fairly minimal exertion for his age,like climbing up stairs or carrying something.  In addition, he has had some lightheadedness, dizziness and fuzzy vision, although he is not hypotensive here.  I suspect is the combination of all the medications we have added on.  We are going to try to simplify things and maximize his medications as best possible.  We will start by discontinuing his atorvastatin.  Although his cholesterol is high, he has normal coronaries and we can defer that for the moment, while we optimize other medications.  I am going to stop carvedilol and start him on 25 mg of Toprol at bedtime.  Will also start the 12.5 mg of spironolactone that was prescribed by the physician at [facility], but stay on the Lasix 20 mg only as needed.  I suspect this will help with the slight amount of volume he is carrying demonstrated by the swelling in his stump.  In addition, he will  restart his Farxiga that he was off prior.  He will maintain the same dose of Entresto, which is one in the morning and 2 at night for 2426 am 49/51 at night.  His home weight is about 152.  I suspect his dry weight is close to 147 and went up after his vacation.  We will see how things change with adjustment of medications.  From a rhythm standpoint, he is wearing a LifeVest and this alarms frequently.  Often when it alarms, he feels fine.  Occasionally, when it alarms, he feels lightheaded and dizzy.  I am going to have my team reach out to the LifeVest people and see if we can get a report on anything that has been going on.  2.  Hypertension, well controlled.  3.  Hyperlipidemia, taken off Lipitor for now.  4.  Tobacco use.  Recommend cessation, but alcohol is more important at this time.  5.  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  Home weights are without prosthetic.    He has an MRI scheduled for next week.  If he has an EF higher than 35%, we will be able to discontinue the LifeVest and actually cancel his visit with Dr. Ellsworth.  If it remains below 35%, we will keep the visit with Dr. Ellsworth and he will be a candidate for an ICD.  We will get a BMP next week and again when he sees Dr. Ellsworth.  I will see him back in about a month or so just to continue optimizing medications.  He will call if anything worsens.      Thank you for allowing me to participate in this delightful patient's care.    Ivory Serrano PA-C        D: 02/10/2022   T: 02/10/2022   MT: ARLET    Name:     SHASHANK TREADWELL  MRN:      [id number]        Account:      [id number]   :      1980           Service Date: 02/10/2022       Document: [id number]

## 2022-02-10 NOTE — RESULT ENCOUNTER NOTE
Will review or did review during clinic visit.  Please see progress note for plan.  Ivory Serrano PA-C 2/10/2022 1:49 PM

## 2022-02-10 NOTE — PATIENT INSTRUCTIONS
Call [facility] nurse for any questions or concerns Mon-Fri 8am-4pm:                                                #[phone]                                       For concerns after hours:                                               #[phone]     1: Medication changes: Stop taking Coreg/ carvedilol.   Start taking Toprol XL/ metoprolol succinate 25 mg before bed.   Stop taking atorvastatin.    Start taking the spironolactone 12.5 one half pill once a day in the morning.   Try a 1/2 ambien to sleep.     Restart the farxiga.    Continue other medications.    We'll call the lifevest company and see if there's anything going on.      2: Plan from today: we'll do the MRI, have you see Dr. Ellsworth and then I'll see you back in about 1 months.     3: Lab results: see attached; labs look great.    Component      Latest Ref Rng & Units 2/10/2022   Sodium      133 - 144 mmol/L 137   Potassium      3.4 - 5.3 mmol/L 4.6   Chloride      94 - 109 mmol/L 106   Carbon Dioxide      20 - 32 mmol/L 28   Anion Gap      3 - 14 mmol/L 3   Urea Nitrogen      7 - 30 mg/dL 14   Creatinine      0.66 - 1.25 mg/dL 1.06   Calcium      8.5 - 10.1 mg/dL 9.4   Glucose      70 - 99 mg/dL 113 (H)   GFR Estimate      >60 mL/min/1.73m2 90
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
## 2022-02-11 ENCOUNTER — DOCUMENTATION ONLY (OUTPATIENT)
Dept: CARDIOLOGY | Facility: CLINIC | Age: 42
End: 2022-02-11
Payer: COMMERCIAL

## 2022-02-11 NOTE — PROGRESS NOTES
[facility]    -Spoke with Aristeo Adames requesting a copy of any abnormal rhythm strips from Jorge L's LifeVest.    Yvette Davison RN on 2/11/2022 at 9:47 AM

## 2022-02-11 NOTE — PROGRESS NOTES
[facility]    -Received fax from [employer].  Copy sent to scanning and copy placed on GAURAV Rodriguez's desk. Rhythm strips reviewed nothing requiring intervention.     Yvette Davison RN on 2/11/2022 at 11:48 AM

## 2022-02-14 ENCOUNTER — TELEPHONE (OUTPATIENT)
Dept: CARDIOLOGY | Facility: CLINIC | Age: 42
End: 2022-02-14
Payer: COMMERCIAL

## 2022-02-14 NOTE — TELEPHONE ENCOUNTER
Greatly appreciate team getting information from [employer].  Pt had noted that it's alarming frequently.  Reviewed briefly with Dr. Lynch, who notes that it appears to be more likely afib than VT.  Given he can disable suggestion to change CT threshold to 180 bpm to avoid inappropriate shocks.  Pt getting MRI here on Thursday- I'm not sure what it take for the rep to change settings.  Pls reach out to [employer] and see our options.    Ivory Serrano PA-C 2/14/2022 2:45 PM

## 2022-02-15 NOTE — TELEPHONE ENCOUNTER
[facility]    -Spoke with Rosangela at [facility] who requests that we contact Tech Support due to a change in the prescription.  She is also reaching out to a fitting technician to see if they are able to meet Jorge L at the [facility] during his [facility] or if they can do an in home fitting to make sure his vest is fitting him properly.    -Spoke with Miguel at [facility] Tech Support who will initiate the change remotely to madera the VT Threshold to 180 bpm.  If they are unable to do this remotely they will arrange a tech person to meet with the pt directly to make this change.     Yvette Davison RN on 2/15/2022 at 9:00 AM

## 2022-02-16 ENCOUNTER — LAB (OUTPATIENT)
Dept: LAB | Facility: CLINIC | Age: 42
End: 2022-02-16
Payer: COMMERCIAL

## 2022-02-16 DIAGNOSIS — I50.20 HFREF (HEART FAILURE WITH REDUCED EJECTION FRACTION) (H): ICD-10-CM

## 2022-02-16 LAB
ANION GAP SERPL CALCULATED.3IONS-SCNC: 2 MMOL/L (ref 3–14)
BUN SERPL-MCNC: 15 MG/DL (ref 7–30)
CALCIUM SERPL-MCNC: 9.9 MG/DL (ref 8.5–10.1)
CHLORIDE BLD-SCNC: 105 MMOL/L (ref 94–109)
CO2 SERPL-SCNC: 30 MMOL/L (ref 20–32)
CREAT SERPL-MCNC: 1.16 MG/DL (ref 0.66–1.25)
GFR SERPL CREATININE-BSD FRML MDRD: 81 ML/MIN/1.73M2
GLUCOSE BLD-MCNC: 105 MG/DL (ref 70–99)
HBA1C MFR BLD: 5.7 % (ref 0–5.6)
POTASSIUM BLD-SCNC: 4.4 MMOL/L (ref 3.4–5.3)
SODIUM SERPL-SCNC: 137 MMOL/L (ref 133–144)

## 2022-02-16 PROCEDURE — 83036 HEMOGLOBIN GLYCOSYLATED A1C: CPT | Performed by: PHYSICIAN ASSISTANT

## 2022-02-16 PROCEDURE — 36415 COLL VENOUS BLD VENIPUNCTURE: CPT

## 2022-02-16 PROCEDURE — 80048 BASIC METABOLIC PNL TOTAL CA: CPT | Performed by: PHYSICIAN ASSISTANT

## 2022-02-17 ENCOUNTER — HOSPITAL ENCOUNTER (OUTPATIENT)
Dept: MRI IMAGING | Facility: CLINIC | Age: 42
Discharge: HOME | End: 2022-02-17
Attending: PHYSICIAN ASSISTANT | Admitting: PHYSICIAN ASSISTANT
Payer: COMMERCIAL

## 2022-02-17 DIAGNOSIS — I50.20 HFREF (HEART FAILURE WITH REDUCED EJECTION FRACTION) (H): ICD-10-CM

## 2022-02-17 PROCEDURE — 75561 CARDIAC MRI FOR MORPH W/DYE: CPT

## 2022-02-17 PROCEDURE — A9585 GADOBUTROL INJECTION: HCPCS | Performed by: PHYSICIAN ASSISTANT

## 2022-02-17 PROCEDURE — 75561 CARDIAC MRI FOR MORPH W/DYE: CPT | Mod: 26 | Performed by: INTERNAL MEDICINE

## 2022-02-17 PROCEDURE — 255N000002 HC RX 255 OP 636: Performed by: PHYSICIAN ASSISTANT

## 2022-02-17 RX ORDER — GADOBUTROL 604.72 MG/ML
10 INJECTION INTRAVENOUS ONCE
Status: COMPLETED | OUTPATIENT
Start: 2022-02-17 | End: 2022-02-17

## 2022-02-17 RX ADMIN — GADOBUTROL 10 ML: 604.72 INJECTION INTRAVENOUS at 08:51

## 2022-03-10 ENCOUNTER — OFFICE VISIT (OUTPATIENT)
Dept: CARDIOLOGY | Facility: CLINIC | Age: 42
End: 2022-03-10
Payer: COMMERCIAL

## 2022-03-10 ENCOUNTER — HOSPITAL ENCOUNTER (OUTPATIENT)
Dept: CARDIOLOGY | Facility: CLINIC | Age: 42
Discharge: HOME | End: 2022-03-10
Attending: INTERNAL MEDICINE | Admitting: INTERNAL MEDICINE
Payer: COMMERCIAL

## 2022-03-10 VITALS
HEART RATE: 80 BPM | SYSTOLIC BLOOD PRESSURE: 112 MMHG | WEIGHT: 168.3 LBS | HEIGHT: 67 IN | DIASTOLIC BLOOD PRESSURE: 62 MMHG | BODY MASS INDEX: 26.42 KG/M2 | OXYGEN SATURATION: 100 %

## 2022-03-10 DIAGNOSIS — I50.20 HFREF (HEART FAILURE WITH REDUCED EJECTION FRACTION) (H): ICD-10-CM

## 2022-03-10 DIAGNOSIS — I42.8 NONISCHEMIC CARDIOMYOPATHY (H): ICD-10-CM

## 2022-03-10 DIAGNOSIS — I42.8 NONISCHEMIC CARDIOMYOPATHY (H): Primary | ICD-10-CM

## 2022-03-10 LAB
3D LVEF ECHO: NORMAL
LVEF ECHO: NORMAL

## 2022-03-10 PROCEDURE — 93306 TTE W/DOPPLER COMPLETE: CPT

## 2022-03-10 PROCEDURE — 99214 OFFICE O/P EST MOD 30 MIN: CPT | Performed by: INTERNAL MEDICINE

## 2022-03-10 PROCEDURE — 93321 DOPPLER ECHO F-UP/LMTD STD: CPT | Mod: 26 | Performed by: INTERNAL MEDICINE

## 2022-03-10 PROCEDURE — 93308 TTE F-UP OR LMTD: CPT

## 2022-03-10 PROCEDURE — 93325 DOPPLER ECHO COLOR FLOW MAPG: CPT

## 2022-03-10 PROCEDURE — 93308 TTE F-UP OR LMTD: CPT | Mod: 26 | Performed by: INTERNAL MEDICINE

## 2022-03-10 PROCEDURE — 93325 DOPPLER ECHO COLOR FLOW MAPG: CPT | Mod: 26 | Performed by: INTERNAL MEDICINE

## 2022-03-10 NOTE — PROGRESS NOTES
HPI and Plan:   See dictation      Orders Placed This Encounter   Procedures     Case Request EP: EP Insert ICD       No orders of the defined types were placed in this encounter.      Medications Discontinued During This Encounter   Medication Reason     nicotine (NICODERM CQ) 14 MG/24HR 24 hr patch      aspirin (ASA) 81 MG EC tablet          Encounter Diagnoses   Name Primary?     HFrEF (heart failure with reduced ejection fraction) (H)      Nonischemic cardiomyopathy (H) Yes       CURRENT MEDICATIONS:  Current Outpatient Medications   Medication Sig Dispense Refill     dapagliflozin (FARXIGA) 5 MG TABS tablet Take 1 tablet (5 mg) by mouth daily 90 tablet 3     furosemide (LASIX) 20 MG tablet Take 1 tablet (20 mg) by mouth daily as needed (wt gain, swelling) 90 tablet 3     metoprolol succinate ER (TOPROL XL) 25 MG 24 hr tablet Take 1 tablet (25 mg) by mouth At Bedtime 30 tablet 11     Multiple Vitamins-Minerals (MENS MULTIVITAMIN) TABS Take 1 tablet by mouth daily       order for DME Equipment being ordered: right lower extremity prosthetic     [employer]  [phone] 1 each 0     sacubitril-valsartan (ENTRESTO) 24-26 MG per tablet Take 1 in the morning and 2 at night. 90 tablet 3     spironolactone (ALDACTONE) 25 MG tablet Take 0.5 tablets (12.5 mg) by mouth daily 45 tablet 3     zolpidem ER (AMBIEN CR) 6.25 MG CR tablet Take 1 tablet (6.25 mg) by mouth nightly as needed for sleep 30 tablet 5       ALLERGIES     Allergies   Allergen Reactions     No Known Allergies        PAST MEDICAL HISTORY:  Past Medical History:   Diagnosis Date     [place] 08[place]01/2014     Alcohol abuse      Nonischemic cardiomyopathy (H)      Smoking        PAST SURGICAL HISTORY:  Past Surgical History:   Procedure Laterality Date     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     Broken Right arm  1992     CV HEART CATHETERIZATION WITH POSSIBLE INTERVENTION N/A 12/9/2021    Procedure: Heart Catheterization with Possible  "Intervention;  Surgeon: Leonard Granados MD;  Location:  HEART CARDIAC CATH LAB     LEG SURGERY Right 2014       FAMILY HISTORY:  No family history on file.    SOCIAL HISTORY:  Social History     Socioeconomic History     Marital status:      Spouse name: None     Number of children: None     Years of education: None     Highest education level: None   Occupational History     Employer: UNKNOWN   Tobacco Use     Smoking status: Former Smoker     Quit date: 2003     Years since quittin.8     Smokeless tobacco: Current User     Types: Chew   Vaping Use     Vaping Use: Never used   Substance and Sexual Activity     Alcohol use: Yes     Alcohol/week: 0.0 standard drinks     Comment: occ.     Drug use: No     Sexual activity: Yes     Partners: Female   Other Topics Concern     Parent/sibling w/ CABG, MI or angioplasty before 65F 55M? Not Asked   Social History Narrative     None     Social Determinants of Health     Financial Resource Strain: Not on file   Food Insecurity: Not on file   Transportation Needs: Not on file   Physical Activity: Not on file   Stress: Not on file   Social Connections: Not on file   Intimate Partner Violence: Not on file   Housing Stability: Not on file       Review of Systems:  Skin:          Eyes:         ENT:         Respiratory:  Negative shortness of breath;cough;wheezing     Cardiovascular:  Negative;chest pain;edema;dizziness;lightheadedness;syncope or near-syncope Positive for;palpitations    Gastroenterology:        Genitourinary:         Musculoskeletal:         Neurologic:  Positive for numbness or tingling of hands    Psychiatric:         Heme/Lymph/Imm:         Endocrine:           Physical Exam:  Vitals: /62 (BP Location: Right arm, Patient Position: Sitting, Cuff Size: Adult Regular)   Pulse 80   Ht 1.702 m (5' 7\")   Wt 76.3 kg (168 lb 4.8 oz)   SpO2 100%   BMI 26.36 kg/m      Constitutional:  cooperative, alert and oriented, well developed, well " nourished, in no acute distress        Skin:  warm and dry to the touch, no apparent skin lesions or masses noted          Head:  normocephalic        Eyes:  sclera white;no xanthalasma        Lymph:No Cervical lymphadenopathy present     ENT:  no pallor or cyanosis   masked    Neck:  no carotid bruit JVP elevated      Respiratory:       fine basilar crackles    Cardiac: regular rhythm   S3;S4;apical impulse displaced   systolic murmur;grade 1        pulses full and equal                                        GI:  abdomen soft;BS normoactive        Extremities and Muscular Skeletal:      bilateral LE edema;trace          Neurological:  no gross motor deficits        Psych:  affect appropriate, oriented to time, person and place        CC  Ivory Serrano PAALIYA  [address]  [place] [id number]
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
## 2022-03-11 ENCOUNTER — CARE COORDINATION (OUTPATIENT)
Dept: CARDIOLOGY | Facility: CLINIC | Age: 42
End: 2022-03-11
Payer: COMMERCIAL

## 2022-03-11 DIAGNOSIS — Z11.59 ENCOUNTER FOR SCREENING FOR OTHER VIRAL DISEASES: Primary | ICD-10-CM

## 2022-03-11 NOTE — PROGRESS NOTES
[facility]    -Received phone call from Jorge L inquiring on his echo results from yesterday. Review with pt. Improved from echo from 12/2021. Similar EF to MRI done 2/2022.     Any medication adjustments will be made by GAURAV Rodriguez once she returns to clinic next week. Jorge L has a follow-up appt on 3/17 with GAURAV Rodriguez.    Future Appointments   Date Time Provider Department Center   3/17/2022  9:05 AM Ivory Serrano PA-C [facility]   4/4/2022  9:20 AM PH COVID LAB [facility]       Yvette Davison RN on 3/11/2022 at 3:06 PM

## 2022-03-17 ENCOUNTER — OFFICE VISIT (OUTPATIENT)
Dept: CARDIOLOGY | Facility: CLINIC | Age: 42
End: 2022-03-17
Payer: COMMERCIAL

## 2022-03-17 VITALS
WEIGHT: 167.3 LBS | DIASTOLIC BLOOD PRESSURE: 64 MMHG | HEIGHT: 67 IN | OXYGEN SATURATION: 99 % | SYSTOLIC BLOOD PRESSURE: 106 MMHG | HEART RATE: 74 BPM | BODY MASS INDEX: 26.26 KG/M2

## 2022-03-17 DIAGNOSIS — I50.20 HFREF (HEART FAILURE WITH REDUCED EJECTION FRACTION) (H): ICD-10-CM

## 2022-03-17 PROCEDURE — 99215 OFFICE O/P EST HI 40 MIN: CPT | Performed by: PHYSICIAN ASSISTANT

## 2022-03-17 RX ORDER — SACUBITRIL AND VALSARTAN 49; 51 MG/1; MG/1
1 TABLET, FILM COATED ORAL 2 TIMES DAILY
Qty: 60 TABLET | Refills: 11 | Status: SHIPPED | OUTPATIENT
Start: 2022-03-17 | End: 2022-12-06

## 2022-03-17 RX ORDER — ATORVASTATIN CALCIUM 40 MG/1
40 TABLET, FILM COATED ORAL DAILY
COMMUNITY
Start: 2022-03-17 | End: 2022-05-12

## 2022-03-17 RX ORDER — METOPROLOL SUCCINATE 25 MG/1
25 TABLET, EXTENDED RELEASE ORAL AT BEDTIME
Qty: 30 TABLET | Refills: 11 | Status: SHIPPED | OUTPATIENT
Start: 2022-03-17 | End: 2022-06-07 | Stop reason: DRUGHIGH

## 2022-03-17 NOTE — LETTER
3/17/2022    Jason García MD  [address] [facility] [address]    RE: Dipak Swartz       Dear Colleague,     I had the pleasure of seeing Dipak Swartz in the [facility].  [id number]      HPI and Plan:   See dictation    Orders this Visit:  Orders Placed This Encounter   Procedures     Basic metabolic panel     Follow-Up with Cardiology ESTEPHANIA Core     Orders Placed This Encounter   Medications     sacubitril-valsartan (ENTRESTO) 49-51 MG per tablet     Sig: Take 1 tablet by mouth 2 times daily     Dispense:  60 tablet     Refill:  11     atorvastatin (LIPITOR) 40 MG tablet     Sig: Take 1 tablet (40 mg) by mouth daily     metoprolol succinate ER (TOPROL XL) 25 MG 24 hr tablet     Sig: Take 1 tablet (25 mg) by mouth At Bedtime     Dispense:  30 tablet     Refill:  11     Medications Discontinued During This Encounter   Medication Reason     sacubitril-valsartan (ENTRESTO) 24-26 MG per tablet      metoprolol succinate ER (TOPROL XL) 25 MG 24 hr tablet          Encounter Diagnosis   Name Primary?     HFrEF (heart failure with reduced ejection fraction) (H)        CURRENT MEDICATIONS:  Current Outpatient Medications   Medication Sig Dispense Refill     atorvastatin (LIPITOR) 40 MG tablet Take 1 tablet (40 mg) by mouth daily       dapagliflozin (FARXIGA) 5 MG TABS tablet Take 1 tablet (5 mg) by mouth daily 90 tablet 3     furosemide (LASIX) 20 MG tablet Take 1 tablet (20 mg) by mouth daily as needed (wt gain, swelling) 90 tablet 3     metoprolol succinate ER (TOPROL XL) 25 MG 24 hr tablet Take 1 tablet (25 mg) by mouth At Bedtime 30 tablet 11     Multiple Vitamins-Minerals (MENS MULTIVITAMIN) TABS Take 1 tablet by mouth daily       order for DME Equipment being ordered: right lower extremity prosthetic     [employer]  [phone] 1 each 0     sacubitril-valsartan (ENTRESTO) 49-51 MG per tablet Take 1 tablet by mouth 2 times daily 60 tablet 11     spironolactone (ALDACTONE) 25  MG tablet Take 0.5 tablets (12.5 mg) by mouth daily 45 tablet 3     zolpidem ER (AMBIEN CR) 6.25 MG CR tablet Take 1 tablet (6.25 mg) by mouth nightly as needed for sleep 30 tablet 5       ALLERGIES     Allergies   Allergen Reactions     No Known Allergies        PAST MEDICAL HISTORY:  Past Medical History:   Diagnosis Date     [place] 2014     Alcohol abuse      Nonischemic cardiomyopathy (H)      Smoking        PAST SURGICAL HISTORY:  Past Surgical History:   Procedure Laterality Date     AMPUTATION BELOW KNEE RT/LT Right 2014     Broken Right arm       CV HEART CATHETERIZATION WITH POSSIBLE INTERVENTION N/A 2021    Procedure: Heart Catheterization with Possible Intervention;  Surgeon: Leonard Granados MD;  Location:  HEART CARDIAC CATH LAB     LEG SURGERY Right 2014       FAMILY HISTORY:  No family history on file.    SOCIAL HISTORY:  Social History     Socioeconomic History     Marital status:      Spouse name: None     Number of children: None     Years of education: None     Highest education level: None   Occupational History     Employer: UNKNOWN   Tobacco Use     Smoking status: Former Smoker     Quit date: 2003     Years since quittin.8     Smokeless tobacco: Current User     Types: Chew   Vaping Use     Vaping Use: Never used   Substance and Sexual Activity     Alcohol use: Yes     Alcohol/week: 0.0 standard drinks     Comment: occ.     Drug use: No     Sexual activity: Yes     Partners: Female   Other Topics Concern     Parent/sibling w/ CABG, MI or angioplasty before 65F 55M? Not Asked   Social History Narrative     None     Social Determinants of Health     Financial Resource Strain: Not on file   Food Insecurity: Not on file   Transportation Needs: Not on file   Physical Activity: Not on file   Stress: Not on file   Social Connections: Not on file   Intimate Partner Violence: Not on file   Housing Stability: Not on file       Review of Systems:  Skin:     "    Eyes:       ENT:       Respiratory:  Negative shortness of breath;cough;wheezing  Cardiovascular:  Negative;chest pain;edema Positive for;palpitations;dizziness;lightheadedness;syncope or near-syncope  Gastroenterology:      Genitourinary:       Musculoskeletal:       Neurologic:  Positive for numbness or tingling of hands  Psychiatric:       Heme/Lymph/Imm:       Endocrine:         Physical Exam:  Vitals: /64 (BP Location: Right arm, Patient Position: Sitting, Cuff Size: Adult Regular)   Pulse 74   Ht 1.702 m (5' 7\")   Wt 75.9 kg (167 lb 4.8 oz)   SpO2 99%   BMI 26.20 kg/m     Please refer to dictation for physical exam    Recent Lab Results: all reviewed today  CBC RESULTS:  Lab Results   Component Value Date    WBC 9.1 12/09/2021    WBC 7.1 08/05/2020    RBC 4.58 12/09/2021    RBC 4.79 08/05/2020    HGB 14.9 12/09/2021    HGB 14.8 08/05/2020    HCT 44.2 12/09/2021    HCT 45.1 08/05/2020    MCV 97 12/09/2021    MCV 94 08/05/2020    MCH 32.5 12/09/2021    MCH 30.9 08/05/2020    MCHC 33.7 12/09/2021    MCHC 32.8 08/05/2020    RDW 13.5 12/09/2021    RDW 13.5 08/05/2020     12/09/2021     08/05/2020       BMP RESULTS:  Lab Results   Component Value Date     02/16/2022     08/05/2020    POTASSIUM 4.4 02/16/2022    POTASSIUM 3.8 08/05/2020    CHLORIDE 105 02/16/2022    CHLORIDE 105 08/05/2020    CO2 30 02/16/2022    CO2 30 08/05/2020    ANIONGAP 2 (L) 02/16/2022    ANIONGAP 3 08/05/2020     (H) 02/16/2022    GLC 77 08/06/2020    BUN 15 02/16/2022    BUN 14 08/05/2020    CR 1.16 02/16/2022    CR 1.12 08/05/2020    GFRESTIMATED 81 02/16/2022    GFRESTIMATED 82 08/05/2020    GFRESTBLACK >90 08/05/2020    SHAYNA 9.9 02/16/2022    SHAYNA 9.3 08/05/2020        INR RESULTS:  Lab Results   Component Value Date    INR 0.92 12/06/2021           CC  No referring provider defined for this encounter.          Thank you for allowing me to participate in the care of your " patient.      Sincerely,     JOSE Robles [facility]  cc:   No referring provider defined for this encounter.

## 2022-03-17 NOTE — PATIENT INSTRUCTIONS
Call [facility]ORE nurse for any questions or concerns Mon-Fri 8am-4pm:                                                #[phone]                                       For concerns after hours:                                               #[phone]     1: Medication changes: stop taking Coreg/ carvedilol.    Start taking Toprol XL/ metoprolol succinate 25 mg once a day before bed- this should help with day time dizziness.    Increase Entresto to 49/51 mg twice a day.    Continue other medications.      2: Plan from today: I'd recommend you get the ICD, but it's your choice.    I'll see you back with labs in about 1 month.

## 2022-03-17 NOTE — LETTER
3/17/2022       RE: Dpiak Swartz  [address]  [facility] [place] [facility] [address]     Dear Colleague,    Thank you for referring your patient, Dipak Swartz, to the [facility] [place] [facility]. Please see a copy of my visit note below.    [id number]      HPI and Plan:   See dictation    Orders this Visit:  Orders Placed This Encounter   Procedures     Basic metabolic panel     Follow-Up with Cardiology ESTEPHANIA Core     Orders Placed This Encounter   Medications     sacubitril-valsartan (ENTRESTO) 49-51 MG per tablet     Sig: Take 1 tablet by mouth 2 times daily     Dispense:  60 tablet     Refill:  11     atorvastatin (LIPITOR) 40 MG tablet     Sig: Take 1 tablet (40 mg) by mouth daily     metoprolol succinate ER (TOPROL XL) 25 MG 24 hr tablet     Sig: Take 1 tablet (25 mg) by mouth At Bedtime     Dispense:  30 tablet     Refill:  11     Medications Discontinued During This Encounter   Medication Reason     sacubitril-valsartan (ENTRESTO) 24-26 MG per tablet      metoprolol succinate ER (TOPROL XL) 25 MG 24 hr tablet          Encounter Diagnosis   Name Primary?     HFrEF (heart failure with reduced ejection fraction) (H)        CURRENT MEDICATIONS:  Current Outpatient Medications   Medication Sig Dispense Refill     atorvastatin (LIPITOR) 40 MG tablet Take 1 tablet (40 mg) by mouth daily       dapagliflozin (FARXIGA) 5 MG TABS tablet Take 1 tablet (5 mg) by mouth daily 90 tablet 3     furosemide (LASIX) 20 MG tablet Take 1 tablet (20 mg) by mouth daily as needed (wt gain, swelling) 90 tablet 3     metoprolol succinate ER (TOPROL XL) 25 MG 24 hr tablet Take 1 tablet (25 mg) by mouth At Bedtime 30 tablet 11     Multiple Vitamins-Minerals (MENS MULTIVITAMIN) TABS Take 1 tablet by mouth daily       order for DME Equipment being ordered: right lower extremity prosthetic     [employer]  [phone] 1 each 0     sacubitril-valsartan (ENTRESTO)  49-51 MG per tablet Take 1 tablet by mouth 2 times daily 60 tablet 11     spironolactone (ALDACTONE) 25 MG tablet Take 0.5 tablets (12.5 mg) by mouth daily 45 tablet 3     zolpidem ER (AMBIEN CR) 6.25 MG CR tablet Take 1 tablet (6.25 mg) by mouth nightly as needed for sleep 30 tablet 5       ALLERGIES     Allergies   Allergen Reactions     No Known Allergies        PAST MEDICAL HISTORY:  Past Medical History:   Diagnosis Date     Accident [place] farm 2014     Alcohol abuse      Nonischemic cardiomyopathy (H)      Smoking        PAST SURGICAL HISTORY:  Past Surgical History:   Procedure Laterality Date     AMPUTATION BELOW KNEE RT/LT Right 2014     Broken Right arm       CV HEART CATHETERIZATION WITH POSSIBLE INTERVENTION N/A 2021    Procedure: Heart Catheterization with Possible Intervention;  Surgeon: Leonard Granados MD;  Location:  HEART CARDIAC CATH LAB     LEG SURGERY Right 2014       FAMILY HISTORY:  No family history on file.    SOCIAL HISTORY:  Social History     Socioeconomic History     Marital status:      Spouse name: None     Number of children: None     Years of education: None     Highest education level: None   Occupational History     Employer: UNKNOWN   Tobacco Use     Smoking status: Former Smoker     Quit date: 2003     Years since quittin.8     Smokeless tobacco: Current User     Types: Chew   Vaping Use     Vaping Use: Never used   Substance and Sexual Activity     Alcohol use: Yes     Alcohol/week: 0.0 standard drinks     Comment: occ.     Drug use: No     Sexual activity: Yes     Partners: Female   Other Topics Concern     Parent/sibling w/ CABG, MI or angioplasty before 65F 55M? Not Asked   Social History Narrative     None     Social Determinants of Health     Financial Resource Strain: Not on file   Food Insecurity: Not on file   Transportation Needs: Not on file   Physical Activity: Not on file   Stress: Not on file   Social Connections: Not on file  "  Intimate Partner Violence: Not on file   Housing Stability: Not on file       Review of Systems:  Skin:        Eyes:       ENT:       Respiratory:  Negative shortness of breath;cough;wheezing  Cardiovascular:  Negative;chest pain;edema Positive for;palpitations;dizziness;lightheadedness;syncope or near-syncope  Gastroenterology:      Genitourinary:       Musculoskeletal:       Neurologic:  Positive for numbness or tingling of hands  Psychiatric:       Heme/Lymph/Imm:       Endocrine:         Physical Exam:  Vitals: /64 (BP Location: Right arm, Patient Position: Sitting, Cuff Size: Adult Regular)   Pulse 74   Ht 1.702 m (5' 7\")   Wt 75.9 kg (167 lb 4.8 oz)   SpO2 99%   BMI 26.20 kg/m     Please refer to dictation for physical exam    Recent Lab Results: all reviewed today  CBC RESULTS:  Lab Results   Component Value Date    WBC 9.1 12/09/2021    WBC 7.1 08/05/2020    RBC 4.58 12/09/2021    RBC 4.79 08/05/2020    HGB 14.9 12/09/2021    HGB 14.8 08/05/2020    HCT 44.2 12/09/2021    HCT 45.1 08/05/2020    MCV 97 12/09/2021    MCV 94 08/05/2020    MCH 32.5 12/09/2021    MCH 30.9 08/05/2020    MCHC 33.7 12/09/2021    MCHC 32.8 08/05/2020    RDW 13.5 12/09/2021    RDW 13.5 08/05/2020     12/09/2021     08/05/2020       BMP RESULTS:  Lab Results   Component Value Date     02/16/2022     08/05/2020    POTASSIUM 4.4 02/16/2022    POTASSIUM 3.8 08/05/2020    CHLORIDE 105 02/16/2022    CHLORIDE 105 08/05/2020    CO2 30 02/16/2022    CO2 30 08/05/2020    ANIONGAP 2 (L) 02/16/2022    ANIONGAP 3 08/05/2020     (H) 02/16/2022    GLC 77 08/06/2020    BUN 15 02/16/2022    BUN 14 08/05/2020    CR 1.16 02/16/2022    CR 1.12 08/05/2020    GFRESTIMATED 81 02/16/2022    GFRESTIMATED 82 08/05/2020    GFRESTBLACK >90 08/05/2020    SHAYNA 9.9 02/16/2022    SHAYNA 9.3 08/05/2020        INR RESULTS:  Lab Results   Component Value Date    INR 0.92 12/06/2021         Service Date: 03/17/2022    CLINIC " VISIT    PRIMARY CARDIOLOGIST:  Dr. Irizarry    REASON FOR VISIT:  [facility] followup.    HISTORY OF PRESENT ILLNESS:  Mr. 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  This was in 12/2021.  He was admitted and underwent angiogram that showed normal coronaries.  He was started on medical therapy.  He otherwise had a past medical history significant for hyperlipidemia and potentially hypertension.  He was discharged with a LifeVest.  I have been working on optimizing his medications and for a time, he also went to a cardiologist at [facility].  At last visit, given he was lightheaded, I tried to switch him off carvedilol on to Toprol but he did not make that switch apparently.  He has, in the interim, been seen by Dr. Ellsworth with a repeat echocardiogram showing an EF of 25%-30% and normalization of the RV and he is scheduled for ICD.    He comes in today.  He has not been wearing his LifeVest the last couple of days because he finds it fairly annoying.  He can exercise fairly well, doing 30 minutes on a bike and situps and pushups every morning and does not get short of breath with that or dizzy.  Gets dizzy off and on throughout the day if he is lifting something.  Also, gets winded going up and down stairs.  The windedness is not particularly out of the norm.  He does have to adjust his activities to prevent the lightheadedness.  He denies orthopnea or PND.  He has been taking Lasix just as needed if he gains 3-4 pounds.  He is not sure he wants to get the ICD put in now, as he is hopeful his heart is going to get stronger.  He also notes that looking back, maybe he had a heart like this before, as he has had times in his life when he has been severely fatigued and short of  breath and at one point, weighed up to 180 pounds was super puffy.    SOCIAL HISTORY:  He is .  His wife's name is Stella.  He comes in today with his children, Ormond who is 7 and Monae who is 5.  He previously was about drinking 4-5 beers a day plus a liter of vodka every couple of days.  He is now drinking rarely and when he does, he has 3-4 drinks.  Continues to smoke.  No street drugs.  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  Now, he just does it on his own.    PHYSICAL EXAMINATION:    GENERAL:  Well-developed, well-nourished, fit-appearing gentleman in no acute distress.  HEENT:  Normocephalic, atraumatic.  HEART:  Regular in rate and rhythm.  I do not appreciate murmur, rub or gallop.  LUNGS:  Clear without wheezes, rales or rhonchi.  EXTREMITIES:  With trace edema, improved from previous.    NECK:  Neck veins are flat at 30 degrees.      Echocardiogram reviewed.  Included one done 12/2021 showing biventricular failure with EF 15%-20%.  MRI done 02/2022, shows an EF of 29% with RVEF up to 45% with no late gadolinium enhancement to indicate myocardial fibrosis or infiltrative disease.    Echo done 03/10/2022 shows an EF of 20%-30% with LV still severely dilated.  Left ventricular end-diastolic dimension of 6.2.    ASSESSMENT AND PLAN:    1.  Severe nonischemic cardiomyopathy with biventricular heart failure and EF 15%-20% on diagnosis, improving now to about 25%-30% with excellent reverse remodeling of the RV but LV not yet completely normalized.  We suspect this is secondary to alcohol.  He continues to have class III symptoms that I suspect are in part just due to poor cardiac output but in part due to some lightheadedness from medications.  Unfortunately, at the last visit, he did not switch his carvedilol for Toprol but will do that today.  He will stop his carvedilol and he will just take 25 mg of Toprol at night.  I am hopeful this will  improve his lightheadedness during the day.  He also was willing to give it a try to further optimize his medications and we will increase Entresto to 49/51 mg b.i.d.  He will remain on a tiny dose of spironolactone at 12.5 and Farxiga at 5 mg daily.  We will get a BMP before next visit.  We had a long talk today about whether or not he should get the ICD.  He is hopeful that his EF will normalize over the next several months.  I noted that he is at risk for sudden cardiac death any time until that happens.  I also noted that we could put in an ICD and if he never needed to use it, that would be fine.  He is going to consider that.  At this point, he has been scheduled for early April and we will keep it on the calendar and go from there.  He will let us know his final decision.  I encouraged him to keep wearing his LifeVest until then.  2.  Concern for him for his ICD, as he does all his own [employer] company, so lifting would be limited for the first one month and that would affect his business.  3.  Hypertension, well controlled.  4.  Hyperlipidemia.  Last , HDL 77, total cholesterol 229.  The patient is on atorvastatin 40.  We will get a repeat lipid panel down the road.  May improve with diet and exercise and may be able to cut that back or discontinue.  5.  Tobacco use.  Recommend cessation but at this point, far more concerned about alcohol which he has decreased dramatically but not completely stopped.  6.  History of BKA secondary to farm accident.  Home weights are without prosthetic.    Thank you for allowing me to participate in this delightful patient's care.  He will potentially get his ICD.  Otherwise, I will see him back in a month with a BMP before that visit.  He will call sooner with concerns.    Ivory Serrano PA-C        D: 2022   T: 2022   MT: cem    Name:     SHASHANK TREADWELL  MRN:      [id number]        Account:      [id number]   :      1980            Service Date: 03/17/2022     Document: [id number]

## 2022-03-17 NOTE — PROGRESS NOTES
[id number]      HPI and Plan:   See dictation    Orders this Visit:  Orders Placed This Encounter   Procedures     Basic metabolic panel     Follow-Up with Cardiology ESTEPHANIA Core     Orders Placed This Encounter   Medications     sacubitril-valsartan (ENTRESTO) 49-51 MG per tablet     Sig: Take 1 tablet by mouth 2 times daily     Dispense:  60 tablet     Refill:  11     atorvastatin (LIPITOR) 40 MG tablet     Sig: Take 1 tablet (40 mg) by mouth daily     metoprolol succinate ER (TOPROL XL) 25 MG 24 hr tablet     Sig: Take 1 tablet (25 mg) by mouth At Bedtime     Dispense:  30 tablet     Refill:  11     Medications Discontinued During This Encounter   Medication Reason     sacubitril-valsartan (ENTRESTO) 24-26 MG per tablet      metoprolol succinate ER (TOPROL XL) 25 MG 24 hr tablet          Encounter Diagnosis   Name Primary?     HFrEF (heart failure with reduced ejection fraction) (H)        CURRENT MEDICATIONS:  Current Outpatient Medications   Medication Sig Dispense Refill     atorvastatin (LIPITOR) 40 MG tablet Take 1 tablet (40 mg) by mouth daily       dapagliflozin (FARXIGA) 5 MG TABS tablet Take 1 tablet (5 mg) by mouth daily 90 tablet 3     furosemide (LASIX) 20 MG tablet Take 1 tablet (20 mg) by mouth daily as needed (wt gain, swelling) 90 tablet 3     metoprolol succinate ER (TOPROL XL) 25 MG 24 hr tablet Take 1 tablet (25 mg) by mouth At Bedtime 30 tablet 11     Multiple Vitamins-Minerals (MENS MULTIVITAMIN) TABS Take 1 tablet by mouth daily       order for DME Equipment being ordered: right lower extremity prosthetic     [employer]  [phone] 1 each 0     sacubitril-valsartan (ENTRESTO) 49-51 MG per tablet Take 1 tablet by mouth 2 times daily 60 tablet 11     spironolactone (ALDACTONE) 25 MG tablet Take 0.5 tablets (12.5 mg) by mouth daily 45 tablet 3     zolpidem ER (AMBIEN CR) 6.25 MG CR tablet Take 1 tablet (6.25 mg) by mouth nightly as needed for sleep 30 tablet 5       ALLERGIES      Allergies   Allergen Reactions     No Known Allergies        PAST MEDICAL HISTORY:  Past Medical History:   Diagnosis Date     Accident on farm 2014     Alcohol abuse      Nonischemic cardiomyopathy (H)      Smoking        PAST SURGICAL HISTORY:  Past Surgical History:   Procedure Laterality Date     AMPUTATION BELOW KNEE RT/LT Right 2014     Broken Right arm       CV HEART CATHETERIZATION WITH POSSIBLE INTERVENTION N/A 2021    Procedure: Heart Catheterization with Possible Intervention;  Surgeon: Leonard Granados MD;  Location:  HEART CARDIAC CATH LAB     LEG SURGERY Right 2014       FAMILY HISTORY:  No family history on file.    SOCIAL HISTORY:  Social History     Socioeconomic History     Marital status:      Spouse name: None     Number of children: None     Years of education: None     Highest education level: None   Occupational History     Employer: UNKNOWN   Tobacco Use     Smoking status: Former Smoker     Quit date: 2003     Years since quittin.8     Smokeless tobacco: Current User     Types: Chew   Vaping Use     Vaping Use: Never used   Substance and Sexual Activity     Alcohol use: Yes     Alcohol/week: 0.0 standard drinks     Comment: occ.     Drug use: No     Sexual activity: Yes     Partners: Female   Other Topics Concern     Parent/sibling w/ CABG, MI or angioplasty before 65F 55M? Not Asked   Social History Narrative     None     Social Determinants of Health     Financial Resource Strain: Not on file   Food Insecurity: Not on file   Transportation Needs: Not on file   Physical Activity: Not on file   Stress: Not on file   Social Connections: Not on file   Intimate Partner Violence: Not on file   Housing Stability: Not on file       Review of Systems:  Skin:        Eyes:       ENT:       Respiratory:  Negative shortness of breath;cough;wheezing  Cardiovascular:  Negative;chest pain;edema Positive for;palpitations;dizziness;lightheadedness;syncope or  "near-syncope  Gastroenterology:      Genitourinary:       Musculoskeletal:       Neurologic:  Positive for numbness or tingling of hands  Psychiatric:       Heme/Lymph/Imm:       Endocrine:         Physical Exam:  Vitals: /64 (BP Location: Right arm, Patient Position: Sitting, Cuff Size: Adult Regular)   Pulse 74   Ht 1.702 m (5' 7\")   Wt 75.9 kg (167 lb 4.8 oz)   SpO2 99%   BMI 26.20 kg/m     Please refer to dictation for physical exam    Recent Lab Results: all reviewed today  CBC RESULTS:  Lab Results   Component Value Date    WBC 9.1 12/09/2021    WBC 7.1 08/05/2020    RBC 4.58 12/09/2021    RBC 4.79 08/05/2020    HGB 14.9 12/09/2021    HGB 14.8 08/05/2020    HCT 44.2 12/09/2021    HCT 45.1 08/05/2020    MCV 97 12/09/2021    MCV 94 08/05/2020    MCH 32.5 12/09/2021    MCH 30.9 08/05/2020    MCHC 33.7 12/09/2021    MCHC 32.8 08/05/2020    RDW 13.5 12/09/2021    RDW 13.5 08/05/2020     12/09/2021     08/05/2020       BMP RESULTS:  Lab Results   Component Value Date     02/16/2022     08/05/2020    POTASSIUM 4.4 02/16/2022    POTASSIUM 3.8 08/05/2020    CHLORIDE 105 02/16/2022    CHLORIDE 105 08/05/2020    CO2 30 02/16/2022    CO2 30 08/05/2020    ANIONGAP 2 (L) 02/16/2022    ANIONGAP 3 08/05/2020     (H) 02/16/2022    GLC 77 08/06/2020    BUN 15 02/16/2022    BUN 14 08/05/2020    CR 1.16 02/16/2022    CR 1.12 08/05/2020    GFRESTIMATED 81 02/16/2022    GFRESTIMATED 82 08/05/2020    GFRESTBLACK >90 08/05/2020    SHAYNA 9.9 02/16/2022    SHAYNA 9.3 08/05/2020        INR RESULTS:  Lab Results   Component Value Date    INR 0.92 12/06/2021           CC  No referring provider defined for this encounter.      "

## 2022-03-17 NOTE — PROGRESS NOTES
Service Date: 03/17/2022    CLINIC VISIT    PRIMARY CARDIOLOGIST:  Dr. Irizarry    REASON FOR VISIT:  C.O.R.E. Clinic followup.    HISTORY OF PRESENT ILLNESS:  Mr. 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  This was in 12/2021.  He was admitted and underwent angiogram that showed normal coronaries.  He was started on medical therapy.  He otherwise had a past medical history significant for hyperlipidemia and potentially hypertension.  He was discharged with a LifeVest.  I have been working on optimizing his medications and for a time, he also went to a cardiologist at [facility].  At last visit, given he was lightheaded, I tried to switch him off carvedilol on to Toprol but he did not make that switch apparently.  He has, in the interim, been seen by Dr. Ellsworth with a repeat echocardiogram showing an EF of 25%-30% and normalization of the RV and he is scheduled for ICD.    He comes in today.  He has not been wearing his LifeVest the last couple of days because he finds it fairly annoying.  He can exercise fairly well, doing 30 minutes on a bike and situps and pushups every morning and does not get short of breath with that or dizzy.  Gets dizzy off and on throughout the day if he is lifting something.  Also, gets winded going up and down stairs.  The windedness is not particularly out of the norm.  He does have to adjust his activities to prevent the lightheadedness.  He denies orthopnea or PND.  He has been taking Lasix just as needed if he gains 3-4 pounds.  He is not sure he wants to get the ICD put in now, as he is hopeful his heart is going to get stronger.  He also notes that looking back, maybe he had a heart like this before, as he has had times in his life when he has been  severely fatigued and short of breath and at one point, weighed up to 180 pounds was super puffy.    SOCIAL HISTORY:  He is .  His wife's name is Stella.  He comes in today with his children, Ormond who is 7 and Monae who is 5.  He previously was about drinking 4-5 beers a day plus a liter of vodka every couple of days.  He is now drinking rarely and when he does, he has 3-4 drinks.  Continues to smoke.  No street drugs.  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  Now, he just does it on his own.    PHYSICAL EXAMINATION:    GENERAL:  Well-developed, well-nourished, fit-appearing gentleman in no acute distress.  HEENT:  Normocephalic, atraumatic.  HEART:  Regular in rate and rhythm.  I do not appreciate murmur, rub or gallop.  LUNGS:  Clear without wheezes, rales or rhonchi.  EXTREMITIES:  With trace edema, improved from previous.    NECK:  Neck veins are flat at 30 degrees.      Echocardiogram reviewed.  Included one done 12/2021 showing biventricular failure with EF 15%-20%.  MRI done 02/2022, shows an EF of 29% with RVEF up to 45% with no late gadolinium enhancement to indicate myocardial fibrosis or infiltrative disease.    Echo done 03/10/2022 shows an EF of 20%-30% with LV still severely dilated.  Left ventricular end-diastolic dimension of 6.2.    ASSESSMENT AND PLAN:    1.  Severe nonischemic cardiomyopathy with biventricular heart failure and EF 15%-20% on diagnosis, improving now to about 25%-30% with excellent reverse remodeling of the RV but LV not yet completely normalized.  We suspect this is secondary to alcohol.  He continues to have class III symptoms that I suspect are in part just due to poor cardiac output but in part due to some lightheadedness from medications.  Unfortunately, at the last visit, he did not switch his carvedilol for Toprol but will do that today.  He will stop his carvedilol and he will just take 25 mg of Toprol at  night.  I am hopeful this will improve his lightheadedness during the day.  He also was willing to give it a try to further optimize his medications and we will increase Entresto to 49/51 mg b.i.d.  He will remain on a tiny dose of spironolactone at 12.5 and Farxiga at 5 mg daily.  We will get a BMP before next visit.  We had a long talk today about whether or not he should get the ICD.  He is hopeful that his EF will normalize over the next several months.  I noted that he is at risk for sudden cardiac death any time until that happens.  I also noted that we could put in an ICD and if he never needed to use it, that would be fine.  He is going to consider that.  At this point, he has been scheduled for early April and we will keep it on the calendar and go from there.  He will let us know his final decision.  I encouraged him to keep wearing his LifeVest until then.  2.  Concern for him for his ICD, as he does all his own [employer] company, so lifting would be limited for the first one month and that would affect his business.  3.  Hypertension, well controlled.  4.  Hyperlipidemia.  Last , HDL 77, total cholesterol 229.  The patient is on atorvastatin 40.  We will get a repeat lipid panel down the road.  May improve with diet and exercise and may be able to cut that back or discontinue.  5.  Tobacco use.  Recommend cessation but at this point, far more concerned about alcohol which he has decreased dramatically but not completely stopped.  6.  History of BKA secondary to farm accident.  Home weights are without prosthetic.    Thank you for allowing me to participate in this delightful patient's care.  He will potentially get his ICD.  Otherwise, I will see him back in a month with a BMP before that visit.  He will call sooner with concerns.    Ivory Serrano PA-C        D: 03/17/2022   T: 03/17/2022   MT: cem    Name:     SHASHANK TREADWELL  MRN:      [id number]        Account:      [id number]    :      1980           Service Date: 2022       Document: [id number]

## 2022-04-04 DIAGNOSIS — I51.9 LV DYSFUNCTION: ICD-10-CM

## 2022-04-04 DIAGNOSIS — I42.8 NONISCHEMIC CARDIOMYOPATHY (H): Primary | ICD-10-CM

## 2022-04-04 RX ORDER — LIDOCAINE 40 MG/G
CREAM TOPICAL
Status: CANCELLED | OUTPATIENT
Start: 2022-04-04

## 2022-04-04 RX ORDER — SODIUM CHLORIDE 450 MG/100ML
INJECTION, SOLUTION INTRAVENOUS CONTINUOUS
Status: CANCELLED | OUTPATIENT
Start: 2022-04-04

## 2022-04-04 RX ORDER — CEFAZOLIN SODIUM 2 G/100ML
2 INJECTION, SOLUTION INTRAVENOUS
Status: CANCELLED | OUTPATIENT
Start: 2022-04-04

## 2022-04-04 NOTE — PROGRESS NOTES
+++Addendum+++  Patient returned call to device clinic this morning and below instructions were reviewed with patient by MALU Albarran.         Left voicemail with patient and asked for return call to review pre-procedure instructions for device implant:     Anticoagulation: N/A   Oral diabetes meds: N/A  Insulin: N/A  Diuretic: Spironolactone, hold morning of procedure.   Contrast allergy: No known Allergies  Pt informed to be NPO at midnight    Instructed pt to shower the morning of the procedure, and then put on a clean shirt in order to help prevent infection.     Pt has post-procedure transportation and 24 hours monitoring set up. Pt reminded to call before coming in if their plans change.  With limited bed availability due to COVID, overnight hospital stays will be allowed for clinical reasons only.    Pt aware of no driving for 24 hours post procedure due to sedation.     INR check scheduled:N/A  (INR not needed)     COVID test scheduled: 4/5/22    Pt reminded to self-quarantine from the time of the COVID test to the procedure.    Asked pt to take temperature the morning of the procedure and call Care [facility]es at [phone] if it is above 100.0    Pt aware of arrival time of 0630 and location. Pt verbalized understanding of instructions.

## 2022-04-05 ENCOUNTER — LAB (OUTPATIENT)
Dept: LAB | Facility: CLINIC | Age: 42
End: 2022-04-05
Payer: COMMERCIAL

## 2022-04-05 DIAGNOSIS — Z11.59 ENCOUNTER FOR SCREENING FOR OTHER VIRAL DISEASES: ICD-10-CM

## 2022-04-05 LAB — SARS-COV-2 RNA RESP QL NAA+PROBE: NEGATIVE

## 2022-04-05 PROCEDURE — U0003 INFECTIOUS AGENT DETECTION BY NUCLEIC ACID (DNA OR RNA); SEVERE ACUTE RESPIRATORY SYNDROME CORONAVIRUS 2 (SARS-COV-2) (CORONAVIRUS DISEASE [COVID-19]), AMPLIFIED PROBE TECHNIQUE, MAKING USE OF HIGH THROUGHPUT TECHNOLOGIES AS DESCRIBED BY CMS-2020-01-R: HCPCS | Performed by: INTERNAL MEDICINE

## 2022-04-05 PROCEDURE — U0005 INFEC AGEN DETEC AMPLI PROBE: HCPCS | Performed by: INTERNAL MEDICINE

## 2022-04-07 ENCOUNTER — APPOINTMENT (OUTPATIENT)
Dept: GENERAL RADIOLOGY | Facility: CLINIC | Age: 42
End: 2022-04-07
Attending: INTERNAL MEDICINE
Payer: COMMERCIAL

## 2022-04-07 ENCOUNTER — HOSPITAL ENCOUNTER (OUTPATIENT)
Facility: CLINIC | Age: 42
Discharge: HOME | End: 2022-04-07
Admitting: INTERNAL MEDICINE
Payer: COMMERCIAL

## 2022-04-07 VITALS
OXYGEN SATURATION: 100 % | HEIGHT: 67 IN | DIASTOLIC BLOOD PRESSURE: 59 MMHG | RESPIRATION RATE: 16 BRPM | HEART RATE: 76 BPM | SYSTOLIC BLOOD PRESSURE: 102 MMHG | BODY MASS INDEX: 25.07 KG/M2 | WEIGHT: 159.7 LBS | TEMPERATURE: 98 F

## 2022-04-07 DIAGNOSIS — I51.9 LV DYSFUNCTION: ICD-10-CM

## 2022-04-07 DIAGNOSIS — I25.5 ISCHEMIC CARDIOMYOPATHY: ICD-10-CM

## 2022-04-07 DIAGNOSIS — I42.8 NONISCHEMIC CARDIOMYOPATHY (H): ICD-10-CM

## 2022-04-07 DIAGNOSIS — Z95.810 ICD (IMPLANTABLE CARDIOVERTER-DEFIBRILLATOR), SINGLE, IN SITU: Primary | ICD-10-CM

## 2022-04-07 DIAGNOSIS — I42.8 NON-ISCHEMIC CARDIOMYOPATHY (H): ICD-10-CM

## 2022-04-07 LAB
ANION GAP SERPL CALCULATED.3IONS-SCNC: 5 MMOL/L (ref 3–14)
BUN SERPL-MCNC: 14 MG/DL (ref 7–30)
CALCIUM SERPL-MCNC: 8.9 MG/DL (ref 8.5–10.1)
CHLORIDE BLD-SCNC: 110 MMOL/L (ref 94–109)
CO2 SERPL-SCNC: 25 MMOL/L (ref 20–32)
CREAT SERPL-MCNC: 1.11 MG/DL (ref 0.66–1.25)
ERYTHROCYTE [DISTWIDTH] IN BLOOD BY AUTOMATED COUNT: 13.3 % (ref 10–15)
GFR SERPL CREATININE-BSD FRML MDRD: 86 ML/MIN/1.73M2
GLUCOSE BLD-MCNC: 107 MG/DL (ref 70–99)
HCT VFR BLD AUTO: 41 % (ref 40–53)
HGB BLD-MCNC: 13.7 G/DL (ref 13.3–17.7)
HOLD SPECIMEN: NORMAL
MCH RBC QN AUTO: 31.6 PG (ref 26.5–33)
MCHC RBC AUTO-ENTMCNC: 33.4 G/DL (ref 31.5–36.5)
MCV RBC AUTO: 95 FL (ref 78–100)
PLATELET # BLD AUTO: 259 10E3/UL (ref 150–450)
POTASSIUM BLD-SCNC: 4 MMOL/L (ref 3.4–5.3)
RBC # BLD AUTO: 4.33 10E6/UL (ref 4.4–5.9)
SODIUM SERPL-SCNC: 140 MMOL/L (ref 133–144)
WBC # BLD AUTO: 8.1 10E3/UL (ref 4–11)

## 2022-04-07 PROCEDURE — 36415 COLL VENOUS BLD VENIPUNCTURE: CPT | Performed by: INTERNAL MEDICINE

## 2022-04-07 PROCEDURE — C1722 AICD, SINGLE CHAMBER: HCPCS | Performed by: INTERNAL MEDICINE

## 2022-04-07 PROCEDURE — C1895 LEAD, AICD, ENDO DUAL COIL: HCPCS | Performed by: INTERNAL MEDICINE

## 2022-04-07 PROCEDURE — 999N000054 HC STATISTIC EKG NON-CHARGEABLE

## 2022-04-07 PROCEDURE — 272N000001 HC OR GENERAL SUPPLY STERILE: Performed by: INTERNAL MEDICINE

## 2022-04-07 PROCEDURE — 99152 MOD SED SAME PHYS/QHP 5/>YRS: CPT | Performed by: INTERNAL MEDICINE

## 2022-04-07 PROCEDURE — 93005 ELECTROCARDIOGRAM TRACING: CPT

## 2022-04-07 PROCEDURE — 33249 INSJ/RPLCMT DEFIB W/LEAD(S): CPT | Performed by: INTERNAL MEDICINE

## 2022-04-07 PROCEDURE — 999N000071 HC STATISTIC HEART CATH LAB OR EP LAB

## 2022-04-07 PROCEDURE — 36591 DRAW BLOOD OFF VENOUS DEVICE: CPT

## 2022-04-07 PROCEDURE — 999N000184 HC STATISTIC TELEMETRY

## 2022-04-07 PROCEDURE — C1894 INTRO/SHEATH, NON-LASER: HCPCS | Performed by: INTERNAL MEDICINE

## 2022-04-07 PROCEDURE — 80048 BASIC METABOLIC PNL TOTAL CA: CPT | Performed by: INTERNAL MEDICINE

## 2022-04-07 PROCEDURE — 85027 COMPLETE CBC AUTOMATED: CPT | Performed by: INTERNAL MEDICINE

## 2022-04-07 PROCEDURE — 250N000009 HC RX 250: Performed by: INTERNAL MEDICINE

## 2022-04-07 PROCEDURE — 250N000011 HC RX IP 250 OP 636: Performed by: INTERNAL MEDICINE

## 2022-04-07 PROCEDURE — 93010 ELECTROCARDIOGRAM REPORT: CPT | Performed by: INTERNAL MEDICINE

## 2022-04-07 PROCEDURE — 999N000065 XR CHEST 2 VW

## 2022-04-07 PROCEDURE — 258N000002 HC RX IP 258 OP 250: Performed by: INTERNAL MEDICINE

## 2022-04-07 DEVICE — ICD RESONATE EL VR DF4: Type: IMPLANTABLE DEVICE | Status: FUNCTIONAL

## 2022-04-07 DEVICE — IMPLANTABLE DEVICE: Type: IMPLANTABLE DEVICE | Status: FUNCTIONAL

## 2022-04-07 RX ORDER — NALOXONE HYDROCHLORIDE 0.4 MG/ML
0.2 INJECTION, SOLUTION INTRAMUSCULAR; INTRAVENOUS; SUBCUTANEOUS
Status: DISCONTINUED | OUTPATIENT
Start: 2022-04-07 | End: 2022-04-07 | Stop reason: HOSPADM

## 2022-04-07 RX ORDER — OXYCODONE AND ACETAMINOPHEN 5; 325 MG/1; MG/1
1 TABLET ORAL EVERY 4 HOURS PRN
Status: DISCONTINUED | OUTPATIENT
Start: 2022-04-07 | End: 2022-04-07 | Stop reason: HOSPADM

## 2022-04-07 RX ORDER — LIDOCAINE 40 MG/G
CREAM TOPICAL
Status: DISCONTINUED | OUTPATIENT
Start: 2022-04-07 | End: 2022-04-07 | Stop reason: HOSPADM

## 2022-04-07 RX ORDER — BUPIVACAINE HYDROCHLORIDE 2.5 MG/ML
INJECTION, SOLUTION EPIDURAL; INFILTRATION; INTRACAUDAL
Status: DISCONTINUED | OUTPATIENT
Start: 2022-04-07 | End: 2022-04-07 | Stop reason: HOSPADM

## 2022-04-07 RX ORDER — SODIUM CHLORIDE 450 MG/100ML
INJECTION, SOLUTION INTRAVENOUS CONTINUOUS
Status: DISCONTINUED | OUTPATIENT
Start: 2022-04-07 | End: 2022-04-07 | Stop reason: HOSPADM

## 2022-04-07 RX ORDER — NALOXONE HYDROCHLORIDE 0.4 MG/ML
0.4 INJECTION, SOLUTION INTRAMUSCULAR; INTRAVENOUS; SUBCUTANEOUS
Status: DISCONTINUED | OUTPATIENT
Start: 2022-04-07 | End: 2022-04-07 | Stop reason: HOSPADM

## 2022-04-07 RX ORDER — FENTANYL CITRATE 50 UG/ML
INJECTION, SOLUTION INTRAMUSCULAR; INTRAVENOUS
Status: DISCONTINUED | OUTPATIENT
Start: 2022-04-07 | End: 2022-04-07 | Stop reason: HOSPADM

## 2022-04-07 RX ORDER — CEFAZOLIN SODIUM 2 G/100ML
2 INJECTION, SOLUTION INTRAVENOUS
Status: COMPLETED | OUTPATIENT
Start: 2022-04-07 | End: 2022-04-07

## 2022-04-07 RX ADMIN — CEFAZOLIN SODIUM 2 G: 2 INJECTION, SOLUTION INTRAVENOUS at 08:28

## 2022-04-07 RX ADMIN — SODIUM CHLORIDE: 4.5 INJECTION, SOLUTION INTRAVENOUS at 08:28

## 2022-04-07 NOTE — PROGRESS NOTES
Today, I directly discussed with the patient (and spouse / POA) the rationale for ICD placement, alternative therapies, technical aspects of the surgical procedure, risks/benefits of therapy and need for long-term follow-up in the Device Clinic.    An educational handout regarding ICD therapy was sent to patient for review by mail before today's appointment (OR was provided today and reviewed face-to-face).  The main points addressed in the handout were further discussed today.  All questions/concerns were addressed.     After a good discussion, the patient verbalized understanding and has agreed to proceed with ICD implantation.      [place] Decision Making Aid

## 2022-04-07 NOTE — PROGRESS NOTES
Care Suites Admission Nursing Note    Patient Information  Name: Dipak Swartz  Age: 41 year old  Reason for admission: ICD implant  Care Suites arrival time: 0645    Visitor Information  Name: stella  Informed of visitor restrictions: Yes  1 visitor allowed per patient   Visitor must screen negative for COVID symptoms   Visitor must wear a mask  Waiting rooms closed to visitors    Patient Admission/Assessment   Pre-procedure assessment complete: Yes  If abnormal assessment/labs, provider notified: N/A  NPO: Yes  Medications held per instructions/orders: Yes  Consent: deferred  If applicable, pregnancy test status: deferred  Patient oriented to room: Yes  Education/questions answered: Yes  Plan/other:     Discharge Planning  Discharge name/phone number: wife Stella [phone]  Overnight post sedation caregiver: wife  Discharge location: home    Kaya Clay RN

## 2022-04-07 NOTE — DISCHARGE INSTRUCTIONS
ICD Implant Discharge Instructions    After you go home:      Have an adult stay with you until tomorrow.    You may resume your normal diet.       For 24 hours - due to the sedation you received:    Relax and take it easy.    Do NOT make any important or legal decisions.    Do NOT drive or operate machines at home or at work.    Do NOT drink alcohol.    Care of Chest Incision:      Keep the bandage on at least 3 days. You may remove the dressing on April 10. Change it only if it gets loose or soaked. If you need to change it, use 4x4-inch gauze and a large clear bandage.     If there is a pressure dressing (gauze & tape) - 24 hours after your procedure you may remove ONLY the top dressing. Leave the bottom dressing on.    Leave the strips of tape on. They will fall off on their own, or we will remove them at your first check-up.    Check your wound daily for signs of infection, such as increased redness, severe swelling or draining. Fever may also be a sign of infection. Call us if you see any of these signs.    If there are no signs of infection, you may shower after the bandage comes off in 3 days. If you take a tub bath, keep the wound dry.    No soaking the incision (swimming pool, bathtub, hot tub) for 2 weeks.    You may have mild to medium pain for 3 to 5 days. Take Acetaminophen (Tylenol) or Ibuprofen (Advil) for the pain. If the pain persists or is severe, call us.    Activity:      For at least 2 weeks: Do not raise your elbow above your shoulder. You can begin to use your arm as it feels comfortable to you.    Do not use arm on implant side to lift more than 10 pounds for 2 weeks.    In 6 to 8 weeks: You may begin to golf, play tennis, swim and do similar activities.    No driving for one day & limit to necessary driving for one week. Please talk to your doctor for specific recommendations.    Bleeding:      If you start bleeding from the incision site, sit down and press firmly on the site for 10  minutes.     Once bleeding stops, call [facility] as soon as you can.    Call 911 right away if you have heavy bleeding or bleeding that does not stop.      Medicines:      Take your medications, including blood thinners, unless your provider tells you not to.    If you have stopped any medicines, check with your provider about when to restart them.    Follow Up Appointments:      Follow up with Device Clinic at [facility] of patient preference in 7-10 days.    Call the clinic if:      You have a large or growing hard lump around the site.    The site is red, swollen, hot or tender.    Blood or fluid is draining from the site.    You have chills or a fever greater than 101 F (38 C).    You feel dizzy or light-headed.    Questions or concerns.    Telling others about your device:      Before you leave the hospital, you will receive a temporary ID card. A permanent card will be mailed to you about 6 to 8 weeks later. Always carry the ID card with you. It has important details about your device.    You may also get a Medical Alert bracelet or tag that says you have a defibrillator (ICD).  Go to [email or web address].     Always tell doctors, dentists and other care providers that you have a device implanted in you.    Let us know before you plan any surgeries. Your care team must take special steps to keep you safe during certain procedures. These steps will depend on the type of device you have. Your provider will need to see your ID card. They may need to call us for instructions.    Device Safety:      Please refer to device  s booklet for further information.          [facility] [place]:    [phone] [facility] (7 days a week)

## 2022-04-07 NOTE — Clinical Note
Irrigated with normal saline solution.    [Mother] : mother [Breast milk] : breast milk [Vitamins ___] : Patient takes [unfilled] vitamins daily [Normal] : Normal [In Bassinet/Crib] : sleeps in bassinet/crib [On back] : sleeps on back

## 2022-04-07 NOTE — PROGRESS NOTES
Discharge instructions reviewed with pt and wife, questions answered and both state understanding.Pacer booklet and card given to pt.

## 2022-04-07 NOTE — PROGRESS NOTES
PATIENT/VISITOR WELLNESS SCREENING    Step 1 Patient Screening    1. In the last month, have you been in contact with someone who was confirmed or suspected to have Coronavirus/COVID-19? No    2. Do you have the following symptoms?  Fever/Chills? No   Cough? No   Shortness of breath? No   New loss of taste or smell? No  Sore throat? No  Muscle or body aches? No  Headaches? No  Fatigue? No  Vomiting or diarrhea? No    Step 2 Visitor Screening    1. Name of Visitor (1 visitor per patient): wife    2. In the last month, have you been in contact with someone who was confirmed or suspected to have Coronavirus/COVID-19? No    3. Do you have the following symptoms?  Fever/Chills? No   Cough? No   Shortness of breath? No   Skin rash? No   Loss of taste or smell? No  Sore throat? No  Runny or stuffy nose? No  Muscle or body aches? No  Headaches? No  Fatigue? No  Vomiting or diarrhea? No      If the visitor has positive symptoms, notify supervisor/manger  Per policy, the visitor will need to leave the facility     Step 3 Refer to logic grid below for actions    NO SYMPTOM(S)    ACTIONS:  1. Standard rooming process  2. Provider to assess per normal protocol  3. Implement precautions as needed and per guidelines     POSITIVE SYMPTOM(S)  If positive for ANY of the following symptoms: fever, cough, shortness of breath, rash    ACTION:  1. Continue to have the patient wear a mask   2. Room patient as soon as possible  3. Don appropriate PPE when entering room  4. Provider evaluation

## 2022-04-07 NOTE — PROGRESS NOTES
Single chamber ICD implantation.  No complications.  May be discharged around 11 am.  Resume home medications.

## 2022-04-07 NOTE — PROGRESS NOTES
Care Suites Discharge Nursing Note    Patient Information  Name: Dipak Swartz  Age: 41 year old    Discharge Education:  Discharge instructions reviewed: Yes  Additional education/resources provided: booklet and card given to pt  Patient/patient representative verbalizes understanding: Yes  Patient discharging on new medications: No  Medication education completed: N/A    Discharge Plans:   Discharge location: home  Discharge ride contacted: Yes  Approximate discharge time: 1100    Discharge Criteria:  Discharge criteria met and vital signs stable: Yes, VSS, site CDI no hematoma no bleeding , CXR no pnuemothorax per report    Patient Belongs:  Patient belongings returned to patient: Yes    Kaya Clay RN

## 2022-04-08 ENCOUNTER — TELEPHONE (OUTPATIENT)
Dept: CARDIOLOGY | Facility: CLINIC | Age: 42
End: 2022-04-08
Payer: COMMERCIAL

## 2022-04-08 NOTE — TELEPHONE ENCOUNTER
Pt had ICD implanted yesterday.     Post device implant discharge phone call.    Reviewed the following:  No raising arm above shoulder on the side of implant for 3 weeks  Remove outer dressing 3 days after implant. May shower after outer dressing removed. Leave steri-strips in place, will be removed at 1 week device check  Limit driving for: N/A  Watch for redness, drainage, warmth, or fever. Call device clinic if any signs of infection.     1 week device check scheduled: Thursday, 4/14/2022, 10:00am, El[place]    Pt states understanding of all instructions.

## 2022-04-13 ENCOUNTER — CARE COORDINATION (OUTPATIENT)
Dept: CARDIOLOGY | Facility: CLINIC | Age: 42
End: 2022-04-13
Payer: COMMERCIAL

## 2022-04-13 DIAGNOSIS — I50.20 HFREF (HEART FAILURE WITH REDUCED EJECTION FRACTION) (H): Primary | ICD-10-CM

## 2022-04-13 RX ORDER — SPIRONOLACTONE 25 MG/1
25 TABLET ORAL DAILY
Qty: 90 TABLET | Refills: 3 | Status: SHIPPED | OUTPATIENT
Start: 2022-04-13 | End: 2023-04-06

## 2022-04-13 NOTE — PROGRESS NOTES
[facility] - CORE Clinic    Incoming call from patient w/medication question. He was last seen by Ivory Serrano on 3/17. At that visit she started him on spironolactone 12.5mg/d. He discovered that he has been taking 25mg/d as he had to get a refill and was told it was too soon. He reported to me that he is feeling well and the dizziness/lightheadedness he had been experiencing has decreased.   Future Appointments   Date Time Provider Department Center   4/14/2022 10:00 AM [facility]   4/19/2022  9:45 AM Ivory Serrano PA-C [facility]     I reviewed this w/Ivory Serrano. He can remain on the 25mg/d and recheck BMP on 4/19 at his upcoming appt. I will send in an updated Rx. He verbalized understanding.  Katelyn Felix RN on 4/13/2022 at 10:13 AM

## 2022-04-14 ENCOUNTER — ANCILLARY PROCEDURE (OUTPATIENT)
Dept: CARDIOLOGY | Facility: CLINIC | Age: 42
End: 2022-04-14
Attending: INTERNAL MEDICINE
Payer: COMMERCIAL

## 2022-04-14 DIAGNOSIS — I42.8 NON-ISCHEMIC CARDIOMYOPATHY (H): ICD-10-CM

## 2022-04-14 DIAGNOSIS — Z95.810 ICD (IMPLANTABLE CARDIOVERTER-DEFIBRILLATOR), SINGLE, IN SITU: ICD-10-CM

## 2022-04-14 LAB
ATRIAL RATE - MUSE: 57 BPM
DIASTOLIC BLOOD PRESSURE - MUSE: NORMAL MMHG
INTERPRETATION ECG - MUSE: NORMAL
P AXIS - MUSE: 55 DEGREES
PR INTERVAL - MUSE: 196 MS
QRS DURATION - MUSE: 126 MS
QT - MUSE: 478 MS
QTC - MUSE: 465 MS
R AXIS - MUSE: 51 DEGREES
SYSTOLIC BLOOD PRESSURE - MUSE: NORMAL MMHG
T AXIS - MUSE: 261 DEGREES
VENTRICULAR RATE- MUSE: 57 BPM

## 2022-04-14 PROCEDURE — 93282 PRGRMG EVAL IMPLANTABLE DFB: CPT | Performed by: INTERNAL MEDICINE

## 2022-04-18 LAB
MDC_IDC_LEAD_IMPLANT_DT: NORMAL
MDC_IDC_LEAD_LOCATION: NORMAL
MDC_IDC_LEAD_LOCATION_DETAIL_1: NORMAL
MDC_IDC_LEAD_MFG: NORMAL
MDC_IDC_LEAD_MODEL: NORMAL
MDC_IDC_LEAD_POLARITY_TYPE: NORMAL
MDC_IDC_LEAD_SERIAL: NORMAL
MDC_IDC_MSMT_BATTERY_STATUS: NORMAL
MDC_IDC_MSMT_CAP_CHARGE_DTM: NORMAL
MDC_IDC_MSMT_CAP_CHARGE_ENERGY: 0 J
MDC_IDC_MSMT_CAP_CHARGE_TIME: 0 S
MDC_IDC_MSMT_CAP_CHARGE_TIME: 9.53
MDC_IDC_MSMT_CAP_CHARGE_TYPE: NORMAL
MDC_IDC_MSMT_CAP_CHARGE_TYPE: NORMAL
MDC_IDC_MSMT_LEADCHNL_RV_IMPEDANCE_VALUE: 552 OHM
MDC_IDC_MSMT_LEADCHNL_RV_PACING_THRESHOLD_AMPLITUDE: 0.9 V
MDC_IDC_MSMT_LEADCHNL_RV_PACING_THRESHOLD_PULSEWIDTH: 0.4 MS
MDC_IDC_MSMT_LEADCHNL_RV_SENSING_INTR_AMPL: 13.3 MV
MDC_IDC_PG_IMPLANT_DTM: NORMAL
MDC_IDC_PG_MFG: NORMAL
MDC_IDC_PG_MODEL: NORMAL
MDC_IDC_PG_SERIAL: NORMAL
MDC_IDC_PG_TYPE: NORMAL
MDC_IDC_SESS_CLINIC_NAME: NORMAL
MDC_IDC_SESS_DTM: NORMAL
MDC_IDC_SESS_TYPE: NORMAL
MDC_IDC_SET_BRADY_HYSTRATE: NORMAL
MDC_IDC_SET_BRADY_LOWRATE: 40 {BEATS}/MIN
MDC_IDC_SET_BRADY_MODE: NORMAL
MDC_IDC_SET_LEADCHNL_RV_PACING_AMPLITUDE: 2 V
MDC_IDC_SET_LEADCHNL_RV_PACING_CAPTURE_MODE: NORMAL
MDC_IDC_SET_LEADCHNL_RV_PACING_POLARITY: NORMAL
MDC_IDC_SET_LEADCHNL_RV_PACING_PULSEWIDTH: 0.4 MS
MDC_IDC_SET_LEADCHNL_RV_SENSING_ADAPTATION_MODE: NORMAL
MDC_IDC_SET_LEADCHNL_RV_SENSING_POLARITY: NORMAL
MDC_IDC_SET_LEADCHNL_RV_SENSING_SENSITIVITY: 0.6 MV
MDC_IDC_SET_ZONE_DETECTION_INTERVAL: 250 MS
MDC_IDC_SET_ZONE_DETECTION_INTERVAL: 300 MS
MDC_IDC_SET_ZONE_DETECTION_INTERVAL: 353 MS
MDC_IDC_SET_ZONE_TYPE: NORMAL
MDC_IDC_SET_ZONE_VENDOR_TYPE: NORMAL
MDC_IDC_STAT_EPISODE_RECENT_COUNT: 0
MDC_IDC_STAT_EPISODE_RECENT_COUNT_DTM_END: NORMAL
MDC_IDC_STAT_EPISODE_RECENT_COUNT_DTM_START: NORMAL
MDC_IDC_STAT_EPISODE_TOTAL_COUNT: 0
MDC_IDC_STAT_EPISODE_TOTAL_COUNT_DTM_END: NORMAL
MDC_IDC_STAT_EPISODE_TYPE: NORMAL
MDC_IDC_STAT_EPISODE_VENDOR_TYPE: NORMAL
MDC_IDC_STAT_TACHYTHERAPY_ATP_DELIVERED_RECENT: 0
MDC_IDC_STAT_TACHYTHERAPY_ATP_DELIVERED_TOTAL: 0
MDC_IDC_STAT_TACHYTHERAPY_RECENT_DTM_END: NORMAL
MDC_IDC_STAT_TACHYTHERAPY_RECENT_DTM_START: NORMAL
MDC_IDC_STAT_TACHYTHERAPY_SHOCKS_ABORTED_RECENT: 0
MDC_IDC_STAT_TACHYTHERAPY_SHOCKS_ABORTED_TOTAL: 0
MDC_IDC_STAT_TACHYTHERAPY_SHOCKS_DELIVERED_RECENT: 0
MDC_IDC_STAT_TACHYTHERAPY_SHOCKS_DELIVERED_TOTAL: 0
MDC_IDC_STAT_TACHYTHERAPY_TOTAL_DTM_END: NORMAL

## 2022-05-02 ENCOUNTER — TELEPHONE (OUTPATIENT)
Dept: CARDIOLOGY | Facility: CLINIC | Age: 42
End: 2022-05-02
Payer: COMMERCIAL

## 2022-05-02 DIAGNOSIS — I47.10 SVT (SUPRAVENTRICULAR TACHYCARDIA) (H): ICD-10-CM

## 2022-05-02 DIAGNOSIS — I42.8 NONISCHEMIC CARDIOMYOPATHY (H): Primary | ICD-10-CM

## 2022-05-02 NOTE — TELEPHONE ENCOUNTER
Transmission received from patient's Single chamber ICD. Patient had 1 episode logged as VT and another as NSVT. Episodes occurred on 4/29/22 at 1654 and 1701. VT EGM reviewed with Dr Lynch(EP MD of the day) EGM shows VS with some irregularity, No change in morphology with rates of 170-180bpm.     Per Dr Lynch hard to tell what rhythm truly is could be an SVT. Suggested a Zio patch for 2 weeks.     Called patient and left voicemail asking for return call to device clinic to discuss recommendations and symptoms.

## 2022-05-02 NOTE — TELEPHONE ENCOUNTER
Received call back from patient and reviewed ICD alert received as outlined by MALU Walker along with Dr. Lynch's recommendations.      Patient stated that he was putting a boat in the water at the time, but cannot recall any symptoms.  He is agreeable to the ZioPatch monitor as recommended by Dr. Lynch.  Pt normally follows up in [place].  Order entered and patient transferred to scheduling.     MALU Rdz

## 2022-05-05 ENCOUNTER — HOSPITAL ENCOUNTER (OUTPATIENT)
Dept: CARDIOLOGY | Facility: CLINIC | Age: 42
Discharge: HOME | End: 2022-05-05
Attending: INTERNAL MEDICINE | Admitting: INTERNAL MEDICINE
Payer: COMMERCIAL

## 2022-05-05 DIAGNOSIS — I42.8 NONISCHEMIC CARDIOMYOPATHY (H): ICD-10-CM

## 2022-05-05 DIAGNOSIS — I47.10 SVT (SUPRAVENTRICULAR TACHYCARDIA) (H): ICD-10-CM

## 2022-05-05 PROCEDURE — 93246 EXT ECG>7D<15D RECORDING: CPT

## 2022-05-10 ENCOUNTER — TELEPHONE (OUTPATIENT)
Dept: CARDIOLOGY | Facility: CLINIC | Age: 42
End: 2022-05-10
Payer: COMMERCIAL

## 2022-05-10 NOTE — TELEPHONE ENCOUNTER
Patient called and stated that Zio patch fell off this morning. Asked that patient call Zio patch to send some adhesive. Phone number provided.     Patient stated that ICD site is tender to touch. Denies any site changes, swelling or redness. ICD was placed on 4/7/22 at 1 week check site c/d/i, no signs of hematoma or swelling. Patient is scheduled for 6 week check on 5/26/22. Asked that patient call device clinic if pain increases, patient notices swelling or change in color of device site. Patient voiced understanding.

## 2022-05-12 DIAGNOSIS — I50.20 HFREF (HEART FAILURE WITH REDUCED EJECTION FRACTION) (H): Primary | ICD-10-CM

## 2022-05-12 DIAGNOSIS — G47.01 INSOMNIA DUE TO MEDICAL CONDITION: ICD-10-CM

## 2022-05-12 DIAGNOSIS — I42.8 NONISCHEMIC CARDIOMYOPATHY (H): ICD-10-CM

## 2022-05-12 RX ORDER — ZOLPIDEM TARTRATE 6.25 MG/1
6.25 TABLET, FILM COATED, EXTENDED RELEASE ORAL
Qty: 14 TABLET | Refills: 0 | Status: SHIPPED | OUTPATIENT
Start: 2022-05-12 | End: 2022-05-31

## 2022-05-12 RX ORDER — ATORVASTATIN CALCIUM 40 MG/1
40 TABLET, FILM COATED ORAL DAILY
Qty: 90 TABLET | Refills: 3 | Status: SHIPPED | OUTPATIENT
Start: 2022-05-12 | End: 2022-12-06

## 2022-05-12 NOTE — TELEPHONE ENCOUNTER
Pt left VM, he said that he contacted Zio Patch after it fell off the first time and he got some adhesive and tried it, but the ZP fell off again 4 hours later. He called ZP again and they said to just send in the monitor because it should have enough data. Pt said he wore it for about 3 or 4 of the 14 days so he wanted to check if that was enough.     Called pt back. Told him to go ahead and send it in, because it seems like he tried as hard as he could and it will keep coming off. We can always try again in the future if needed or try something else. Pt agrees with this plan.

## 2022-05-12 NOTE — TELEPHONE ENCOUNTER
Please let patient know I have sent a small anabelle refill. Due to this being a controlled substance I need to see him at least every 6 months, so he is overdue for a recheck.     OK for approval req. OK for virtual apt as well.    Kong Farmer PA-C  [facility]

## 2022-05-12 NOTE — TELEPHONE ENCOUNTER
Received refill request for:  Atorvastatin  Last OV was: 3/17/22  Labs/EK21  F/U scheduled: 22  New script sent to: Maia Davison RN on 2022 at 5:25 PM

## 2022-05-12 NOTE — TELEPHONE ENCOUNTER
Pending Prescriptions:                       Disp   Refills    zolpidem ER (AMBIEN CR) 6.25 MG CR tablet *30 tab*5        Sig: TAKE ONE TABLET BY MOUTH AT BEDTIME AS NEEDED FOR           SLEEP    Routing refill request to provider for review/approval because:  Drug not on the G refill protocol     zolpidem ER (AMBIEN CR) 6.25 MG CR tablet 30 tablet 5 11/5/2021  No   Sig - Route: Take 1 tablet (6.25 mg) by mouth nightly as needed for sleep - Oral   Sent to pharmacy as: Zolpidem Tartrate ER 6.25 MG Oral Tablet Extended Release (AMBIEN CR)   Class: E-Prescribe   Order: [id number]   E-Prescribing Status: Receipt confirmed by pharmacy (10/26/2021 11:58 AM CDT)     Adriana Hernandez RN on 5/12/2022 at 3:22 PM

## 2022-05-26 ENCOUNTER — ANCILLARY PROCEDURE (OUTPATIENT)
Dept: CARDIOLOGY | Facility: CLINIC | Age: 42
End: 2022-05-26
Attending: INTERNAL MEDICINE
Payer: COMMERCIAL

## 2022-05-26 DIAGNOSIS — I42.8 NON-ISCHEMIC CARDIOMYOPATHY (H): ICD-10-CM

## 2022-05-26 DIAGNOSIS — Z95.810 ICD (IMPLANTABLE CARDIOVERTER-DEFIBRILLATOR), SINGLE, IN SITU: ICD-10-CM

## 2022-05-26 PROCEDURE — 93282 PRGRMG EVAL IMPLANTABLE DFB: CPT | Performed by: INTERNAL MEDICINE

## 2022-05-31 ENCOUNTER — TELEPHONE (OUTPATIENT)
Dept: FAMILY MEDICINE | Facility: OTHER | Age: 42
End: 2022-05-31
Payer: COMMERCIAL

## 2022-05-31 DIAGNOSIS — G47.01 INSOMNIA DUE TO MEDICAL CONDITION: ICD-10-CM

## 2022-05-31 RX ORDER — ATORVASTATIN CALCIUM 40 MG/1
40 TABLET, FILM COATED ORAL DAILY
COMMUNITY
Start: 2021-12-14 | End: 2022-06-02

## 2022-05-31 RX ORDER — ZOLPIDEM TARTRATE 6.25 MG/1
6.25 TABLET, FILM COATED, EXTENDED RELEASE ORAL
Qty: 30 TABLET | Refills: 0 | Status: SHIPPED | OUTPATIENT
Start: 2022-05-31 | End: 2022-07-05

## 2022-05-31 RX ORDER — CARVEDILOL 3.12 MG/1
3.12 TABLET ORAL 2 TIMES DAILY
COMMUNITY
Start: 2021-12-14 | End: 2022-06-02

## 2022-06-01 NOTE — TELEPHONE ENCOUNTER
Will give 30 days and leave for PCP to be aware that I did send a anabelle for him given appointment was canceled.       JOSE Au CNP  Questions or concerns please feel free to send me a O[employer] message or call me  Phone : [phone]         reviewed no concerns.   Appointments in Next Year    Jun 02, 2022  8:00 AM  LAB with PH LAB  [facility] ([facility] ) [phone]   Jun 02, 2022  8:25 AM  RETURN CORE with Ivory Serrano PA-C  [facility] ([facility] ) [phone]   Jul 12, 2022 11:30 AM  (Arrive by 11:10 AM)  Provider Visit with Lois Farmer PA-C  [facility] ([facility] ) [phone]   Aug 29, 2022 12:00 AM  CARDIAC DEVICE CHECK - REMOTE with LOUISE PIERCE2  [facility] ([facility] ) [phone]            JOSE Au CNP  Questions or concerns please feel free to send me a Omnirelianthart message or call me  Phone : [phone]      
Pt contacted and advised.   
Reason for Call:  Medication or medication refill:    Do you use a [facility]?  Name of the pharmacy and phone number for the current request:  [facility] [phone]    Name of the medication requested: Zolpidem    Other request:  Patient was scheduled for today 05-31 for follow up but the provider is out. He will need a refill.  He is rescheduled for 07-12. Thank you     Can we leave a detailed message on this number? YES    Phone number patient can be reached at: Cell number on file:    Telephone Information:   Mobile [phone]       Best Time:  any    Call taken on 5/31/2022 at 7:38 AM by Valentine Lazo      
If you are a smoker, it is important for your health to stop smoking. Please be aware that second hand smoke is also harmful.

## 2022-06-02 ENCOUNTER — LAB (OUTPATIENT)
Dept: LAB | Facility: CLINIC | Age: 42
End: 2022-06-02
Payer: COMMERCIAL

## 2022-06-02 ENCOUNTER — TELEPHONE (OUTPATIENT)
Dept: CARDIOLOGY | Facility: CLINIC | Age: 42
End: 2022-06-02

## 2022-06-02 ENCOUNTER — OFFICE VISIT (OUTPATIENT)
Dept: CARDIOLOGY | Facility: CLINIC | Age: 42
End: 2022-06-02
Payer: COMMERCIAL

## 2022-06-02 VITALS
WEIGHT: 157 LBS | OXYGEN SATURATION: 99 % | HEIGHT: 67 IN | SYSTOLIC BLOOD PRESSURE: 100 MMHG | BODY MASS INDEX: 24.64 KG/M2 | DIASTOLIC BLOOD PRESSURE: 72 MMHG | HEART RATE: 80 BPM

## 2022-06-02 DIAGNOSIS — I50.20 HFREF (HEART FAILURE WITH REDUCED EJECTION FRACTION) (H): Primary | ICD-10-CM

## 2022-06-02 DIAGNOSIS — I50.20 HFREF (HEART FAILURE WITH REDUCED EJECTION FRACTION) (H): ICD-10-CM

## 2022-06-02 DIAGNOSIS — Z11.4 SCREENING FOR HIV (HUMAN IMMUNODEFICIENCY VIRUS): ICD-10-CM

## 2022-06-02 DIAGNOSIS — Z11.59 NEED FOR HEPATITIS C SCREENING TEST: ICD-10-CM

## 2022-06-02 LAB
ANION GAP SERPL CALCULATED.3IONS-SCNC: 4 MMOL/L (ref 3–14)
BUN SERPL-MCNC: 19 MG/DL (ref 7–30)
CALCIUM SERPL-MCNC: 9.4 MG/DL (ref 8.5–10.1)
CHLORIDE BLD-SCNC: 106 MMOL/L (ref 94–109)
CO2 SERPL-SCNC: 26 MMOL/L (ref 20–32)
CREAT SERPL-MCNC: 1.15 MG/DL (ref 0.66–1.25)
GFR SERPL CREATININE-BSD FRML MDRD: 82 ML/MIN/1.73M2
GLUCOSE BLD-MCNC: 118 MG/DL (ref 70–99)
HCV AB SERPL QL IA: NONREACTIVE
HIV 1+2 AB+HIV1 P24 AG SERPL QL IA: NONREACTIVE
MDC_IDC_LEAD_IMPLANT_DT: NORMAL
MDC_IDC_LEAD_LOCATION: NORMAL
MDC_IDC_LEAD_LOCATION_DETAIL_1: NORMAL
MDC_IDC_LEAD_MFG: NORMAL
MDC_IDC_LEAD_MODEL: NORMAL
MDC_IDC_LEAD_POLARITY_TYPE: NORMAL
MDC_IDC_LEAD_SERIAL: NORMAL
MDC_IDC_MSMT_BATTERY_DTM: NORMAL
MDC_IDC_MSMT_BATTERY_REMAINING_LONGEVITY: 174 MO
MDC_IDC_MSMT_BATTERY_REMAINING_PERCENTAGE: 100 %
MDC_IDC_MSMT_BATTERY_STATUS: NORMAL
MDC_IDC_MSMT_CAP_CHARGE_DTM: NORMAL
MDC_IDC_MSMT_CAP_CHARGE_TIME: 9.5 S
MDC_IDC_MSMT_CAP_CHARGE_TYPE: NORMAL
MDC_IDC_MSMT_LEADCHNL_RV_IMPEDANCE_VALUE: 578 OHM
MDC_IDC_MSMT_LEADCHNL_RV_PACING_THRESHOLD_AMPLITUDE: 0.7 V
MDC_IDC_MSMT_LEADCHNL_RV_PACING_THRESHOLD_PULSEWIDTH: 0.4 MS
MDC_IDC_PG_IMPLANT_DTM: NORMAL
MDC_IDC_PG_MFG: NORMAL
MDC_IDC_PG_MODEL: NORMAL
MDC_IDC_PG_SERIAL: NORMAL
MDC_IDC_PG_TYPE: NORMAL
MDC_IDC_SESS_CLINIC_NAME: NORMAL
MDC_IDC_SESS_DTM: NORMAL
MDC_IDC_SESS_TYPE: NORMAL
MDC_IDC_SET_BRADY_LOWRATE: 40 {BEATS}/MIN
MDC_IDC_SET_BRADY_MODE: NORMAL
MDC_IDC_SET_LEADCHNL_RV_PACING_AMPLITUDE: 2 V
MDC_IDC_SET_LEADCHNL_RV_PACING_CAPTURE_MODE: NORMAL
MDC_IDC_SET_LEADCHNL_RV_PACING_POLARITY: NORMAL
MDC_IDC_SET_LEADCHNL_RV_PACING_PULSEWIDTH: 0.4 MS
MDC_IDC_SET_LEADCHNL_RV_SENSING_ADAPTATION_MODE: NORMAL
MDC_IDC_SET_LEADCHNL_RV_SENSING_POLARITY: NORMAL
MDC_IDC_SET_LEADCHNL_RV_SENSING_SENSITIVITY: 0.6 MV
MDC_IDC_SET_ZONE_DETECTION_INTERVAL: 250 MS
MDC_IDC_SET_ZONE_DETECTION_INTERVAL: 300 MS
MDC_IDC_SET_ZONE_DETECTION_INTERVAL: 353 MS
MDC_IDC_SET_ZONE_TYPE: NORMAL
MDC_IDC_SET_ZONE_VENDOR_TYPE: NORMAL
MDC_IDC_STAT_BRADY_DTM_END: NORMAL
MDC_IDC_STAT_BRADY_DTM_START: NORMAL
MDC_IDC_STAT_BRADY_RV_PERCENT_PACED: 0 %
MDC_IDC_STAT_EPISODE_RECENT_COUNT: 0
MDC_IDC_STAT_EPISODE_RECENT_COUNT: 14
MDC_IDC_STAT_EPISODE_RECENT_COUNT_DTM_END: NORMAL
MDC_IDC_STAT_EPISODE_RECENT_COUNT_DTM_START: NORMAL
MDC_IDC_STAT_EPISODE_TYPE: NORMAL
MDC_IDC_STAT_EPISODE_VENDOR_TYPE: NORMAL
MDC_IDC_STAT_TACHYTHERAPY_ATP_DELIVERED_RECENT: 0
MDC_IDC_STAT_TACHYTHERAPY_ATP_DELIVERED_TOTAL: 0
MDC_IDC_STAT_TACHYTHERAPY_RECENT_DTM_END: NORMAL
MDC_IDC_STAT_TACHYTHERAPY_RECENT_DTM_START: NORMAL
MDC_IDC_STAT_TACHYTHERAPY_SHOCKS_ABORTED_RECENT: 0
MDC_IDC_STAT_TACHYTHERAPY_SHOCKS_ABORTED_TOTAL: 0
MDC_IDC_STAT_TACHYTHERAPY_SHOCKS_DELIVERED_RECENT: 0
MDC_IDC_STAT_TACHYTHERAPY_SHOCKS_DELIVERED_TOTAL: 0
MDC_IDC_STAT_TACHYTHERAPY_TOTAL_DTM_END: NORMAL
MDC_IDC_STAT_TACHYTHERAPY_TOTAL_DTM_START: NORMAL
POTASSIUM BLD-SCNC: 4.2 MMOL/L (ref 3.4–5.3)
SODIUM SERPL-SCNC: 136 MMOL/L (ref 133–144)

## 2022-06-02 PROCEDURE — 80048 BASIC METABOLIC PNL TOTAL CA: CPT | Performed by: PHYSICIAN ASSISTANT

## 2022-06-02 PROCEDURE — 99214 OFFICE O/P EST MOD 30 MIN: CPT | Mod: 24 | Performed by: PHYSICIAN ASSISTANT

## 2022-06-02 PROCEDURE — 87389 HIV-1 AG W/HIV-1&-2 AB AG IA: CPT

## 2022-06-02 PROCEDURE — 36415 COLL VENOUS BLD VENIPUNCTURE: CPT

## 2022-06-02 PROCEDURE — 86803 HEPATITIS C AB TEST: CPT
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
## 2022-06-02 NOTE — PATIENT INSTRUCTIONS
Call [facility] nurse for any questions or concerns Mon-Fri 8am-4pm:                                                #[phone]                                       For concerns after hours:                                               #[phone]     1: Medication changes: continue current medications.  If your lightheadedness gets worse please call.    Minimize alcohol as much as possible.      2: Plan from today: I'll have the device nurses check your pacemaker to see if anything was going on yesterday.   Send in your monitor.    I'll see you back in August, please see Dr. Irizarry in December.  We'll do an echocardiogram in August as well.       3: Lab results: labs look great  Component      Latest Ref Rng & Units 6/2/2022   Sodium      133 - 144 mmol/L 136   Potassium      3.4 - 5.3 mmol/L 4.2   Chloride      94 - 109 mmol/L 106   Carbon Dioxide      20 - 32 mmol/L 26   Anion Gap      3 - 14 mmol/L 4   Urea Nitrogen      7 - 30 mg/dL 19   Creatinine      0.66 - 1.25 mg/dL 1.15   Calcium      8.5 - 10.1 mg/dL 9.4   Glucose      70 - 99 mg/dL 118 (H)   GFR Estimate      >60 mL/min/1.73m2 82     
No
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
## 2022-06-02 NOTE — TELEPHONE ENCOUNTER
Called patient and left voicemail requesting call back. His monitor is currently not connecting on the latitude site. Will assist patient with sending transmission when he returns call.     ----- Message from Ivory Serrano PA-C sent at 6/2/2022  9:06 AM CDT -----  Regarding: near syncope  Good morning,   I saw Jorge L today in clinic.  He had a near syncopal episode yesterday between 10 and 11 am while shopping in [employer].  Will you pls check his device remotely?  I'll adjust bb if its rhythm related.  Thanks,   Ivory

## 2022-06-02 NOTE — RESULT ENCOUNTER NOTE
Will review or did review during clinic visit.  Please see progress note for plan.  Ivory Serrano PA-C 6/2/2022 12:34 PM

## 2022-06-03 ENCOUNTER — TELEPHONE (OUTPATIENT)
Dept: INTERNAL MEDICINE | Facility: CLINIC | Age: 42
End: 2022-06-03
Payer: COMMERCIAL

## 2022-06-03 NOTE — LETTER
[address] [address]  [phone]        June 9, 2022    Dipak Swartz  [address] [place] [facility] [place] [address]          Dear Dipak,    Results: HIV and Hep C    Sincerely,        Jason García MD

## 2022-06-03 NOTE — TELEPHONE ENCOUNTER
I have attempted to call the pt with the following message. I left a message for pt to call back. I will call back another time.    Please let him know his HIV and hepatitis c screens were negative.    Cherise Donnelly, CMA

## 2022-06-06 NOTE — TELEPHONE ENCOUNTER
Thanks for the update!  It's possible episode correlated with near syncope in [place], but only 13 secs long.  Regardless we want to minimize VT.  BP are on the softer side, but I'd like him to try and increase toprol to 50 mg daily.  IF we need to, I can decrease entresto to make bp room.    CORE team, pls contact pt.   Ivory Serrano PA-C 6/6/2022 12:15 PM

## 2022-06-06 NOTE — TELEPHONE ENCOUNTER
[facility]    I called Jorge L and left CASIMIRO requesting call back to Ivory INMAN's office for new recommendations.    Will reattempt tomorrow if we don't receive call back.    Yoly Delgado RN BSN   2:42 PM 06/06/22

## 2022-06-07 RX ORDER — METOPROLOL SUCCINATE 25 MG/1
TABLET, EXTENDED RELEASE ORAL
Qty: 180 TABLET | Refills: 3 | Status: SHIPPED | OUTPATIENT
Start: 2022-06-07 | End: 2022-06-30

## 2022-06-07 NOTE — TELEPHONE ENCOUNTER
[facility]    Called patient w/direction to increase toprol to 50mg/d based on device report and near syncopal episode. Patient verbalized understanding. I asked if he has been monitoring his BP and he stated he has not recently. Historically he has been in the low 100's. With this toprol increase he will monitor his BP and call if he is <90 systolic. Updated Rx sent to his preferred pharmacy.  Katelyn Felix RN on 6/7/2022 at 11:27 AM

## 2022-06-16 ENCOUNTER — CARE COORDINATION (OUTPATIENT)
Dept: CARDIOLOGY | Facility: CLINIC | Age: 42
End: 2022-06-16
Payer: COMMERCIAL

## 2022-06-16 NOTE — PROGRESS NOTES
[facility]    -Spoke with Jorge L who states he is having intermittent chest pain. Nothing severe, more like heart burn. He states his weights are typically 142-147# and he is 142# today. No complaints of SOB, BPs are good.      Meds:    Entresto 49-51 mg 1 tablet twice daily  Spironolactone 25 mg daily  Lasix 20 mg PRN weight gain, swelling      Reviewed the above with Ivory Serrano who recommends  1. If CP becomes severe and uncontrolled to go to the ER.    2. If he has more slow, intermittent chest pain, we should do a device check.   3. If he starts experiencing HF symptoms to take Lasix 20 mg for 3 days.      Reviewed the recommendations with Jorge L who will send in a remote device check.      Will send a message to the device nurses to watch for a check to come through.     Yvette Davison RN on 6/16/2022 at 5:04 PM

## 2022-06-17 NOTE — PROGRESS NOTES
Called patient. He states that he will be out of town until tomorrow. He will send a transmission tomorrow which will be reviewed on Monday.

## 2022-06-20 ENCOUNTER — TELEPHONE (OUTPATIENT)
Dept: CARDIOLOGY | Facility: CLINIC | Age: 42
End: 2022-06-20
Payer: COMMERCIAL

## 2022-06-20 ENCOUNTER — ANCILLARY PROCEDURE (OUTPATIENT)
Dept: CARDIOLOGY | Facility: CLINIC | Age: 42
End: 2022-06-20
Attending: INTERNAL MEDICINE
Payer: COMMERCIAL

## 2022-06-20 DIAGNOSIS — Z95.810 ICD (IMPLANTABLE CARDIOVERTER-DEFIBRILLATOR), SINGLE, IN SITU: ICD-10-CM

## 2022-06-20 DIAGNOSIS — I42.8 NON-ISCHEMIC CARDIOMYOPATHY (H): ICD-10-CM

## 2022-06-20 PROCEDURE — 93295 DEV INTERROG REMOTE 1/2/MLT: CPT | Performed by: INTERNAL MEDICINE

## 2022-06-20 PROCEDURE — 93296 REM INTERROG EVL PM/IDS: CPT | Performed by: INTERNAL MEDICINE

## 2022-06-20 NOTE — TELEPHONE ENCOUNTER
Requested by CORE to evaluate for arrhythmias d/t pt having chest pain. Unknown when pt had the chest pain but, a total of 7 VT/NSVT episodes logged since 6/2/22, when a courtesy check was done to assess for arrhythmias relating to a presyncopal episode. EGM's show SVT/VT in the 170-200's lasting 2.5-48s.EGM's similar to ones assessed on 5/1/22 by Dr. Lynch.Pt did have a Zio patch placed to determine SVT vs VT see report on 5/5/22. Rosemary TORIBIO      Full device check as follows:   ScanÃ¢â‚¬Â¢Jour Resonate (S)ICD Remote Device Check    : 0 %    Mode: VVI 40    Presenting Rhythm: VS 90's     Heart Rate: Stable with excellent variability per histograms    Sensing: WNL    Pacing Threshold: WNL    Impedance: WNL, shock: 54 ohms    Battery Status: 15y    Atrial Arrhythmia: none    Ventricular Arrhythmia: 7 NSVT episodes logged. EGM's show SVT/VT in the 170-200's lasting 2.5-31s. 2 VT episodes logged. EGM's show SVT/VT in the 170's lasting 39-48s.  EGM's similar to ones assessed on 5/1/22 by Dr. Lynch. Pt recently had a holter monitor to assess this. See results in EPIC on 6/7/22.   ATP: 0    Shocks: 0    Care Plan: requested by CORE clinic to assess rhythms due to pt having chest pain. Unsure of the times or dates of pt symptoms but episodes logged on   5/29/22 at 1548 and 1549, 5/30/22 at 1459, 6/1/22 at 0856, 6/2/22 at 1509, 6/7/22 [id number],6/13/22 at 1440 and 1506, and 6/18 at 0846. Pt has next remote scheduled for 8/29/22. Will update core clinic with findings. DAWSON/RN

## 2022-06-21 NOTE — TELEPHONE ENCOUNTER
[facility]    Reviewed Ivory INMAN's message with Jorge L. Fortunately, he's had no further episodes of these symptoms.    He told me he typically only eats one meal daily, and doesn't think symptoms were related to acid reflux.     I asked him to watch closely for symptoms, and contact us with concerns between now and next clinic visit. He agreed.    Future Appointments   Date Time Provider Department Center   7/12/2022 11:30 AM [facility]   8/9/2022 10:00 AM [address]   8/9/2022 11:00 AM PH LAB [facility]   8/16/2022 10:25 AM Ivory Serrano PA-C [facility]   8/29/2022 12:00 [facility] Delgado RN BSN   12:27 PM 06/21/22

## 2022-06-21 NOTE — TELEPHONE ENCOUNTER
It's possible he had cp on the episodes of SVT/ 's lasting 39-48s, although difficult to know for certain. toprol recently increased, bps have been soft for I don't want to increase again until I see him and get repeat vitals.    Follow cp for now, it may be gerd related.

## 2022-06-24 LAB
MDC_IDC_EPISODE_DTM: NORMAL
MDC_IDC_EPISODE_DURATION: 15 S
MDC_IDC_EPISODE_DURATION: 20 S
MDC_IDC_EPISODE_DURATION: 31 S
MDC_IDC_EPISODE_DURATION: 64 S
MDC_IDC_EPISODE_ID: NORMAL
MDC_IDC_EPISODE_TYPE: NORMAL
MDC_IDC_EPISODE_VENDOR_TYPE: NORMAL
MDC_IDC_LEAD_IMPLANT_DT: NORMAL
MDC_IDC_LEAD_LOCATION: NORMAL
MDC_IDC_LEAD_LOCATION_DETAIL_1: NORMAL
MDC_IDC_LEAD_MFG: NORMAL
MDC_IDC_LEAD_MODEL: NORMAL
MDC_IDC_LEAD_POLARITY_TYPE: NORMAL
MDC_IDC_LEAD_SERIAL: NORMAL
MDC_IDC_MSMT_BATTERY_DTM: NORMAL
MDC_IDC_MSMT_BATTERY_REMAINING_LONGEVITY: 180 MO
MDC_IDC_MSMT_BATTERY_REMAINING_PERCENTAGE: 100 %
MDC_IDC_MSMT_BATTERY_STATUS: NORMAL
MDC_IDC_MSMT_CAP_CHARGE_DTM: NORMAL
MDC_IDC_MSMT_CAP_CHARGE_TIME: 9.5 S
MDC_IDC_MSMT_CAP_CHARGE_TYPE: NORMAL
MDC_IDC_MSMT_LEADCHNL_RV_IMPEDANCE_VALUE: 567 OHM
MDC_IDC_MSMT_LEADCHNL_RV_PACING_THRESHOLD_AMPLITUDE: 0.7 V
MDC_IDC_MSMT_LEADCHNL_RV_PACING_THRESHOLD_PULSEWIDTH: 0.4 MS
MDC_IDC_PG_IMPLANT_DTM: NORMAL
MDC_IDC_PG_MFG: NORMAL
MDC_IDC_PG_MODEL: NORMAL
MDC_IDC_PG_SERIAL: NORMAL
MDC_IDC_PG_TYPE: NORMAL
MDC_IDC_SESS_CLINIC_NAME: NORMAL
MDC_IDC_SESS_DTM: NORMAL
MDC_IDC_SESS_TYPE: NORMAL
MDC_IDC_SET_BRADY_LOWRATE: 40 {BEATS}/MIN
MDC_IDC_SET_BRADY_MODE: NORMAL
MDC_IDC_SET_LEADCHNL_RV_PACING_AMPLITUDE: 2 V
MDC_IDC_SET_LEADCHNL_RV_PACING_CAPTURE_MODE: NORMAL
MDC_IDC_SET_LEADCHNL_RV_PACING_POLARITY: NORMAL
MDC_IDC_SET_LEADCHNL_RV_PACING_PULSEWIDTH: 0.4 MS
MDC_IDC_SET_LEADCHNL_RV_SENSING_ADAPTATION_MODE: NORMAL
MDC_IDC_SET_LEADCHNL_RV_SENSING_POLARITY: NORMAL
MDC_IDC_SET_LEADCHNL_RV_SENSING_SENSITIVITY: 0.6 MV
MDC_IDC_SET_ZONE_DETECTION_INTERVAL: 250 MS
MDC_IDC_SET_ZONE_DETECTION_INTERVAL: 300 MS
MDC_IDC_SET_ZONE_DETECTION_INTERVAL: 353 MS
MDC_IDC_SET_ZONE_TYPE: NORMAL
MDC_IDC_SET_ZONE_VENDOR_TYPE: NORMAL
MDC_IDC_STAT_BRADY_DTM_END: NORMAL
MDC_IDC_STAT_BRADY_DTM_START: NORMAL
MDC_IDC_STAT_BRADY_RV_PERCENT_PACED: 0 %
MDC_IDC_STAT_EPISODE_RECENT_COUNT: 0
MDC_IDC_STAT_EPISODE_RECENT_COUNT: 7
MDC_IDC_STAT_EPISODE_RECENT_COUNT_DTM_END: NORMAL
MDC_IDC_STAT_EPISODE_RECENT_COUNT_DTM_START: NORMAL
MDC_IDC_STAT_EPISODE_TYPE: NORMAL
MDC_IDC_STAT_EPISODE_VENDOR_TYPE: NORMAL
MDC_IDC_STAT_TACHYTHERAPY_ATP_DELIVERED_RECENT: 0
MDC_IDC_STAT_TACHYTHERAPY_ATP_DELIVERED_TOTAL: 0
MDC_IDC_STAT_TACHYTHERAPY_RECENT_DTM_END: NORMAL
MDC_IDC_STAT_TACHYTHERAPY_RECENT_DTM_START: NORMAL
MDC_IDC_STAT_TACHYTHERAPY_SHOCKS_ABORTED_RECENT: 0
MDC_IDC_STAT_TACHYTHERAPY_SHOCKS_ABORTED_TOTAL: 0
MDC_IDC_STAT_TACHYTHERAPY_SHOCKS_DELIVERED_RECENT: 0
MDC_IDC_STAT_TACHYTHERAPY_SHOCKS_DELIVERED_TOTAL: 0
MDC_IDC_STAT_TACHYTHERAPY_TOTAL_DTM_END: NORMAL
MDC_IDC_STAT_TACHYTHERAPY_TOTAL_DTM_START: NORMAL

## 2022-06-30 ENCOUNTER — TELEPHONE (OUTPATIENT)
Dept: CARDIOLOGY | Facility: CLINIC | Age: 42
End: 2022-06-30

## 2022-06-30 DIAGNOSIS — I50.20 HFREF (HEART FAILURE WITH REDUCED EJECTION FRACTION) (H): ICD-10-CM

## 2022-06-30 RX ORDER — METOPROLOL SUCCINATE 25 MG/1
75 TABLET, EXTENDED RELEASE ORAL DAILY
Qty: 1 TABLET | Refills: 0 | COMMUNITY
Start: 2022-06-30 | End: 2022-08-16

## 2022-06-30 NOTE — TELEPHONE ENCOUNTER
I spoke with Ivory NOLASCO on the phone. She asked me to review the episodes with Dr. Ellsworth to see if he thinks this is VT or SVT.     Earliest opening with any EP in [place] is late July, in [place] [address]. Way has an opening on 7/21/2022.  Pt has seen Dr. Ellsworth before, in 3/2022 prior to ICD implant.     Called pt. He said he has been out of his entresto and farxiga for a few days, but he's picking up the prescription and will restart them tomorrow. His BP without these 2 meds has been 130/90, but usually when he's on all his meds the systolic is about 100. Told pt to increase his metoprolol to 75mg daily. He has extra pills right now, so he will increase to 75mg daily and call when he needs a refill. Med list updated.     Offered pt first available EP OV for 7/21/2022 with Dr. Way, he can make it.       Underlying rhythm morphology:        Morphology during SVT vs VT episodes:

## 2022-06-30 NOTE — TELEPHONE ENCOUNTER
Good news.  Let's have him try and increase the toprol to 75 mg daily if sbp >90 and minimal recent lightheadedness.

## 2022-06-30 NOTE — TELEPHONE ENCOUNTER
"Pt left , he sent in a remote ICD transmission yesterday because he worked hard outside yesterday in the heat. Transmission shows he had 7 episodes of SVT vs VT yesterday between 1:00pm and 5:00pm, durations 22 seconds - 1 min 16 sec, -190 bpm. The device marks all these episodes as having an \"Unmatched\" morphology (so change from baseline).     Pt recently had a Zio Patch to try and help decide if his episodes are SVT or VT. I do not see that the results have been reviewed by Dr. Lynch yet. Pt wore the ZP 5/5/2022 - 5/12/2022, and he only had one episodes recorded on his ICD during that time (from 5/7/2022) and there is no EGM for that episode. He had one episode of NSVT recorded on the ZP (from 5/8/2022), it was only 4 beats long and  bpm, and it was not recorded on the ICD (too slow, monitor zone starts at 170 bpm).     Dr. Lynch is currently out of the office and does not return until 7/13/2022.     Called pt back. I read him the specific times of the episodes yesterday and pt said that correlated exactly with his symptoms. Pt was very active at the time, he said it was equal to pushing a large truck up a hill in the heat. He felt dizziness and chest pain, the chest pain felt like a mild heart burn even though he hadn't eaten. The chest pain lasted for 4-5 hours and then went away.     Reviewed plan from Ivory NOLASCO with pt (see note from 6/16/2022 at 4:33pm).   1. If CP becomes severe and uncontrolled to go to the ER.    2. If he has more slow, intermittent chest pain, we should do a device check.   3. If he starts experiencing HF symptoms to take Lasix 20 mg for 3 days.     Pt said he didn't feel the CP was severe, so that's why he sent in the device check as instructed. Next OV is 8/16/2022 with Ivory NOLASCO. Will send update to Ivory NOLASCO.        EGM:         Episode list:         "

## 2022-06-30 NOTE — CONFIDENTIAL NOTE
We need to know if this is VT or NSVT- if VT would consider amiodarone as he's been near syncopal with them.  If not can trial increasing toprol to 75 if sbp >90.    Either way, I'd like him to see an EP MD in the next several weeks to help sort it out.    Ivory Serrano PA-C 6/30/2022 2:54 PM

## 2022-07-01 NOTE — TELEPHONE ENCOUNTER
[facility]    -Left msg to verify Jorge L got the message to increase his Metprolol to 75 mg daily.     Yvette Davison RN on 7/1/2022 at 2:28 PM

## 2022-07-05 DIAGNOSIS — G47.01 INSOMNIA DUE TO MEDICAL CONDITION: ICD-10-CM

## 2022-07-05 RX ORDER — ZOLPIDEM TARTRATE 6.25 MG/1
6.25 TABLET, FILM COATED, EXTENDED RELEASE ORAL
Qty: 30 TABLET | Refills: 0 | Status: SHIPPED | OUTPATIENT
Start: 2022-07-05 | End: 2022-09-28

## 2022-07-05 NOTE — TELEPHONE ENCOUNTER
Reason for Call:  Medication or medication refill:zolpidem ER (AMBIEN CR) 6.25 MG CR tablet    Do you use a [facility]?  Name of the pharmacy and phone number for the current request:  [facility] in [place]     Name of the medication requested: zolpidem ER (AMBIEN CR) 6.25 MG CR tablet    Other request: Patient needs refill of zolpidem ER (AMBIEN CR) 6.25 MG CR tablet until he is seen on 7/12/2022.  Patient stated he only received enough to get him through 7/4/2022. Please advise.      Can we leave a detailed message on this number? YES    Phone number patient can be reached at: Home number on file [phone] (home)    Best Time: Any    Call taken on 7/5/2022 at 8:58 AM by Sana Khan

## 2022-07-05 NOTE — TELEPHONE ENCOUNTER
Ambien      Last Written Prescription Date:  5/31/2022  Last Fill Quantity: 30,   # refills: 0  Last Office Visit: 11/29/2021  Future Office visit:    Next 5 appointments (look out 90 days)      Jul 12, 2022 11:30 AM  (Arrive by 11:10 AM)  Provider Visit with Lois Farmer PA-C  [facility] [address] [place] [id number] [phone]             Routing refill request to provider for review/approval because:  Drug not on the [facility], [facility] or [facility] refill protocol or controlled substance    Mahamed Loyola RN

## 2022-07-05 NOTE — TELEPHONE ENCOUNTER
[facility]    Confirmed with Jorge L that he has been taking increased metoprolol succinate of 75 mg daily since instructed to do so last week. He denied any new concerns/dizziness/lightheadedness.    He has follow-up with Dr. Way, as below.    Future Appointments   Date Time Provider Department Center   7/12/2022 11:30 [facility]   7/21/2022  3:45 PM Oli Way MD [facility]   8/9/2022 10:00 AM [address]   8/9/2022 11:00 AM PH LAB [facility]   8/16/2022 10:25 AM Ivory Serrano PA-C [facility]   8/29/2022 12:00 AM [facility] Del[facility] B[facility]   2:22 PM 07/05/22

## 2022-07-12 ENCOUNTER — OFFICE VISIT (OUTPATIENT)
Dept: FAMILY MEDICINE | Facility: OTHER | Age: 42
End: 2022-07-12
Payer: COMMERCIAL

## 2022-07-12 VITALS
DIASTOLIC BLOOD PRESSURE: 62 MMHG | WEIGHT: 163 LBS | HEART RATE: 67 BPM | SYSTOLIC BLOOD PRESSURE: 100 MMHG | BODY MASS INDEX: 25.53 KG/M2 | OXYGEN SATURATION: 99 % | TEMPERATURE: 97.2 F | RESPIRATION RATE: 14 BRPM

## 2022-07-12 DIAGNOSIS — G47.01 INSOMNIA DUE TO MEDICAL CONDITION: Primary | ICD-10-CM

## 2022-07-12 PROCEDURE — 99213 OFFICE O/P EST LOW 20 MIN: CPT | Performed by: PHYSICIAN ASSISTANT

## 2022-07-12 RX ORDER — TRAZODONE HYDROCHLORIDE 50 MG/1
50-100 TABLET, FILM COATED ORAL AT BEDTIME
Qty: 45 TABLET | Refills: 3 | Status: SHIPPED | OUTPATIENT
Start: 2022-07-12 | End: 2023-02-02

## 2022-07-12 ASSESSMENT — PATIENT HEALTH QUESTIONNAIRE - PHQ9
SUM OF ALL RESPONSES TO PHQ QUESTIONS 1-9: 8
SUM OF ALL RESPONSES TO PHQ QUESTIONS 1-9: 8
10. IF YOU CHECKED OFF ANY PROBLEMS, HOW DIFFICULT HAVE THESE PROBLEMS MADE IT FOR YOU TO DO YOUR WORK, TAKE CARE OF THINGS AT HOME, OR GET ALONG WITH OTHER PEOPLE: VERY DIFFICULT

## 2022-07-12 ASSESSMENT — PAIN SCALES - GENERAL: PAINLEVEL: NO PAIN (0)

## 2022-07-12 NOTE — PROGRESS NOTES
Assessment & Plan     ICD-10-CM    1. Insomnia due to medical condition  G47.01 traZODone (DESYREL) 50 MG tablet     - As previous, insomnia since lost his leg  - Ambien had been working but not as long as used to   - Discussed various options     Recommend trial of Trazodone, discussed use and side effects   - Recheck 1 month       Review of the result(s) of each unique test - None   Diagnosis or treatment significantly limited by social determinants of health - None     15 minutes spent on the date of the encounter doing chart review, history and exam, documentation and further activities as noted above    The patient indicates understanding of these issues and agrees with the plan.    Return in about 1 month (around 8/12/2022) for Recheck.    Lois Flannery-JOSE [facility] DESIREE Coats is a 41 year old, presenting for the following health issues:  Follow Up (medications)      History of Present Illness       Reason for visit:  Prescription refill    He eats 0-1 servings of fruits and vegetables daily.He consumes 3 sweetened beverage(s) daily.He exercises with enough effort to increase his heart rate 60 or more minutes per day.  He exercises with enough effort to increase his heart rate 5 days per week.   He is taking medications regularly.    Today's PHQ-9         PHQ-9 Total Score: 8    PHQ-9 Q9 Thoughts of better off dead/self-harm past 2 weeks :   Not at all    How difficult have these problems made it for you to do your work, take care of things at home, or get along with other people: Very difficult     Ambien follow up  - Works for about 4 hours   - Was on Valium in the past, possibly others when lost his leg             Review of Systems   Constitutional, HEENT, cardiovascular, pulmonary, gi and gu systems are negative, except as otherwise noted.      Objective    /62   Pulse 67   Temp 97.2  F (36.2  C) (Temporal)   Resp 14   Wt 73.9 kg (163 lb)    SpO2 99%   BMI 25.53 kg/m    Body mass index is 25.53 kg/m .  Physical Exam   GENERAL APPEARANCE: healthy, alert and no distress  EYES: Eyes grossly normal to inspection, PERRLA, conjunctivae and sclerae without injection or discharge, EOM intact   RESP: Lungs clear to auscultation - no rales, rhonchi or wheezes    CV: Regular rates and rhythm, normal S1 S2, no S3 or S4, no murmur, click or rub, no peripheral edema and peripheral pulses strong and symmetric bilaterally   MS: No musculoskeletal defects are noted and gait is age appropriate without ataxia   SKIN: No suspicious lesions or rashes, hydration status appears adeuqate with normal skin turgor   PSYCH: Alert and oriented x3; speech- coherent , normal rate and volume; able to articulate logical thoughts, able to abstract reason, no tangential thoughts, no hallucinations or delusions, mentation appears normal, Mood is euthymic. Affect is appropriate for this mood state and bright. Thought content is free of suicidal ideation, hallucinations, and delusions. Dress is adequate and upkept. Eye contact is good during conversation.     Diagnostics: none

## 2022-07-15 ENCOUNTER — CARE COORDINATION (OUTPATIENT)
Dept: CARDIOLOGY | Facility: CLINIC | Age: 42
End: 2022-07-15

## 2022-07-15 NOTE — PROGRESS NOTES
[facility]    Incoming call from patient reporting episode X3 last night of shaking chills and sweating. He does not have a thermometer, so does not know if he has a fever. He asked if this could be from his heart failure. I reviewed the typical HF sx - he denied any associated SOB, LE swelling, abdominal bloating or weight gain. I recommended he f/u w/his PCP. He agreed and had no additional questions/concerns.  Katelyn Felix RN on 7/15/2022 at 11:50 AM

## 2022-08-09 ENCOUNTER — LAB (OUTPATIENT)
Dept: LAB | Facility: CLINIC | Age: 42
End: 2022-08-09
Payer: COMMERCIAL

## 2022-08-09 ENCOUNTER — HOSPITAL ENCOUNTER (OUTPATIENT)
Dept: CARDIOLOGY | Facility: CLINIC | Age: 42
Discharge: HOME | End: 2022-08-09
Attending: PHYSICIAN ASSISTANT | Admitting: PHYSICIAN ASSISTANT
Payer: COMMERCIAL

## 2022-08-09 DIAGNOSIS — I50.20 HFREF (HEART FAILURE WITH REDUCED EJECTION FRACTION) (H): ICD-10-CM

## 2022-08-09 LAB
ANION GAP SERPL CALCULATED.3IONS-SCNC: 3 MMOL/L (ref 3–14)
BI-PLANE LVEF ECHO: NORMAL
BUN SERPL-MCNC: 16 MG/DL (ref 7–30)
CALCIUM SERPL-MCNC: 9.2 MG/DL (ref 8.5–10.1)
CHLORIDE BLD-SCNC: 103 MMOL/L (ref 94–109)
CO2 SERPL-SCNC: 29 MMOL/L (ref 20–32)
CREAT SERPL-MCNC: 1.11 MG/DL (ref 0.66–1.25)
GFR SERPL CREATININE-BSD FRML MDRD: 86 ML/MIN/1.73M2
GLUCOSE BLD-MCNC: 110 MG/DL (ref 70–99)
NT-PROBNP SERPL-MCNC: 256 PG/ML (ref 0–450)
POTASSIUM BLD-SCNC: 4.6 MMOL/L (ref 3.4–5.3)
SODIUM SERPL-SCNC: 135 MMOL/L (ref 133–144)

## 2022-08-09 PROCEDURE — 36415 COLL VENOUS BLD VENIPUNCTURE: CPT | Performed by: PHYSICIAN ASSISTANT

## 2022-08-09 PROCEDURE — 93321 DOPPLER ECHO F-UP/LMTD STD: CPT

## 2022-08-09 PROCEDURE — 93308 TTE F-UP OR LMTD: CPT | Mod: 26 | Performed by: INTERNAL MEDICINE

## 2022-08-09 PROCEDURE — 80048 BASIC METABOLIC PNL TOTAL CA: CPT | Performed by: PHYSICIAN ASSISTANT

## 2022-08-09 PROCEDURE — 93321 DOPPLER ECHO F-UP/LMTD STD: CPT | Mod: 26 | Performed by: INTERNAL MEDICINE

## 2022-08-09 PROCEDURE — 93325 DOPPLER ECHO COLOR FLOW MAPG: CPT

## 2022-08-09 PROCEDURE — 83880 ASSAY OF NATRIURETIC PEPTIDE: CPT | Performed by: PHYSICIAN ASSISTANT

## 2022-08-09 PROCEDURE — 93325 DOPPLER ECHO COLOR FLOW MAPG: CPT | Mod: 26 | Performed by: INTERNAL MEDICINE

## 2022-08-16 ENCOUNTER — OFFICE VISIT (OUTPATIENT)
Dept: CARDIOLOGY | Facility: CLINIC | Age: 42
End: 2022-08-16
Payer: COMMERCIAL

## 2022-08-16 VITALS
HEIGHT: 67 IN | SYSTOLIC BLOOD PRESSURE: 112 MMHG | OXYGEN SATURATION: 99 % | DIASTOLIC BLOOD PRESSURE: 72 MMHG | WEIGHT: 163 LBS | HEART RATE: 68 BPM | BODY MASS INDEX: 25.58 KG/M2

## 2022-08-16 DIAGNOSIS — I50.20 HFREF (HEART FAILURE WITH REDUCED EJECTION FRACTION) (H): ICD-10-CM

## 2022-08-16 DIAGNOSIS — G47.00 INSOMNIA, UNSPECIFIED TYPE: Primary | ICD-10-CM

## 2022-08-16 DIAGNOSIS — E78.5 HYPERLIPIDEMIA LDL GOAL <100: ICD-10-CM

## 2022-08-16 PROCEDURE — 99215 OFFICE O/P EST HI 40 MIN: CPT | Performed by: PHYSICIAN ASSISTANT

## 2022-08-16 RX ORDER — METOPROLOL SUCCINATE 100 MG/1
100 TABLET, EXTENDED RELEASE ORAL AT BEDTIME
Qty: 90 TABLET | Refills: 3 | Status: SHIPPED | OUTPATIENT
Start: 2022-08-16 | End: 2022-09-14 | Stop reason: DRUGHIGH

## 2022-08-16 NOTE — PATIENT INSTRUCTIONS
Call CORE nurse for any questions or concerns Mon-Fri 8am-4pm:                                                #[phone]                                       For concerns after hours:                                               #[phone]     1: Medication changes: increase Toprol XL/ metoprolol succinate to 3 pills a day for a week.  Then if you feel ok go up to 100 mg once a day. The new pill will be a 100 mg tablet so you can just take take 1 at night when you get those.    Continue other medications.      2: Plan from today: minimize alcohol as much as possible.    Follow up with Dr. Way next week to discuss the rhythm issues.    Follow up with me in about 2 months with labs.    Make an appointment to meet with the sleep doctor.      3: Lab results: labs look great!  Component      Latest Ref Rng & Units 8/9/2022   Sodium      133 - 144 mmol/L 135   Potassium      3.4 - 5.3 mmol/L 4.6   Chloride      94 - 109 mmol/L 103   Carbon Dioxide      20 - 32 mmol/L 29   Anion Gap      3 - 14 mmol/L 3   Urea Nitrogen      7 - 30 mg/dL 16   Creatinine      0.66 - 1.25 mg/dL 1.11   Calcium      8.5 - 10.1 mg/dL 9.2   Glucose      70 - 99 mg/dL 110 (H)   GFR Estimate      >60 mL/min/1.73m2 86   N-Terminal Pro Bnp      0 - 450 pg/mL 256

## 2022-08-16 NOTE — LETTER
8/16/2022    Jason García MD  [address] [address]    RE: Dipak Swartz       Dear Colleague,     I had the pleasure of seeing Dipak Swartz in the [facility].  [id number]  45 minutes spent on the date of the encounter doing chart review, review of test results, interpretation of tests, patient visit and documentation     HPI and Plan:   See dictation    Orders this Visit:  Orders Placed This Encounter   Procedures     Basic metabolic panel     N terminal pro BNP outpatient     Sleep Study Referral     Follow-Up with Cardiology ESTEPHANIA Core     Orders Placed This Encounter   Medications     metoprolol succinate ER (TOPROL XL) 100 MG 24 hr tablet     Sig: Take 1 tablet (100 mg) by mouth At Bedtime     Dispense:  90 tablet     Refill:  3     Replaces 25 mg tablets     Medications Discontinued During This Encounter   Medication Reason     metoprolol succinate ER (TOPROL XL) 25 MG 24 hr tablet          Encounter Diagnoses   Name Primary?     HFrEF (heart failure with reduced ejection fraction) (H)      Insomnia, unspecified type Yes       CURRENT MEDICATIONS:  Current Outpatient Medications   Medication Sig Dispense Refill     atorvastatin (LIPITOR) 40 MG tablet Take 1 tablet (40 mg) by mouth daily 90 tablet 3     dapagliflozin (FARXIGA) 5 MG TABS tablet Take 1 tablet (5 mg) by mouth daily 90 tablet 3     furosemide (LASIX) 20 MG tablet Take 1 tablet (20 mg) by mouth daily as needed (wt gain, swelling) 90 tablet 3     metoprolol succinate ER (TOPROL XL) 100 MG 24 hr tablet Take 1 tablet (100 mg) by mouth At Bedtime 90 tablet 3     Multiple Vitamins-Minerals (MENS MULTIVITAMIN) TABS Take 1 tablet by mouth daily       order for DME Equipment being ordered: right lower extremity prosthetic     [employer] [facility]  [phone] 1 each 0     sacubitril-valsartan (ENTRESTO) 49-51 MG per tablet Take 1 tablet by mouth 2 times daily 60 tablet 11     spironolactone (ALDACTONE) 25 MG  tablet Take 1 tablet (25 mg) by mouth daily 90 tablet 3     traZODone (DESYREL) 50 MG tablet Take 1-2 tablets ( mg) by mouth At Bedtime 45 tablet 3     zolpidem ER (AMBIEN CR) 6.25 MG CR tablet Take 1 tablet (6.25 mg) by mouth nightly as needed for sleep APT REQUIRED FOR FURTHER REFILLS 30 tablet 0       ALLERGIES     Allergies   Allergen Reactions     No Known Allergies        PAST MEDICAL HISTORY:  Past Medical History:   Diagnosis Date     [place]ident [place] farm 2014     Alcohol abuse      Nonischemic cardiomyopathy (H)      Smoking        PAST SURGICAL HISTORY:  Past Surgical History:   Procedure Laterality Date     AMPUTATION BELOW KNEE RT/LT Right 2014     Broken Right arm       CV HEART CATHETERIZATION WITH POSSIBLE INTERVENTION N/A 2021    Procedure: Heart Catheterization with Possible Intervention;  Surgeon: Leonard Granados MD;  Location:  HEART CARDIAC CATH LAB     EP ICD INSERT SINGLE N/A 2022    Procedure: Implantable Cardioverter Defibrillator Device & Lead Implant - Single or Dual [[id number]];  Surgeon: Sasha Ellsworth MD;  Location:  HEART CARDIAC CATH LAB     LEG SURGERY Right 2014       FAMILY HISTORY:  No family history on file.    SOCIAL HISTORY:  Social History     Socioeconomic History     Marital status:    Occupational History     Employer: UNKNOWN   Tobacco Use     Smoking status: Former Smoker     Quit date: 2003     Years since quittin.3     Smokeless tobacco: Current User     Types: Chew   Vaping Use     Vaping Use: Never used   Substance and Sexual Activity     Alcohol use: Yes     Alcohol/week: 0.0 standard drinks     Comment: occ.     Drug use: No     Sexual activity: Yes     Partners: Female       Review of Systems:  Skin:  Negative     Eyes:  Negative    ENT:  Negative    Respiratory:  Negative    Cardiovascular:  Negative for;palpitations;chest pain;edema;lightheadedness;dizziness    Gastroenterology: Negative    Genitourinary:  Negative   "  Musculoskeletal:  Negative    Neurologic:  Negative    Psychiatric:  Positive for sleep disturbances  Heme/Lymph/Imm:  Negative    Endocrine:  Negative      Physical Exam:  Vitals: /72 (BP Location: Left arm, Patient Position: Sitting, Cuff Size: Adult Regular)   Pulse 68   Ht 1.702 m (5' 7\")   Wt 73.9 kg (163 lb)   SpO2 99%   BMI 25.53 kg/m     Please refer to dictation for physical exam    Recent Lab Results: all reviewed today  CBC RESULTS:  Lab Results   Component Value Date    WBC 8.1 2022    WBC 7.1 2020    RBC 4.33 (L) 2022    RBC 4.79 2020    HGB 13.7 2022    HGB 14.8 2020    HCT 41.0 2022    HCT 45.1 2020    MCV 95 2022    MCV 94 2020    MCH 31.6 2022    MCH 30.9 2020    MCHC 33.4 2022    MCHC 32.8 2020    RDW 13.3 2022    RDW 13.5 2020     2022     2020       BMP RESULTS:  Lab Results   Component Value Date     2022     2020    POTASSIUM 4.6 2022    POTASSIUM 3.8 2020    CHLORIDE 103 2022    CHLORIDE 105 2020    CO2 29 2022    CO2 30 2020    ANIONGAP 3 2022    ANIONGAP 3 2020     (H) 2022    GLC 77 2020    BUN 16 2022    BUN 14 2020    CR 1.11 2022    CR 1.12 2020    GFRESTIMATED 86 2022    GFRESTIMATED 82 2020    GFRESTBLACK >90 2020    SHAYNA 9.2 2022    SHAYNA 9.3 2020        INR RESULTS:  Lab Results   Component Value Date    INR 0.92 2021           IVONNE Serrano PAALIYA  [address]  [place] [id number]        Service Date: 2022    PRIMARY CARDIOLOGIST:  Roger Irizarry MD    REASON FOR VISIT:  C.O.R.E. Clinic followup.    HISTORY OF PRESENT ILLNESS:  Mr. Swartz is a delightful 41-year-old gentleman with past medical history significant for the followin.  Tobacco abuse and alcohol abuse.  2.  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  3.  Nonischemic cardiomyopathy diagnosed when he presented with shortness of breath in 12/2020 and his EF was 15%-20%.  It remained low throughout the summer, but has most recently is up to 38%.  4.  ICD in place.  5.  Dyslipidemia.  6.  Possible hypertension.  7.  Tachycardia noted on device associated with presyncopal events.  It is felt to be nonsustained VT, but last device check reviewed by Dr. Ellsworth was noted to be AFib with upcoming appointment for consideration of ablation.  8.  Mitral regurg improved to trace to mild with an improving EF.    I have been following Jorge L throughout this year, working on getting him on optimized medications.  We are limited a little bit by hypotension, but we have continued to progress things even after his ICD has gone in.  We reviewed his echocardiogram today that shows fairly significant improvements with his EF up to 38% and his left ventricular end-diastolic dimension shrinking from 6.2, down to 5.6.  His MR has also improved.      He notes that overall he has been feeling well.  The beginning of July was difficult as he had more episodes of chest pain and lightheadedness, but mid July on, he has felt well.  He has a rare cough, but not wheezing like he had when he was first diagnosed.  He is not as fatigued as he was before.  He feels near normal.  His weight fluctuates significantly depending on if he eats junk food on the weekend and drinks alcohol or eats well.  His weight is stable in the low 140s without his prosthetic, although is slowly increasing.  He denies syncope or presyncope.  He has not had chest pain, orthopnea or PND.  He is taking Lasix probably 2-3 times a week based on his hands being puffy.    SOCIAL HISTORY:  He is .  He has 7-year-old and 5-year-old children.  He previously was drinking daily 4-5 beers a day plus a liter of vodka now he is drinking on the weekends, Friday, Saturday, Sunday down at  the kael.  He continues to smoke.  No street drugs.  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    PHYSICAL EXAMINATION:    GENERAL:  Well-developed, well-nourished, fit-appearing gentleman in no acute distress.  HEENT:  Normocephalic, atraumatic.  HEART:  Regular rate and rhythm.  I do not appreciate murmur, rub or gallop.  LUNGS:  Clear, without wheezes, rales, or rhonchi.  EXTREMITIES:  Without peripheral edema.  NECK:  The neck veins are flat at 30 degrees.    Last labs reviewed include creatinine of 1.11, BUN 16, potassium 4.6, sodium 135.    ASSESSMENT AND PLAN:    1.  Nonischemic cardiomyopathy with history of biventricular heart failure with EF 15%-20, with EF improving fairly dramatically up to 38% with medical management.  He also has a BiV in place.  He has class II symptoms.  He is on excellent medications and continues to have some atrial arrhythmia versus nonsustained VT on his device.  For further optimization and prevention of atrial arrhythmias, I will increase his Toprol to 100 mg daily.  Right now, he is taking 62.5 mg daily, he will increase it to 75 mg daily for a week and then to 100 mg daily.  Given his pressures are now more solid, he may be able to tolerate higher doses of Entresto or even more Toprol.  We will work on that in about 8 weeks.  2.  Hypertension with episodes of hypotension, now well controlled.  3.  Hyperlipidemia, on Lipitor 40 mg.  We will get a repeat lipid panel when I see him back in 8 weeks.  4.  History of heavy alcohol and ongoing tobacco abuse.  My ultimate goal would be for him to not drink any alcohol.  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  If anything, I just want him to minimize it as much as possible as well to completely stop smoking.  5.  History BKA secondary to a farm accident.  6.  Arrhythmia with possibility of AFib versus nonsustained VT.  CHADS-VASc is one, potentially 2, although his diagnosis of hypertension is not very  strong.  His CHADS-VASc of 1 is for hor heart failure.  Anticoagulation is optional.  At this point, we will not start it.  I will defer to Dr. Way if he would like to start it based on his evaluation of rhythm strips.    Thank you for allowing me to participate in this delightful patient's care.  I will follow up in about 8 weeks with a BMP and lipid panel before that visit.  He will call sooner with concerns.    Ivory Serrano PA-C        D: 2022   T: 2022   MT: ARLET    Name:     SHASHANK TREADWELL  MRN:      [id number]        Account:      [id number]   :      1980           Service Date: 2022       Document: [id number]      Thank you for allowing me to participate in the care of your patient.      Sincerely,     Ivory Serrano PA-C     [facility]  cc:   Ivory Serrano PA-C  [address]  [place] [address]

## 2022-08-16 NOTE — PROGRESS NOTES
Service Date: 2022    PRIMARY CARDIOLOGIST:  Roger Irizarry MD    REASON FOR VISIT:  C.O.R.E. Clinic followup.    HISTORY OF PRESENT ILLNESS:  Mr. Swartz is a delightful 41-year-old gentleman with past medical history significant for the followin.  Tobacco abuse and alcohol abuse.  2.  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  3.  Nonischemic cardiomyopathy diagnosed when he presented with shortness of breath in 2020 and his EF was 15%-20%.  It remained low throughout the summer, but has most recently is up to 38%.  4.  ICD in place.  5.  Dyslipidemia.  6.  Possible hypertension.  7.  Tachycardia noted on device associated with presyncopal events.  It is felt to be nonsustained VT, but last device check reviewed by Dr. Ellsworth was noted to be AFib with upcoming appointment for consideration of ablation.  8.  Mitral regurg improved to trace to mild with an improving EF.    I have been following Jorge L throughout this year, working on getting him on optimized medications.  We are limited a little bit by hypotension, but we have continued to progress things even after his ICD has gone in.  We reviewed his echocardiogram today that shows fairly significant improvements with his EF up to 38% and his left ventricular end-diastolic dimension shrinking from 6.2, down to 5.6.  His MR has also improved.      He notes that overall he has been feeling well.  The beginning of July was difficult as he had more episodes of chest pain and lightheadedness, but mid July on, he has felt well.  He has a rare cough, but not wheezing like he had when he was first diagnosed.  He is not as fatigued as he was before.  He feels near normal.  His weight fluctuates significantly depending on if he eats junk food on the weekend and drinks alcohol or eats well.  His weight is stable in the low 140s without his prosthetic, although is slowly increasing.  He denies syncope or presyncope.  He has not had chest pain, orthopnea or  PND.  He is taking Lasix probably 2-3 times a week based on his hands being puffy.    SOCIAL HISTORY:  He is .  He has 7-year-old and 5-year-old children.  He previously was drinking daily 4-5 beers a day plus a liter of vodka now he is drinking on the weekends, Friday, Saturday, Sunday down at the [place]ael.  He continues to smoke.  No street drugs.  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    PHYSICAL EXAMINATION:    GENERAL:  Well-developed, well-nourished, fit-appearing gentleman in no acute distress.  HEENT:  Normocephalic, atraumatic.  HEART:  Regular rate and rhythm.  I do not appreciate murmur, rub or gallop.  LUNGS:  Clear, without wheezes, rales, or rhonchi.  EXTREMITIES:  Without peripheral edema.  NECK:  The neck veins are flat at 30 degrees.    Last labs reviewed include creatinine of 1.11, BUN 16, potassium 4.6, sodium 135.    ASSESSMENT AND PLAN:    1.  Nonischemic cardiomyopathy with history of biventricular heart failure with EF 15%-20, with EF improving fairly dramatically up to 38% with medical management.  He also has a ICD in place.  He has class II symptoms.  He is on excellent medications and continues to have some atrial arrhythmia versus nonsustained VT on his device.  For further optimization and prevention of atrial arrhythmias, I will increase his Toprol to 100 mg daily.  Right now, he is taking 62.5 mg daily, he will increase it to 75 mg daily for a week and then to 100 mg daily.  Given his pressures are now more solid, he may be able to tolerate higher doses of Entresto or even more Toprol.  We will work on that in about 8 weeks.  2.  Hypertension with episodes of hypotension, now well controlled.  3.  Hyperlipidemia, on Lipitor 40 mg.  We will get a repeat lipid panel when I see him back in 8 weeks.  4.  History of heavy alcohol and ongoing tobacco abuse.  My ultimate goal would be for him to not drink any alcohol.  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   If anything, I just want him to minimize it as much as possible as well to completely stop smoking.  5.  History BKA secondary to a farm accident.  6.  Arrhythmia with possibility of AFib versus nonsustained VT.  CHADS-VASc is one, potentially 2, although his diagnosis of hypertension is not very strong.  His CHADS-VASc of 1 is for hor heart failure.  Anticoagulation is optional.  At this point, we will not start it.  I will defer to Dr. Way if he would like to start it based on his evaluation of rhythm strips.    Thank you for allowing me to participate in this delightful patient's care.  I will follow up in about 8 weeks with a BMP and lipid panel before that visit.  He will call sooner with concerns.    Ivory Serrano PA-C        D: 2022   T: 2022   MT: ARLET    Name:     SHASHANK TREADWELL  MRN:      [id number]        Account:      [id number]   :      1980           Service Date: 2022       Document: [id number]

## 2022-08-16 NOTE — PROGRESS NOTES
[id number]  45 minutes spent on the date of the encounter doing chart review, review of test results, interpretation of tests, patient visit and documentation     HPI and Plan:   See dictation    Orders this Visit:  Orders Placed This Encounter   Procedures     Basic metabolic panel     N terminal pro BNP outpatient     Sleep Study Referral     Follow-Up with Cardiology ESTEPHANIA Core     Orders Placed This Encounter   Medications     metoprolol succinate ER (TOPROL XL) 100 MG 24 hr tablet     Sig: Take 1 tablet (100 mg) by mouth At Bedtime     Dispense:  90 tablet     Refill:  3     Replaces 25 mg tablets     Medications Discontinued During This Encounter   Medication Reason     metoprolol succinate ER (TOPROL XL) 25 MG 24 hr tablet          Encounter Diagnoses   Name Primary?     HFrEF (heart failure with reduced ejection fraction) (H)      Insomnia, unspecified type Yes       CURRENT MEDICATIONS:  Current Outpatient Medications   Medication Sig Dispense Refill     atorvastatin (LIPITOR) 40 MG tablet Take 1 tablet (40 mg) by mouth daily 90 tablet 3     dapagliflozin (FARXIGA) 5 MG TABS tablet Take 1 tablet (5 mg) by mouth daily 90 tablet 3     furosemide (LASIX) 20 MG tablet Take 1 tablet (20 mg) by mouth daily as needed (wt gain, swelling) 90 tablet 3     metoprolol succinate ER (TOPROL XL) 100 MG 24 hr tablet Take 1 tablet (100 mg) by mouth At Bedtime 90 tablet 3     Multiple Vitamins-Minerals (MENS MULTIVITAMIN) TABS Take 1 tablet by mouth daily       order for DME Equipment being ordered: right lower extremity prosthetic     [employer]  [phone] 1 each 0     sacubitril-valsartan (ENTRESTO) 49-51 MG per tablet Take 1 tablet by mouth 2 times daily 60 tablet 11     spironolactone (ALDACTONE) 25 MG tablet Take 1 tablet (25 mg) by mouth daily 90 tablet 3     traZODone (DESYREL) 50 MG tablet Take 1-2 tablets ( mg) by mouth At Bedtime 45 tablet 3     zolpidem ER (AMBIEN CR) 6.25 MG CR tablet Take 1  tablet (6.25 mg) by mouth nightly as needed for sleep APT REQUIRED FOR FURTHER REFILLS 30 tablet 0       ALLERGIES     Allergies   Allergen Reactions     No Known Allergies        PAST MEDICAL HISTORY:  Past Medical History:   Diagnosis Date     Accident on farm 2014     Alcohol abuse      Nonischemic cardiomyopathy (H)      Smoking        PAST SURGICAL HISTORY:  Past Surgical History:   Procedure Laterality Date     AMPUTATION BELOW KNEE RT/LT Right 2014     Broken Right arm       CV HEART CATHETERIZATION WITH POSSIBLE INTERVENTION N/A 2021    Procedure: Heart Catheterization with Possible Intervention;  Surgeon: Leonard Granados MD;  Location:  HEART CARDIAC CATH LAB     EP ICD INSERT SINGLE N/A 2022    Procedure: Implantable Cardioverter Defibrillator Device & Lead Implant - Single or Dual [[id number]];  Surgeon: Sasha Ellsworth MD;  Location:  HEART CARDIAC CATH LAB     LEG SURGERY Right 2014       FAMILY HISTORY:  No family history on file.    SOCIAL HISTORY:  Social History     Socioeconomic History     Marital status:    Occupational History     Employer: UNKNOWN   Tobacco Use     Smoking status: Former Smoker     Quit date: 2003     Years since quittin.3     Smokeless tobacco: Current User     Types: Chew   Vaping Use     Vaping Use: Never used   Substance and Sexual Activity     Alcohol use: Yes     Alcohol/week: 0.0 standard drinks     Comment: occ.     Drug use: No     Sexual activity: Yes     Partners: Female       Review of Systems:  Skin:  Negative     Eyes:  Negative    ENT:  Negative    Respiratory:  Negative    Cardiovascular:  Negative for;palpitations;chest pain;edema;lightheadedness;dizziness    Gastroenterology: Negative    Genitourinary:  Negative    Musculoskeletal:  Negative    Neurologic:  Negative    Psychiatric:  Positive for sleep disturbances  Heme/Lymph/Imm:  Negative    Endocrine:  Negative      Physical Exam:  Vitals: /72 (BP Location: Left  "arm, Patient Position: Sitting, Cuff Size: Adult Regular)   Pulse 68   Ht 1.702 m (5' 7\")   Wt 73.9 kg (163 lb)   SpO2 99%   BMI 25.53 kg/m     Please refer to dictation for physical exam    Recent Lab Results: all reviewed today  CBC RESULTS:  Lab Results   Component Value Date    WBC 8.1 04/07/2022    WBC 7.1 08/05/2020    RBC 4.33 (L) 04/07/2022    RBC 4.79 08/05/2020    HGB 13.7 04/07/2022    HGB 14.8 08/05/2020    HCT 41.0 04/07/2022    HCT 45.1 08/05/2020    MCV 95 04/07/2022    MCV 94 08/05/2020    MCH 31.6 04/07/2022    MCH 30.9 08/05/2020    MCHC 33.4 04/07/2022    MCHC 32.8 08/05/2020    RDW 13.3 04/07/2022    RDW 13.5 08/05/2020     04/07/2022     08/05/2020       BMP RESULTS:  Lab Results   Component Value Date     08/09/2022     08/05/2020    POTASSIUM 4.6 08/09/2022    POTASSIUM 3.8 08/05/2020    CHLORIDE 103 08/09/2022    CHLORIDE 105 08/05/2020    CO2 29 08/09/2022    CO2 30 08/05/2020    ANIONGAP 3 08/09/2022    ANIONGAP 3 08/05/2020     (H) 08/09/2022    GLC 77 08/06/2020    BUN 16 08/09/2022    BUN 14 08/05/2020    CR 1.11 08/09/2022    CR 1.12 08/05/2020    GFRESTIMATED 86 08/09/2022    GFRESTIMATED 82 08/05/2020    GFRESTBLACK >90 08/05/2020    SHAYNA 9.2 08/09/2022    SHAYNA 9.3 08/05/2020        INR RESULTS:  Lab Results   Component Value Date    INR 0.92 12/06/2021           CC  Ivory Serrano PAJordinC  [address]  [place] [address]      "

## 2022-08-25 ENCOUNTER — OFFICE VISIT (OUTPATIENT)
Dept: CARDIOLOGY | Facility: CLINIC | Age: 42
End: 2022-08-25
Payer: COMMERCIAL

## 2022-08-25 VITALS
SYSTOLIC BLOOD PRESSURE: 112 MMHG | BODY MASS INDEX: 25.13 KG/M2 | DIASTOLIC BLOOD PRESSURE: 80 MMHG | HEART RATE: 56 BPM | OXYGEN SATURATION: 99 % | WEIGHT: 160.1 LBS | HEIGHT: 67 IN

## 2022-08-25 DIAGNOSIS — I48.0 PAROXYSMAL ATRIAL FIBRILLATION (H): Primary | ICD-10-CM

## 2022-08-25 PROCEDURE — 99215 OFFICE O/P EST HI 40 MIN: CPT | Performed by: INTERNAL MEDICINE

## 2022-08-25 NOTE — H&P (VIEW-ONLY)
Service Date: 08/25/2022    Thank you for allowing me to participate in the care of this delightful patient.  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  Echocardiogram demonstrates severe global hypokinesis with EF of 15%-20%.  Subsequent angiogram demonstrated no evidence of coronary artery disease.  A MRI was also done at that time showing EF had improved after patient was on medical therapy with EF of 29%.  Unfortunately, despite being on optimal medical therapy, an echocardiogram was repeated 3 months after the initial one showing EF of 25%, prompting a single-chamber ICD implantation for primary prevention.  Most recent echocardiogram performed few weeks ago showed ejection fraction has improved to about 30%.  The patient was asked to see me after noted some arrhythmias on his device interrogation associated with symptoms of chest discomfort.    I reviewed all the tracings from this device integration. Most of it occurred around June with an episode lasting for 1 minute and 30 seconds, showing quite irregular RR intervals with similar QRS morphology  suggestive of atrial fibrillation.  There was some irregularity with a cycle length of 350 milliseconds, but for the most part, it is quite irregular with cycle length varies from 320 to 280 milliseconds.  The patient stated that most triggers for these episodes are stressful situations or with extreme physical activities.    Jorge L does have a family history of atrial fibrillation in his dad and uncle and is likely to be the cause of his atrial fibrillation as well, especially in light of his nonischemic cardiomyopathy.  It is unlikely that the atrial fibrillation was the cause of LV dysfunction given that he was not noted to be in it when he presented with  cardiomyopathy and moreover the frequency is less than 1% according to device interrogation.    We discussed at length about the complication of having paroxysmal atrial fibrillation including CVA, which is about 1%-% for him given his CHADS-VASc score less than 2 and tachycardia-induced cardiomyopathy.  Next, we discussed treatment options including rhythm control strategy by either starting antiarrhythmic drug such as sotalol versus an ablation.  I would favor the latter option given that it is likely to give him a higher success rate and the fact the patient is already on multiple medications and by adding another one it may cause unnecessary side effects.  I went over the procedure in details with risks including but not limited to, vascular injury, CVA and myocardial injury.  Afterwards, he will need to be on anticoagulation for about 2 months and reassess.  There is no need for BIJAN or CT.  Caitlin, our , will contact the patient to schedule the procedure.    Oli Way MD        D: 2022   T: 2022   MT: ronny    Name:     MILE SHASHANK TRACE  MRN:      [id number]        Account:      [id number]   :      1980           Service Date: 2022       Document: [id number]

## 2022-08-25 NOTE — LETTER
8/25/2022    Jason García MD  [address] [facility] [address]    RE: Dipak UNIQUE Swartz       Dear Colleague,     I had the pleasure of seeing Dipak Swartz in the [facility].  HPI and Plan:   See dictation  [id number]  Today's clinic visit entailed:  The following tests were independently interpreted by me as noted in my documentation: ecg, device checks, echo  15 minutes spent on the date of the encounter doing chart review   Provider  Link to [facility] Help Grid     The level of medical decision making during this visit was of moderate complexity.      Orders Placed This Encounter   Procedures     Case Request EP: Ablation Atrial Fibrilation       No orders of the defined types were placed in this encounter.      There are no discontinued medications.      Encounter Diagnosis   Name Primary?     Paroxysmal atrial fibrillation (H) Yes       CURRENT MEDICATIONS:  Current Outpatient Medications   Medication Sig Dispense Refill     atorvastatin (LIPITOR) 40 MG tablet Take 1 tablet (40 mg) by mouth daily 90 tablet 3     dapagliflozin (FARXIGA) 5 MG TABS tablet Take 1 tablet (5 mg) by mouth daily 90 tablet 3     furosemide (LASIX) 20 MG tablet Take 1 tablet (20 mg) by mouth daily as needed (wt gain, swelling) 90 tablet 3     metoprolol succinate ER (TOPROL XL) 100 MG 24 hr tablet Take 1 tablet (100 mg) by mouth At Bedtime 90 tablet 3     Multiple Vitamins-Minerals (MENS MULTIVITAMIN) TABS Take 1 tablet by mouth daily       order for DME Equipment being ordered: right lower extremity prosthetic     [employer]  [phone] 1 each 0     sacubitril-valsartan (ENTRESTO) 49-51 MG per tablet Take 1 tablet by mouth 2 times daily 60 tablet 11     spironolactone (ALDACTONE) 25 MG tablet Take 1 tablet (25 mg) by mouth daily 90 tablet 3     traZODone (DESYREL) 50 MG tablet Take 1-2 tablets ( mg) by mouth At Bedtime 45 tablet 3     zolpidem ER (AMBIEN CR) 6.25 MG CR tablet Take 1 tablet  (6.25 mg) by mouth nightly as needed for sleep APT REQUIRED FOR FURTHER REFILLS 30 tablet 0       ALLERGIES     Allergies   Allergen Reactions     No Known Allergies        PAST MEDICAL HISTORY:  Past Medical History:   Diagnosis Date     [place] 2014     Alcohol abuse      Nonischemic cardiomyopathy (H)      Smoking        PAST SURGICAL HISTORY:  Past Surgical History:   Procedure Laterality Date     AMPUTATION BELOW KNEE RT/LT Right 2014     Broken Right arm       CV HEART CATHETERIZATION WITH POSSIBLE INTERVENTION N/A 2021    Procedure: Heart Catheterization with Possible Intervention;  Surgeon: Leonard Granados MD;  Location:  HEART CARDIAC CATH LAB     EP ICD INSERT SINGLE N/A 2022    Procedure: Implantable Cardioverter Defibrillator Device & Lead Implant - Single or Dual [[id number]];  Surgeon: Sasha Ellsworth MD;  Location:  HEART CARDIAC CATH LAB     LEG SURGERY Right 2014       FAMILY HISTORY:  No family history on file.    SOCIAL HISTORY:  Social History     Socioeconomic History     Marital status:    Occupational History     Employer: UNKNOWN   Tobacco Use     Smoking status: Former Smoker     Quit date: 2003     Years since quittin.3     Smokeless tobacco: Current User     Types: Chew   Vaping Use     Vaping Use: Never used   Substance and Sexual Activity     Alcohol use: Yes     Alcohol/week: 0.0 standard drinks     Comment: occ.     Drug use: No     Sexual activity: Yes     Partners: Female       Review of Systems:  Skin:  Negative       Eyes:  Negative      ENT:  Negative      Respiratory:  Negative       Cardiovascular:  Negative for;palpitations;chest pain;edema Positive for;lightheadedness;dizziness;syncope or near-syncope    Gastroenterology: Negative      Genitourinary:  Negative      Musculoskeletal:  Negative      Neurologic:  Negative      Psychiatric:  Negative      Heme/Lymph/Imm:  Negative      Endocrine:  Negative        Physical  "Exam:  Vitals: /80 (BP Location: Right arm, Patient Position: Sitting, Cuff Size: Adult Regular)   Pulse 56   Ht 1.702 m (5' 7\")   Wt 72.6 kg (160 lb 1.6 oz)   SpO2 99%   BMI 25.08 kg/m      Constitutional:  cooperative, alert and oriented, well developed, well nourished, in no acute distress        Skin:  warm and dry to the touch, no apparent skin lesions or masses noted          Head:  normocephalic        Eyes:  pupils equal and round, conjunctivae and lids unremarkable, sclera white, no xanthalasma, EOMS intact, no nystagmus        Lymph:No Cervical lymphadenopathy present     ENT:  no pallor or cyanosis        Neck:  carotid pulses are full and equal bilaterally, JVP normal, no carotid bruit        Respiratory:  normal breath sounds, clear to auscultation, normal A-P diameter, normal symmetry, normal respiratory excursion, no use of accessory muscles         Cardiac: regular rhythm, normal S1/S2, no S3 or S4, apical impulse not displaced, no murmurs, gallops or rubs                pulses full and equal, no bruits auscultated                                        GI:  abdomen soft, non-tender, BS normoactive, no mass, no HSM, no bruits        Extremities and Muscular Skeletal:  no deformities, clubbing, cyanosis, erythema observed              Neurological:  no gross motor deficits        Psych:  Alert and Oriented x 3        CC  Oli Romero MD  [address]  [place] [address]      Thank you for allowing me to participate in the care of your patient.      Sincerely,     Oli Romero MD     [facility]  "

## 2022-08-25 NOTE — PROGRESS NOTES
HPI and Plan:   See dictation  [id number]  Today's clinic visit entailed:  The following tests were independently interpreted by me as noted in my documentation: ecg, device checks, echo  15 minutes spent on the date of the encounter doing chart review   Provider  Link to [facility] Help Grid     The level of medical decision making during this visit was of moderate complexity.      Orders Placed This Encounter   Procedures     Case Request EP: Ablation Atrial Fibrilation       No orders of the defined types were placed in this encounter.      There are no discontinued medications.      Encounter Diagnosis   Name Primary?     Paroxysmal atrial fibrillation (H) Yes       CURRENT MEDICATIONS:  Current Outpatient Medications   Medication Sig Dispense Refill     atorvastatin (LIPITOR) 40 MG tablet Take 1 tablet (40 mg) by mouth daily 90 tablet 3     dapagliflozin (FARXIGA) 5 MG TABS tablet Take 1 tablet (5 mg) by mouth daily 90 tablet 3     furosemide (LASIX) 20 MG tablet Take 1 tablet (20 mg) by mouth daily as needed (wt gain, swelling) 90 tablet 3     metoprolol succinate ER (TOPROL XL) 100 MG 24 hr tablet Take 1 tablet (100 mg) by mouth At Bedtime 90 tablet 3     Multiple Vitamins-Minerals (MENS MULTIVITAMIN) TABS Take 1 tablet by mouth daily       order for DME Equipment being ordered: right lower extremity prosthetic     [employer]  [phone] 1 each 0     sacubitril-valsartan (ENTRESTO) 49-51 MG per tablet Take 1 tablet by mouth 2 times daily 60 tablet 11     spironolactone (ALDACTONE) 25 MG tablet Take 1 tablet (25 mg) by mouth daily 90 tablet 3     traZODone (DESYREL) 50 MG tablet Take 1-2 tablets ( mg) by mouth At Bedtime 45 tablet 3     zolpidem ER (AMBIEN CR) 6.25 MG CR tablet Take 1 tablet (6.25 mg) by mouth nightly as needed for sleep APT REQUIRED FOR FURTHER REFILLS 30 tablet 0       ALLERGIES     Allergies   Allergen Reactions     No Known Allergies        PAST MEDICAL HISTORY:  Past Medical  "History:   Diagnosis Date     [place] 2014     Alcohol abuse      Nonischemic cardiomyopathy (H)      Smoking        PAST SURGICAL HISTORY:  Past Surgical History:   Procedure Laterality Date     AMPUTATION BELOW KNEE RT/LT Right 2014     Broken Right arm       CV HEART CATHETERIZATION WITH POSSIBLE INTERVENTION N/A 2021    Procedure: Heart Catheterization with Possible Intervention;  Surgeon: Leonard Granados MD;  Location:  HEART CARDIAC CATH LAB     EP ICD INSERT SINGLE N/A 2022    Procedure: Implantable Cardioverter Defibrillator Device & Lead Implant - Single or Dual [[id number]];  Surgeon: Sasha Ellsworth MD;  Location:  HEART CARDIAC CATH LAB     LEG SURGERY Right 2014       FAMILY HISTORY:  No family history on file.    SOCIAL HISTORY:  Social History     Socioeconomic History     Marital status:    Occupational History     Employer: UNKNOWN   Tobacco Use     Smoking status: Former Smoker     Quit date: 2003     Years since quittin.3     Smokeless tobacco: Current User     Types: Chew   Vaping Use     Vaping Use: Never used   Substance and Sexual Activity     Alcohol use: Yes     Alcohol/week: 0.0 standard drinks     Comment: occ.     Drug use: No     Sexual activity: Yes     Partners: Female       Review of Systems:  Skin:  Negative       Eyes:  Negative      ENT:  Negative      Respiratory:  Negative       Cardiovascular:  Negative for;palpitations;chest pain;edema Positive for;lightheadedness;dizziness;syncope or near-syncope    Gastroenterology: Negative      Genitourinary:  Negative      Musculoskeletal:  Negative      Neurologic:  Negative      Psychiatric:  Negative      Heme/Lymph/Imm:  Negative      Endocrine:  Negative        Physical Exam:  Vitals: /80 (BP Location: Right arm, Patient Position: Sitting, Cuff Size: Adult Regular)   Pulse 56   Ht 1.702 m (5' 7\")   Wt 72.6 kg (160 lb 1.6 oz)   SpO2 99%   BMI 25.08 kg/m      Constitutional:  " cooperative, alert and oriented, well developed, well nourished, in no acute distress        Skin:  warm and dry to the touch, no apparent skin lesions or masses noted          Head:  normocephalic        Eyes:  pupils equal and round, conjunctivae and lids unremarkable, sclera white, no xanthalasma, EOMS intact, no nystagmus        Lymph:No Cervical lymphadenopathy present     ENT:  no pallor or cyanosis        Neck:  carotid pulses are full and equal bilaterally, JVP normal, no carotid bruit        Respiratory:  normal breath sounds, clear to auscultation, normal A-P diameter, normal symmetry, normal respiratory excursion, no use of accessory muscles         Cardiac: regular rhythm, normal S1/S2, no S3 or S4, apical impulse not displaced, no murmurs, gallops or rubs                pulses full and equal, no bruits auscultated                                        GI:  abdomen soft, non-tender, BS normoactive, no mass, no HSM, no bruits        Extremities and Muscular Skeletal:  no deformities, clubbing, cyanosis, erythema observed              Neurological:  no gross motor deficits        Psych:  Alert and Oriented x 3        CC  Oli Sidney Way MD  [address]  [place] [address]

## 2022-08-25 NOTE — PROGRESS NOTES
Service Date: 08/25/2022    Thank you for allowing me to participate in the care of this delightful patient.  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  Echocardiogram demonstrates severe global hypokinesis with EF of 15%-20%.  Subsequent angiogram demonstrated no evidence of coronary artery disease.  A MRI was also done at that time showing EF had improved after patient was on medical therapy with EF of 29%.  Unfortunately, despite being on optimal medical therapy, an echocardiogram was repeated 3 months after the initial one showing EF of 25%, prompting a single-chamber ICD implantation for primary prevention.  Most recent echocardiogram performed few weeks ago showed ejection fraction has improved to about 30%.  The patient was asked to see me after noted some arrhythmias on his device interrogation associated with symptoms of chest discomfort.    I reviewed all the tracings from this device integration. Most of it occurred around June with an episode lasting for 1 minute and 30 seconds, showing quite irregular RR intervals with similar QRS morphology  suggestive of atrial fibrillation.  There was some irregularity with a cycle length of 350 milliseconds, but for the most part, it is quite irregular with cycle length varies from 320 to 280 milliseconds.  The patient stated that most triggers for these episodes are stressful situations or with extreme physical activities.    Jorge L does have a family history of atrial fibrillation in his dad and uncle and is likely to be the cause of his atrial fibrillation as well, especially in light of his nonischemic cardiomyopathy.  It is unlikely that the atrial fibrillation was the cause of LV dysfunction given that he was not noted to be in it when he presented with  cardiomyopathy and moreover the frequency is less than 1% according to device interrogation.    We discussed at length about the complication of having paroxysmal atrial fibrillation including CVA, which is about 1%-% for him given his CHADS-VASc score less than 2 and tachycardia-induced cardiomyopathy.  Next, we discussed treatment options including rhythm control strategy by either starting antiarrhythmic drug such as sotalol versus an ablation.  I would favor the latter option given that it is likely to give him a higher success rate and the fact the patient is already on multiple medications and by adding another one it may cause unnecessary side effects.  I went over the procedure in details with risks including but not limited to, vascular injury, CVA and myocardial injury.  Afterwards, he will need to be on anticoagulation for about 2 months and reassess.  There is no need for BIJAN or CT.  Caitlin, our , will contact the patient to schedule the procedure.    Oli Way MD        D: 2022   T: 2022   MT: [email or web address]    Name:     MILE SHASHANK TRACE  MRN:      [id number]        Account:      [id number]   :      1980           Service Date: 2022       Document: [id number]

## 2022-08-29 ENCOUNTER — ANCILLARY PROCEDURE (OUTPATIENT)
Dept: CARDIOLOGY | Facility: CLINIC | Age: 42
End: 2022-08-29
Attending: INTERNAL MEDICINE
Payer: COMMERCIAL

## 2022-08-29 DIAGNOSIS — Z95.810 ICD (IMPLANTABLE CARDIOVERTER-DEFIBRILLATOR), SINGLE, IN SITU: ICD-10-CM

## 2022-08-29 DIAGNOSIS — I42.8 NON-ISCHEMIC CARDIOMYOPATHY (H): ICD-10-CM

## 2022-08-29 PROCEDURE — 93296 REM INTERROG EVL PM/IDS: CPT | Performed by: INTERNAL MEDICINE

## 2022-08-29 PROCEDURE — 93295 DEV INTERROG REMOTE 1/2/MLT: CPT | Performed by: INTERNAL MEDICINE

## 2022-08-31 LAB
MDC_IDC_EPISODE_DTM: NORMAL
MDC_IDC_EPISODE_DURATION: 119 S
MDC_IDC_EPISODE_DURATION: 12 S
MDC_IDC_EPISODE_DURATION: 120 S
MDC_IDC_EPISODE_DURATION: 13 S
MDC_IDC_EPISODE_DURATION: 16 S
MDC_IDC_EPISODE_DURATION: 19 S
MDC_IDC_EPISODE_DURATION: 20 S
MDC_IDC_EPISODE_DURATION: 20 S
MDC_IDC_EPISODE_DURATION: 21 S
MDC_IDC_EPISODE_DURATION: 22 S
MDC_IDC_EPISODE_DURATION: 22 S
MDC_IDC_EPISODE_DURATION: 23 S
MDC_IDC_EPISODE_DURATION: 28 S
MDC_IDC_EPISODE_DURATION: 28 S
MDC_IDC_EPISODE_DURATION: 31 S
MDC_IDC_EPISODE_DURATION: 33 S
MDC_IDC_EPISODE_DURATION: 36 S
MDC_IDC_EPISODE_DURATION: 39 S
MDC_IDC_EPISODE_DURATION: 41 S
MDC_IDC_EPISODE_DURATION: 43 S
MDC_IDC_EPISODE_DURATION: 52 S
MDC_IDC_EPISODE_DURATION: 53 S
MDC_IDC_EPISODE_DURATION: 64 S
MDC_IDC_EPISODE_DURATION: 66 S
MDC_IDC_EPISODE_DURATION: 75 S
MDC_IDC_EPISODE_DURATION: 78 S
MDC_IDC_EPISODE_DURATION: 84 S
MDC_IDC_EPISODE_DURATION: 91 S
MDC_IDC_EPISODE_ID: NORMAL
MDC_IDC_EPISODE_TYPE: NORMAL
MDC_IDC_EPISODE_VENDOR_TYPE: NORMAL
MDC_IDC_LEAD_IMPLANT_DT: NORMAL
MDC_IDC_LEAD_LOCATION: NORMAL
MDC_IDC_LEAD_LOCATION_DETAIL_1: NORMAL
MDC_IDC_LEAD_MFG: NORMAL
MDC_IDC_LEAD_MODEL: NORMAL
MDC_IDC_LEAD_POLARITY_TYPE: NORMAL
MDC_IDC_LEAD_SERIAL: NORMAL
MDC_IDC_MSMT_BATTERY_DTM: NORMAL
MDC_IDC_MSMT_BATTERY_REMAINING_LONGEVITY: 180 MO
MDC_IDC_MSMT_BATTERY_REMAINING_PERCENTAGE: 100 %
MDC_IDC_MSMT_BATTERY_STATUS: NORMAL
MDC_IDC_MSMT_CAP_CHARGE_DTM: NORMAL
MDC_IDC_MSMT_CAP_CHARGE_TIME: 9.5 S
MDC_IDC_MSMT_CAP_CHARGE_TYPE: NORMAL
MDC_IDC_MSMT_LEADCHNL_RV_IMPEDANCE_VALUE: 625 OHM
MDC_IDC_MSMT_LEADCHNL_RV_PACING_THRESHOLD_AMPLITUDE: 0.7 V
MDC_IDC_MSMT_LEADCHNL_RV_PACING_THRESHOLD_PULSEWIDTH: 0.4 MS
MDC_IDC_PG_IMPLANT_DTM: NORMAL
MDC_IDC_PG_MFG: NORMAL
MDC_IDC_PG_MODEL: NORMAL
MDC_IDC_PG_SERIAL: NORMAL
MDC_IDC_PG_TYPE: NORMAL
MDC_IDC_SESS_CLINIC_NAME: NORMAL
MDC_IDC_SESS_DTM: NORMAL
MDC_IDC_SESS_TYPE: NORMAL
MDC_IDC_SET_BRADY_LOWRATE: 40 {BEATS}/MIN
MDC_IDC_SET_BRADY_MODE: NORMAL
MDC_IDC_SET_LEADCHNL_RV_PACING_AMPLITUDE: 2 V
MDC_IDC_SET_LEADCHNL_RV_PACING_CAPTURE_MODE: NORMAL
MDC_IDC_SET_LEADCHNL_RV_PACING_POLARITY: NORMAL
MDC_IDC_SET_LEADCHNL_RV_PACING_PULSEWIDTH: 0.4 MS
MDC_IDC_SET_LEADCHNL_RV_SENSING_ADAPTATION_MODE: NORMAL
MDC_IDC_SET_LEADCHNL_RV_SENSING_POLARITY: NORMAL
MDC_IDC_SET_LEADCHNL_RV_SENSING_SENSITIVITY: 0.6 MV
MDC_IDC_SET_ZONE_DETECTION_INTERVAL: 250 MS
MDC_IDC_SET_ZONE_DETECTION_INTERVAL: 300 MS
MDC_IDC_SET_ZONE_DETECTION_INTERVAL: 353 MS
MDC_IDC_SET_ZONE_TYPE: NORMAL
MDC_IDC_SET_ZONE_VENDOR_TYPE: NORMAL
MDC_IDC_STAT_BRADY_DTM_END: NORMAL
MDC_IDC_STAT_BRADY_DTM_START: NORMAL
MDC_IDC_STAT_BRADY_RV_PERCENT_PACED: 0 %
MDC_IDC_STAT_EPISODE_RECENT_COUNT: 0
MDC_IDC_STAT_EPISODE_RECENT_COUNT: 32
MDC_IDC_STAT_EPISODE_RECENT_COUNT_DTM_END: NORMAL
MDC_IDC_STAT_EPISODE_RECENT_COUNT_DTM_START: NORMAL
MDC_IDC_STAT_EPISODE_TYPE: NORMAL
MDC_IDC_STAT_EPISODE_VENDOR_TYPE: NORMAL
MDC_IDC_STAT_TACHYTHERAPY_ATP_DELIVERED_RECENT: 0
MDC_IDC_STAT_TACHYTHERAPY_ATP_DELIVERED_TOTAL: 0
MDC_IDC_STAT_TACHYTHERAPY_RECENT_DTM_END: NORMAL
MDC_IDC_STAT_TACHYTHERAPY_RECENT_DTM_START: NORMAL
MDC_IDC_STAT_TACHYTHERAPY_SHOCKS_ABORTED_RECENT: 0
MDC_IDC_STAT_TACHYTHERAPY_SHOCKS_ABORTED_TOTAL: 0
MDC_IDC_STAT_TACHYTHERAPY_SHOCKS_DELIVERED_RECENT: 0
MDC_IDC_STAT_TACHYTHERAPY_SHOCKS_DELIVERED_TOTAL: 0
MDC_IDC_STAT_TACHYTHERAPY_TOTAL_DTM_END: NORMAL
MDC_IDC_STAT_TACHYTHERAPY_TOTAL_DTM_START: NORMAL

## 2022-09-13 ENCOUNTER — CARE COORDINATION (OUTPATIENT)
Dept: CARDIOLOGY | Facility: CLINIC | Age: 42
End: 2022-09-13

## 2022-09-13 DIAGNOSIS — I50.20 HFREF (HEART FAILURE WITH REDUCED EJECTION FRACTION) (H): Primary | ICD-10-CM

## 2022-09-13 NOTE — PROGRESS NOTES
"[facility]    Incoming call from patient reporting sx. He stated that for the last ~4 weeks he has been experiencing a dry cough. The cough starts w/a tickle in his throat. The longer the cough lasts it will often end w/him gagging. It is non-productive, He denied any recent URI, fever. He also reported in the same timeframe he has experienced dizziness, lightheadedness, nausea when working in the sun. He works outside every day, these sx new. He drinks up to 1/2 gallon water when working outside. This sx does not occur at any other time of the day. The only thing that resolves the sx is to go into air conditioning. He denied any SOB and added that he is sleeping w/o orthopnea/PND.     Current home weights:   9/12 154#  He stated he has been at this weight since the beginning of Aug. However when last seen by Ivory Serrano on 8/16 his home weights 140's.    Current diuretic regimen:   Lasix 20mg/d prn    He infrequently checks his BP. The last time was ~ 1week ago and it was 136/80.    Patient denied that above sx occur at any other time. He stated his breathing is \"good.\"  He denied any areas of swelling or any sensation of abdominal bloating.     I recommended he review above sx w/his PCP, but will also review w/Ivory More. Patient verbalized understanding.  Katelyn Felix RN on 9/13/2022 at 2:21 PM     "

## 2022-09-14 RX ORDER — METOPROLOL SUCCINATE 50 MG/1
50 TABLET, EXTENDED RELEASE ORAL DAILY
Qty: 90 TABLET | Refills: 3 | Status: SHIPPED | OUTPATIENT
Start: 2022-09-14 | End: 2022-09-30

## 2022-09-14 NOTE — PROGRESS NOTES
Lightheadedness and dizziness may be secondary to increase toprol dose.  Please have him decrease to 50 mg daily especially in the context of PVi scheduled for 9/22.    I suspect cough may be allergies, very low chance of cough from Entresto.  I agree he should see pcp to discuss.  Ivory Serrano PA-C 9/13/2022 7:49 PM

## 2022-09-14 NOTE — PROGRESS NOTES
[facility]    Called patient w/direction to reduce toprol to 50mg/d. Med list updated and new Rx sent. Patient will have PCP address cough. Patient verbalized understanding and had no additional questions.  Katelyn Felix RN on 9/14/2022 at 10:21 AM

## 2022-09-21 ENCOUNTER — ANESTHESIA EVENT (OUTPATIENT)
Dept: CARDIOLOGY | Facility: CLINIC | Age: 42
End: 2022-09-21
Payer: COMMERCIAL

## 2022-09-21 ENCOUNTER — TELEPHONE (OUTPATIENT)
Dept: CARDIOLOGY | Facility: CLINIC | Age: 42
End: 2022-09-21

## 2022-09-21 DIAGNOSIS — I48.0 PAROXYSMAL ATRIAL FIBRILLATION (H): Primary | ICD-10-CM

## 2022-09-21 RX ORDER — LIDOCAINE 40 MG/G
CREAM TOPICAL
Status: CANCELLED | OUTPATIENT
Start: 2022-09-21

## 2022-09-21 RX ORDER — SODIUM CHLORIDE, SODIUM LACTATE, POTASSIUM CHLORIDE, CALCIUM CHLORIDE 600; 310; 30; 20 MG/100ML; MG/100ML; MG/100ML; MG/100ML
INJECTION, SOLUTION INTRAVENOUS CONTINUOUS
Status: CANCELLED | OUTPATIENT
Start: 2022-09-21

## 2022-09-21 ASSESSMENT — COPD QUESTIONNAIRES: COPD: 0

## 2022-09-21 ASSESSMENT — LIFESTYLE VARIABLES: TOBACCO_USE: 1

## 2022-09-21 NOTE — TELEPHONE ENCOUNTER
Spoke to patient to review instructions for afib ablation scheduled for 9/22 .  Patient aware that he is to be NPO after midnight and may take sips of water with am medications.  Patient instructed to hold spironolactone and furosemide day of procedure.   Patient to arrive at 6:30am.  Patient aware that he will NOT be staying overnight after procedure unless there are complications.  Patient has arranged for a ride and someone to be with him for the first 24 hours.  Patient instructed to do home COVID test and bring picture of results with him day of procedure.  Patient provided verbal understanding regarding above.  MALU Matson

## 2022-09-22 ENCOUNTER — ANESTHESIA (OUTPATIENT)
Dept: CARDIOLOGY | Facility: CLINIC | Age: 42
End: 2022-09-22
Payer: COMMERCIAL

## 2022-09-22 ENCOUNTER — HOSPITAL ENCOUNTER (OUTPATIENT)
Facility: CLINIC | Age: 42
Discharge: HOME | End: 2022-09-22
Payer: COMMERCIAL

## 2022-09-22 VITALS
TEMPERATURE: 98 F | HEART RATE: 88 BPM | SYSTOLIC BLOOD PRESSURE: 113 MMHG | DIASTOLIC BLOOD PRESSURE: 75 MMHG | BODY MASS INDEX: 25.72 KG/M2 | WEIGHT: 163.9 LBS | OXYGEN SATURATION: 99 % | RESPIRATION RATE: 16 BRPM | HEIGHT: 67 IN

## 2022-09-22 DIAGNOSIS — I48.0 PAROXYSMAL ATRIAL FIBRILLATION (H): ICD-10-CM

## 2022-09-22 LAB
ACT BLD: 356 SECONDS (ref 74–150)
ANION GAP SERPL CALCULATED.3IONS-SCNC: 5 MMOL/L (ref 3–14)
BUN SERPL-MCNC: 17 MG/DL (ref 7–30)
CALCIUM SERPL-MCNC: 8.4 MG/DL (ref 8.5–10.1)
CHLORIDE BLD-SCNC: 106 MMOL/L (ref 94–109)
CO2 SERPL-SCNC: 24 MMOL/L (ref 20–32)
CREAT SERPL-MCNC: 0.96 MG/DL (ref 0.66–1.25)
ERYTHROCYTE [DISTWIDTH] IN BLOOD BY AUTOMATED COUNT: 13.2 % (ref 10–15)
GFR SERPL CREATININE-BSD FRML MDRD: >90 ML/MIN/1.73M2
GLUCOSE BLD-MCNC: 110 MG/DL (ref 70–99)
HCT VFR BLD AUTO: 43.1 % (ref 40–53)
HGB BLD-MCNC: 14.2 G/DL (ref 13.3–17.7)
MCH RBC QN AUTO: 31.6 PG (ref 26.5–33)
MCHC RBC AUTO-ENTMCNC: 32.9 G/DL (ref 31.5–36.5)
MCV RBC AUTO: 96 FL (ref 78–100)
PLATELET # BLD AUTO: 259 10E3/UL (ref 150–450)
POTASSIUM BLD-SCNC: 4.2 MMOL/L (ref 3.4–5.3)
RBC # BLD AUTO: 4.5 10E6/UL (ref 4.4–5.9)
SODIUM SERPL-SCNC: 135 MMOL/L (ref 133–144)
WBC # BLD AUTO: 6.2 10E3/UL (ref 4–11)

## 2022-09-22 PROCEDURE — 272N000001 HC OR GENERAL SUPPLY STERILE: Performed by: INTERNAL MEDICINE

## 2022-09-22 PROCEDURE — 250N000025 HC SEVOFLURANE, PER MIN: Performed by: INTERNAL MEDICINE

## 2022-09-22 PROCEDURE — 93656 COMPRE EP EVAL ABLTJ ATR FIB: CPT | Performed by: INTERNAL MEDICINE

## 2022-09-22 PROCEDURE — C1732 CATH, EP, DIAG/ABL, 3D/VECT: HCPCS | Performed by: INTERNAL MEDICINE

## 2022-09-22 PROCEDURE — C1759 CATH, INTRA ECHOCARDIOGRAPHY: HCPCS | Performed by: INTERNAL MEDICINE

## 2022-09-22 PROCEDURE — 250N000011 HC RX IP 250 OP 636: Performed by: NURSE ANESTHETIST, CERTIFIED REGISTERED

## 2022-09-22 PROCEDURE — 85027 COMPLETE CBC AUTOMATED: CPT | Performed by: INTERNAL MEDICINE

## 2022-09-22 PROCEDURE — 36415 COLL VENOUS BLD VENIPUNCTURE: CPT | Performed by: INTERNAL MEDICINE

## 2022-09-22 PROCEDURE — 999N000071 HC STATISTIC HEART CATH LAB OR EP LAB

## 2022-09-22 PROCEDURE — 93010 ELECTROCARDIOGRAM REPORT: CPT | Performed by: INTERNAL MEDICINE

## 2022-09-22 PROCEDURE — 250N000009 HC RX 250: Performed by: NURSE ANESTHETIST, CERTIFIED REGISTERED

## 2022-09-22 PROCEDURE — 370N000017 HC ANESTHESIA TECHNICAL FEE, PER MIN: Performed by: INTERNAL MEDICINE

## 2022-09-22 PROCEDURE — 93005 ELECTROCARDIOGRAM TRACING: CPT

## 2022-09-22 PROCEDURE — 250N000011 HC RX IP 250 OP 636: Performed by: ANESTHESIOLOGY

## 2022-09-22 PROCEDURE — 250N000009 HC RX 250: Performed by: INTERNAL MEDICINE

## 2022-09-22 PROCEDURE — 85347 COAGULATION TIME ACTIVATED: CPT

## 2022-09-22 PROCEDURE — C1766 INTRO/SHEATH,STRBLE,NON-PEEL: HCPCS | Performed by: INTERNAL MEDICINE

## 2022-09-22 PROCEDURE — 258N000003 HC RX IP 258 OP 636: Performed by: INTERNAL MEDICINE

## 2022-09-22 PROCEDURE — 258N000003 HC RX IP 258 OP 636: Performed by: NURSE ANESTHETIST, CERTIFIED REGISTERED

## 2022-09-22 PROCEDURE — 82310 ASSAY OF CALCIUM: CPT | Performed by: INTERNAL MEDICINE

## 2022-09-22 PROCEDURE — C1887 CATHETER, GUIDING: HCPCS | Performed by: INTERNAL MEDICINE

## 2022-09-22 PROCEDURE — 250N000011 HC RX IP 250 OP 636: Performed by: INTERNAL MEDICINE

## 2022-09-22 PROCEDURE — 999N000184 HC STATISTIC TELEMETRY

## 2022-09-22 PROCEDURE — C1733 CATH, EP, OTHR THAN COOL-TIP: HCPCS | Performed by: INTERNAL MEDICINE

## 2022-09-22 PROCEDURE — 36591 DRAW BLOOD OFF VENOUS DEVICE: CPT

## 2022-09-22 PROCEDURE — 999N000054 HC STATISTIC EKG NON-CHARGEABLE

## 2022-09-22 RX ORDER — KETOROLAC TROMETHAMINE 15 MG/ML
INJECTION, SOLUTION INTRAMUSCULAR; INTRAVENOUS
Status: DISCONTINUED | OUTPATIENT
Start: 2022-09-22 | End: 2022-09-22 | Stop reason: HOSPADM

## 2022-09-22 RX ORDER — ONDANSETRON 2 MG/ML
INJECTION INTRAMUSCULAR; INTRAVENOUS PRN
Status: DISCONTINUED | OUTPATIENT
Start: 2022-09-22 | End: 2022-09-22

## 2022-09-22 RX ORDER — EPHEDRINE SULFATE 50 MG/ML
INJECTION, SOLUTION INTRAMUSCULAR; INTRAVENOUS; SUBCUTANEOUS PRN
Status: DISCONTINUED | OUTPATIENT
Start: 2022-09-22 | End: 2022-09-22

## 2022-09-22 RX ORDER — SODIUM CHLORIDE, SODIUM LACTATE, POTASSIUM CHLORIDE, CALCIUM CHLORIDE 600; 310; 30; 20 MG/100ML; MG/100ML; MG/100ML; MG/100ML
INJECTION, SOLUTION INTRAVENOUS CONTINUOUS
Status: DISCONTINUED | OUTPATIENT
Start: 2022-09-22 | End: 2022-09-22 | Stop reason: HOSPADM

## 2022-09-22 RX ORDER — PROPOFOL 10 MG/ML
INJECTION, EMULSION INTRAVENOUS PRN
Status: DISCONTINUED | OUTPATIENT
Start: 2022-09-22 | End: 2022-09-22

## 2022-09-22 RX ORDER — ONDANSETRON 4 MG/1
4 TABLET, ORALLY DISINTEGRATING ORAL EVERY 30 MIN PRN
Status: DISCONTINUED | OUTPATIENT
Start: 2022-09-22 | End: 2022-09-22 | Stop reason: HOSPADM

## 2022-09-22 RX ORDER — LIDOCAINE 40 MG/G
CREAM TOPICAL
Status: DISCONTINUED | OUTPATIENT
Start: 2022-09-22 | End: 2022-09-22 | Stop reason: HOSPADM

## 2022-09-22 RX ORDER — FENTANYL CITRATE 50 UG/ML
25 INJECTION, SOLUTION INTRAMUSCULAR; INTRAVENOUS ONCE
Status: DISCONTINUED | OUTPATIENT
Start: 2022-09-22 | End: 2022-09-22 | Stop reason: HOSPADM

## 2022-09-22 RX ORDER — NALOXONE HYDROCHLORIDE 0.4 MG/ML
0.2 INJECTION, SOLUTION INTRAMUSCULAR; INTRAVENOUS; SUBCUTANEOUS
Status: DISCONTINUED | OUTPATIENT
Start: 2022-09-22 | End: 2022-09-22 | Stop reason: HOSPADM

## 2022-09-22 RX ORDER — NALOXONE HYDROCHLORIDE 0.4 MG/ML
0.4 INJECTION, SOLUTION INTRAMUSCULAR; INTRAVENOUS; SUBCUTANEOUS
Status: DISCONTINUED | OUTPATIENT
Start: 2022-09-22 | End: 2022-09-22 | Stop reason: HOSPADM

## 2022-09-22 RX ORDER — HYDROMORPHONE HYDROCHLORIDE 1 MG/ML
0.4 INJECTION, SOLUTION INTRAMUSCULAR; INTRAVENOUS; SUBCUTANEOUS EVERY 5 MIN PRN
Status: DISCONTINUED | OUTPATIENT
Start: 2022-09-22 | End: 2022-09-22 | Stop reason: HOSPADM

## 2022-09-22 RX ORDER — OXYCODONE AND ACETAMINOPHEN 5; 325 MG/1; MG/1
1 TABLET ORAL EVERY 4 HOURS PRN
Status: DISCONTINUED | OUTPATIENT
Start: 2022-09-22 | End: 2022-09-22 | Stop reason: HOSPADM

## 2022-09-22 RX ORDER — PROTAMINE SULFATE 10 MG/ML
INJECTION, SOLUTION INTRAVENOUS
Status: DISCONTINUED | OUTPATIENT
Start: 2022-09-22 | End: 2022-09-22 | Stop reason: HOSPADM

## 2022-09-22 RX ORDER — HEPARIN SODIUM 1000 [USP'U]/ML
INJECTION, SOLUTION INTRAVENOUS; SUBCUTANEOUS
Status: DISCONTINUED | OUTPATIENT
Start: 2022-09-22 | End: 2022-09-22 | Stop reason: HOSPADM

## 2022-09-22 RX ORDER — GLYCOPYRROLATE 0.2 MG/ML
INJECTION, SOLUTION INTRAMUSCULAR; INTRAVENOUS PRN
Status: DISCONTINUED | OUTPATIENT
Start: 2022-09-22 | End: 2022-09-22

## 2022-09-22 RX ORDER — OXYCODONE HYDROCHLORIDE 5 MG/1
5 TABLET ORAL EVERY 4 HOURS PRN
Status: DISCONTINUED | OUTPATIENT
Start: 2022-09-22 | End: 2022-09-22 | Stop reason: HOSPADM

## 2022-09-22 RX ORDER — NEOSTIGMINE METHYLSULFATE 1 MG/ML
VIAL (ML) INJECTION PRN
Status: DISCONTINUED | OUTPATIENT
Start: 2022-09-22 | End: 2022-09-22

## 2022-09-22 RX ORDER — ONDANSETRON 2 MG/ML
4 INJECTION INTRAMUSCULAR; INTRAVENOUS EVERY 30 MIN PRN
Status: DISCONTINUED | OUTPATIENT
Start: 2022-09-22 | End: 2022-09-22 | Stop reason: HOSPADM

## 2022-09-22 RX ORDER — DEXAMETHASONE SODIUM PHOSPHATE 4 MG/ML
INJECTION, SOLUTION INTRA-ARTICULAR; INTRALESIONAL; INTRAMUSCULAR; INTRAVENOUS; SOFT TISSUE PRN
Status: DISCONTINUED | OUTPATIENT
Start: 2022-09-22 | End: 2022-09-22

## 2022-09-22 RX ORDER — FENTANYL CITRATE 50 UG/ML
50 INJECTION, SOLUTION INTRAMUSCULAR; INTRAVENOUS EVERY 5 MIN PRN
Status: DISCONTINUED | OUTPATIENT
Start: 2022-09-22 | End: 2022-09-22 | Stop reason: HOSPADM

## 2022-09-22 RX ORDER — FENTANYL CITRATE 50 UG/ML
INJECTION, SOLUTION INTRAMUSCULAR; INTRAVENOUS PRN
Status: DISCONTINUED | OUTPATIENT
Start: 2022-09-22 | End: 2022-09-22

## 2022-09-22 RX ADMIN — PROPOFOL 50 MG: 10 INJECTION, EMULSION INTRAVENOUS at 08:43

## 2022-09-22 RX ADMIN — FENTANYL CITRATE 100 MCG: 50 INJECTION, SOLUTION INTRAMUSCULAR; INTRAVENOUS at 08:39

## 2022-09-22 RX ADMIN — ROCURONIUM BROMIDE 50 MG: 50 INJECTION, SOLUTION INTRAVENOUS at 08:40

## 2022-09-22 RX ADMIN — GLYCOPYRROLATE 0.4 MG: 0.2 INJECTION, SOLUTION INTRAMUSCULAR; INTRAVENOUS at 10:05

## 2022-09-22 RX ADMIN — MIDAZOLAM 2 MG: 1 INJECTION INTRAMUSCULAR; INTRAVENOUS at 08:33

## 2022-09-22 RX ADMIN — SODIUM CHLORIDE, POTASSIUM CHLORIDE, SODIUM LACTATE AND CALCIUM CHLORIDE: 600; 310; 30; 20 INJECTION, SOLUTION INTRAVENOUS at 07:12

## 2022-09-22 RX ADMIN — Medication 3 MG: at 10:05

## 2022-09-22 RX ADMIN — PHENYLEPHRINE HYDROCHLORIDE 100 MCG: 10 INJECTION INTRAVENOUS at 09:53

## 2022-09-22 RX ADMIN — PHENYLEPHRINE HYDROCHLORIDE 0.3 MCG/KG/MIN: 10 INJECTION INTRAVENOUS at 08:56

## 2022-09-22 RX ADMIN — FENTANYL CITRATE 50 MCG: 50 INJECTION, SOLUTION INTRAMUSCULAR; INTRAVENOUS at 10:47

## 2022-09-22 RX ADMIN — Medication 7.5 MG: at 08:40

## 2022-09-22 RX ADMIN — DEXAMETHASONE SODIUM PHOSPHATE 4 MG: 4 INJECTION, SOLUTION INTRA-ARTICULAR; INTRALESIONAL; INTRAMUSCULAR; INTRAVENOUS; SOFT TISSUE at 08:52

## 2022-09-22 RX ADMIN — PROPOFOL 150 MG: 10 INJECTION, EMULSION INTRAVENOUS at 08:39

## 2022-09-22 RX ADMIN — ONDANSETRON 4 MG: 2 INJECTION INTRAMUSCULAR; INTRAVENOUS at 10:03

## 2022-09-22 ASSESSMENT — ACTIVITIES OF DAILY LIVING (ADL)
ADLS_ACUITY_SCORE: 35

## 2022-09-22 NOTE — PROGRESS NOTES
Care Suites Post Procedure Note    Patient Information  Name: Dipak Swartz  Age: 41 year old    Post Procedure  Time patient returned to Care Suites: 1140  Concerns/abnormal assessment: NA  If abnormal assessment, provider notified: N/A  Plan/Other: post care as ordered. Report received from PACU RN. Right groin CDI/soft with stopcock/suture in place, denies pain, neuro check intact.     Yvette Merino RN

## 2022-09-22 NOTE — PROGRESS NOTES
Care Suites Discharge Nursing Note    Patient Information  Name: Dipak Swartz  Age: 41 year old    Discharge Education:  Discharge instructions reviewed: Yes  Additional education/resources provided: yes Eliquis  Patient/patient representative verbalizes understanding: Yes  Patient discharging on new medications: Yes  Medication education completed: Yes    Discharge Plans:   Discharge location: home  Discharge ride contacted: Yes  Approximate discharge time: 1250    Discharge Criteria:  Discharge criteria met and vital signs stable: Yes, VSS, pt denies pain, right groin site CDI no hematoma no bleeding, ambulating at baseline, voided, pt sent home with Eliquis  medication    Patient Belongs:  Patient belongings returned to patient: Yes    Kaya Clay RN

## 2022-09-22 NOTE — Clinical Note
Potential access sites were evaluated for patency using ultrasound.   The right femoral vein was selected. Access was obtained x2 under with Sonosite guidance using a standard 18 guage needle with direct visualization of needle entry.

## 2022-09-22 NOTE — ANESTHESIA CARE TRANSFER NOTE
Patient: Dipak Swartz    Procedure: Procedure(s):  Ablation Atrial Fibrilation       Diagnosis: afib  Diagnosis Additional Information: No value filed.    Anesthesia Type:   General     Note:    Oropharynx: oropharynx clear of all foreign objects and spontaneously breathing  Level of Consciousness: awake  Oxygen Supplementation: room air    Independent Airway: airway patency satisfactory and stable  Dentition: dentition unchanged  Vital Signs Stable: post-procedure vital signs reviewed and stable    Patient transferred to: PACU    Handoff Report: Identifed the Patient, Identified the Reponsible Provider, Reviewed the pertinent medical history, Discussed the surgical course, Reviewed Intra-OP anesthesia mangement and issues during anesthesia, Set expectations for post-procedure period and Allowed opportunity for questions and acknowledgement of understanding      Vitals:  Vitals Value Taken Time   BP 98/59 09/22/22 1028   Temp     Pulse 75 09/22/22 1030   Resp 14 09/22/22 1030   SpO2 98 % 09/22/22 1030   Vitals shown include unvalidated device data.    Electronically Signed By: JOSE Barkley CRNA  September 22, 2022  10:31 AM

## 2022-09-22 NOTE — PROGRESS NOTES
AF ablation  All 4 PVs were isolated  Ablation of induced right sided AFL  eliquis 5 mg bid for 2 months  Remove suture and stopcock from right groin after 1 hour of bedrest  Home around 1230

## 2022-09-22 NOTE — DISCHARGE INSTRUCTIONS
A Fib Ablation Discharge Instructions    After you go home:  Have an adult stay with you until tomorrow.  You may eat your normal diet, unless your doctor tells you otherwise.  RELAX and take it easy for 3 days.        For 24-48 hours (due to the sedation you received):  DO NOT DRIVE FOR 2 DAYS!   Do NOT make any important or legal decisions.  Do NOT drive or operate machines at home or at work.  Do NOT drink alcohol.    Care of Puncture Site:  Check the puncture site severy 1-2 hours while awake.  For 2-3 days, when you cough, sneeze, laugh or move your bowels, hold your hand over the puncture sites and press firmly.  Please remove Dressing after 24 hours.  Then apply a band aid daily for at least 3 days (if needed). If there is minor oozing, apply another band aid and remove it after 12 hours.   It is normal to have a small bruise or a small lump that is present under the skin . This will go away on its own after 3-4 weeks.   You may shower. Do NOT take a bath, or use a hot tub or pool until groin site heals, which may take up to a week.  Do NOT scrub the site. Do NOT use lotion or powder near the puncture site.    Activity:  Do NOT lift, push or pull more than 10 pounds (equal to a gallon of milk) for 3 days.  NO repetitive motions such as loading , vacuuming, raking, shoveling.     Bleeding:  If you start bleeding from the groin site, lie down flat and press firmly on the site for 10 minutes or until bleeding stops.   Once bleeding stops, lay flat for 1-2 hours.  Call your A Fib nurse if bleeding does not stop or after hours will need to go to ER.       Go to ER or Call 911 right away if you have heavy bleeding or bleeding that does not stop.    Medications:  Take your medications, including blood thinners, unless your doctor tells you not to.  If you have stopped any other medicines, check with your nurse or provider about when to restart them.  If you have PAIN or a TIGHTNESS in your chest, you MAY  takeTylenol (acetaminophen) and if this does not help, you MAY take Advil (ibuprofen-400 mg with food).  If you have constipation or prone to constipation,  take a fiber supplement, ie metamucil or stool softners.    Call the A Fib RN if:  Chest pain not relieved by acetaminophen or ibuprofen  Difficulty swallowing and/or coughing up blood  Shortness of breath  Increased groin pain or a large or growing hard lump around the site.  Groin site is red, swollen, hot or tender.  Blood or fluid is draining from the groin site.  You have chills or a fever greater than 101 F (38 C).  Your leg feels numb, cool or changes color.  If groin pain is not relieved by Tylenol or Ibuprofen.  Recurrent irregular or fast heart rate lasting over 2-3 hours.  Any questions or concerns.    Heart rhythms:  You may have some irregular heartbeats. These feel very strong. They may make you feel that the A Fib is going to start again.  Give it time. The irregular beats should occur less often.    Follow Up Appointments:  9/30/2022 with GAURAV Elena at 8;30am in [place]  1/19/2022 with Dr Way at 9:30am in [facility]   A Fib clinic RN's Ramila Ling or Tania [phone] (Mon-Fri, 8:00-4:30)   [phone] Option 2 (7 days a week) after hours for on call Cardiologist.

## 2022-09-22 NOTE — ANESTHESIA POSTPROCEDURE EVALUATION
Patient: Dipak Swartz    Procedure: Procedure(s):  Ablation Atrial Fibrilation       Anesthesia Type:  General    Note:  Disposition: Outpatient   Postop Pain Control: Uneventful            Sign Out: Well controlled pain   PONV: No   Neuro/Psych: Uneventful            Sign Out: Acceptable/Baseline neuro status   Airway/Respiratory: Uneventful            Sign Out: Acceptable/Baseline resp. status   CV/Hemodynamics: Uneventful            Sign Out: Acceptable CV status   Other NRE: NONE   DID A NON-ROUTINE EVENT OCCUR? No           Last vitals:  Vitals Value Taken Time   BP 97/59 09/22/22 1141   Temp     Pulse 64 09/22/22 1141   Resp 13 09/22/22 1135   SpO2 98 % 09/22/22 1144   Vitals shown include unvalidated device data.    Electronically Signed By: Dipak Soriano MD  September 22, 2022  5:54 PM

## 2022-09-22 NOTE — ANESTHESIA PREPROCEDURE EVALUATION
Anesthesia Pre-Procedure Evaluation    Patient: Shashank Swartz   MRN: [id number] : 1980        Procedure : Procedure(s):  Ablation Atrial Fibrilation          Past Medical History:   Diagnosis Date     Accident on farm 2014     Alcohol abuse      Nonischemic cardiomyopathy (H)      Smoking       Past Surgical History:   Procedure Laterality Date     AMPUTATION BELOW KNEE RT/LT Right 2014     Broken Right arm       CV HEART CATHETERIZATION WITH POSSIBLE INTERVENTION N/A 2021    Procedure: Heart Catheterization with Possible Intervention;  Surgeon: Leonard Granados MD;  Location:  HEART CARDIAC CATH LAB     EP ICD INSERT SINGLE N/A 2022    Procedure: Implantable Cardioverter Defibrillator Device & Lead Implant - Single or Dual [[id number]];  Surgeon: Sasha Ellsworth MD;  Location:  HEART CARDIAC CATH LAB     LEG SURGERY Right 2014      Allergies   Allergen Reactions     No Known Allergies       Social History     Tobacco Use     Smoking status: Former Smoker     Quit date: 2003     Years since quittin.4     Smokeless tobacco: Current User     Types: Chew   Substance Use Topics     Alcohol use: Yes     Alcohol/week: 0.0 standard drinks     Comment: occ.      Wt Readings from Last 1 Encounters:   22 72.6 kg (160 lb 1.6 oz)        Anesthesia Evaluation   Pt has had prior anesthetic. Type: General.    No history of anesthetic complications       ROS/MED HX  ENT/Pulmonary:     (+) tobacco use, Past use,  (-) asthma, COPD and sleep apnea   Neurologic:       Cardiovascular:     (+) -----CHF etiology: nonischemic cardiomyopathy Last EF: 38% ICD Previous cardiac testing   Echo: Date: 2022 Results:  Narrative  [id number]  [id number]  [id number]  [id number]^MORE^PERLITA^ASHLEY WORRELL     [facility]  Echocardiography Laboratory  [address] [place] [address]     Name: SHASHANK SWARTZ  MRN: [id number]  : 1980  Study Date: 2022 10:08 AM  Age: 41  yrs  Gender: Male  Patient Location: [facility]  Reason For Study: HFrEF (heart failure with reduced ejection fraction) (H)  History: ICD, Non-Ischemic Cardiomyopathy, Tobacco, HFrEF  Ordering Physician: PERLITA BACH  Referring Physician: Jason García  Performed By: Ciarra Lamar     BSA: 1.8 m2  Height: 67 in  Weight: 147 lb  HR: 65  BP: 124/76 mmHg  ______________________________________________________________________________  Procedure  Limited Echo Adult.  ______________________________________________________________________________  Interpretation Summary     The left ventricle is borderline dilated.  Moderately decreased left ventricular systolic function. Biplane LVEF is 38%.  Moderate global hypokinesia of the left ventricle.  The right ventricle is normal in size and function.  There is a pacemaker lead in the right ventricle.  Trace mitral and tricuspid valve regurgitation.  Normal inferior vena cava.     Compared to the previous study dated 3/10/2022, left ventricular dilatation  has nearly normalized, LVEF has improved from 29% to 38%, mitral valve  regurgitation has decreased to trace.  ______________________________________________________________________________  Left Ventricle  The left ventricle is borderline dilated. There is mild eccentric left  ventricular hypertrophy. Moderately decreased left ventricular systolic  function. Biplane LVEF is 38%. Grade I or early diastolic dysfunction. There  is moderate global hypokinesia of the left ventricle.     Right Ventricle  The right ventricle is normal in size and function. There is a pacemaker lead  in the right ventricle.     Atria  Normal left atrial size. Right atrial size is normal. There is no color  Doppler evidence of an atrial shunt.     Mitral Valve  The mitral valve leaflets appear normal. There is no evidence of stenosis,  fluttering, or prolapse. There is trace mitral regurgitation.     Tricuspid Valve  Normal tricuspid valve.  There is trace tricuspid regurgitation. Right  ventricular systolic pressure could not be approximated due to inadequate  tricuspid regurgitation.     Aortic Valve  The aortic valve is trileaflet. No aortic regurgitation is present. No aortic  stenosis is present.     Pulmonic Valve  The pulmonic valve is not well visualized. There is trace pulmonic valvular  regurgitation.     Vessels  The aortic root is normal size. Normal size ascending aorta. The inferior vena  cava is normal.     Pericardium  There is no pericardial effusion.     Rhythm  Sinus rhythm was noted.  ______________________________________________________________________________  MMode/2D Measurements & Calculations  IVSd: 0.92 cm     LVIDd: 5.6 cm  LVIDs: 4.8 cm  LVPWd: 0.93 cm  FS: 15.3 %  LV mass(C)d: 199.7 grams  LV mass(C)dI: 112.6 grams/m2  LA dimension: 3.7 cm  RWT: 0.33     Stress Test: Date: Results:    ECG Reviewed: Date: Results:    Cath: Date: Results:   (-) hypertension and dyslipidemia   METS/Exercise Tolerance:     Hematologic:  - neg hematologic  ROS     Musculoskeletal: Comment: BKA      GI/Hepatic:    (-) GERD and liver disease   Renal/Genitourinary:    (-) renal disease   Endo:    (-) Type I DM, Type II DM and thyroid disease   Psychiatric/Substance Use:     (+) alcohol abuse     Infectious Disease:       Malignancy:       Other:            Physical Exam    Airway        Mallampati: II   TM distance: > 3 FB   Neck ROM: full   Mouth opening: > 3 cm    Respiratory Devices and Support         Dental  no notable dental history         Cardiovascular          Rhythm and rate: regular and normal     Pulmonary   pulmonary exam normal                OUTSIDE LABS:  CBC:   Lab Results   Component Value Date    WBC 8.1 04/07/2022    WBC 9.1 12/09/2021    HGB 13.7 04/07/2022    HGB 14.9 12/09/2021    HCT 41.0 04/07/2022    HCT 44.2 12/09/2021     04/07/2022     12/09/2021     BMP:   Lab Results   Component Value Date      08/09/2022     06/02/2022    POTASSIUM 4.6 08/09/2022    POTASSIUM 4.2 06/02/2022    CHLORIDE 103 08/09/2022    CHLORIDE 106 06/02/2022    CO2 29 08/09/2022    CO2 26 06/02/2022    BUN 16 08/09/2022    BUN 19 06/02/2022    CR 1.11 08/09/2022    CR 1.15 06/02/2022     (H) 08/09/2022     (H) 06/02/2022     COAGS:   Lab Results   Component Value Date    PTT 26 12/06/2021    INR 0.92 12/06/2021     POC: No results found for: BGM, HCG, HCGS  HEPATIC:   Lab Results   Component Value Date    ALBUMIN 3.5 12/10/2021    PROTTOTAL 6.8 12/10/2021    ALT 46 12/10/2021    AST 22 12/10/2021    ALKPHOS 57 12/10/2021    BILITOTAL 0.3 12/10/2021     OTHER:   Lab Results   Component Value Date    A1C 5.7 (H) 02/16/2022    SHAYNA 9.2 08/09/2022    MAG 2.3 12/10/2021    TSH 0.89 01/09/2018    CRP <2.9 12/06/2021    SED 5 12/06/2021       Anesthesia Plan    ASA Status:  3   NPO Status:  NPO Appropriate    Anesthesia Type: General.     - Airway: ETT   Induction: Intravenous.   Maintenance: Inhalation.        Consents    Anesthesia Plan(s) and associated risks, benefits, and realistic alternatives discussed. Questions answered and patient/representative(s) expressed understanding.    - Discussed:     - Discussed with:  Patient      - Extended Intubation/Ventilatory Support Discussed: No.      - Patient is DNR/DNI Status: No    Use of blood products discussed: Yes.     - Discussed with: Patient.     - Consented: consented to blood products            Reason for refusal: other.     Postoperative Care    Pain management: IV analgesics.   PONV prophylaxis: Ondansetron (or other 5HT-3), Dexamethasone or Solumedrol     Comments:                Dipak Soriano MD

## 2022-09-22 NOTE — ANESTHESIA PROCEDURE NOTES
Airway       Patient location during procedure: OR       Procedure Start/Stop Times: 9/22/2022 8:43 AM  Staff -        CRNA: Tim Stubbs APRN CRNA       Performed By: CRNA  Consent for Airway        Urgency: elective  Indications and Patient Condition       Indications for airway management: leandro-procedural       Induction type:intravenous       Mask difficulty assessment: 1 - vent by mask    Final Airway Details       Final airway type: endotracheal airway       Successful airway: ETT - single  Endotracheal Airway Details        ETT size (mm): 8.0       Cuffed: yes       Successful intubation technique: direct laryngoscopy       DL Blade Type: Abdi 2       Grade View of Cords: 1       Adjucts: stylet       Position: Right       Measured from: gums/teeth       Secured at (cm): 24       Bite block used: None    Post intubation assessment        Placement verified by: capnometry, equal breath sounds and chest rise        Number of attempts at approach: 1       Number of other approaches attempted: 0       Secured with: silk tape       Ease of procedure: easy       Dentition: Intact and Unchanged    Medication(s) Administered   Medication Administration Time: 9/22/2022 8:43 AM

## 2022-09-22 NOTE — PROGRESS NOTES
Care Suites Admission Nursing Note    Patient Information  Name: Dipak Swartz  Age: 41 year old  Reason for admission: AFib Ablation  Care Suites arrival time: 0630    Visitor Information  Name: Stella  Informed of visitor restrictions: Yes  1 visitor allowed per patient   Visitor must screen negative for COVID symptoms   Visitor must wear a mask  Waiting rooms closed to visitors    Patient Admission/Assessment   Pre-procedure assessment complete: Yes  If abnormal assessment/labs, provider notified: N/A  NPO: Yes  Medications held per instructions/orders: N/A  Consent: deferred  If applicable, pregnancy test status: deferred  Patient oriented to room: Yes  Education/questions answered: Yes  Plan/other:     Discharge Planning  Discharge name/phone number: Stella wife [phone]  Overnight post sedation caregiver: wife  Discharge location: home    Kaya Clay RN

## 2022-09-23 ENCOUNTER — TELEPHONE (OUTPATIENT)
Dept: CARDIOLOGY | Facility: CLINIC | Age: 42
End: 2022-09-23

## 2022-09-26 LAB
ATRIAL RATE - MUSE: 56 BPM
DIASTOLIC BLOOD PRESSURE - MUSE: NORMAL MMHG
INTERPRETATION ECG - MUSE: NORMAL
P AXIS - MUSE: 52 DEGREES
PR INTERVAL - MUSE: 190 MS
QRS DURATION - MUSE: 106 MS
QT - MUSE: 468 MS
QTC - MUSE: 451 MS
R AXIS - MUSE: 28 DEGREES
SYSTOLIC BLOOD PRESSURE - MUSE: NORMAL MMHG
T AXIS - MUSE: 210 DEGREES
VENTRICULAR RATE- MUSE: 56 BPM

## 2022-09-26 NOTE — PROGRESS NOTES
"[facility]    I had the pleasure of seeing Jorge L when he came for follow up of recent AFib ablation.  This 41 year old sees Dr. Way for his history of:    1. NICMO, likely EtOH - LVEF 15-20% in 12/2021, up to 38% 8/2022. Single chamber  [employer] ICD placed   2. Paroxysmal AFib - noted on device interrogation. S/p PVI and CTI ablation for AFib and inducible AFlutter 9/22/2022.   3. Dyslipidemia   4. MR  5. 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 At that time, he required PRN Lasix ~2-3x/week and Class II sxs noted. Toprol was increased to 100 mg daily for arrhythmias noted on device interrogation, but later decreased to 50 mg daily 9/13 d/t c/o lightheadedness/dizziness 9/13.     He saw Dr. Way 8/25/2022 at which time they reviewed his EGMs from device interrogation, suggestive of AFib. They discussed options, including AAD vs ablation. Therefore, he underwent EPS and WACA PV, CTI ablation for inducible flutter on 9/22/2022    Interval History:  Overall feels he's he doing well following ablation with recurrent arrhythmia that he's aware of.  No palpitations after the first few days, and what he did have, were very \"regular, heart\" heartbeats.    No chills/fever. Notes \"soreness\" of chest and R groin site, worse after the first couple days, but now much improved.  No trouble swallowing.    Still has dizziness and lightheadedness.  This was much worse in the hot weather, though he did not necessarily think he was dehydrated.  This seemed to have gotten worse ~1 month ago when placed on trazodone.    VITALS:  Vitals: /69   Pulse 63   Ht 1.702 m (5' 7\")   Wt 73 kg (161 lb)   SpO2 99%   BMI 25.22 kg/m      Diagnostic Testing:  EKG today, which I overread, showed SR 67 bpm.   Device interrogation 8/30/2022, showed 0%  in " "VVI 40 with Ventricular Arrhythmias with EGMs suggestive of AFib/RVR into monitor zone (therapy starts at 200 bpm). No ATP/shocks.   Echocardiogram 8/9/2022 - LVEF up to 38%. Grade I DD. Nl RV. Nl LA size. Min valve abnls.  Angiogram 12/2021 - negative        Plan:  1.  Continue Eliquis BID x at least 2 months  2.  Routine follow-up     Assessment/Plan:    1. Paroxysmal AFib    As above, noted on ICD interrogations, getting into the monitor zone    Now s/p WACA and CTI ablation 9/22/2022    EKG today shows SR.  He is unaware of any recurrent atrial arrhythmias, and he \"definitely noted\" them before    Remains on Metoprolol Xl - dose decreased back to 50 mg daily.  This did not improve lightheadedness/dizziness, which she thinks may be related to trazodone use    On AC with Eliquis for CHADSVASc 1 (CM) for at least 2 months per Dr. Way    PLAN:    Contact us with recurrent atrial fibrillation lasting >2 hours    Follow-up with Dr. Way 1/2023    We will contact him ~7 weeks after ablation to ensure he has not had any recurrent atrial arrhythmias.  Is he has, I would recommend he remain on Eliquis until he sees Dr. Way 1/2023, otherwise, we would stop this.    2. Nonischemic cardiomyopathy    Angiogram 12/2021 in the setting of low LVEF was negative for severe stenoses    He remains on metoprolol XL, spironolactone and Entresto.  He also takes furosemide 20 mg daily as needed.  This is needed only 1-2x/week at this point    No association with furosemide dosing and lightheadedness    On exam today, he appears euvolemic    PLAN:    Labs and see Ivory More in [place] 10/2022 as planned    Continue routine device interrogations      Addendum: After the visit, received note from Device indicating that he had had episodes of AFib (rates 160-170s) lasting up to 2 minutes.  11 of the episodes occurred 9/25 and 9/26, a few days after his ablation.  This got into the monitor zone at times, but not close to the treatment " zone.    Symptoms of dizziness yesterday did not correlate with arrhythmia, as he was in SR yesterday. HRs in SR elevated 90-100s, which is frequently seen post ablation.  As his metoprolol XL reduction did not seem to improve lightheadedness/dizziness at all, will ask that he increase his metoprolol XL to 50 mg in AM and 25 mg in PM.         Amaya Cardoso PA-C, MSPAS      Orders Placed This Encounter   Procedures     EKG 12-lead complete w/read - Clinics (performed today)     No orders of the defined types were placed in this encounter.    There are no discontinued medications.      Encounter Diagnosis   Name Primary?     Paroxysmal atrial fibrillation (H) Yes       CURRENT MEDICATIONS:  Current Outpatient Medications   Medication Sig Dispense Refill     apixaban ANTICOAGULANT (ELIQUIS ANTICOAGULANT) 5 MG tablet Take 1 tablet (5 mg) by mouth 2 times daily 120 tablet 0     atorvastatin (LIPITOR) 40 MG tablet Take 1 tablet (40 mg) by mouth daily 90 tablet 3     dapagliflozin (FARXIGA) 5 MG TABS tablet Take 1 tablet (5 mg) by mouth daily 90 tablet 3     furosemide (LASIX) 20 MG tablet Take 1 tablet (20 mg) by mouth daily as needed (wt gain, swelling) 90 tablet 3     metoprolol succinate ER (TOPROL XL) 50 MG 24 hr tablet Take 1 tablet (50 mg) by mouth daily 90 tablet 3     Multiple Vitamins-Minerals (MENS MULTIVITAMIN) TABS Take 1 tablet by mouth daily       order for DME Equipment being ordered: right lower extremity prosthetic     [employer] [facility]  [phone] 1 each 0     sacubitril-valsartan (ENTRESTO) 49-51 MG per tablet Take 1 tablet by mouth 2 times daily 60 tablet 11     spironolactone (ALDACTONE) 25 MG tablet Take 1 tablet (25 mg) by mouth daily 90 tablet 3     traZODone (DESYREL) 50 MG tablet Take 1-2 tablets ( mg) by mouth At Bedtime 45 tablet 3     zolpidem ER (AMBIEN CR) 6.25 MG CR tablet TAKE ONE TABLET BY MOUTH NIGHTLY AS NEEDED FOR SLEEP **APPT REQUIRED FOR FURTHER REFILLS** 4 tablet 0  "      ALLERGIES     Allergies   Allergen Reactions     No Known Allergies          Review of Systems:  Skin:  Negative     Eyes:  Negative    ENT:  Negative    Respiratory:  Negative for shortness of breath;dyspnea on exertion;cough  Cardiovascular:  Negative for;palpitations;chest pain;edema Positive for;lightheadedness;dizziness  Gastroenterology: Negative for melena;hematochezia  Genitourinary:  Negative    Musculoskeletal:  Negative    Neurologic:  Negative    Psychiatric:  Negative    Heme/Lymph/Imm:  Negative    Endocrine:  Negative      Physical Exam:  Vitals: /69   Pulse 63   Ht 1.702 m (5' 7\")   Wt 73 kg (161 lb)   SpO2 99%   BMI 25.22 kg/m      Constitutional:  cooperative, alert and oriented, well developed, well nourished, in no acute distress        Skin:  warm and dry to the touch, no apparent skin lesions or masses noted        Head:  normocephalic        Eyes:  pupils equal and round;conjunctivae and lids unremarkable;sclera white        ENT:  not assessed this visit        Neck:  not assessed this visit        Chest:  normal breath sounds, clear to auscultation, normal A-P diameter, normal symmetry, normal respiratory excursion, no use of accessory muscles        Cardiac: regular rhythm, normal S1/S2, no S3 or S4, apical impulse not displaced, no murmurs, gallops or rubs                  Abdomen:  abdomen soft        Vascular: pulses full and equal                               right femoral bruit (-) Small (<1cm2) hematoma RFV post AFib ablation 9/2022    Extremities and Back:  no deformities, clubbing, cyanosis, erythema observed;no edema   s/p R BKA    Neurological:  no gross motor deficits            PAST MEDICAL HISTORY:  Past Medical History:   Diagnosis Date     Accident on farm 08/01/2014     Alcohol abuse      Nonischemic cardiomyopathy (H)      Smoking        PAST SURGICAL HISTORY:  Past Surgical History:   Procedure Laterality Date     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 "     Broken Right arm       CV HEART CATHETERIZATION WITH POSSIBLE INTERVENTION N/A 2021    Procedure: Heart Catheterization with Possible Intervention;  Surgeon: Leonard Granados MD;  Location:  HEART CARDIAC CATH LAB     EP ABLATION FOCAL AFIB N/A 2022    Procedure: Ablation Atrial Fibrilation;  Surgeon: Oli Way MD;  Location:  HEART CARDIAC CATH LAB     EP ICD INSERT SINGLE N/A 2022    Procedure: Implantable Cardioverter Defibrillator Device & Lead Implant - Single or Dual [[id number]];  Surgeon: Sasha Ellsworth MD;  Location:  HEART CARDIAC CATH LAB     LEG SURGERY Right 2014       FAMILY HISTORY:  History reviewed. No pertinent family history.    SOCIAL HISTORY:  Social History     Socioeconomic History     Marital status:      Spouse name: None     Number of children: None     Years of education: None     Highest education level: None   Occupational History     Employer: UNKNOWN   Tobacco Use     Smoking status: Former Smoker     Quit date: 2003     Years since quittin.4     Smokeless tobacco: Current User     Types: Chew   Vaping Use     Vaping Use: Never used   Substance and Sexual Activity     Alcohol use: Yes     Alcohol/week: 0.0 standard drinks     Comment: occ.     Drug use: No     Sexual activity: Yes     Partners: Female

## 2022-09-27 DIAGNOSIS — G47.01 INSOMNIA DUE TO MEDICAL CONDITION: ICD-10-CM

## 2022-09-28 RX ORDER — ZOLPIDEM TARTRATE 6.25 MG/1
TABLET, FILM COATED, EXTENDED RELEASE ORAL
Qty: 4 TABLET | Refills: 0 | Status: SHIPPED | OUTPATIENT
Start: 2022-09-28 | End: 2023-06-16

## 2022-09-30 ENCOUNTER — OFFICE VISIT (OUTPATIENT)
Dept: CARDIOLOGY | Facility: CLINIC | Age: 42
End: 2022-09-30
Payer: COMMERCIAL

## 2022-09-30 ENCOUNTER — TELEPHONE (OUTPATIENT)
Dept: CARDIOLOGY | Facility: CLINIC | Age: 42
End: 2022-09-30

## 2022-09-30 VITALS
DIASTOLIC BLOOD PRESSURE: 69 MMHG | BODY MASS INDEX: 25.27 KG/M2 | HEART RATE: 63 BPM | SYSTOLIC BLOOD PRESSURE: 109 MMHG | HEIGHT: 67 IN | OXYGEN SATURATION: 99 % | WEIGHT: 161 LBS

## 2022-09-30 DIAGNOSIS — I48.0 PAROXYSMAL ATRIAL FIBRILLATION (H): Primary | ICD-10-CM

## 2022-09-30 DIAGNOSIS — I50.20 HFREF (HEART FAILURE WITH REDUCED EJECTION FRACTION) (H): ICD-10-CM

## 2022-09-30 PROCEDURE — 99214 OFFICE O/P EST MOD 30 MIN: CPT | Performed by: PHYSICIAN ASSISTANT

## 2022-09-30 PROCEDURE — 93000 ELECTROCARDIOGRAM COMPLETE: CPT | Performed by: PHYSICIAN ASSISTANT

## 2022-09-30 RX ORDER — METOPROLOL SUCCINATE 100 MG/1
100 TABLET, EXTENDED RELEASE ORAL AT BEDTIME
Qty: 1 TABLET | Refills: 0 | COMMUNITY
Start: 2022-09-30 | End: 2022-12-06

## 2022-09-30 NOTE — TELEPHONE ENCOUNTER
Pt sent manual remote ICD transmission yesterday. He called device techs today and said he sent because he was having dizziness and chest pain yesterday. Pt already had OV with Amaya NOLASCO this morning for f/u after AF ablation on 9/22/2022.     Lead measurements are stable. Presenting rhythm is a regular VS in the 70's. Mode VVI 40,  0%. Histogram shows excellent variability with rates reaching 170's at times, but counters have not been cleared since 5/26/2022.     There were 34 ventricular episodes recorded since last remote on 8/29, 18 with EGMs for review, all EGM show no change in morphology for AF with RVR or SVT, rates in the 160-170's, durations 11 seconds - 2 minutes. 11 of these episodes occurred after the AF ablation, they occurred on 9/25 and 9/26. Monitor zone 170 bpm, VT treatment zone 200 bpm, VF treatment zone 240 bpm. There were no arrhythmias on 9/29 when pt had his symptoms. Did note that the mean daily heart rate is often in the 90's - 100's.       Called pt. He said he had the dizziness and chest pain yesterday, it was on and off throughout the day. I told pt there were no arrhythmias at all yesterday. Pt said when he saw Amaya NOLASCO this morning she told him that these symptoms are pretty common after his ablation, so he is no longer concerned.     Pt did confirm he is taking metoprolol succinate 50mg daily. This dose was reduced by Ivory NOLASCO on 9/13/2022 because pt was feeling lightheaded. Will send update to Amaya NOLASCO about the average HRs of  bpm.       Graph of last 3 months showing mean daily heart rate:

## 2022-09-30 NOTE — TELEPHONE ENCOUNTER
Reviewed device interrogation.  My note from earlier today addended    We often see HR increase in SR after ablation, which is typically transient lasting just 1-3 months.  Not surprising that he had recurrent AFib, but HR still getting quite fast.    **If he is willing, could you please have him increase metoprolol XL from 50 mg daily to 50 mg in AM and 25 mg in PM?  He confirmed that dizziness did not seem to improve after Ivory reduced his metoprolol XL from 50 mg BID on 9/13.  He thinks that a lot of this is due to trazodone.    If he is willing, please update med list and send Rx in if needed.  If he is concerned that the higher dose may cause problems, continue XL 50 mg daily with routine monitoring.    Thanks-Amaya

## 2022-09-30 NOTE — PATIENT INSTRUCTIONS
Jorge L - it was good to meet you today!    EKG today looked great  Reviewed AFib ablation  - AFib and AFlutter ablation (PVI, CTI) done)  Reviewed dizziness not better - trazodone?    PLAN:  Eliquis x 2 months  - we'll call before stopping it to make sure no more AFib  See Ivory as planned 11/2022  Dr. Way 1/2023 and device cehck 12/2022      Ramila Ling & Tania: [phone]

## 2022-09-30 NOTE — TELEPHONE ENCOUNTER
Attempted to call pt, no answer, left brief non-urgent VM to call back to device clinic for a message from Amaya NOLASCO.     ADDENDUM 4:45PM. Pt returned call. We discussed metoprolol per Amaya NOLASCO's note below. Pt said he is willing to increase the metoprolol up to total 75mg in a day, or even back up to 100mg daily if Amaya NOLASCO thinks that is best. He wonders if he can take the whole dose in the evening instead of splitting it up because he doesn't want to feel too fatigued.     Pt wants to ask Amaya if he should just do 100mg again, all in the evening. I spoke with Amaya NOLASCO and she said that was fine.     Called pt back and let him know he can take 100mg daily at night. Pt does not need a refill. Med list updated.

## 2022-09-30 NOTE — LETTER
"9/30/2022    Jason García MD  [address] [facility] [address]    RE: Dipak Swartz       Dear Colleague,     I had the pleasure of seeing Dipak UNIQUE Swartz in the [facility].  [facility]    I had the pleasure of seeing Jorge L when he came for follow up of recent AFib ablation.  This 41 year old sees Dr. Way for his history of:    1. NICMO, likely EtOH - LVEF 15-20% in 12/2021, up to 38% 8/2022. Single chamber  [employer] ICD placed   2. Paroxysmal AFib - noted on device interrogation. S/p PVI and CTI ablation for AFib and inducible AFlutter 9/22/2022.   3. Dyslipidemia   4. MR  5. 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 At that time, he required PRN Lasix ~2-3x/week and Class II sxs noted. Toprol was increased to 100 mg daily for arrhythmias noted on device interrogation, but later decreased to 50 mg daily 9/13 d/t c/o lightheadedness/dizziness 9/13.     He saw Dr. Way 8/25/2022 at which time they reviewed his EGMs from device interrogation, suggestive of AFib. They discussed options, including AAD vs ablation. Therefore, he underwent EPS and WACA PV, CTI ablation for inducible flutter on 9/22/2022    Interval History:  Overall feels he's he doing well following ablation with recurrent arrhythmia that he's aware of.  No palpitations after the first few days, and what he did have, were very \"regular, heart\" heartbeats.    No chills/fever. Notes \"soreness\" of chest and R groin site, worse after the first couple days, but now much improved.  No trouble swallowing.    Still has dizziness and lightheadedness.  This was much worse in the hot weather, though he did not necessarily think he was dehydrated.  This seemed to have gotten worse ~1 month ago when placed on trazodone.    VITALS:  Vitals: /69  " " Pulse 63   Ht 1.702 m (5' 7\")   Wt 73 kg (161 lb)   SpO2 99%   BMI 25.22 kg/m      Diagnostic Testing:  EKG today, which I overread, showed SR 67 bpm.   Device interrogation 8/30/2022, showed 0%  in VVI 40 with Ventricular Arrhythmias with EGMs suggestive of AFib/RVR into monitor zone (therapy starts at 200 bpm). No ATP/shocks.   Echocardiogram 8/9/2022 - LVEF up to 38%. Grade I DD. Nl RV. Nl LA size. Min valve abnls.  Angiogram 12/2021 - negative        Plan:  1.  Continue Eliquis BID x at least 2 months  2.  Routine follow-up     Assessment/Plan:    1. Paroxysmal AFib    As above, noted on ICD interrogations, getting into the monitor zone    Now s/p WACA and CTI ablation 9/22/2022    EKG today shows SR.  He is unaware of any recurrent atrial arrhythmias, and he \"definitely noted\" them before    Remains on Metoprolol Xl - dose decreased back to 50 mg daily.  This did not improve lightheadedness/dizziness, which she thinks may be related to trazodone use    On AC with Eliquis for CHADSVASc 1 (CM) for at least 2 months per Dr. Way    PLAN:    Contact us with recurrent atrial fibrillation lasting >2 hours    Follow-up with Dr. Way 1/2023    We will contact him ~7 weeks after ablation to ensure he has not had any recurrent atrial arrhythmias.  Is he has, I would recommend he remain on Eliquis until he sees Dr. Way 1/2023, otherwise, we would stop this.    2. Nonischemic cardiomyopathy    Angiogram 12/2021 in the setting of low LVEF was negative for severe stenoses    He remains on metoprolol XL, spironolactone and Entresto.  He also takes furosemide 20 mg daily as needed.  This is needed only 1-2x/week at this point    No association with furosemide dosing and lightheadedness    On exam today, he appears euvolemic    PLAN:    Labs and see Ivory Serrano in CORE 10/2022 as planned    Continue routine device interrogations      Amaya Cardoso PA-C, MSPAS      Orders Placed This Encounter   Procedures     EKG 12-lead " complete w/read - Clinics (performed today)     No orders of the defined types were placed in this encounter.    There are no discontinued medications.      Encounter Diagnosis   Name Primary?     Paroxysmal atrial fibrillation (H) Yes       CURRENT MEDICATIONS:  Current Outpatient Medications   Medication Sig Dispense Refill     apixaban ANTICOAGULANT (ELIQUIS ANTICOAGULANT) 5 MG tablet Take 1 tablet (5 mg) by mouth 2 times daily 120 tablet 0     atorvastatin (LIPITOR) 40 MG tablet Take 1 tablet (40 mg) by mouth daily 90 tablet 3     dapagliflozin (FARXIGA) 5 MG TABS tablet Take 1 tablet (5 mg) by mouth daily 90 tablet 3     furosemide (LASIX) 20 MG tablet Take 1 tablet (20 mg) by mouth daily as needed (wt gain, swelling) 90 tablet 3     metoprolol succinate ER (TOPROL XL) 50 MG 24 hr tablet Take 1 tablet (50 mg) by mouth daily 90 tablet 3     Multiple Vitamins-Minerals (MENS MULTIVITAMIN) TABS Take 1 tablet by mouth daily       order for DME Equipment being ordered: right lower extremity prosthetic     [employer]  [phone] 1 each 0     sacubitril-valsartan (ENTRESTO) 49-51 MG per tablet Take 1 tablet by mouth 2 times daily 60 tablet 11     spironolactone (ALDACTONE) 25 MG tablet Take 1 tablet (25 mg) by mouth daily 90 tablet 3     traZODone (DESYREL) 50 MG tablet Take 1-2 tablets ( mg) by mouth At Bedtime 45 tablet 3     zolpidem ER (AMBIEN CR) 6.25 MG CR tablet TAKE ONE TABLET BY MOUTH NIGHTLY AS NEEDED FOR SLEEP **APPT REQUIRED FOR FURTHER REFILLS** 4 tablet 0       ALLERGIES     Allergies   Allergen Reactions     No Known Allergies          Review of Systems:  Skin:  Negative     Eyes:  Negative    ENT:  Negative    Respiratory:  Negative for shortness of breath;dyspnea on exertion;cough  Cardiovascular:  Negative for;palpitations;chest pain;edema Positive for;lightheadedness;dizziness  Gastroenterology: Negative for melena;hematochezia  Genitourinary:  Negative    Musculoskeletal:   "Negative    Neurologic:  Negative    Psychiatric:  Negative    Heme/Lymph/Imm:  Negative    Endocrine:  Negative      Physical Exam:  Vitals: /69   Pulse 63   Ht 1.702 m (5' 7\")   Wt 73 kg (161 lb)   SpO2 99%   BMI 25.22 kg/m      Constitutional:  cooperative, alert and oriented, well developed, well nourished, in no acute distress        Skin:  warm and dry to the touch, no apparent skin lesions or masses noted        Head:  normocephalic        Eyes:  pupils equal and round;conjunctivae and lids unremarkable;sclera white        ENT:  not assessed this visit        Neck:  not assessed this visit        Chest:  normal breath sounds, clear to auscultation, normal A-P diameter, normal symmetry, normal respiratory excursion, no use of accessory muscles        Cardiac: regular rhythm, normal S1/S2, no S3 or S4, apical impulse not displaced, no murmurs, gallops or rubs                  Abdomen:  abdomen soft        Vascular: pulses full and equal                               right femoral bruit (-) Small (<1cm2) hematoma RFV post AFib ablation 9/2022    Extremities and Back:  no deformities, clubbing, cyanosis, erythema observed;no edema   s/p R BKA    Neurological:  no gross motor deficits            PAST MEDICAL HISTORY:  Past Medical History:   Diagnosis Date     Accident on farm 08/01/2014     Alcohol abuse      Nonischemic cardiomyopathy (H)      Smoking        PAST SURGICAL HISTORY:  Past Surgical History:   Procedure Laterality Date     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     Broken Right arm  1992     CV HEART CATHETERIZATION WITH POSSIBLE INTERVENTION N/A 12/9/2021    Procedure: Heart Catheterization with Possible Intervention;  Surgeon: Leonard Granados MD;  Location:  HEART CARDIAC CATH LAB     EP ABLATION FOCAL AFIB N/A 9/22/2022    Procedure: Ablation Atrial Fibrilation;  Surgeon: Oli Way MD;  Location:  HEART CARDIAC CATH LAB     EP ICD INSERT SINGLE N/A 4/7/2022    Procedure: " Implantable Cardioverter Defibrillator Device & Lead Implant - Single or Dual [[id number]];  Surgeon: Sasha Ellsworth MD;  Location:  HEART CARDIAC CATH LAB     LEG SURGERY Right 2014       FAMILY HISTORY:  History reviewed. No pertinent family history.    SOCIAL HISTORY:  Social History     Socioeconomic History     Marital status:      Spouse name: None     Number of children: None     Years of education: None     Highest education level: None   Occupational History     Employer: UNKNOWN   Tobacco Use     Smoking status: Former Smoker     Quit date: 2003     Years since quittin.4     Smokeless tobacco: Current User     Types: Chew   Vaping Use     Vaping Use: Never used   Substance and Sexual Activity     Alcohol use: Yes     Alcohol/week: 0.0 standard drinks     Comment: occ.     Drug use: No     Sexual activity: Yes     Partners: Female           Thank you for allowing me to participate in the care of your patient.      Sincerely,     Clotilde Cardoso PA-C     [facility]  cc:   No referring provider defined for this encounter.

## 2022-11-01 DIAGNOSIS — I48.0 PAROXYSMAL ATRIAL FIBRILLATION (H): Primary | ICD-10-CM

## 2022-11-01 NOTE — TELEPHONE ENCOUNTER
[facility]    Incoming message from patient re:eliquis refill. Patient last seen by Amaya Cardoso on 9/30/22. Per her clinic notes:    On AC with Eliquis for CHADSVASc 1 (CM) for at least 2 months per Dr. Way    Current Rx at  [facility]. Will change this to [facility] per his request.  Katelyn Felix RN on 11/1/2022 at 2:09 PM

## 2022-11-02 ENCOUNTER — CARE COORDINATION (OUTPATIENT)
Dept: CARDIOLOGY | Facility: CLINIC | Age: 42
End: 2022-11-02

## 2022-11-02 NOTE — PROGRESS NOTES
"[facility]    Incoming call from patient reporting sx of blurred vision and subsequent HA. Patient stated that yesterday(Tues 11/1) afternoon he experienced sudden onset of blurred near vision. This progressed to blurry distance vision. Visual changes equal bilaterally and he specified the blurriness was like \"looking through waves.\"  In about 20min he got a severe HA, \"it was an all over HA, it felt like I got kicked in the head.\"  He denied any associated dizziness, lightheadedness, N/V, CP, SOB. The HA gradually improved and he was able to sleep. He woke this am w/o sx , but then ~1030 today his close vision again became blurred. At the time of his call the vision change had resolved but he had a mild HA.     He asked about the above being a SE of his metoprolol. He was last seen by Amaya Cardoso on 9/30 in f/u of AF ablation done on 9/22/22. Her clinic notes report that his metoprolol was decreased to 50mg/d d/t episodes of dizziness/lightheadedness. However per device interrogation he continued to have episodes of AF w/rates up to 160-170. His reports of dizziness did not correlate w/the rapid rate and she increased his dose ultimately to 100mg/d. He confirmed this is the dose he is taking. Today he stated that his diizziness/lightheadedness is actually better.     I suggested to him that his sx sound more like migraine. He stated he has never been dx'd w/migraines. I recommended he be evaluated in UC or by his PCP today. He stated that he had a \"busy schedule today w/alot of important things.\"  I reiterated my recommendation to be seen ASAP.     I did also review above w/Ivory More who agreed sx sound more likely migraine w/visual aura. She added that SE from metoprolol would be extremely unlikely. She agreed that he should be seen. I called patient back w/this additional information - LM.  Katelyn Felix RN on 11/2/2022 at 12:27 PM    "

## 2022-11-04 ENCOUNTER — MYC MEDICAL ADVICE (OUTPATIENT)
Dept: CARDIOLOGY | Facility: CLINIC | Age: 42
End: 2022-11-04

## 2022-11-04 ASSESSMENT — SLEEP AND FATIGUE QUESTIONNAIRES
HOW LIKELY ARE YOU TO NOD OFF OR FALL ASLEEP WHEN YOU ARE A PASSENGER IN A CAR FOR AN HOUR WITHOUT A BREAK: WOULD NEVER DOZE
HOW LIKELY ARE YOU TO NOD OFF OR FALL ASLEEP WHILE SITTING INACTIVE IN A PUBLIC PLACE: HIGH CHANCE OF DOZING
HOW LIKELY ARE YOU TO NOD OFF OR FALL ASLEEP WHILE SITTING AND TALKING TO SOMEONE: WOULD NEVER DOZE
HOW LIKELY ARE YOU TO NOD OFF OR FALL ASLEEP WHILE SITTING AND READING: HIGH CHANCE OF DOZING
HOW LIKELY ARE YOU TO NOD OFF OR FALL ASLEEP WHILE LYING DOWN TO REST IN THE AFTERNOON WHEN CIRCUMSTANCES PERMIT: HIGH CHANCE OF DOZING
HOW LIKELY ARE YOU TO NOD OFF OR FALL ASLEEP WHILE SITTING QUIETLY AFTER LUNCH WITHOUT ALCOHOL: WOULD NEVER DOZE
HOW LIKELY ARE YOU TO NOD OFF OR FALL ASLEEP WHILE WATCHING TV: SLIGHT CHANCE OF DOZING
HOW LIKELY ARE YOU TO NOD OFF OR FALL ASLEEP IN A CAR, WHILE STOPPED FOR A FEW MINUTES IN TRAFFIC: MODERATE CHANCE OF DOZING

## 2022-12-01 ENCOUNTER — ANCILLARY PROCEDURE (OUTPATIENT)
Dept: CARDIOLOGY | Facility: CLINIC | Age: 42
End: 2022-12-01
Attending: INTERNAL MEDICINE
Payer: COMMERCIAL

## 2022-12-01 DIAGNOSIS — I42.8 NON-ISCHEMIC CARDIOMYOPATHY (H): ICD-10-CM

## 2022-12-01 DIAGNOSIS — Z95.810 ICD (IMPLANTABLE CARDIOVERTER-DEFIBRILLATOR), SINGLE, IN SITU: ICD-10-CM

## 2022-12-01 PROCEDURE — 93295 DEV INTERROG REMOTE 1/2/MLT: CPT | Performed by: INTERNAL MEDICINE

## 2022-12-01 PROCEDURE — 93296 REM INTERROG EVL PM/IDS: CPT | Performed by: INTERNAL MEDICINE

## 2022-12-06 ENCOUNTER — OFFICE VISIT (OUTPATIENT)
Dept: CARDIOLOGY | Facility: CLINIC | Age: 42
End: 2022-12-06
Payer: COMMERCIAL

## 2022-12-06 VITALS
BODY MASS INDEX: 25.8 KG/M2 | SYSTOLIC BLOOD PRESSURE: 108 MMHG | WEIGHT: 164.4 LBS | DIASTOLIC BLOOD PRESSURE: 74 MMHG | HEIGHT: 67 IN | OXYGEN SATURATION: 100 % | HEART RATE: 76 BPM

## 2022-12-06 DIAGNOSIS — E78.5 HYPERLIPIDEMIA LDL GOAL <100: ICD-10-CM

## 2022-12-06 DIAGNOSIS — I50.20 HFREF (HEART FAILURE WITH REDUCED EJECTION FRACTION) (H): ICD-10-CM

## 2022-12-06 LAB
ALT SERPL W P-5'-P-CCNC: 30 U/L (ref 0–70)
ANION GAP SERPL CALCULATED.3IONS-SCNC: 3 MMOL/L (ref 3–14)
BUN SERPL-MCNC: 12 MG/DL (ref 7–30)
CALCIUM SERPL-MCNC: 8.7 MG/DL (ref 8.5–10.1)
CHLORIDE BLD-SCNC: 103 MMOL/L (ref 94–109)
CHOLEST SERPL-MCNC: 199 MG/DL
CO2 SERPL-SCNC: 28 MMOL/L (ref 20–32)
CREAT SERPL-MCNC: 1.08 MG/DL (ref 0.66–1.25)
FASTING STATUS PATIENT QL REPORTED: YES
GFR SERPL CREATININE-BSD FRML MDRD: 88 ML/MIN/1.73M2
GLUCOSE BLD-MCNC: 117 MG/DL (ref 70–99)
HDLC SERPL-MCNC: 108 MG/DL
LDLC SERPL CALC-MCNC: 75 MG/DL
NONHDLC SERPL-MCNC: 91 MG/DL
NT-PROBNP SERPL-MCNC: 132 PG/ML (ref 0–450)
POTASSIUM BLD-SCNC: 4.1 MMOL/L (ref 3.4–5.3)
SODIUM SERPL-SCNC: 134 MMOL/L (ref 133–144)
TRIGL SERPL-MCNC: 79 MG/DL

## 2022-12-06 PROCEDURE — 99214 OFFICE O/P EST MOD 30 MIN: CPT | Mod: 25 | Performed by: PHYSICIAN ASSISTANT

## 2022-12-06 PROCEDURE — 83880 ASSAY OF NATRIURETIC PEPTIDE: CPT | Performed by: PHYSICIAN ASSISTANT

## 2022-12-06 PROCEDURE — 80061 LIPID PANEL: CPT | Performed by: PHYSICIAN ASSISTANT

## 2022-12-06 PROCEDURE — 80048 BASIC METABOLIC PNL TOTAL CA: CPT | Performed by: PHYSICIAN ASSISTANT

## 2022-12-06 PROCEDURE — 36415 COLL VENOUS BLD VENIPUNCTURE: CPT | Performed by: PHYSICIAN ASSISTANT

## 2022-12-06 PROCEDURE — 84460 ALANINE AMINO (ALT) (SGPT): CPT | Performed by: PHYSICIAN ASSISTANT

## 2022-12-06 RX ORDER — SACUBITRIL AND VALSARTAN 97; 103 MG/1; MG/1
1 TABLET, FILM COATED ORAL 2 TIMES DAILY
Qty: 180 TABLET | Refills: 3 | Status: SHIPPED | OUTPATIENT
Start: 2022-12-06 | End: 2022-12-21

## 2022-12-06 RX ORDER — METOPROLOL SUCCINATE 100 MG/1
100 TABLET, EXTENDED RELEASE ORAL AT BEDTIME
Qty: 90 TABLET | Refills: 3 | Status: SHIPPED | OUTPATIENT
Start: 2022-12-06 | End: 2023-03-22

## 2022-12-06 NOTE — PROGRESS NOTES
Service Date: 2022    POOR AUDIO    PRIMARY CARDIOLOGIST:  Dr. Irizarry.    REASON FOR VISIT:  C.O.R.E. Clinic followup.    HISTORY OF PRESENT ILLNESS:  Mr. Swratz is a delightful 42-year-old gentleman with past medical history significant for the followin.  Tobacco and alcohol abuse.  2.  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  3.  Nonischemic cardiomyopathy diagnosed when he presented with shortness of breath in 2021 with EF of 15%-20% at that time, with it remaining low in spite of medical therapy with last ejection fraction 38%.  4.  ICD in place.  5.  Dyslipidemia.  6.  Atrial fibrillation and atrial flutter noted on device.  Now status post PVI and flutter ablation on 2022.  7.  Mitral regurg improved to trace to mild with improving EF.    We have been working on Jorge L optimizing through medications and have been somewhat limited by hypotension, but in fact, it turns out his dizziness may more be rhythm related.  Since he has had his ablation, he has not been as lightheaded in spite of being on 100 mg of Toprol at night.  He denies chest pain, shortness of breath, orthopnea or PND.  He has not had syncope or presyncope.  He did have 1 episode of blurred vision and this was associated with nausea, most likely an ocular migraine.  His weight is down at home from 154 to 149 and stable.  He is only taking his Lasix probably twice a week.    SOCIAL HISTORY:  He is .  He recently moved with his 7-year-old and 5-year-old children and wife to [place] and is running a kael and restaurant.  Previously was drinking 4-5 beers a day plus a liter of vodka, now drinking more on the weekends.  Continues to smoke.  No street drugs.  He notes that he was on a supplement called Kratom for about 2-1/2 years as a replacement of opioids.  He is wondering if this could have impacted his heart.    PHYSICAL EXAMINATION:    GENERAL:  Well-developed, well-nourished gentleman in no acute  distress.  HEENT:  Normocephalic, atraumatic.  HEART:  Regular in rate and rhythm.  I do not appreciate murmur, rub or gallop.  LUNGS:  Clear, without wheezes, rales or rhonchi.  No peripheral edema.  NECK:  The neck veins are flat.    Labs are reviewed today showing a creatinine of 1.08, BUN of 12, potassium 4.1, sodium 134, LDL 75, , triglycerides 79, N-terminal proBNP 132, ALT 30.    Studies reviewed include angiogram dated 12/21 shows normal coronary arteries.    ASSESSMENT AND PLAN:    1.  Nonischemic cardiomyopathy with biventricular heart failure with EF of 15%-20% on diagnosis, improved to 38% with medical management now with ICD in place and class I to class II symptoms.  Fortunately, his lightheadedness has reduced with his ablation, suggesting this may have been more arrhythmia induced instead of hypotension.  As such, we are going to increase his Entresto to a full dose at 97/103 mg b.i.d.  He will remain on spironolactone 25 mg daily, Jardiance 5 mg daily and Toprol 100 mg at bedtime.  He will continue his Lasix on a p.r.n. basis.  2.  Hypertension now with episodes of hypotension.  3.  Hyperlipidemia but no known coronary disease.  We discussed today that we want to avoid problems now and down the road.  He would like a trial off Lipitor, which I think is reasonable.  His lipid panel is excellent today and he is going to work harder on diet and exercise.  Repeat lipid panel in 3 months.  4.  History of heavy ETOH and ongoing tobacco.  Ultimate goal is to eliminate alcohol.  5.  ICD in place with history of AFib and flutter, now status post PVI and flutter ablation.  He was on short-term anticoagulation.  Will defer this to EP.    We will get a repeat BMP and an echocardiogram in 1 month.  He already has scheduled followup with Dr. Way, but that is in [place] and he has now moved to [place].  We will switch his followup to [place], and I will ask our scheduling team reach out to him and he  can see me there for C.O.R.E. as well.  Depending on his ejection fraction, we will figure out C.O.R.E. followup.    Thank you for allowing me to participate in this delightful patient's care.    Ivory Serrano PA-C        D: 2022   T: 2022   MT: rosy    Name:     SHASHANK TREADWELL  MRN:      [id number]        Account:      [id number]   :      1980           Service Date: 2022       Document: [id number]

## 2022-12-06 NOTE — PATIENT INSTRUCTIONS
Call CORE nurse for any questions or concerns Mon-Fri 8am-4pm:                                                #[phone]                                       For concerns after hours:                                               #[phone]     1: Medication changes: increase Entresto to 97/103 mg twice a day.  You can take 2 of your 49/51 mg pills twice a day until those are gone.    Stop taking atorvastatin, we'll recheck your cholesterol in about 3 months.    Continue other medications.      2: Plan from today: we'll get an echocardiogram and labs before your visit with Dr. Way in January.    Call if you get more lightheaded or have any other issues.      3: Lab results: labs are perfect!

## 2022-12-06 NOTE — PROGRESS NOTES
[id number]      HPI and Plan:   See dictation    Orders this Visit:  Orders Placed This Encounter   Procedures     Basic metabolic panel     Basic metabolic panel     Follow-Up with Cardiology [facility]     Echocardiogram Limited     Orders Placed This Encounter   Medications     sacubitril-valsartan (ENTRESTO)  MG per tablet     Sig: Take 1 tablet by mouth 2 times daily     Dispense:  180 tablet     Refill:  3     metoprolol succinate ER (TOPROL XL) 100 MG 24 hr tablet     Sig: Take 1 tablet (100 mg) by mouth At Bedtime     Dispense:  90 tablet     Refill:  3     Medications Discontinued During This Encounter   Medication Reason     sacubitril-valsartan (ENTRESTO) 49-51 MG per tablet      metoprolol succinate ER (TOPROL XL) 100 MG 24 hr tablet      atorvastatin (LIPITOR) 40 MG tablet          Encounter Diagnoses   Name Primary?     HFrEF (heart failure with reduced ejection fraction) (H)      Hyperlipidemia LDL goal <100        CURRENT MEDICATIONS:  Current Outpatient Medications   Medication Sig Dispense Refill     dapagliflozin (FARXIGA) 5 MG TABS tablet Take 1 tablet (5 mg) by mouth daily 90 tablet 3     metoprolol succinate ER (TOPROL XL) 100 MG 24 hr tablet Take 1 tablet (100 mg) by mouth At Bedtime 90 tablet 3     order for DME Equipment being ordered: right lower extremity prosthetic     [employer]  [phone] 1 each 0     sacubitril-valsartan (ENTRESTO)  MG per tablet Take 1 tablet by mouth 2 times daily 180 tablet 3     spironolactone (ALDACTONE) 25 MG tablet Take 1 tablet (25 mg) by mouth daily 90 tablet 3     traZODone (DESYREL) 50 MG tablet Take 1-2 tablets ( mg) by mouth At Bedtime 45 tablet 3     furosemide (LASIX) 20 MG tablet Take 1 tablet (20 mg) by mouth daily as needed (wt gain, swelling) (Patient not taking: Reported on 12/6/2022) 90 tablet 3     Multiple Vitamins-Minerals (MENS MULTIVITAMIN) TABS Take 1 tablet by mouth daily (Patient not taking: Reported on  12/6/2022)       zolpidem ER (AMBIEN CR) 6.25 MG CR tablet TAKE ONE TABLET BY MOUTH NIGHTLY AS NEEDED FOR SLEEP **APPT REQUIRED FOR FURTHER REFILLS** (Patient not taking: Reported on 11/4/2022) 4 tablet 0       ALLERGIES     Allergies   Allergen Reactions     No Known Allergies        PAST MEDICAL HISTORY:  Past Medical History:   Diagnosis Date     Accident [place] farm 08/01/2014     Alcohol abuse      Nonischemic cardiomyopathy (H)      Smoking        PAST SURGICAL HISTORY:  Past Surgical History:   Procedure Laterality Date     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     Broken Right arm  1992     CV HEART CATHETERIZATION WITH POSSIBLE INTERVENTION N/A 12/9/2021    Procedure: Heart Catheterization with Possible Intervention;  Surgeon: Leonard Granados MD;  Location:  HEART CARDIAC CATH LAB     EP ABLATION FOCAL AFIB N/A 9/22/2022    Procedure: Ablation Atrial Fibrilation;  Surgeon: Oli Way MD;  Location: [facility] CARDIAC CATH LAB     EP ICD INSERT SINGLE N/A 4/7/2022    Procedure: Implantable Cardioverter Defibrillator Device & Lead Implant - Single or Dual [[id number]];  Surgeon: Sasha Ellsworth MD;  Location:  HEART CARDIAC CATH LAB     LEG SURGERY Right 8/2014       FAMILY HISTORY:  No family history on file.    SOCIAL HISTORY:  Social History     Socioeconomic History     Marital status:      Spouse name: None     Number of children: None     Years of education: None     Highest education level: None   Occupational History     Employer: UNKNOWN   Tobacco Use     Smoking status: Former     Smokeless tobacco: Current     Types: Chew   Vaping Use     Vaping Use: Never used   Substance and Sexual Activity     Alcohol use: Yes     Alcohol/week: 0.0 standard drinks     Comment: occ.     Drug use: No     Sexual activity: Yes     Partners: Female       Review of Systems:  Skin:        Eyes:       ENT:       Respiratory:  Negative shortness of breath;cough;wheezing  Cardiovascular:   "Negative;palpitations;edema;lightheadedness;dizziness;syncope or near-syncope Positive for;chest pain  Gastroenterology:      Genitourinary:       Musculoskeletal:       Neurologic:  Positive for numbness or tingling of hands  Psychiatric:       Heme/Lymph/Imm:       Endocrine:         Physical Exam:  Vitals: /74 (BP Location: Right arm, Patient Position: Sitting, Cuff Size: Adult Regular)   Pulse 76   Ht 1.702 m (5' 7\")   Wt 74.6 kg (164 lb 6.4 oz)   SpO2 100%   BMI 25.75 kg/m     Please refer to dictation for physical exam    Recent Lab Results: all reviewed today  CBC RESULTS:  Lab Results   Component Value Date    WBC 6.2 09/22/2022    WBC 7.1 08/05/2020    RBC 4.50 09/22/2022    RBC 4.79 08/05/2020    HGB 14.2 09/22/2022    HGB 14.8 08/05/2020    HCT 43.1 09/22/2022    HCT 45.1 08/05/2020    MCV 96 09/22/2022    MCV 94 08/05/2020    MCH 31.6 09/22/2022    MCH 30.9 08/05/2020    MCHC 32.9 09/22/2022    MCHC 32.8 08/05/2020    RDW 13.2 09/22/2022    RDW 13.5 08/05/2020     09/22/2022     08/05/2020       BMP RESULTS:  Lab Results   Component Value Date     12/06/2022     08/05/2020    POTASSIUM 4.1 12/06/2022    POTASSIUM 3.8 08/05/2020    CHLORIDE 103 12/06/2022    CHLORIDE 105 08/05/2020    CO2 28 12/06/2022    CO2 30 08/05/2020    ANIONGAP 3 12/06/2022    ANIONGAP 3 08/05/2020     (H) 12/06/2022    GLC 77 08/06/2020    BUN 12 12/06/2022    BUN 14 08/05/2020    CR 1.08 12/06/2022    CR 1.12 08/05/2020    GFRESTIMATED 88 12/06/2022    GFRESTIMATED 82 08/05/2020    GFRESTBLACK >90 08/05/2020    SHAYNA 8.7 12/06/2022    SHAYNA 9.3 08/05/2020        INR RESULTS:  Lab Results   Component Value Date    INR 0.92 12/06/2021           CC  Ivory Serrano, PA-C  [id number]  [place] [id number]        "

## 2022-12-07 ENCOUNTER — TELEPHONE (OUTPATIENT)
Dept: CARDIOLOGY | Facility: CLINIC | Age: 42
End: 2022-12-07

## 2022-12-08 ENCOUNTER — TELEPHONE (OUTPATIENT)
Dept: CARDIOLOGY | Facility: CLINIC | Age: 42
End: 2022-12-08

## 2022-12-08 NOTE — TELEPHONE ENCOUNTER
Attempted to call pt to give him results form remote check and discuss elevated heart rates, no answer and VM box was full. I sent a [employer] message to pt with results and asked him to call back to device clinic to discuss the episodes of faster heart rates. Awaiting call from pt.

## 2022-12-08 NOTE — TELEPHONE ENCOUNTER
Routine remote ICD check was scheduled on 12/1/2022, results interpreted today 12/7/2022. Pt had PVI and CTI on 9/22/2022. On 9/30/2022 it was noted that pt was still having elevated heart rates (possible AF with RVR, single chamber device), so his metoprolol dose was increased to 100mg daily. Pt recently saw Ivory PA for CORE OV on 12/6, and her note says he is still taking metoprolol 100mg daily. Next OV is with Dr. Way on 1/13/2023.     Will call pt tomorrow to assess for symptoms and give results of remote check, and then update provider as needed.     From device check:  Heart Rate: excellent variability with some elevated rates noted (counters last cleared 5/2022)  Atrial Arrhythmia: s/p AF ablation on 9/22/2022. Eliquis was DCed in November.   Ventricular Arrhythmia: since last remote on 8/29/2022, 108 episodes recorded, 37 with EGMs for review.  All available EGMs show mostly regular VS in the 170's with no change in morphology. These occurred before and after AF ablation on 9/22. Longest duration 3.5 minutes. All episodes categorized as non-sustained or VT-1 (monitor zone). Treatment zone starts at 200 bpm.       Example of EGM of tachy episode (from 11/25/2022):          Graph of mean daily heart rate:

## 2022-12-13 LAB
MDC_IDC_EPISODE_DTM: NORMAL
MDC_IDC_EPISODE_DURATION: 13 S
MDC_IDC_EPISODE_DURATION: 14 S
MDC_IDC_EPISODE_DURATION: 15 S
MDC_IDC_EPISODE_DURATION: 16 S
MDC_IDC_EPISODE_DURATION: 17 S
MDC_IDC_EPISODE_DURATION: 18 S
MDC_IDC_EPISODE_DURATION: 19 S
MDC_IDC_EPISODE_DURATION: 19 S
MDC_IDC_EPISODE_DURATION: 20 S
MDC_IDC_EPISODE_DURATION: 21 S
MDC_IDC_EPISODE_DURATION: 214 S
MDC_IDC_EPISODE_DURATION: 22 S
MDC_IDC_EPISODE_DURATION: 22 S
MDC_IDC_EPISODE_DURATION: 23 S
MDC_IDC_EPISODE_DURATION: 23 S
MDC_IDC_EPISODE_DURATION: 24 S
MDC_IDC_EPISODE_DURATION: 24 S
MDC_IDC_EPISODE_DURATION: 29 S
MDC_IDC_EPISODE_DURATION: 32 S
MDC_IDC_EPISODE_DURATION: 34 S
MDC_IDC_EPISODE_DURATION: 37 S
MDC_IDC_EPISODE_DURATION: 40 S
MDC_IDC_EPISODE_DURATION: 45 S
MDC_IDC_EPISODE_DURATION: 49 S
MDC_IDC_EPISODE_DURATION: 55 S
MDC_IDC_EPISODE_DURATION: 66 S
MDC_IDC_EPISODE_DURATION: 71 S
MDC_IDC_EPISODE_DURATION: 80 S
MDC_IDC_EPISODE_ID: NORMAL
MDC_IDC_EPISODE_TYPE: NORMAL
MDC_IDC_EPISODE_VENDOR_TYPE: NORMAL
MDC_IDC_LEAD_IMPLANT_DT: NORMAL
MDC_IDC_LEAD_LOCATION: NORMAL
MDC_IDC_LEAD_LOCATION_DETAIL_1: NORMAL
MDC_IDC_LEAD_MFG: NORMAL
MDC_IDC_LEAD_MODEL: NORMAL
MDC_IDC_LEAD_POLARITY_TYPE: NORMAL
MDC_IDC_LEAD_SERIAL: NORMAL
MDC_IDC_MSMT_BATTERY_DTM: NORMAL
MDC_IDC_MSMT_BATTERY_REMAINING_LONGEVITY: 180 MO
MDC_IDC_MSMT_BATTERY_REMAINING_PERCENTAGE: 100 %
MDC_IDC_MSMT_BATTERY_STATUS: NORMAL
MDC_IDC_MSMT_CAP_CHARGE_DTM: NORMAL
MDC_IDC_MSMT_CAP_CHARGE_TIME: 9.8 S
MDC_IDC_MSMT_CAP_CHARGE_TYPE: NORMAL
MDC_IDC_MSMT_LEADCHNL_RV_IMPEDANCE_VALUE: 594 OHM
MDC_IDC_PG_IMPLANT_DTM: NORMAL
MDC_IDC_PG_MFG: NORMAL
MDC_IDC_PG_MODEL: NORMAL
MDC_IDC_PG_SERIAL: NORMAL
MDC_IDC_PG_TYPE: NORMAL
MDC_IDC_SESS_CLINIC_NAME: NORMAL
MDC_IDC_SESS_DTM: NORMAL
MDC_IDC_SESS_TYPE: NORMAL
MDC_IDC_SET_BRADY_LOWRATE: 40 {BEATS}/MIN
MDC_IDC_SET_BRADY_MODE: NORMAL
MDC_IDC_SET_LEADCHNL_RV_PACING_AMPLITUDE: 2 V
MDC_IDC_SET_LEADCHNL_RV_PACING_CAPTURE_MODE: NORMAL
MDC_IDC_SET_LEADCHNL_RV_PACING_POLARITY: NORMAL
MDC_IDC_SET_LEADCHNL_RV_PACING_PULSEWIDTH: 0.4 MS
MDC_IDC_SET_LEADCHNL_RV_SENSING_ADAPTATION_MODE: NORMAL
MDC_IDC_SET_LEADCHNL_RV_SENSING_POLARITY: NORMAL
MDC_IDC_SET_LEADCHNL_RV_SENSING_SENSITIVITY: 0.6 MV
MDC_IDC_SET_ZONE_DETECTION_INTERVAL: 250 MS
MDC_IDC_SET_ZONE_DETECTION_INTERVAL: 300 MS
MDC_IDC_SET_ZONE_DETECTION_INTERVAL: 353 MS
MDC_IDC_SET_ZONE_TYPE: NORMAL
MDC_IDC_SET_ZONE_VENDOR_TYPE: NORMAL
MDC_IDC_STAT_BRADY_DTM_END: NORMAL
MDC_IDC_STAT_BRADY_DTM_START: NORMAL
MDC_IDC_STAT_BRADY_RV_PERCENT_PACED: 0 %
MDC_IDC_STAT_EPISODE_RECENT_COUNT: 0
MDC_IDC_STAT_EPISODE_RECENT_COUNT: 4
MDC_IDC_STAT_EPISODE_RECENT_COUNT: 71
MDC_IDC_STAT_EPISODE_RECENT_COUNT_DTM_END: NORMAL
MDC_IDC_STAT_EPISODE_RECENT_COUNT_DTM_START: NORMAL
MDC_IDC_STAT_EPISODE_TYPE: NORMAL
MDC_IDC_STAT_EPISODE_VENDOR_TYPE: NORMAL
MDC_IDC_STAT_TACHYTHERAPY_ATP_DELIVERED_RECENT: 0
MDC_IDC_STAT_TACHYTHERAPY_ATP_DELIVERED_TOTAL: 0
MDC_IDC_STAT_TACHYTHERAPY_RECENT_DTM_END: NORMAL
MDC_IDC_STAT_TACHYTHERAPY_RECENT_DTM_START: NORMAL
MDC_IDC_STAT_TACHYTHERAPY_SHOCKS_ABORTED_RECENT: 0
MDC_IDC_STAT_TACHYTHERAPY_SHOCKS_ABORTED_TOTAL: 0
MDC_IDC_STAT_TACHYTHERAPY_SHOCKS_DELIVERED_RECENT: 0
MDC_IDC_STAT_TACHYTHERAPY_SHOCKS_DELIVERED_TOTAL: 0
MDC_IDC_STAT_TACHYTHERAPY_TOTAL_DTM_END: NORMAL
MDC_IDC_STAT_TACHYTHERAPY_TOTAL_DTM_START: NORMAL

## 2022-12-20 ENCOUNTER — MYC MEDICAL ADVICE (OUTPATIENT)
Dept: CARDIOLOGY | Facility: CLINIC | Age: 42
End: 2022-12-20

## 2022-12-21 DIAGNOSIS — I50.20 HFREF (HEART FAILURE WITH REDUCED EJECTION FRACTION) (H): ICD-10-CM

## 2022-12-21 RX ORDER — DAPAGLIFLOZIN 5 MG/1
5 TABLET, FILM COATED ORAL DAILY
Qty: 90 TABLET | Refills: 3 | Status: SHIPPED | OUTPATIENT
Start: 2022-12-21 | End: 2023-02-22

## 2022-12-21 RX ORDER — SACUBITRIL AND VALSARTAN 97; 103 MG/1; MG/1
1 TABLET, FILM COATED ORAL 2 TIMES DAILY
Qty: 180 TABLET | Refills: 3 | Status: SHIPPED | OUTPATIENT
Start: 2022-12-21 | End: 2023-06-16

## 2022-12-23 ENCOUNTER — OFFICE VISIT (OUTPATIENT)
Dept: FAMILY MEDICINE | Facility: CLINIC | Age: 42
End: 2022-12-23
Payer: COMMERCIAL

## 2022-12-23 VITALS
RESPIRATION RATE: 14 BRPM | OXYGEN SATURATION: 98 % | DIASTOLIC BLOOD PRESSURE: 70 MMHG | HEIGHT: 67 IN | WEIGHT: 161 LBS | TEMPERATURE: 98.4 F | HEART RATE: 72 BPM | BODY MASS INDEX: 25.27 KG/M2 | SYSTOLIC BLOOD PRESSURE: 104 MMHG

## 2022-12-23 DIAGNOSIS — L03.90 CELLULITIS, UNSPECIFIED CELLULITIS SITE: Primary | ICD-10-CM

## 2022-12-23 PROCEDURE — 99213 OFFICE O/P EST LOW 20 MIN: CPT | Performed by: PHYSICIAN ASSISTANT

## 2022-12-23 RX ORDER — OXYCODONE HYDROCHLORIDE 5 MG/1
5 TABLET ORAL EVERY 6 HOURS PRN
Qty: 6 TABLET | Refills: 0 | Status: SHIPPED | OUTPATIENT
Start: 2022-12-23 | End: 2022-12-25

## 2022-12-23 RX ORDER — CEPHALEXIN 500 MG/1
500 CAPSULE ORAL 3 TIMES DAILY
Qty: 30 CAPSULE | Refills: 0 | Status: SHIPPED | OUTPATIENT
Start: 2022-12-23 | End: 2023-01-02

## 2022-12-23 ASSESSMENT — ENCOUNTER SYMPTOMS
COUGH: 0
LIGHT-HEADEDNESS: 0
HEADACHES: 0
FEVER: 0
DIZZINESS: 0
CHILLS: 0
WHEEZING: 0
SHORTNESS OF BREATH: 0
FATIGUE: 0

## 2022-12-23 ASSESSMENT — PATIENT HEALTH QUESTIONNAIRE - PHQ9
SUM OF ALL RESPONSES TO PHQ QUESTIONS 1-9: 4
SUM OF ALL RESPONSES TO PHQ QUESTIONS 1-9: 4
10. IF YOU CHECKED OFF ANY PROBLEMS, HOW DIFFICULT HAVE THESE PROBLEMS MADE IT FOR YOU TO DO YOUR WORK, TAKE CARE OF THINGS AT HOME, OR GET ALONG WITH OTHER PEOPLE: EXTREMELY DIFFICULT

## 2022-12-23 NOTE — PROGRESS NOTES
Assessment & Plan       ICD-10-CM    1. Cellulitis, unspecified cellulitis site  L03.90 cephALEXin (KEFLEX) 500 MG capsule     oxyCODONE (ROXICODONE) 5 MG tablet        #1 cellulitis to right posterior stump  -Keflex 500 mg   He does have what appears to be a cellulitis/abscess to the posterior aspect of his right stump.  It is tender with palpation with fluctuance.  Its mildly erythematous.  No warmth.  He is not having any systemic symptoms at this time.  Symptoms started on Sunday.  No streaking up the leg.  Mild swelling.  I did recommend I&D but he would like to hold off on this and try antibiotics first.  We will start him on Keflex 3 times a day for 10 days.  Side effects of the Keflex were discussed.  He is to closely monitor this.  If symptoms including redness, warmth, or systemic symptoms occur over the weekend he is to be seen.  Also recommend close follow-up in clinic early next week for recheck if symptoms or not improving.  [id number]}      No follow-ups on file.    Bob Walker PA-C  [facility]   Jorge L is a 42 year old, presenting for the following health issues:  Derm Problem (Rash on R leg on stump. Very sore and bothersome. Seems to have a fowl smell to it and a bit of discharge. Noticed on Quentin Dec 18th)      Jorge L is a pleasant 42-year-old male that presents to the clinic today for pain and redness to the posterior aspect of his right stump.  He has a past medical history of type 2 diabetes and congestive heart failure.  Sunday of this week he noticed some mild pain to the posterior aspect of his right stump and then since then he is also had some redness and swelling to the area as well.  No injury to his leg that he is aware of.  He does use a prosthetic where the prosthetic does have contact with this area.  On physical exam he does have some pain, fluctuance, and redness over the area of the posterior aspect of his stump region.  No warmth noted.   "There is a small abrasion as well where I suspect the source of infection may have been the cause.  He is not having any fevers, chills or any other systemic symptoms at this time.  His blood sugars have been well controlled.    History of Present Illness       Reason for visit:  Stump problem  Symptom onset:  3-7 days ago  Symptoms include:  Swollen, severe pain  Symptom intensity:  Severe  Symptom progression:  Worsening    He eats 0-1 servings of fruits and vegetables daily.He consumes 1 sweetened beverage(s) daily.He exercises with enough effort to increase his heart rate 9 or less minutes per day.  He exercises with enough effort to increase his heart rate 3 or less days per week. He is missing 2 dose(s) of medications per week.    Today's PHQ-9         PHQ-9 Total Score: 4    PHQ-9 Q9 Thoughts of better off dead/self-harm past 2 weeks :   Not at all    How difficult have these problems made it for you to do your work, take care of things at home, or get along with other people: Extremely difficult         Review of Systems   Constitutional: Negative for chills, fatigue and fever.   Respiratory: Negative for cough, shortness of breath and wheezing.    Skin:        Pain, swelling, redness to the posterior aspect of her right stump.  Started on Sunday.   Neurological: Negative for dizziness, light-headedness and headaches.            Objective    /70 (BP Location: Right arm, Patient Position: Sitting, Cuff Size: Adult Regular)   Pulse 72   Temp 98.4  F (36.9  C) (Oral)   Resp 14   Ht 1.702 m (5' 7\")   Wt 73 kg (161 lb)   SpO2 98%   BMI 25.22 kg/m    Body mass index is 25.22 kg/m .  Physical Exam  Vitals and nursing note reviewed.   Constitutional:       Appearance: Normal appearance.   HENT:      Head: Normocephalic and atraumatic.   Eyes:      Conjunctiva/sclera: Conjunctivae normal.   Cardiovascular:      Rate and Rhythm: Normal rate and regular rhythm.      Heart sounds: No murmur heard.    No " friction rub. No gallop.   Pulmonary:      Effort: Pulmonary effort is normal.      Breath sounds: No wheezing, rhonchi or rales.   Musculoskeletal:      Cervical back: Neck supple.   Skin:     Comments: Redness, warmth, and fluctuant area to the posterior aspect of right stump.  Cellulitis and abscess noted.  No streaking up the leg.   Neurological:      Mental Status: He is alert.

## 2022-12-27 ENCOUNTER — OFFICE VISIT (OUTPATIENT)
Dept: FAMILY MEDICINE | Facility: CLINIC | Age: 42
End: 2022-12-27
Payer: COMMERCIAL

## 2022-12-27 VITALS
RESPIRATION RATE: 12 BRPM | TEMPERATURE: 98.4 F | HEIGHT: 67 IN | SYSTOLIC BLOOD PRESSURE: 118 MMHG | OXYGEN SATURATION: 96 % | BODY MASS INDEX: 24.8 KG/M2 | DIASTOLIC BLOOD PRESSURE: 64 MMHG | WEIGHT: 158 LBS | HEART RATE: 81 BPM

## 2022-12-27 DIAGNOSIS — L02.419 CELLULITIS AND ABSCESS OF LEG: Primary | ICD-10-CM

## 2022-12-27 DIAGNOSIS — L03.119 CELLULITIS AND ABSCESS OF LEG: Primary | ICD-10-CM

## 2022-12-27 LAB
ANION GAP SERPL CALCULATED.3IONS-SCNC: 14 MMOL/L (ref 7–15)
BUN SERPL-MCNC: 12.4 MG/DL (ref 6–20)
CALCIUM SERPL-MCNC: 9.7 MG/DL (ref 8.6–10)
CHLORIDE SERPL-SCNC: 99 MMOL/L (ref 98–107)
CREAT SERPL-MCNC: 1.04 MG/DL (ref 0.67–1.17)
DEPRECATED HCO3 PLAS-SCNC: 25 MMOL/L (ref 22–29)
ERYTHROCYTE [DISTWIDTH] IN BLOOD BY AUTOMATED COUNT: 13.2 % (ref 10–15)
GFR SERPL CREATININE-BSD FRML MDRD: >90 ML/MIN/1.73M2
GLUCOSE SERPL-MCNC: 123 MG/DL (ref 70–99)
HCT VFR BLD AUTO: 43 % (ref 40–53)
HGB BLD-MCNC: 14.3 G/DL (ref 13.3–17.7)
MCH RBC QN AUTO: 31.8 PG (ref 26.5–33)
MCHC RBC AUTO-ENTMCNC: 33.3 G/DL (ref 31.5–36.5)
MCV RBC AUTO: 96 FL (ref 78–100)
PLATELET # BLD AUTO: 319 10E3/UL (ref 150–450)
POTASSIUM SERPL-SCNC: 4.6 MMOL/L (ref 3.4–5.3)
RBC # BLD AUTO: 4.5 10E6/UL (ref 4.4–5.9)
SODIUM SERPL-SCNC: 138 MMOL/L (ref 136–145)
WBC # BLD AUTO: 7.7 10E3/UL (ref 4–11)

## 2022-12-27 PROCEDURE — 85027 COMPLETE CBC AUTOMATED: CPT | Performed by: PHYSICIAN ASSISTANT

## 2022-12-27 PROCEDURE — 10060 I&D ABSCESS SIMPLE/SINGLE: CPT | Performed by: PHYSICIAN ASSISTANT

## 2022-12-27 PROCEDURE — 87070 CULTURE OTHR SPECIMN AEROBIC: CPT | Performed by: PHYSICIAN ASSISTANT

## 2022-12-27 PROCEDURE — 87077 CULTURE AEROBIC IDENTIFY: CPT | Performed by: PHYSICIAN ASSISTANT

## 2022-12-27 PROCEDURE — 36415 COLL VENOUS BLD VENIPUNCTURE: CPT | Performed by: PHYSICIAN ASSISTANT

## 2022-12-27 PROCEDURE — 80048 BASIC METABOLIC PNL TOTAL CA: CPT | Performed by: PHYSICIAN ASSISTANT

## 2022-12-27 PROCEDURE — 87186 SC STD MICRODIL/AGAR DIL: CPT | Performed by: PHYSICIAN ASSISTANT

## 2022-12-27 RX ORDER — SULFAMETHOXAZOLE/TRIMETHOPRIM 800-160 MG
1 TABLET ORAL 2 TIMES DAILY
Qty: 20 TABLET | Refills: 0 | Status: SHIPPED | OUTPATIENT
Start: 2022-12-27 | End: 2023-01-06

## 2022-12-27 RX ORDER — OXYCODONE HYDROCHLORIDE 5 MG/1
5 TABLET ORAL EVERY 6 HOURS PRN
Qty: 8 TABLET | Refills: 0 | Status: SHIPPED | OUTPATIENT
Start: 2022-12-27 | End: 2022-12-31

## 2022-12-27 ASSESSMENT — ENCOUNTER SYMPTOMS
COUGH: 0
SHORTNESS OF BREATH: 0
NEUROLOGICAL NEGATIVE: 1
CHILLS: 1
WHEEZING: 0

## 2022-12-27 NOTE — PROGRESS NOTES
Chief Complaint   Patient presents with     RECHECK     R left follow up cellulitis. Doesn't seem to get better. More swollen and sore.        ICD-10-CM    1. Cellulitis and abscess of leg  L03.119 CBC with platelets    L02.419 Basic metabolic panel     sulfamethoxazole-trimethoprim (BACTRIM DS) 800-160 MG tablet     Wound Aerobic Bacterial Culture Routine     CBC with platelets     Basic metabolic panel     oxyCODONE (ROXICODONE) 5 MG tablet     DRAIN SKIN ABSCESS SIMPLE/SINGLE        #1 abscess to posterior right leg  He does have a abscess to the posterior aspect of right leg/stump.  He has been on Keflex 3 times a day over the weekend the redness and warmth of the improved but the pain and swelling has worsened.  He said chills but no fevers.  No other systemic symptoms.  He is not having any redness, warmth or pain to the knee.  Physical exam his abscess has grown a bit and it is very fluctuant.  He was agreeable to let me do a I&D today.  I did perform an I&D without any complications.  Copious amount of purulent discharge was able to be expelled from the abscess.  Wound culture was done.  We will check a CBC and a BMP.  We will also start him on Bactrim prophylactically for MRSA due to the odor and the purulent discharge.  He is to continue Keflex as well.  Once we get wound culture back we can de-escalate antibiotics based on that.  He is to clean the wound every day.  Watch for worsening signs of infection.  If he develops worsening swelling or streaking up the leg or pain within the knee he is to be seen in the emergency room for evaluation.  He also would like a refill of the oxycodone for pain and this was sent to the pharmacy.  He is to follow-up at the end of the week if symptoms are not improving.  Symptoms for prompt reevaluation were discussed.  Consent form signed    Procedure note    Date: December 27, 2022   Provider: Bob Walker PA-C  Name of procedure: I&D of abscess  Indication: Abscess to  right posterior leg/stump  Patient consent: Signed consent was in chart.  Procedure, benefits, risks including risks of bleeding, infection, injury, anesthesia, and allergies and alternative explanations to the patient who voiced understanding of the information.  Their questions were sought and answered.  Patient agrees to proceed with the procedure.    Description of procedure: The area was cleansed with Betadine.  5 cc of lidocaine without epinephrine was used to anesthetize the area.  An 18-gauge needle was used to puncture the fluctuant part of the abscess x3.  Copious amount of purulent discharge was excreted along with minimal amount of blood.  Anesthesia: 5 cc of lidocaine without epinephrine  Complications: None  Estimated blood loss: Minimal  Disposition: Patient alert and oriented and resting.  Patient tolerated procedure well.  Minimal blood loss.  Signs and symptoms of infection were discussed in detail.  Symptoms for prompt reevaluation were discussed.        Subjective   Jorge L is a 42 year old presenting for the following health issues:  RECHECK (R left follow up cellulitis. Doesn't seem to get better. More swollen and sore. )      Jorge L is a pleasant 42-year-old male who presents to the clinic today for recheck on cellulitis and abscess to the posterior aspect of his left leg.  He was seen in the clinic on Friday and started on Keflex for a cellulitis and abscess.  He did not wish to do an I&D at that time.  He has been taking Keflex 3 times a day for the last 3 or 4 days and has been tolerating the antibiotics well.  He states that swelling over the abscess site has worsened he is having more pain.  The redness and warmth has improved.  He also notes that he is having some chills but no fevers.  He is not having any pain to the knee itself.  He does have some swelling that goes distally into his stump.  Otherwise feeling well no other systemic symptoms today.    Review of Systems   Constitutional:  "Positive for chills.   HENT: Negative.    Respiratory: Negative for cough, shortness of breath and wheezing.    Genitourinary: Negative.    Skin:        Abscess/cellulitis to posterior right leg/stump.  Swelling.  Decreased redness and warmth.   Neurological: Negative.             Objective    /64 (BP Location: Left arm, Patient Position: Sitting, Cuff Size: Adult Regular)   Pulse 81   Temp 98.4  F (36.9  C) (Oral)   Resp 12   Ht 1.702 m (5' 7\")   Wt 71.7 kg (158 lb)   SpO2 96%   BMI 24.75 kg/m    Body mass index is 24.75 kg/m .  Physical Exam  Vitals and nursing note reviewed.   Constitutional:       General: He is not in acute distress.     Appearance: Normal appearance. He is not ill-appearing, toxic-appearing or diaphoretic.   HENT:      Head: Normocephalic and atraumatic.   Eyes:      Conjunctiva/sclera: Conjunctivae normal.   Skin:     General: Skin is warm and dry.      Findings: Lesion present.      Comments: Abscess to the posterior aspect of the right leg/stump.  Mild swelling throughout the stump.  No streaking.  Redness and warmth but decreased from last visit.   Neurological:      Mental Status: He is alert.                    "

## 2022-12-30 ENCOUNTER — TELEPHONE (OUTPATIENT)
Dept: FAMILY MEDICINE | Facility: CLINIC | Age: 42
End: 2022-12-30

## 2022-12-30 ENCOUNTER — MYC MEDICAL ADVICE (OUTPATIENT)
Dept: FAMILY MEDICINE | Facility: CLINIC | Age: 42
End: 2022-12-30

## 2022-12-30 LAB — BACTERIA WND CULT: ABNORMAL

## 2022-12-30 NOTE — TELEPHONE ENCOUNTER
Called and left detailed msg regarding appointment. Wondering if pt still needed to be seen? Will wait for call back.    Shira Diaz MA on 12/30/2022 at 7:11 AM

## 2022-12-31 ENCOUNTER — MYC REFILL (OUTPATIENT)
Dept: FAMILY MEDICINE | Facility: CLINIC | Age: 42
End: 2022-12-31

## 2022-12-31 DIAGNOSIS — L03.119 CELLULITIS AND ABSCESS OF LEG: ICD-10-CM

## 2022-12-31 DIAGNOSIS — L02.419 CELLULITIS AND ABSCESS OF LEG: ICD-10-CM

## 2023-01-01 RX ORDER — OXYCODONE HYDROCHLORIDE 5 MG/1
5 TABLET ORAL EVERY 6 HOURS PRN
Qty: 8 TABLET | Refills: 0 | Status: SHIPPED | OUTPATIENT
Start: 2023-01-01 | End: 2023-06-16

## 2023-01-13 ENCOUNTER — OFFICE VISIT (OUTPATIENT)
Dept: CARDIOLOGY | Facility: CLINIC | Age: 43
End: 2023-01-13
Payer: COMMERCIAL

## 2023-01-13 VITALS
HEART RATE: 64 BPM | SYSTOLIC BLOOD PRESSURE: 109 MMHG | BODY MASS INDEX: 25.63 KG/M2 | DIASTOLIC BLOOD PRESSURE: 71 MMHG | WEIGHT: 163.3 LBS | HEIGHT: 67 IN

## 2023-01-13 DIAGNOSIS — Z95.810 ICD (IMPLANTABLE CARDIOVERTER-DEFIBRILLATOR), SINGLE, IN SITU: ICD-10-CM

## 2023-01-13 DIAGNOSIS — I50.20 HFREF (HEART FAILURE WITH REDUCED EJECTION FRACTION) (H): Primary | ICD-10-CM

## 2023-01-13 DIAGNOSIS — E78.5 HYPERLIPIDEMIA LDL GOAL <100: ICD-10-CM

## 2023-01-13 DIAGNOSIS — I48.0 PAROXYSMAL ATRIAL FIBRILLATION (H): ICD-10-CM

## 2023-01-13 PROCEDURE — 93010 ELECTROCARDIOGRAM REPORT: CPT | Performed by: INTERNAL MEDICINE

## 2023-01-13 PROCEDURE — 99213 OFFICE O/P EST LOW 20 MIN: CPT | Performed by: INTERNAL MEDICINE

## 2023-01-13 NOTE — PROGRESS NOTES
HPI and Plan:   See dictation  [id number]  Today's clinic visit entailed:  The following tests were independently interpreted by me as noted in my documentation: device check  30 minutes spent on the date of the encounter doing chart review, history and exam, documentation and further activities per the note  Provider  Link to MDM Help Grid     The level of medical decision making during this visit was of moderate complexity.      No orders of the defined types were placed in this encounter.      No orders of the defined types were placed in this encounter.      There are no discontinued medications.      Encounter Diagnoses   Name Primary?     HFrEF (heart failure with reduced ejection fraction) (H) Yes     Hyperlipidemia LDL goal <100      ICD (implantable cardioverter-defibrillator), single, in situ        CURRENT MEDICATIONS:  Current Outpatient Medications   Medication Sig Dispense Refill     dapagliflozin (FARXIGA) 5 MG TABS tablet Take 1 tablet (5 mg) by mouth daily 90 tablet 3     furosemide (LASIX) 20 MG tablet Take 1 tablet (20 mg) by mouth daily as needed (wt gain, swelling) 90 tablet 3     metoprolol succinate ER (TOPROL XL) 100 MG 24 hr tablet Take 1 tablet (100 mg) by mouth At Bedtime 90 tablet 3     Multiple Vitamins-Minerals (MENS MULTIVITAMIN) TABS Take 1 tablet by mouth daily       oxyCODONE (ROXICODONE) 5 MG tablet Take 1 tablet (5 mg) by mouth every 6 hours as needed for pain 8 tablet 0     sacubitril-valsartan (ENTRESTO)  MG per tablet Take 1 tablet by mouth 2 times daily 180 tablet 3     spironolactone (ALDACTONE) 25 MG tablet Take 1 tablet (25 mg) by mouth daily 90 tablet 3     traZODone (DESYREL) 50 MG tablet Take 1-2 tablets ( mg) by mouth At Bedtime 45 tablet 3     zolpidem ER (AMBIEN CR) 6.25 MG CR tablet TAKE ONE TABLET BY MOUTH NIGHTLY AS NEEDED FOR SLEEP **APPT REQUIRED FOR FURTHER REFILLS** 4 tablet 0     order for DME Equipment being ordered: right lower extremity prosthetic      [employer] [facility]  [phone] 1 each 0       ALLERGIES     Allergies   Allergen Reactions     No Known Allergies        PAST MEDICAL HISTORY:  Past Medical History:   Diagnosis Date     [place]ident [place] farm 08/01/2014     Alcohol abuse      Nonischemic cardiomyopathy (H)      Smoking        PAST SURGICAL HISTORY:  Past Surgical History:   Procedure Laterality Date     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     Broken Right arm  1992     CV HEART CATHETERIZATION WITH POSSIBLE INTERVENTION N/A 12/9/2021    Procedure: Heart Catheterization with Possible Intervention;  Surgeon: Leonard Grnaados MD;  Location:  HEART CARDIAC CATH LAB     EP ABLATION FOCAL AFIB N/A 9/22/2022    Procedure: Ablation Atrial Fibrilation;  Surgeon: Oli Way MD;  Location:  HEART CARDIAC CATH LAB     EP ICD INSERT SINGLE N/A 4/7/2022    Procedure: Implantable Cardioverter Defibrillator Device & Lead Implant - Single or Dual [[id number]];  Surgeon: Sasha Ellsworth MD;  Location:  HEART CARDIAC CATH LAB     LEG SURGERY Right 8/2014       FAMILY HISTORY:  History reviewed. No pertinent family history.    SOCIAL HISTORY:  Social History     Socioeconomic History     Marital status:      Spouse name: None     Number of children: None     Years of education: None     Highest education level: None   Occupational History     Employer: UNKNOWN   Tobacco Use     Smoking status: Former     Passive exposure: Never     Smokeless tobacco: Current     Types: Chew   Vaping Use     Vaping Use: Never used   Substance and Sexual Activity     Alcohol use: Yes     Alcohol/week: 0.0 standard drinks     Comment: occ.     Drug use: No     Sexual activity: Yes     Partners: Female       Review of Systems:  Skin:          Eyes:         ENT:         Respiratory:          Cardiovascular:         Gastroenterology:        Genitourinary:         Musculoskeletal:         Neurologic:         Psychiatric:         Heme/Lymph/Imm:         Endocrine:        "    Physical Exam:  Vitals: /71   Pulse 64   Ht 1.702 m (5' 7\")   Wt 74.1 kg (163 lb 4.8 oz)   BMI 25.58 kg/m      Constitutional:  cooperative, alert and oriented, well developed, well nourished, in no acute distress        Skin:  warm and dry to the touch, no apparent skin lesions or masses noted          Head:  normocephalic, no masses or lesions        Eyes:  pupils equal and round, conjunctivae and lids unremarkable, sclera white, no xanthalasma, EOMS intact, no nystagmus        Lymph:No Cervical lymphadenopathy present     ENT:  no pallor or cyanosis        Neck:  carotid pulses are full and equal bilaterally, JVP normal, no carotid bruit        Respiratory:  normal breath sounds, clear to auscultation, normal A-P diameter, normal symmetry, normal respiratory excursion, no use of accessory muscles         Cardiac: regular rhythm, normal S1/S2, no S3 or S4, apical impulse not displaced, no murmurs, gallops or rubs                pulses full and equal, no bruits auscultated                                        GI:  abdomen soft, non-tender, BS normoactive, no mass, no HSM, no bruits        Extremities and Muscular Skeletal:  no deformities, clubbing, cyanosis, erythema observed              Neurological:  no gross motor deficits        Psych:  Alert and Oriented x 3        CC  No referring provider defined for this encounter.              "

## 2023-01-13 NOTE — PROGRESS NOTES
Service Date: 2023    Thank you for allowing me to participate in the care of your delightful patient.  As you know, Jorge L is a 42-year-old gentleman with a history of right BKA farm accident and nonischemic cardiomyopathy along with CHF decompensation with EF as low as 15%-20%.  Unfortunately, despite optimization of LV function, ejection fraction remained less than 30%, prompting a single-chamber ICD implantation for primary prevention.    Subsequently, the patient was noted to have paroxysmal atrial fibrillation and atrial flutter on his device interrogations associated with symptoms of palpitation.    I had the pleasure of meeting Jorge L for the first time this past August.  We discussed at length about options going forward, including antiarrhythmic drug versus ablation for rhythm control strategy.  Given his young age, Jorge L opted for the latter.  The patient underwent uneventful isolation of pulmonary veins and ablation of induced right sided typical atrial flutter.  He is here for a 3-month followup.    Since then, he is doing quite well.  Ejection fraction has improved slightly with EF near 40% now.  His device was checked recently and showed no evidence of any atrial arrhythmias, aside from the day of the procedure.  For now, I am somewhat cautiously optimistic that we have achieved long-term remission, but it is way too early to determine that at this point in time.  The patient will continue to follow with Ivory Serrano, one of our advanced practice providers, and follow in Device Clinic.  I would like the patient to come back to see me in about a year or so.    Oli Way MD        D: 2023   T: 2023   MT: [email or web address]    Name:     SHASHANK TREADWELL  MRN:      [id number]        Account:      [id number]   :      1980           Service Date: 2023       Document: [id number]

## 2023-01-13 NOTE — LETTER
1/13/2023    Jason García MD  [address] [facility] [address]    RE: Dipak Swartz       Dear Colleague,     I had the pleasure of seeing Dipak Swartz in the [facility].  HPI and Plan:   See dictation  [id number]  Today's clinic visit entailed:  The following tests were independently interpreted by me as noted in my documentation: device check  30 minutes spent on the date of the encounter doing chart review, history and exam, documentation and further activities per the note  Provider  Link to MDM Help Grid     The level of medical decision making during this visit was of moderate complexity.      No orders of the defined types were placed in this encounter.      No orders of the defined types were placed in this encounter.      There are no discontinued medications.      Encounter Diagnoses   Name Primary?     HFrEF (heart failure with reduced ejection fraction) (H) Yes     Hyperlipidemia LDL goal <100      ICD (implantable cardioverter-defibrillator), single, in situ        CURRENT MEDICATIONS:  Current Outpatient Medications   Medication Sig Dispense Refill     dapagliflozin (FARXIGA) 5 MG TABS tablet Take 1 tablet (5 mg) by mouth daily 90 tablet 3     furosemide (LASIX) 20 MG tablet Take 1 tablet (20 mg) by mouth daily as needed (wt gain, swelling) 90 tablet 3     metoprolol succinate ER (TOPROL XL) 100 MG 24 hr tablet Take 1 tablet (100 mg) by mouth At Bedtime 90 tablet 3     Multiple Vitamins-Minerals (MENS MULTIVITAMIN) TABS Take 1 tablet by mouth daily       oxyCODONE (ROXICODONE) 5 MG tablet Take 1 tablet (5 mg) by mouth every 6 hours as needed for pain 8 tablet 0     sacubitril-valsartan (ENTRESTO)  MG per tablet Take 1 tablet by mouth 2 times daily 180 tablet 3     spironolactone (ALDACTONE) 25 MG tablet Take 1 tablet (25 mg) by mouth daily 90 tablet 3     traZODone (DESYREL) 50 MG tablet Take 1-2 tablets ( mg) by mouth At Bedtime 45 tablet 3     zolpidem ER  (AMBIEN CR) 6.25 MG CR tablet TAKE ONE TABLET BY MOUTH NIGHTLY AS NEEDED FOR SLEEP **APPT REQUIRED FOR FURTHER REFILLS** 4 tablet 0     order for DME Equipment being ordered: right lower extremity prosthetic     [employer] [facility]  [phone] 1 each 0       ALLERGIES     Allergies   Allergen Reactions     No Known Allergies        PAST MEDICAL HISTORY:  Past Medical History:   Diagnosis Date     [place]ident [place] farm 08/01/2014     Alcohol abuse      Nonischemic cardiomyopathy (H)      Smoking        PAST SURGICAL HISTORY:  Past Surgical History:   Procedure Laterality Date     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     Broken Right arm  1992     CV HEART CATHETERIZATION WITH POSSIBLE INTERVENTION N/A 12/9/2021    Procedure: Heart Catheterization with Possible Intervention;  Surgeon: Leonard Granados MD;  Location:  HEART CARDIAC CATH LAB     EP ABLATION FOCAL AFIB N/A 9/22/2022    Procedure: Ablation Atrial Fibrilation;  Surgeon: Oli Way MD;  Location: [facility] CARDIAC CATH LAB     EP ICD INSERT SINGLE N/A 4/7/2022    Procedure: Implantable Cardioverter Defibrillator Device & Lead Implant - Single or Dual [[id number]];  Surgeon: Sasha Ellsworth MD;  Location:  HEART CARDIAC CATH LAB     LEG SURGERY Right 8/2014       FAMILY HISTORY:  History reviewed. No pertinent family history.    SOCIAL HISTORY:  Social History     Socioeconomic History     Marital status:      Spouse name: None     Number of children: None     Years of education: None     Highest education level: None   Occupational History     Employer: UNKNOWN   Tobacco Use     Smoking status: Former     Passive exposure: Never     Smokeless tobacco: Current     Types: Chew   Vaping Use     Vaping Use: Never used   Substance and Sexual Activity     Alcohol use: Yes     Alcohol/week: 0.0 standard drinks     Comment: occ.     Drug use: No     Sexual activity: Yes     Partners: Female       Review of Systems:  Skin:          Eyes:         ENT:     "     Respiratory:          Cardiovascular:         Gastroenterology:        Genitourinary:         Musculoskeletal:         Neurologic:         Psychiatric:         Heme/Lymph/Imm:         Endocrine:           Physical Exam:  Vitals: /71   Pulse 64   Ht 1.702 m (5' 7\")   Wt 74.1 kg (163 lb 4.8 oz)   BMI 25.58 kg/m      Constitutional:  cooperative, alert and oriented, well developed, well nourished, in no acute distress        Skin:  warm and dry to the touch, no apparent skin lesions or masses noted          Head:  normocephalic, no masses or lesions        Eyes:  pupils equal and round, conjunctivae and lids unremarkable, sclera white, no xanthalasma, EOMS intact, no nystagmus        Lymph:No Cervical lymphadenopathy present     ENT:  no pallor or cyanosis        Neck:  carotid pulses are full and equal bilaterally, JVP normal, no carotid bruit        Respiratory:  normal breath sounds, clear to auscultation, normal A-P diameter, normal symmetry, normal respiratory excursion, no use of accessory muscles         Cardiac: regular rhythm, normal S1/S2, no S3 or S4, apical impulse not displaced, no murmurs, gallops or rubs                pulses full and equal, no bruits auscultated                                        GI:  abdomen soft, non-tender, BS normoactive, no mass, no HSM, no bruits        Extremities and Muscular Skeletal:  no deformities, clubbing, cyanosis, erythema observed              Neurological:  no gross motor deficits        Psych:  Alert and Oriented x 3        CC  No referring provider defined for this encounter.                Service Date: 01/13/2023    Thank you for allowing me to participate in the care of your delightful patient.  As you know, Jorge L is a 42-year-old gentleman with a history of right BKA farm accident and nonischemic cardiomyopathy along with CHF decompensation with EF as low as 15%-20%.  Unfortunately, despite optimization of LV function, ejection fraction remained " less than 30%, prompting a single-chamber ICD implantation for primary prevention.    Subsequently, the patient was noted to have paroxysmal atrial fibrillation and atrial flutter on his device interrogations associated with symptoms of palpitation.    I had the pleasure of meeting Jorge L for the first time this past August.  We discussed at length about options going forward, including antiarrhythmic drug versus ablation for rhythm control strategy.  Given his young age, Jorge L opted for the latter.  The patient underwent uneventful isolation of pulmonary veins and ablation of induced right sided typical atrial flutter.  He is here for a 3-month followup.    Since then, he is doing quite well.  Ejection fraction has improved slightly with EF near 40% now.  His device was checked recently and showed no evidence of any atrial arrhythmias, aside from the day of the procedure.  For now, I am somewhat cautiously optimistic that we have achieved long-term remission, but it is way too early to determine that at this point in time.  The patient will continue to follow with Ivory Serrano, one of our advanced practice providers, and follow in Device Clinic.  I would like the patient to come back to see me in about a year or so.    Oli Way MD    D: 2023   T: 2023   MT: [email or web address]    Name:     SHASHANK TREADWELL  MRN:      [id number]        Account:      [id number]   :      1980           Service Date: 2023       Document: [id number]      Thank you for allowing me to participate in the care of your patient.      Sincerely,     Oli Romero MD     [facility]  cc:   No referring provider defined for this encounter.

## 2023-01-14 ENCOUNTER — HEALTH MAINTENANCE LETTER (OUTPATIENT)
Age: 43
End: 2023-01-14

## 2023-01-23 DIAGNOSIS — I50.20 HFREF (HEART FAILURE WITH REDUCED EJECTION FRACTION) (H): ICD-10-CM

## 2023-01-25 NOTE — TELEPHONE ENCOUNTER
Should be routed to his cardiology team that saw him 12/6/22.     I have never seen for this medication    Kong Flannery-JOSE Weber  [facility]

## 2023-01-25 NOTE — TELEPHONE ENCOUNTER
"Lasix  Routing refill request to provider for review/approval because:  Patient needs to be seen because it has been more than 1 year since last office visit.  Last office visit 7/12/2022, 10/26/21, 9/28/2021 with Joshua LONG    Requested Prescriptions   Pending Prescriptions Disp Refills     furosemide (LASIX) 20 MG tablet 90 tablet 3     Sig: Take 1 tablet (20 mg) by mouth daily as needed (wt gain, swelling)       Diuretics (Including Combos) Protocol Failed - 1/23/2023  4:38 PM        Failed - Recent (12 mo) or future (30 days) visit within the authorizing provider's specialty     Patient has had an office visit with the authorizing provider or a provider within the authorizing providers department within the previous 12 mos or has a future within next 30 days. See \"Patient Info\" tab in inbasket, or \"Choose Columns\" in Meds & Orders section of the refill encounter.           Kacie Irving RN    "

## 2023-01-27 RX ORDER — FUROSEMIDE 20 MG
20 TABLET ORAL DAILY PRN
Qty: 90 TABLET | Refills: 3 | Status: SHIPPED | OUTPATIENT
Start: 2023-01-27 | End: 2023-05-25

## 2023-02-01 DIAGNOSIS — G47.01 INSOMNIA DUE TO MEDICAL CONDITION: ICD-10-CM

## 2023-02-02 RX ORDER — TRAZODONE HYDROCHLORIDE 50 MG/1
TABLET, FILM COATED ORAL
Qty: 45 TABLET | Refills: 1 | Status: SHIPPED | OUTPATIENT
Start: 2023-02-02 | End: 2023-06-15

## 2023-02-02 NOTE — TELEPHONE ENCOUNTER
Prescription approved per [facility] Refill Protocol.  Kamila Evangelista RN on 2/2/2023 at 2:40 PM

## 2023-02-07 ENCOUNTER — LAB (OUTPATIENT)
Dept: LAB | Facility: CLINIC | Age: 43
End: 2023-02-07
Payer: COMMERCIAL

## 2023-02-07 ENCOUNTER — HOSPITAL ENCOUNTER (OUTPATIENT)
Dept: CARDIOLOGY | Facility: CLINIC | Age: 43
Discharge: HOME | End: 2023-02-07
Attending: PHYSICIAN ASSISTANT | Admitting: PHYSICIAN ASSISTANT
Payer: COMMERCIAL

## 2023-02-07 DIAGNOSIS — I50.20 HFREF (HEART FAILURE WITH REDUCED EJECTION FRACTION) (H): ICD-10-CM

## 2023-02-07 DIAGNOSIS — E78.5 HYPERLIPIDEMIA LDL GOAL <100: ICD-10-CM

## 2023-02-07 LAB
ANION GAP SERPL CALCULATED.3IONS-SCNC: 14 MMOL/L (ref 7–15)
BI-PLANE LVEF ECHO: NORMAL
BUN SERPL-MCNC: 17.1 MG/DL (ref 6–20)
CALCIUM SERPL-MCNC: 9.8 MG/DL (ref 8.6–10)
CHLORIDE SERPL-SCNC: 95 MMOL/L (ref 98–107)
CHOLEST SERPL-MCNC: 334 MG/DL
CREAT SERPL-MCNC: 1.2 MG/DL (ref 0.67–1.17)
DEPRECATED HCO3 PLAS-SCNC: 27 MMOL/L (ref 22–29)
GFR SERPL CREATININE-BSD FRML MDRD: 77 ML/MIN/1.73M2
GLUCOSE SERPL-MCNC: 115 MG/DL (ref 70–99)
HDLC SERPL-MCNC: 114 MG/DL
LDLC SERPL CALC-MCNC: 204 MG/DL
NONHDLC SERPL-MCNC: 220 MG/DL
POTASSIUM SERPL-SCNC: 3.9 MMOL/L (ref 3.4–5.3)
SODIUM SERPL-SCNC: 136 MMOL/L (ref 136–145)
TRIGL SERPL-MCNC: 79 MG/DL

## 2023-02-07 PROCEDURE — 80061 LIPID PANEL: CPT | Performed by: PHYSICIAN ASSISTANT

## 2023-02-07 PROCEDURE — 80048 BASIC METABOLIC PNL TOTAL CA: CPT | Performed by: PHYSICIAN ASSISTANT

## 2023-02-07 PROCEDURE — 999N000208 ECHOCARDIOGRAM COMPLETE

## 2023-02-07 PROCEDURE — 255N000002 HC RX 255 OP 636: Performed by: PHYSICIAN ASSISTANT

## 2023-02-07 PROCEDURE — 93306 TTE W/DOPPLER COMPLETE: CPT | Mod: 26 | Performed by: INTERNAL MEDICINE

## 2023-02-07 PROCEDURE — 36415 COLL VENOUS BLD VENIPUNCTURE: CPT | Performed by: PHYSICIAN ASSISTANT

## 2023-02-07 RX ADMIN — HUMAN ALBUMIN MICROSPHERES AND PERFLUTREN 9 ML: 10; .22 INJECTION, SOLUTION INTRAVENOUS at 15:01

## 2023-02-15 DIAGNOSIS — N52.9 ERECTILE DYSFUNCTION, UNSPECIFIED ERECTILE DYSFUNCTION TYPE: Primary | ICD-10-CM

## 2023-02-15 RX ORDER — TADALAFIL 5 MG/1
5 TABLET ORAL EVERY 24 HOURS
Qty: 30 TABLET | Refills: 11 | Status: SHIPPED | OUTPATIENT
Start: 2023-02-15 | End: 2024-02-28
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
## 2023-02-20 ENCOUNTER — VIRTUAL VISIT (OUTPATIENT)
Dept: SLEEP MEDICINE | Facility: CLINIC | Age: 43
End: 2023-02-20
Attending: PHYSICIAN ASSISTANT
Payer: COMMERCIAL

## 2023-02-20 DIAGNOSIS — I42.9 CARDIOMYOPATHY, UNSPECIFIED TYPE (H): ICD-10-CM

## 2023-02-20 DIAGNOSIS — F51.04 PSYCHOPHYSIOLOGIC INSOMNIA: Primary | ICD-10-CM

## 2023-02-20 DIAGNOSIS — I50.20 HFREF (HEART FAILURE WITH REDUCED EJECTION FRACTION) (H): ICD-10-CM

## 2023-02-20 DIAGNOSIS — R29.818 SUSPECTED SLEEP APNEA: ICD-10-CM

## 2023-02-20 PROCEDURE — 99204 OFFICE O/P NEW MOD 45 MIN: CPT | Mod: VID | Performed by: PHYSICIAN ASSISTANT

## 2023-02-20 NOTE — PATIENT INSTRUCTIONS
"      MY TREATMENT INFORMATION FOR SLEEP APNEA-  Dipak Swartz    DOCTOR : GAURAV Aldana-AVI    Am I having a sleep study at a sleep center?  --->Due to normal delays, you will be contacted within 2-4 weeks to schedule    Am I having a home sleep study?  --->Watch the video for the device you are using:    -/drop off device-   https://www.LemonStand..com/watch?v=yGGFBdELGhk    -Disposable device sent out require phone/computer application-   https://www.LemonStand..com/watch?v=BCce_vbiwxE      Frequently asked questions:  1. What is Obstructive Sleep Apnea (ALEX)? ALEX is the most common type of sleep apnea. Apnea means, \"without breath.\"  Apnea is most often caused by narrowing or collapse of the upper airway as muscles relax during sleep.   Almost everyone has occasional apneas. Most people with sleep apnea have had brief interruptions at night frequently for many years.  The severity of sleep apnea is related to how frequent and severe the events are.   2. What are the consequences of ALEX? Symptoms include: feeling sleepy during the day, snoring loudly, gasping or stopping of breathing, trouble sleeping, and occasionally morning headaches or heartburn at night.  Sleepiness can be serious and even increase the risk of falling asleep while driving. Other health consequences may include development of high blood pressure and other cardiovascular disease in persons who are susceptible. Untreated ALEX  can contribute to heart disease, stroke and diabetes.   3. What are the treatment options? In most situations, sleep apnea is a lifelong disease that must be managed with daily therapy. Medications are not effective for sleep apnea and surgery is generally not considered until other therapies have been tried. Your treatment is your choice . Continuous Positive Airway (CPAP) works right away and is the therapy that is effective in nearly everyone. An oral device to hold your jaw forward is usually the next most " reliable option. Other options include postioning devices (to keep you off your back), weight loss, and surgery including a tongue pacing device. There is more detail about some of these options below.  4. Are my sleep studies covered by insurance? Although we will request verification of coverage, we advise you also check in advance of the study to ensure there is coverage.    Important tips for those choosing CPAP and similar devices   Know your equipment:  CPAP is continuous positive airway pressure that prevents obstructive sleep apnea by keeping the throat from collapsing while you are sleeping. In most cases, the device is  smart  and can slowly self-adjusts if your throat collapses and keeps a record every day of how well you are treated-this information is available to you and your care team.  BPAP is bilevel positive airway pressure that keeps your throat open and also assists each breath with a pressure boost to maintain adequate breathing.  Special kinds of BPAP are used in patients who have inadequate breathing from lung or heart disease. In most cases, the device is  smart  and can slowly self-adjusts to assist breathing. Like CPAP, the device keeps a record of how well you are treated.  Your mask is your connection to the device. You get to choose what feels most comfortable and the staff will help to make sure if fits. Here: are some examples of the different masks that are available:       Key points to remember on your journey with sleep apnea:  Sleep study.  PAP devices often need to be adjusted during a sleep study to show that they are effective and adjusted right.  Good tips to remember: Try wearing just the mask during a quiet time during the day so your body adapts to wearing it. A humidifier is recommended for comfort in most cases to prevent drying of your nose and throat. Allergy medication from your provider may help you if you are having nasal congestion.  Getting settled-in. It takes  more than one night for most of us to get used to wearing a mask. Try wearing just the mask during a quiet time during the day so your body adapts to wearing it. A humidifier is recommended for comfort in most cases. Our team will work with you carefully on the first day and will be in contact within 4 days and again at 2 and 4 weeks for advice and remote device adjustments. Your therapy is evaluated by the device each day.   Use it every night. The more you are able to sleep naturally for 7-8 hours, the more likely you will have good sleep and to prevent health risks or symptoms from sleep apnea. Even if you use it 4 hours it helps. Occasionally all of us are unable to use a medical therapy, in sleep apnea, it is not dangerous to miss one night.   Communicate. Call our skilled team on the number provided on the first day if your visit for problems that make it difficult to wear the device. Over 2 out of 3 patients can learn to wear the device long-term with help from our team. Remember to call our team or your sleep providers if you are unable to wear the device as we may have other solutions for those who cannot adapt to mask CPAP therapy. It is recommended that you sleep your sleep provider within the first 3 months and yearly after that if you are not having problems.   Use it for your health. We encourage use of CPAP masks during daytime quiet periods to allow your face and brain to adapt to the sensation of CPAP so that it will be a more natural sensation to awaken to at night or during naps. This can be very useful during the first few weeks or months of adapting to CPAP though it does not help medically to wear CPAP during wakefulness and  should not be used as a strategy just to meet guidelines.  Take care of your equipment. Make sure you clean your mask and tubing using directions every day and that your filter and mask are replaced as recommended or if they are not working.     BESIDES CPAP, WHAT OTHER  THERAPIES ARE THERE?    Positioning Device  Positioning devices are generally used when sleep apnea is mild and only occurs on your back.This example shows a pillow that straps around the waist. It may be appropriate for those whose sleep study shows milder sleep apnea that occurs primarily when lying flat on one's back. Preliminary studies have shown benefit but effectiveness at home may need to be verified by a home sleep test. These devices are generally not covered by medical insurance.  Examples of devices that maintain sleeping on the back to prevent snoring and mild sleep apnea.    Belt type body positioner  http://[employer]/    Electronic reminder  http://nightshifttherapy.com/            Oral Appliance  What is oral appliance therapy?  An oral appliance device fits on your teeth at night like a retainer used after having braces. The device is made by a specialized dentist and requires several visits over 1-2 months before a manufactured device is made to fit your teeth and is adjusted to prevent your sleep apnea. Once an oral device is working properly, snoring should be improved. A home sleep test may be recommended at that time if to determine whether the sleep apnea is adequately treated.       Some things to remember:  -Oral devices are often, but not always, covered by your medical insurance. Be sure to check with your insurance provider.   -If you are referred for oral therapy, you will be given a list of specialized dentists to consider or you may choose to visit the Web site of the American Academy of Dental Sleep Medicine  -Oral devices are less likely to work if you have severe sleep apnea or are extremely overweight.     More detailed information  An oral appliance is a small acrylic device that fits over the upper and lower teeth  (similar to a retainer or a mouth guard). This device slightly moves jaw forward, which moves the base of the tongue forward, opens the airway, improves breathing  for effective treat snoring and obstructive sleep apnea in perhaps 7 out of 10 people .  The best working devices are custom-made by a dental device  after a mold is made of the teeth 1, 2, 3.  When is an oral appliance indicated?  Oral appliance therapy is recommended as a first-line treatment for patients with primary snoring, mild sleep apnea, and for patients with moderate sleep apnea who prefer appliance therapy to use of CPAP4, 5. Severity of sleep apnea is determined by sleep testing and is based on the number of respiratory events per hour of sleep.   How successful is oral appliance therapy?  The success rate of oral appliance therapy in patients with mild sleep apnea is 75-80% while in patients with moderate sleep apnea it is 50-70%. The chance of success in patients with severe sleep apnea is 40-50%. The research also shows that oral appliances have a beneficial effect on the cardiovascular health of ALEX patients at the same magnitude as CPAP therapy7.  Oral appliances should be a second-line treatment in cases of severe sleep apnea, but if not completely successful then a combination therapy utilizing CPAP plus oral appliance therapy may be effective. Oral appliances tend to be effective in a broad range of patients although studies show that the patients who have the highest success are females, younger patients, those with milder disease, and less severe obesity. 3, 6.   Finding a dentist that practices dental sleep medicine  Specific training is available through the American Academy of Dental Sleep Medicine for dentists interested in working in the field of sleep. To find a dentist who is educated in the field of sleep and the use of oral appliances, near you, visit the Web site of the American Academy of Dental Sleep Medicine.    References  1. Bib et al. Objectively measured vs self-reported compliance during oral appliance therapy for sleep-disordered breathing. Chest 2013;  144(5): 7285-5846.  2. Fred et al. Objective measurement of compliance during oral appliance therapy for sleep-disordered breathing. Thorax 2013; 68(1): 91-96.  3. Lyndon et al. Mandibular advancement devices in 620 men and women with ALEX and snoring: tolerability and predictors of treatment success. Chest 2004; 125: 6433-3752.  4. Solitario, et al. Oral appliances for snoring and ALEX: a review. Sleep 2006; 29: 244-262.  5. Juan M et al. Oral appliance treatment for ALEX: an update. J Clin Sleep Med 2014; 10(2): 215-227.  6. Harish et al. Predictors of OSAH treatment outcome. J Dent Res 2007; 86: 2005-9013.      Weight Loss:    Weight loss is a long-term strategy that may improve sleep apnea in some patients.    Weight management is a personal decision and the decision should be based on your interest and the potential benefits.  If you are interested in exploring weight loss strategies, the following discussion covers the impact on weight loss on sleep apnea and the approaches that may be successful.    Being overweight does not necessarily mean you will have health consequences.  Those who have BMI over 35 or over 27 with existing medical conditions carries greater risk.   Weight loss decreases severity of sleep apnea in most people with obesity. For those with mild obesity who have developed snoring with weight gain, even 15-30 pound weight loss can improve and occasionally eliminate sleep apnea.  Structured and life-long dietary and health habits are necessary to lose weight and keep healthier weight levels.     Though there may be significant health benefits from weight loss, long-term weight loss is very difficult to achieve- studies show success with dietary management in less than 10% of people. In addition, substantial weight loss may require years of dietary control and may be difficult if patients have severe obesity. In these cases, surgical management may be considered.  Finally, older  individuals who have tolerated obesity without health complications may be less likely to benefit from weight loss strategies.      [unfilled]    Surgery:    Surgery for obstructive sleep apnea is considered generally only when other therapies fail to work. Surgery may be discussed with you if you are having a difficult time tolerating CPAP and or when there is an abnormal structure that requires surgical correction.  Nose and throat surgeries often enlarge the airway to prevent collapse.  Most of these surgeries create pain for 1-2 weeks and up to half of the most common surgeries are not effective throughout life.  You should carefully discuss the benefits and drawbacks to surgery with your sleep provider and surgeon to determine if it is the best solution for you.   More information  Surgery for ALEX is directed at areas that are responsible for narrowing or complete obstruction of the airway during sleep.  There are a wide range of procedures available to enlarge and/or stabilize the airway to prevent blockage of breathing in the three major areas where it can occur: the palate, tongue, and nasal regions.  Successful surgical treatment depends on the accurate identification of the factors responsible for obstructive sleep apnea in each person.  A personalized approach is required because there is no single treatment that works well for everyone.  Because of anatomic variation, consultation with an examination by a sleep surgeon is a critical first step in determining what surgical options are best for each patient.  In some cases, examination during sedation may be recommended in order to guide the selection of procedures.  Patients will be counseled about risks and benefits as well as the typical recovery course after surgery. Surgery is typically not a cure for a person s ALEX.  However, surgery will often significantly improve one s ALEX severity (termed  success rate ).  Even in the absence of a cure, surgery  will decrease the cardiovascular risk associated with OSA7; improve overall quality of life8 (sleepiness, functionality, sleep quality, etc).      Palate Procedures:  Patients with ALEX often have narrowing of their airway in the region of their tonsils and uvula.  The goals of palate procedures are to widen the airway in this region as well as to help the tissues resist collapse.  Modern palate procedure techniques focus on tissue conservation and soft tissue rearrangement, rather than tissue removal.  Often the uvula is preserved in this procedure. Residual sleep apnea is common in patient after pharyngoplasty with an average reduction in sleep apnea events of 33%2.      Tongue Procedures:  ExamWhile patients are awake, the muscles that surround the throat are active and keep this region open for breathing. These muscles relax during sleep, allowing the tongue and other structures to collapse and block breathing.  There are several different tongue procedures available.  Selection of a tongue base procedure depends on characteristics seen on physical exam.  Generally, procedures are aimed at removing bulky tissues in this area or preventing the back of the tongue from falling back during sleep.  Success rates for tongue surgery range from 50-62%3.    Hypoglossal Nerve Stimulation:  Hypoglossal nerve stimulation has recently received approval from the [place] Food and Drug Administration for the treatment of obstructive sleep apnea.  This is based on research showing that the system was safe and effective in treating sleep apnea6.  Results showed that the median AHI score decreased 68%, from 29.3 to 9.0. This therapy uses an implant system that senses breathing patterns and delivers mild stimulation to airway muscles, which keeps the airway open during sleep.  The system consists of three fully implanted components: a small generator (similar in size to a pacemaker), a breathing sensor, and a stimulation lead.   Using a small handheld remote, a patient turns the therapy on before bed and off upon awakening.    Candidates for this device must be greater than 18 years of age, have moderate to severe ALEX (AHI between 15-65), BMI less than 35, have tried CPAP/oral appliance for at least 8 weeks without success, and have appropriate upper airway anatomy (determined by a sleep endoscopy performed by Dr. Celestino Reynoso).    Hypoglossal Nerve Stimulation Pathway:    The sleep surgeon s office will work with the patient through the insurance prior-authorization process (including communications and appeals).    Nasal Procedures:  Nasal obstruction can interfere with nasal breathing during the day and night.  Studies have shown that relief of nasal obstruction can improve the ability of some patients to tolerate positive airway pressure therapy for obstructive sleep apnea1.  Treatment options include medications such as nasal saline, topical corticosteroid and antihistamine sprays, and oral medications such as antihistamines or decongestants. Non-surgical treatments can include external nasal dilators for selected patients. If these are not successful by themselves, surgery can improve the nasal airway either alone or in combination with these other options.      Combination Procedures:  Combination of surgical procedures and other treatments may be recommended, particularly if patients have more than one area of narrowing or persistent positional disease.  The success rate of combination surgery ranges from 66-80%2,3.    References  Segun CÁRDENAS. The Role of the Nose in Snoring and Obstructive Sleep Apnoea: An Update.  Eur Arch Otorhinolaryngol. 2011; 268: 1365-73.   Kayode SM; Theresa JA; Puma JR; Pallanch JF; Christopher MB; Christine SG; Hernán NEWBY. Surgical modifications of the upper airway for obstructive sleep apnea in adults: a systematic review and meta-analysis. SLEEP 2010;33(10):5525-7705. Cali VICENTE. Hypopharyngeal surgery in  obstructive sleep apnea: an evidence-based medicine review.  Arch Otolaryngol Head Neck Surg. 2006 Feb;132(2):206-13.  Gigi YH1, Unruly Y, Santosh TSERING. The efficacy of anatomically based multilevel surgery for obstructive sleep apnea. Otolaryngol Head Neck Surg. 2003 Oct;129(4):327-35.  Cali VICENTE, Goldberg A. Hypopharyngeal Surgery in Obstructive Sleep Apnea: An Evidence-Based Medicine Review. Arch Otolaryngol Head Neck Surg. 2006 Feb;132(2):206-13.  Jp TSANG et al. Upper-Airway Stimulation for Obstructive Sleep Apnea.  N Engl J Med. 2014 Jan 9;370(2):139-49.  Jayleen Y et al. Increased Incidence of Cardiovascular Disease in Middle-aged Men with Obstructive Sleep Apnea. Am J Respir Crit Care Med; 2002 166: 159-165  Rondonadryan BRIDGES et al. Studying Life Effects and Effectiveness of Palatopharyngoplasty (SLEEP) study: Subjective Outcomes of Isolated Uvulopalatopharyngoplasty. Otolaryngol Head Neck Surg. 2011; 144: 623-631.        WHAT IF I ONLY HAVE SNORING?    Mandibular advancement devices, lateral sleep positioning, long-term weight loss and treatment of nasal allergies have been shown to improve snoring.  Exercising tongue muscles with a game (https://[employer].Sportpost.com/us/xavier/soundly-reduce-snoring/hr4188220011) or stimulating the tongue during the day with a device (https://doi.org/10.3390/kkc62417407) have improved snoring in some individuals.    Remember to Drive Safe... Drive Alive     Sleep health profoundly affects your health, mood, and your safety.  Thirty three percent of the population (one in three of us) is not getting enough sleep and many have a sleep disorder. Not getting enough sleep or having an untreated / undertreated sleep condition may make us sleepy without even knowing it. In fact, our driving could be dramatically impaired due to our sleep health. As your provider, here are some things I would like you to know about driving:     Here are some warning signs for impairment and dangerous drowsy driving:               -Having been awake more than 16 hours               -Looking tired               -Eyelid drooping              -Head nodding (it could be too late at this point)              -Driving for more than 30 minutes     Some things you could do to make the driving safer if you are experiencing some drowsiness:              -Stop driving and rest              -Call for transportation              -Make sure your sleep disorder is adequately treated     Some things that have been shown NOT to work when experiencing drowsiness while driving:              -Turning on the radio              -Opening windows              -Eating any  distracting  /  entertaining  foods (e.g., sunflower seeds, candy, or any other)              -Talking on the phone      Your decision may not only impact your life, but also the life of others. Please, remember to drive safe for yourself and all of us.

## 2023-02-21 RX ORDER — DAPAGLIFLOZIN 5 MG/1
5 TABLET, FILM COATED ORAL DAILY
Qty: 90 TABLET | Refills: 3 | OUTPATIENT
Start: 2023-02-21

## 2023-03-09 ENCOUNTER — ANCILLARY PROCEDURE (OUTPATIENT)
Dept: CARDIOLOGY | Facility: CLINIC | Age: 43
End: 2023-03-09
Attending: INTERNAL MEDICINE
Payer: COMMERCIAL

## 2023-03-09 DIAGNOSIS — I42.8 NON-ISCHEMIC CARDIOMYOPATHY (H): Primary | ICD-10-CM

## 2023-03-09 DIAGNOSIS — Z95.810 ICD (IMPLANTABLE CARDIOVERTER-DEFIBRILLATOR), SINGLE, IN SITU: ICD-10-CM

## 2023-03-09 PROCEDURE — 93295 DEV INTERROG REMOTE 1/2/MLT: CPT | Performed by: INTERNAL MEDICINE

## 2023-03-09 PROCEDURE — 93296 REM INTERROG EVL PM/IDS: CPT | Performed by: INTERNAL MEDICINE

## 2023-03-17 LAB
MDC_IDC_EPISODE_DTM: NORMAL
MDC_IDC_EPISODE_DURATION: 11 S
MDC_IDC_EPISODE_DURATION: 12 S
MDC_IDC_EPISODE_DURATION: 134 S
MDC_IDC_EPISODE_DURATION: 14 S
MDC_IDC_EPISODE_DURATION: 14 S
MDC_IDC_EPISODE_DURATION: 16 S
MDC_IDC_EPISODE_DURATION: 18 S
MDC_IDC_EPISODE_DURATION: 19 S
MDC_IDC_EPISODE_DURATION: 20 S
MDC_IDC_EPISODE_DURATION: 22 S
MDC_IDC_EPISODE_DURATION: 22 S
MDC_IDC_EPISODE_DURATION: 23 S
MDC_IDC_EPISODE_DURATION: 26 S
MDC_IDC_EPISODE_DURATION: 28 S
MDC_IDC_EPISODE_DURATION: 301 S
MDC_IDC_EPISODE_DURATION: 338 S
MDC_IDC_EPISODE_DURATION: 34 S
MDC_IDC_EPISODE_DURATION: 37 S
MDC_IDC_EPISODE_DURATION: 40 S
MDC_IDC_EPISODE_DURATION: 43 S
MDC_IDC_EPISODE_DURATION: 44 S
MDC_IDC_EPISODE_DURATION: 444 S
MDC_IDC_EPISODE_DURATION: 48 S
MDC_IDC_EPISODE_DURATION: 52 S
MDC_IDC_EPISODE_DURATION: 55 S
MDC_IDC_EPISODE_DURATION: 64 S
MDC_IDC_EPISODE_DURATION: 68 S
MDC_IDC_EPISODE_ID: NORMAL
MDC_IDC_EPISODE_TYPE: NORMAL
MDC_IDC_EPISODE_VENDOR_TYPE: NORMAL
MDC_IDC_LEAD_IMPLANT_DT: NORMAL
MDC_IDC_LEAD_LOCATION: NORMAL
MDC_IDC_LEAD_LOCATION_DETAIL_1: NORMAL
MDC_IDC_LEAD_MFG: NORMAL
MDC_IDC_LEAD_MODEL: NORMAL
MDC_IDC_LEAD_POLARITY_TYPE: NORMAL
MDC_IDC_LEAD_SERIAL: NORMAL
MDC_IDC_MSMT_BATTERY_DTM: NORMAL
MDC_IDC_MSMT_BATTERY_REMAINING_LONGEVITY: 180 MO
MDC_IDC_MSMT_BATTERY_REMAINING_PERCENTAGE: 100 %
MDC_IDC_MSMT_BATTERY_STATUS: NORMAL
MDC_IDC_MSMT_CAP_CHARGE_DTM: NORMAL
MDC_IDC_MSMT_CAP_CHARGE_TIME: 9.8 S
MDC_IDC_MSMT_CAP_CHARGE_TYPE: NORMAL
MDC_IDC_MSMT_LEADCHNL_RV_IMPEDANCE_VALUE: 624 OHM
MDC_IDC_PG_IMPLANT_DTM: NORMAL
MDC_IDC_PG_MFG: NORMAL
MDC_IDC_PG_MODEL: NORMAL
MDC_IDC_PG_SERIAL: NORMAL
MDC_IDC_PG_TYPE: NORMAL
MDC_IDC_SESS_CLINIC_NAME: NORMAL
MDC_IDC_SESS_DTM: NORMAL
MDC_IDC_SESS_TYPE: NORMAL
MDC_IDC_SET_BRADY_LOWRATE: 40 {BEATS}/MIN
MDC_IDC_SET_BRADY_MODE: NORMAL
MDC_IDC_SET_LEADCHNL_RV_PACING_AMPLITUDE: 2 V
MDC_IDC_SET_LEADCHNL_RV_PACING_CAPTURE_MODE: NORMAL
MDC_IDC_SET_LEADCHNL_RV_PACING_POLARITY: NORMAL
MDC_IDC_SET_LEADCHNL_RV_PACING_PULSEWIDTH: 0.4 MS
MDC_IDC_SET_LEADCHNL_RV_SENSING_ADAPTATION_MODE: NORMAL
MDC_IDC_SET_LEADCHNL_RV_SENSING_POLARITY: NORMAL
MDC_IDC_SET_LEADCHNL_RV_SENSING_SENSITIVITY: 0.6 MV
MDC_IDC_SET_ZONE_DETECTION_INTERVAL: 250 MS
MDC_IDC_SET_ZONE_DETECTION_INTERVAL: 300 MS
MDC_IDC_SET_ZONE_DETECTION_INTERVAL: 353 MS
MDC_IDC_SET_ZONE_TYPE: NORMAL
MDC_IDC_SET_ZONE_VENDOR_TYPE: NORMAL
MDC_IDC_STAT_BRADY_DTM_END: NORMAL
MDC_IDC_STAT_BRADY_DTM_START: NORMAL
MDC_IDC_STAT_BRADY_RV_PERCENT_PACED: 0 %
MDC_IDC_STAT_EPISODE_RECENT_COUNT: 0
MDC_IDC_STAT_EPISODE_RECENT_COUNT: 4
MDC_IDC_STAT_EPISODE_RECENT_COUNT: 87
MDC_IDC_STAT_EPISODE_RECENT_COUNT_DTM_END: NORMAL
MDC_IDC_STAT_EPISODE_RECENT_COUNT_DTM_START: NORMAL
MDC_IDC_STAT_EPISODE_TYPE: NORMAL
MDC_IDC_STAT_EPISODE_VENDOR_TYPE: NORMAL
MDC_IDC_STAT_TACHYTHERAPY_ATP_DELIVERED_RECENT: 0
MDC_IDC_STAT_TACHYTHERAPY_ATP_DELIVERED_TOTAL: 0
MDC_IDC_STAT_TACHYTHERAPY_RECENT_DTM_END: NORMAL
MDC_IDC_STAT_TACHYTHERAPY_RECENT_DTM_START: NORMAL
MDC_IDC_STAT_TACHYTHERAPY_SHOCKS_ABORTED_RECENT: 0
MDC_IDC_STAT_TACHYTHERAPY_SHOCKS_ABORTED_TOTAL: 0
MDC_IDC_STAT_TACHYTHERAPY_SHOCKS_DELIVERED_RECENT: 0
MDC_IDC_STAT_TACHYTHERAPY_SHOCKS_DELIVERED_TOTAL: 0
MDC_IDC_STAT_TACHYTHERAPY_TOTAL_DTM_END: NORMAL
MDC_IDC_STAT_TACHYTHERAPY_TOTAL_DTM_START: NORMAL

## 2023-03-22 ENCOUNTER — TELEPHONE (OUTPATIENT)
Dept: CARDIOLOGY | Facility: CLINIC | Age: 43
End: 2023-03-22

## 2023-03-22 ENCOUNTER — OFFICE VISIT (OUTPATIENT)
Dept: CARDIOLOGY | Facility: CLINIC | Age: 43
End: 2023-03-22
Attending: PHYSICIAN ASSISTANT
Payer: COMMERCIAL

## 2023-03-22 VITALS
DIASTOLIC BLOOD PRESSURE: 70 MMHG | SYSTOLIC BLOOD PRESSURE: 115 MMHG | HEIGHT: 67 IN | OXYGEN SATURATION: 99 % | BODY MASS INDEX: 26.78 KG/M2 | HEART RATE: 78 BPM | WEIGHT: 170.6 LBS

## 2023-03-22 DIAGNOSIS — I50.20 HFREF (HEART FAILURE WITH REDUCED EJECTION FRACTION) (H): ICD-10-CM

## 2023-03-22 PROCEDURE — 99214 OFFICE O/P EST MOD 30 MIN: CPT | Performed by: PHYSICIAN ASSISTANT

## 2023-03-22 RX ORDER — METOPROLOL SUCCINATE 100 MG/1
150 TABLET, EXTENDED RELEASE ORAL AT BEDTIME
Qty: 135 TABLET | Refills: 3 | Status: SHIPPED | OUTPATIENT
Start: 2023-03-22 | End: 2023-06-16

## 2023-03-22 NOTE — PROGRESS NOTES
Service Date: 3/22/2023     POOR AUDIO    PRIMARY CARDIOLOGIST:  Dr. Irizarry and Dr. Way for     REASON FOR VISIT:  [facility] Clinic followup.    HISTORY OF PRESENT ILLNESS:  Mr. Swartz is a delightful 42-year-old gentleman with past medical history significant for the followin.  Tobacco and alcohol abuse.  2.  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  3.  Nonischemic cardiomyopathy diagnosed when he presented with shortness of breath in 2021 with EF of 15%-20% at that time, with it remaining low in spite of medical therapy with last ejection fraction 38% and stable on echocardiogram in February..  4.  ICD in place.  5.  Dyslipidemia.  6.  Atrial fibrillation and atrial flutter noted on device.  Now status post PVI and flutter ablation on 2022.  7.  Mitral regurg improved to trace to mild with improving EF.  8.  Nonsustained VT on device check.    He comes in for routine follow-up today.  Unfortunately more recently he has felt more puffy.  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  He denies chest pain, shortness of breath, orthopnea, or PND.  But he feels like his face is puffy his belly is puffy and his stump is puffy as well.  Weight is up here, but he is not weighing himself regularly.  He has taken his Lasix every day for the last 3 days but really has not felt like that is been enough.    SOCIAL HISTORY:  He is .  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  Previously was drinking 4-5 beers a day plus a liter of vodka, now drinking more on the weekends.  Continues to smoke.  No street drugs.  He notes that he was on a supplement called Kratom for about 2-1/2 years as a replacement of opioids.  He is wondering if this could have impacted his heart.    PHYSICAL EXAMINATION:    GENERAL:  Well-developed, well-nourished  gentleman in no acute distress.  HEENT:  Normocephalic, atraumatic.  HEART:  Regular in rate and rhythm.  I do not appreciate murmur, rub or gallop.  LUNGS:  Clear, without wheezes, rales or rhonchi.  Trace edema.  NECK:  The neck veins are flat.    Studies reviewed include angiogram dated 12/21 shows normal coronary arteries.    ASSESSMENT AND PLAN:    1.  Nonischemic cardiomyopathy with biventricular heart failure with EF of 15%-20% on diagnosis, improved to 38% with medical management now with ICD in place, class II stage C.  He is recently more hypervolemic likely secondary to dietary indiscretion.  At this point we will have him take his Lasix daily until he feels like he is euvolemic again then rotate back to taking it several times a week.  His EF has remained stable at 38% but we have seen positive improvements both in his valvular disease and the size of his left ventricle.  It is gone from 5.6 cm left ventricular end-diastolic dimension down to 5.8.  This makes me hopeful that eventually this will improve.  He is well aware that he needs to stop alcohol completely for complete reverse remodeling of his heart.  Today, because he is having more nonsustained VT him and increase his Toprol to 150 mg daily.  If he becomes lightheaded with this my recommendation is to decrease Entresto to 49/50 1 in the morning and continue on 97/10 3 at night.  If he does not become lightheaded I would like to keep him on both and ideally get him on 200 mg of Toprol a day.    2.  Hypertension now with episodes of hypotension.    3.  Hyperlipidemia but no known coronary disease.  We discussed today that we want to avoid problems now and down the road, he has been off Lipitor, and now with his poor diet LDL is markedly elevated at 111.  He will work on diet and exercise.    4.  History of heavy ETOH and ongoing tobacco.  Ultimate goal is to eliminate alcohol.  Unfortunately he is drinking more lately with a new living  situation.    5.  ICD in place with history of AFib and flutter, now status post PVI and flutter ablation.  He was on short-term anticoagulation.  No recurrent A-fib noted.    Is my great pleasure to participate in this delightful patient's care.  We will see him back in about 3 months with a BMP before that visit, he will call sooner with concerns.    Ivory Serrano PA-C  1:48 PM 3/22/2023   [facility]    This note was completed using Dragon voice recognition system.  There are inherent errors in the system and there may be misspelled names or nonsensical words.      Orders this Visit:  Orders Placed This Encounter   Procedures     Basic metabolic panel     Follow-Up with Cardiology [facility]     Orders Placed This Encounter   Medications     metoprolol succinate ER (TOPROL XL) 100 MG 24 hr tablet     Sig: Take 1.5 tablets (150 mg) by mouth At Bedtime     Dispense:  135 tablet     Refill:  3     Medications Discontinued During This Encounter   Medication Reason     metoprolol succinate ER (TOPROL XL) 100 MG 24 hr tablet          Encounter Diagnosis   Name Primary?     HFrEF (heart failure with reduced ejection fraction) (H)        CURRENT MEDICATIONS:  Current Outpatient Medications   Medication Sig Dispense Refill     dapagliflozin (FARXIGA) 5 MG TABS tablet Take 1 tablet (5 mg) by mouth daily 90 tablet 3     furosemide (LASIX) 20 MG tablet Take 1 tablet (20 mg) by mouth daily as needed (wt gain, swelling) 90 tablet 3     metoprolol succinate ER (TOPROL XL) 100 MG 24 hr tablet Take 1.5 tablets (150 mg) by mouth At Bedtime 135 tablet 3     Multiple Vitamins-Minerals (MENS MULTIVITAMIN) TABS Take 1 tablet by mouth daily       oxyCODONE (ROXICODONE) 5 MG tablet Take 1 tablet (5 mg) by mouth every 6 hours as needed for pain 8 tablet 0     sacubitril-valsartan (ENTRESTO)  MG per tablet Take 1 tablet by mouth 2 times daily 180 tablet 3     spironolactone (ALDACTONE) 25 MG tablet Take 1 tablet (25 mg)  by mouth daily 90 tablet 3     tadalafil (CIALIS) 5 MG tablet Take 1 tablet (5 mg) by mouth every 24 hours If not effective, can take an additional 5 mg 1 hour prior to sexual activity. 30 tablet 11     traZODone (DESYREL) 50 MG tablet TAKE 1 TO 2 TABLETS BY MOUTH AT BEDTIME 45 tablet 1     order for DME Equipment being ordered: right lower extremity prosthetic     [employer]  [phone] 1 each 0     zolpidem ER (AMBIEN CR) 6.25 MG CR tablet TAKE ONE TABLET BY MOUTH NIGHTLY AS NEEDED FOR SLEEP **APPT REQUIRED FOR FURTHER REFILLS** (Patient not taking: Reported on 2/20/2023) 4 tablet 0       ALLERGIES     Allergies   Allergen Reactions     No Known Allergies        PAST MEDICAL HISTORY:  Past Medical History:   Diagnosis Date     [place]ident [place] farm 08/01/2014     Alcohol abuse      Nonischemic cardiomyopathy (H)      Smoking        PAST SURGICAL HISTORY:  Past Surgical History:   Procedure Laterality Date     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     Broken Right arm  1992     CV HEART CATHETERIZATION WITH POSSIBLE INTERVENTION N/A 12/9/2021    Procedure: Heart Catheterization with Possible Intervention;  Surgeon: Leonard Granados MD;  Location:  HEART CARDIAC CATH LAB     EP ABLATION FOCAL AFIB N/A 9/22/2022    Procedure: Ablation Atrial Fibrilation;  Surgeon: Oli Way MD;  Location:  HEART CARDIAC CATH LAB     EP ICD INSERT SINGLE N/A 4/7/2022    Procedure: Implantable Cardioverter Defibrillator Device & Lead Implant - Single or Dual [[id number]];  Surgeon: Sasha Ellsworth MD;  Location:  HEART CARDIAC CATH LAB     LEG SURGERY Right 8/2014       FAMILY HISTORY:  No family history on file.    SOCIAL HISTORY:  Social History     Socioeconomic History     Marital status:    Occupational History     Employer: UNKNOWN   Tobacco Use     Smoking status: Former     Passive exposure: Never     Smokeless tobacco: Current     Types: Chew   Vaping Use     Vaping Use: Never used   Substance and  Sexual Activity     Alcohol use: Yes     Alcohol/week: 0.0 standard drinks     Comment: occ.     Drug use: No     Sexual activity: Yes     Partners: Female       Review of Systems:  Skin:  not assessed     Eyes:  not assessed    ENT:  not assessed    Respiratory:  Positive for shortness of breath  Cardiovascular:    Positive for;chest pain;edema  Gastroenterology: Negative    Genitourinary:  Negative    Musculoskeletal:  not assessed    Neurologic:  Positive for numbness or tingling of hands  Psychiatric:  Positive for sleep disturbances  Heme/Lymph/Imm:  Negative    Endocrine:  Negative        Recent Lab Results: all reviewed today  CBC RESULTS:  Lab Results   Component Value Date    WBC 7.7 12/27/2022    WBC 7.1 08/05/2020    RBC 4.50 12/27/2022    RBC 4.79 08/05/2020    HGB 14.3 12/27/2022    HGB 14.8 08/05/2020    HCT 43.0 12/27/2022    HCT 45.1 08/05/2020    MCV 96 12/27/2022    MCV 94 08/05/2020    MCH 31.8 12/27/2022    MCH 30.9 08/05/2020    MCHC 33.3 12/27/2022    MCHC 32.8 08/05/2020    RDW 13.2 12/27/2022    RDW 13.5 08/05/2020     12/27/2022     08/05/2020       BMP RESULTS:  Lab Results   Component Value Date     02/07/2023     08/05/2020    POTASSIUM 3.9 02/07/2023    POTASSIUM 4.1 12/06/2022    POTASSIUM 3.8 08/05/2020    CHLORIDE 95 (L) 02/07/2023    CHLORIDE 103 12/06/2022    CHLORIDE 105 08/05/2020    CO2 27 02/07/2023    CO2 28 12/06/2022    CO2 30 08/05/2020    ANIONGAP 14 02/07/2023    ANIONGAP 3 12/06/2022    ANIONGAP 3 08/05/2020     (H) 02/07/2023     (H) 12/06/2022    GLC 77 08/06/2020    BUN 17.1 02/07/2023    BUN 12 12/06/2022    BUN 14 08/05/2020    CR 1.20 (H) 02/07/2023    CR 1.12 08/05/2020    GFRESTIMATED 77 02/07/2023    GFRESTIMATED 82 08/05/2020    GFRESTBLACK >90 08/05/2020    SHAYNA 9.8 02/07/2023    SHAYNA 9.3 08/05/2020        LIPID RESULTS:  Lab Results   Component Value Date    CHOL 334 02/07/2023    CHOL 208 03/03/2016     Lab Results    Component Value Date     02/07/2023    HDL 99 03/03/2016     Lab Results   Component Value Date     02/07/2023    LDL 98 03/03/2016     Lab Results   Component Value Date    TRIG 79 02/07/2023    TRIG 53 03/03/2016     No results found for: CHOLHDLRATIO     INR RESULTS:  Lab Results   Component Value Date    INR 0.92 12/06/2021           IVONNE Serrano, PA-C  [address]  [place] [id number]

## 2023-03-22 NOTE — TELEPHONE ENCOUNTER
I did Bobs remote device check and Jorge L is having episodes of increased heart rates. ? Afib with RVR  vs 's ..patient only has an RV lead. It is regular. Pt had Ablation for afib/aflutter 9/22/2022  and is no longer on OAC . Pt was seen by Henrique today and Metoprolol  Succinate was increased to 150mg once a day at night.  I have been unable to contact pt to see if he has been symptomatic however henrique states in her note today that he denies chest pain SOB, orthopnea or PND. ( He has had increased puffiness of face , belly and stump.)

## 2023-03-22 NOTE — PATIENT INSTRUCTIONS
Call CORE nurse for any questions or concerns Mon-Fri 8am-4pm:                                                #[phone]                                       For concerns after hours:                                               #[phone]     1: Medication changes: Toprol XL/ metoprolol succinate 150 mg once a day at night.  If this makes you lightheaded, please call and we'll adjust other medications.    Take your Lasix/ furosemide everyday as long as you feel like you need to to get rid of the fluid.      2: Plan from today: keep on working on eating less salt and less alcohol.

## 2023-03-22 NOTE — LETTER
3/22/2023    Jason García MD  [address] [facility] [address]    RE: Dipak Swartz       Dear Colleague,     I had the pleasure of seeing Dipak Swartz in the [facility].  Service Date: 3/22/2023     POOR AUDIO    PRIMARY CARDIOLOGIST:  Dr. Irizarry and Dr. Way for     REASON FOR VISIT:  C.O.R.E. Clinic followup.    HISTORY OF PRESENT ILLNESS:  Mr. Swartz is a delightful 42-year-old gentleman with past medical history significant for the followin.  Tobacco and alcohol abuse.  2.  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  3.  Nonischemic cardiomyopathy diagnosed when he presented with shortness of breath in 2021 with EF of 15%-20% at that time, with it remaining low in spite of medical therapy with last ejection fraction 38% and stable on echocardiogram in February..  4.  ICD in place.  5.  Dyslipidemia.  6.  Atrial fibrillation and atrial flutter noted on device.  Now status post PVI and flutter ablation on 2022.  7.  Mitral regurg improved to trace to mild with improving EF.  8.  Nonsustained VT on device check.    He comes in for routine follow-up today.  Unfortunately more recently he has felt more puffy.  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  He denies chest pain, shortness of breath, orthopnea, or PND.  But he feels like his face is puffy his belly is puffy and his stump is puffy as well.  Weight is up here, but he is not weighing himself regularly.  He has taken his Lasix every day for the last 3 days but really has not felt like that is been enough.    SOCIAL HISTORY:  He is .  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  Previously was drinking 4-5 beers a day plus a liter of vodka, now drinking more on the weekends.  Continues to smoke.  No street drugs.  He notes that he was  on a supplement called Maryanne for about 2-1/2 years as a replacement of opioids.  He is wondering if this could have impacted his heart.    PHYSICAL EXAMINATION:    GENERAL:  Well-developed, well-nourished gentleman in no acute distress.  HEENT:  Normocephalic, atraumatic.  HEART:  Regular in rate and rhythm.  I do not appreciate murmur, rub or gallop.  LUNGS:  Clear, without wheezes, rales or rhonchi.  Trace edema.  NECK:  The neck veins are flat.    Studies reviewed include angiogram dated 12/21 shows normal coronary arteries.    ASSESSMENT AND PLAN:    1.  Nonischemic cardiomyopathy with biventricular heart failure with EF of 15%-20% on diagnosis, improved to 38% with medical management now with ICD in place, class II stage C.  He is recently more hypervolemic likely secondary to dietary indiscretion.  At this point we will have him take his Lasix daily until he feels like he is euvolemic again then rotate back to taking it several times a week.  His EF has remained stable at 38% but we have seen positive improvements both in his valvular disease and the size of his left ventricle.  It is gone from 5.6 cm left ventricular end-diastolic dimension down to 5.8.  This makes me hopeful that eventually this will improve.  He is well aware that he needs to stop alcohol completely for complete reverse remodeling of his heart.  Today, because he is having more nonsustained VT him and increase his Toprol to 150 mg daily.  If he becomes lightheaded with this my recommendation is to decrease Entresto to 49/50 1 in the morning and continue on 97/10 3 at night.  If he does not become lightheaded I would like to keep him on both and ideally get him on 200 mg of Toprol a day.    2.  Hypertension now with episodes of hypotension.    3.  Hyperlipidemia but no known coronary disease.  We discussed today that we want to avoid problems now and down the road, he has been off Lipitor, and now with his poor diet LDL is markedly elevated  at 111.  He will work on diet and exercise.    4.  History of heavy ETOH and ongoing tobacco.  Ultimate goal is to eliminate alcohol.  Unfortunately he is drinking more lately with a new living situation.    5.  ICD in place with history of AFib and flutter, now status post PVI and flutter ablation.  He was on short-term anticoagulation.  No recurrent A-fib noted.    Is my great pleasure to participate in this delightful patient's care.  We will see him back in about 3 months with a BMP before that visit, he will call sooner with concerns.    Ivory Serrano PA-C  1:48 PM 3/22/2023   [facility]    This note was completed using Dragon voice recognition system.  There are inherent errors in the system and there may be misspelled names or nonsensical words.      Orders this Visit:  Orders Placed This Encounter   Procedures     Basic metabolic panel     Follow-Up with Cardiology [facility]     Orders Placed This Encounter   Medications     metoprolol succinate ER (TOPROL XL) 100 MG 24 hr tablet     Sig: Take 1.5 tablets (150 mg) by mouth At Bedtime     Dispense:  135 tablet     Refill:  3     Medications Discontinued During This Encounter   Medication Reason     metoprolol succinate ER (TOPROL XL) 100 MG 24 hr tablet          Encounter Diagnosis   Name Primary?     HFrEF (heart failure with reduced ejection fraction) (H)        CURRENT MEDICATIONS:  Current Outpatient Medications   Medication Sig Dispense Refill     dapagliflozin (FARXIGA) 5 MG TABS tablet Take 1 tablet (5 mg) by mouth daily 90 tablet 3     furosemide (LASIX) 20 MG tablet Take 1 tablet (20 mg) by mouth daily as needed (wt gain, swelling) 90 tablet 3     metoprolol succinate ER (TOPROL XL) 100 MG 24 hr tablet Take 1.5 tablets (150 mg) by mouth At Bedtime 135 tablet 3     Multiple Vitamins-Minerals (MENS MULTIVITAMIN) TABS Take 1 tablet by mouth daily       oxyCODONE (ROXICODONE) 5 MG tablet Take 1 tablet (5 mg) by mouth every 6 hours as needed  for pain 8 tablet 0     sacubitril-valsartan (ENTRESTO)  MG per tablet Take 1 tablet by mouth 2 times daily 180 tablet 3     spironolactone (ALDACTONE) 25 MG tablet Take 1 tablet (25 mg) by mouth daily 90 tablet 3     tadalafil (CIALIS) 5 MG tablet Take 1 tablet (5 mg) by mouth every 24 hours If not effective, can take an additional 5 mg 1 hour prior to sexual activity. 30 tablet 11     traZODone (DESYREL) 50 MG tablet TAKE 1 TO 2 TABLETS BY MOUTH AT BEDTIME 45 tablet 1     order for DME Equipment being ordered: right lower extremity prosthetic     [employer]  [phone] 1 each 0     zolpidem ER (AMBIEN CR) 6.25 MG CR tablet TAKE ONE TABLET BY MOUTH NIGHTLY AS NEEDED FOR SLEEP **APPT REQUIRED FOR FURTHER REFILLS** (Patient not taking: Reported on 2/20/2023) 4 tablet 0       ALLERGIES     Allergies   Allergen Reactions     No Known Allergies        PAST MEDICAL HISTORY:  Past Medical History:   Diagnosis Date     [place]ident on farm 08/01/2014     Alcohol abuse      Nonischemic cardiomyopathy (H)      Smoking        PAST SURGICAL HISTORY:  Past Surgical History:   Procedure Laterality Date     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     Broken Right arm  1992     CV HEART CATHETERIZATION WITH POSSIBLE INTERVENTION N/A 12/9/2021    Procedure: Heart Catheterization with Possible Intervention;  Surgeon: Leonard Granados MD;  Location:  HEART CARDIAC CATH LAB     EP ABLATION FOCAL AFIB N/A 9/22/2022    Procedure: Ablation Atrial Fibrilation;  Surgeon: Oli Way MD;  Location:  HEART CARDIAC CATH LAB     EP ICD INSERT SINGLE N/A 4/7/2022    Procedure: Implantable Cardioverter Defibrillator Device & Lead Implant - Single or Dual [[id number]];  Surgeon: Sasha Ellsworth MD;  Location:  HEART CARDIAC CATH LAB     LEG SURGERY Right 8/2014       FAMILY HISTORY:  No family history on file.    SOCIAL HISTORY:  Social History     Socioeconomic History     Marital status:    Occupational History      Employer: UNKNOWN   Tobacco Use     Smoking status: Former     Passive exposure: Never     Smokeless tobacco: Current     Types: Chew   Vaping Use     Vaping Use: Never used   Substance and Sexual Activity     Alcohol use: Yes     Alcohol/week: 0.0 standard drinks     Comment: occ.     Drug use: No     Sexual activity: Yes     Partners: Female       Review of Systems:  Skin:  not assessed     Eyes:  not assessed    ENT:  not assessed    Respiratory:  Positive for shortness of breath  Cardiovascular:    Positive for;chest pain;edema  Gastroenterology: Negative    Genitourinary:  Negative    Musculoskeletal:  not assessed    Neurologic:  Positive for numbness or tingling of hands  Psychiatric:  Positive for sleep disturbances  Heme/Lymph/Imm:  Negative    Endocrine:  Negative        Recent Lab Results: all reviewed today  CBC RESULTS:  Lab Results   Component Value Date    WBC 7.7 12/27/2022    WBC 7.1 08/05/2020    RBC 4.50 12/27/2022    RBC 4.79 08/05/2020    HGB 14.3 12/27/2022    HGB 14.8 08/05/2020    HCT 43.0 12/27/2022    HCT 45.1 08/05/2020    MCV 96 12/27/2022    MCV 94 08/05/2020    MCH 31.8 12/27/2022    MCH 30.9 08/05/2020    MCHC 33.3 12/27/2022    MCHC 32.8 08/05/2020    RDW 13.2 12/27/2022    RDW 13.5 08/05/2020     12/27/2022     08/05/2020       BMP RESULTS:  Lab Results   Component Value Date     02/07/2023     08/05/2020    POTASSIUM 3.9 02/07/2023    POTASSIUM 4.1 12/06/2022    POTASSIUM 3.8 08/05/2020    CHLORIDE 95 (L) 02/07/2023    CHLORIDE 103 12/06/2022    CHLORIDE 105 08/05/2020    CO2 27 02/07/2023    CO2 28 12/06/2022    CO2 30 08/05/2020    ANIONGAP 14 02/07/2023    ANIONGAP 3 12/06/2022    ANIONGAP 3 08/05/2020     (H) 02/07/2023     (H) 12/06/2022    GLC 77 08/06/2020    BUN 17.1 02/07/2023    BUN 12 12/06/2022    BUN 14 08/05/2020    CR 1.20 (H) 02/07/2023    CR 1.12 08/05/2020    GFRESTIMATED 77 02/07/2023    GFRESTIMATED 82 08/05/2020     GFRESTBLACK >90 08/05/2020    SHAYNA 9.8 02/07/2023    SHAYNA 9.3 08/05/2020        LIPID RESULTS:  Lab Results   Component Value Date    CHOL 334 02/07/2023    CHOL 208 03/03/2016     Lab Results   Component Value Date     02/07/2023    HDL 99 03/03/2016     Lab Results   Component Value Date     02/07/2023    LDL 98 03/03/2016     Lab Results   Component Value Date    TRIG 79 02/07/2023    TRIG 53 03/03/2016     No results found for: CHOLHDLRATIO     INR RESULTS:  Lab Results   Component Value Date    INR 0.92 12/06/2021           CC  Ivory Serrano PA-C  [address]  [place] [address]    Thank you for allowing me to participate in the care of your patient.      Sincerely,     Ivory Serrano PA-C     [facility]

## 2023-03-23 NOTE — TELEPHONE ENCOUNTER
Oli Way MD  You 18 minutes ago (3:14 PM)     QP  Monitor for now                      Response per Dr Rayan Colorado RN

## 2023-04-06 DIAGNOSIS — I50.20 HFREF (HEART FAILURE WITH REDUCED EJECTION FRACTION) (H): ICD-10-CM

## 2023-04-06 RX ORDER — SPIRONOLACTONE 25 MG/1
25 TABLET ORAL DAILY
Qty: 90 TABLET | Refills: 3 | Status: SHIPPED | OUTPATIENT
Start: 2023-04-06 | End: 2024-03-11

## 2023-04-12 ENCOUNTER — ANCILLARY PROCEDURE (OUTPATIENT)
Dept: CARDIOLOGY | Facility: CLINIC | Age: 43
End: 2023-04-12
Payer: COMMERCIAL

## 2023-04-12 ENCOUNTER — TELEPHONE (OUTPATIENT)
Dept: CARDIOLOGY | Facility: CLINIC | Age: 43
End: 2023-04-12

## 2023-04-12 DIAGNOSIS — I42.8 NON-ISCHEMIC CARDIOMYOPATHY (H): ICD-10-CM

## 2023-04-12 DIAGNOSIS — Z95.810 ICD (IMPLANTABLE CARDIOVERTER-DEFIBRILLATOR), SINGLE, IN SITU: ICD-10-CM

## 2023-04-12 LAB
MDC_IDC_LEAD_IMPLANT_DT: NORMAL
MDC_IDC_LEAD_LOCATION: NORMAL
MDC_IDC_LEAD_LOCATION_DETAIL_1: NORMAL
MDC_IDC_LEAD_MFG: NORMAL
MDC_IDC_LEAD_MODEL: NORMAL
MDC_IDC_LEAD_POLARITY_TYPE: NORMAL
MDC_IDC_LEAD_SERIAL: NORMAL
MDC_IDC_MSMT_BATTERY_DTM: NORMAL
MDC_IDC_MSMT_BATTERY_REMAINING_LONGEVITY: 174 MO
MDC_IDC_MSMT_BATTERY_REMAINING_PERCENTAGE: 100 %
MDC_IDC_MSMT_BATTERY_STATUS: NORMAL
MDC_IDC_MSMT_CAP_CHARGE_DTM: NORMAL
MDC_IDC_MSMT_CAP_CHARGE_TIME: 10 S
MDC_IDC_MSMT_CAP_CHARGE_TYPE: NORMAL
MDC_IDC_MSMT_LEADCHNL_RV_IMPEDANCE_VALUE: 689 OHM
MDC_IDC_MSMT_LEADCHNL_RV_PACING_THRESHOLD_AMPLITUDE: 0.7 V
MDC_IDC_MSMT_LEADCHNL_RV_PACING_THRESHOLD_PULSEWIDTH: 0.4 MS
MDC_IDC_PG_IMPLANT_DTM: NORMAL
MDC_IDC_PG_MFG: NORMAL
MDC_IDC_PG_MODEL: NORMAL
MDC_IDC_PG_SERIAL: NORMAL
MDC_IDC_PG_TYPE: NORMAL
MDC_IDC_SESS_CLINIC_NAME: NORMAL
MDC_IDC_SESS_DTM: NORMAL
MDC_IDC_SESS_TYPE: NORMAL
MDC_IDC_SET_BRADY_LOWRATE: 40 {BEATS}/MIN
MDC_IDC_SET_BRADY_MODE: NORMAL
MDC_IDC_SET_LEADCHNL_RV_PACING_AMPLITUDE: 2 V
MDC_IDC_SET_LEADCHNL_RV_PACING_CAPTURE_MODE: NORMAL
MDC_IDC_SET_LEADCHNL_RV_PACING_POLARITY: NORMAL
MDC_IDC_SET_LEADCHNL_RV_PACING_PULSEWIDTH: 0.4 MS
MDC_IDC_SET_LEADCHNL_RV_SENSING_ADAPTATION_MODE: NORMAL
MDC_IDC_SET_LEADCHNL_RV_SENSING_POLARITY: NORMAL
MDC_IDC_SET_LEADCHNL_RV_SENSING_SENSITIVITY: 0.6 MV
MDC_IDC_SET_ZONE_DETECTION_INTERVAL: 250 MS
MDC_IDC_SET_ZONE_DETECTION_INTERVAL: 300 MS
MDC_IDC_SET_ZONE_DETECTION_INTERVAL: 353 MS
MDC_IDC_SET_ZONE_TYPE: NORMAL
MDC_IDC_SET_ZONE_VENDOR_TYPE: NORMAL
MDC_IDC_STAT_BRADY_DTM_END: NORMAL
MDC_IDC_STAT_BRADY_DTM_START: NORMAL
MDC_IDC_STAT_BRADY_RV_PERCENT_PACED: 0 %
MDC_IDC_STAT_EPISODE_RECENT_COUNT: 0
MDC_IDC_STAT_EPISODE_RECENT_COUNT: 4
MDC_IDC_STAT_EPISODE_RECENT_COUNT: 98
MDC_IDC_STAT_EPISODE_RECENT_COUNT_DTM_END: NORMAL
MDC_IDC_STAT_EPISODE_RECENT_COUNT_DTM_START: NORMAL
MDC_IDC_STAT_EPISODE_TYPE: NORMAL
MDC_IDC_STAT_EPISODE_VENDOR_TYPE: NORMAL
MDC_IDC_STAT_TACHYTHERAPY_ATP_DELIVERED_RECENT: 0
MDC_IDC_STAT_TACHYTHERAPY_ATP_DELIVERED_TOTAL: 0
MDC_IDC_STAT_TACHYTHERAPY_RECENT_DTM_END: NORMAL
MDC_IDC_STAT_TACHYTHERAPY_RECENT_DTM_START: NORMAL
MDC_IDC_STAT_TACHYTHERAPY_SHOCKS_ABORTED_RECENT: 0
MDC_IDC_STAT_TACHYTHERAPY_SHOCKS_ABORTED_TOTAL: 0
MDC_IDC_STAT_TACHYTHERAPY_SHOCKS_DELIVERED_RECENT: 0
MDC_IDC_STAT_TACHYTHERAPY_SHOCKS_DELIVERED_TOTAL: 0
MDC_IDC_STAT_TACHYTHERAPY_TOTAL_DTM_END: NORMAL
MDC_IDC_STAT_TACHYTHERAPY_TOTAL_DTM_START: NORMAL

## 2023-04-12 PROCEDURE — 93282 PRGRMG EVAL IMPLANTABLE DFB: CPT | Performed by: INTERNAL MEDICINE

## 2023-04-13 NOTE — TELEPHONE ENCOUNTER
Oli Way MD  You 15 hours ago (4:45 PM)     QP  Since less than 2 min ok to monitor for now. qp

## 2023-05-25 ENCOUNTER — MYC MEDICAL ADVICE (OUTPATIENT)
Dept: CARDIOLOGY | Facility: CLINIC | Age: 43
End: 2023-05-25
Payer: COMMERCIAL

## 2023-05-25 DIAGNOSIS — I50.20 HFREF (HEART FAILURE WITH REDUCED EJECTION FRACTION) (H): ICD-10-CM

## 2023-05-25 RX ORDER — FUROSEMIDE 20 MG
20 TABLET ORAL DAILY PRN
Qty: 90 TABLET | Refills: 3 | Status: SHIPPED | OUTPATIENT
Start: 2023-05-25 | End: 2023-06-16

## 2023-05-25 NOTE — TELEPHONE ENCOUNTER
[facility]    -Pt has follow-up with GAURAV Rodriguez on 6/16.  Refill sent to preferred pharmacy.         Future Appointments   Date Time Provider Department Center   6/16/2023  9:15 AM  LAB [facility]   6/16/2023 10:10 AM [facility]   7/13/2023 12:00 AM GIL [facility] CLIN   7/19/2023  8:00 PM BED 1 SH SLEEP [facility] [place] [facility]   8/3/2023 11:00 AM Roger Feldman PsyD [facility] BK SLEEP       Yvette Davison RN BAN   1:41 PM 5/25/2023    CORE nurse line [facility] 8a-4p: [phone]

## 2023-06-13 DIAGNOSIS — G47.01 INSOMNIA DUE TO MEDICAL CONDITION: ICD-10-CM

## 2023-06-13 RX ORDER — TRAZODONE HYDROCHLORIDE 50 MG/1
TABLET, FILM COATED ORAL
Qty: 45 TABLET | Refills: 1 | OUTPATIENT
Start: 2023-06-13

## 2023-06-13 NOTE — TELEPHONE ENCOUNTER
Pending Prescriptions:                       Disp   Refills    traZODone (DESYREL) 50 MG tablet [Pharmacy*45 tab*1        Sig: TAKE ONE TO TWO TABLETS BY MOUTH AT BEDTIME    Routing refill request to provider for review/approval because:  Last visit for insomnia 7/2022 - overdue for follow up on this per visit note

## 2023-06-13 NOTE — TELEPHONE ENCOUNTER
Rx denied. Needs apt, or could ask his sleep specialist if this medication was discussed at their last appointment     Kong Flannery-JOSE Weber  Managed by [facility]

## 2023-06-16 ENCOUNTER — OFFICE VISIT (OUTPATIENT)
Dept: CARDIOLOGY | Facility: CLINIC | Age: 43
End: 2023-06-16
Attending: PHYSICIAN ASSISTANT
Payer: COMMERCIAL

## 2023-06-16 ENCOUNTER — LAB (OUTPATIENT)
Dept: LAB | Facility: CLINIC | Age: 43
End: 2023-06-16
Payer: COMMERCIAL

## 2023-06-16 VITALS
HEIGHT: 67 IN | DIASTOLIC BLOOD PRESSURE: 80 MMHG | WEIGHT: 169.3 LBS | BODY MASS INDEX: 26.57 KG/M2 | SYSTOLIC BLOOD PRESSURE: 114 MMHG | OXYGEN SATURATION: 100 % | HEART RATE: 54 BPM

## 2023-06-16 DIAGNOSIS — E78.5 HYPERLIPIDEMIA LDL GOAL <100: ICD-10-CM

## 2023-06-16 DIAGNOSIS — E78.5 HYPERLIPIDEMIA LDL GOAL <100: Primary | ICD-10-CM

## 2023-06-16 DIAGNOSIS — I50.20 HFREF (HEART FAILURE WITH REDUCED EJECTION FRACTION) (H): ICD-10-CM

## 2023-06-16 DIAGNOSIS — I48.0 PAROXYSMAL ATRIAL FIBRILLATION (H): ICD-10-CM

## 2023-06-16 DIAGNOSIS — R73.09 ELEVATED GLUCOSE: ICD-10-CM

## 2023-06-16 DIAGNOSIS — I47.10 SVT (SUPRAVENTRICULAR TACHYCARDIA) (H): ICD-10-CM

## 2023-06-16 LAB
ANION GAP SERPL CALCULATED.3IONS-SCNC: 10 MMOL/L (ref 7–15)
BUN SERPL-MCNC: 12.8 MG/DL (ref 6–20)
CALCIUM SERPL-MCNC: 9.4 MG/DL (ref 8.6–10)
CHLORIDE SERPL-SCNC: 101 MMOL/L (ref 98–107)
CHOLEST SERPL-MCNC: 280 MG/DL
CREAT SERPL-MCNC: 1.1 MG/DL (ref 0.67–1.17)
DEPRECATED HCO3 PLAS-SCNC: 26 MMOL/L (ref 22–29)
GFR SERPL CREATININE-BSD FRML MDRD: 86 ML/MIN/1.73M2
GLUCOSE SERPL-MCNC: 107 MG/DL (ref 70–99)
HBA1C MFR BLD: 5.4 %
HDLC SERPL-MCNC: 108 MG/DL
LDLC SERPL CALC-MCNC: 158 MG/DL
NONHDLC SERPL-MCNC: 172 MG/DL
POTASSIUM SERPL-SCNC: 4.8 MMOL/L (ref 3.4–5.3)
SODIUM SERPL-SCNC: 137 MMOL/L (ref 136–145)
TRIGL SERPL-MCNC: 69 MG/DL

## 2023-06-16 PROCEDURE — 80048 BASIC METABOLIC PNL TOTAL CA: CPT | Performed by: PHYSICIAN ASSISTANT

## 2023-06-16 PROCEDURE — 80061 LIPID PANEL: CPT | Performed by: PHYSICIAN ASSISTANT

## 2023-06-16 PROCEDURE — 36415 COLL VENOUS BLD VENIPUNCTURE: CPT | Performed by: PHYSICIAN ASSISTANT

## 2023-06-16 PROCEDURE — 83036 HEMOGLOBIN GLYCOSYLATED A1C: CPT | Performed by: PHYSICIAN ASSISTANT

## 2023-06-16 PROCEDURE — 99214 OFFICE O/P EST MOD 30 MIN: CPT | Performed by: PHYSICIAN ASSISTANT

## 2023-06-16 RX ORDER — METOPROLOL SUCCINATE 200 MG/1
200 TABLET, EXTENDED RELEASE ORAL AT BEDTIME
Qty: 90 TABLET | Refills: 3 | Status: SHIPPED | OUTPATIENT
Start: 2023-06-16 | End: 2023-10-16

## 2023-06-16 RX ORDER — FUROSEMIDE 20 MG
20 TABLET ORAL DAILY PRN
Qty: 90 TABLET | Refills: 3 | Status: SHIPPED | OUTPATIENT
Start: 2023-06-16 | End: 2023-12-26 | Stop reason: ALTCHOICE

## 2023-06-16 RX ORDER — SACUBITRIL AND VALSARTAN 97; 103 MG/1; MG/1
1 TABLET, FILM COATED ORAL 2 TIMES DAILY
Qty: 180 TABLET | Refills: 3 | Status: SHIPPED | OUTPATIENT
Start: 2023-06-16 | End: 2024-07-15

## 2023-06-16 NOTE — RESULT ENCOUNTER NOTE
Will review or did review during clinic visit.  Please see progress note for plan.  Ivory Serrano PA-C 6/16/2023 10:43 AM

## 2023-06-16 NOTE — PATIENT INSTRUCTIONS
Call CORE nurse for any questions or concerns Mon-Fri 8am-4pm:                                                #[phone]                                       For concerns after hours:                                               #[phone]     1: Medication changes: increase Toprol XL/ metoprolol succinate to 200 mg once a day at night.  The new pill will be 200 mg so it will just be one before bed.      Continue other medications.      2: Plan from today: echocardiogram, labs and visit with  Dr. Way in about 6 months.     3: Lab results: see attached; labs look great, no diabetes.   Component      Latest Ref Rng 6/16/2023  8:59 AM   Sodium      136 - 145 mmol/L 137    Potassium      3.4 - 5.3 mmol/L 4.8    Chloride      98 - 107 mmol/L 101    Carbon Dioxide (CO2)      22 - 29 mmol/L 26    Anion Gap      7 - 15 mmol/L 10    Urea Nitrogen      6.0 - 20.0 mg/dL 12.8    Creatinine      0.67 - 1.17 mg/dL 1.10    Calcium      8.6 - 10.0 mg/dL 9.4    Glucose      70 - 99 mg/dL 107 (H)    GFR Estimate      >60 mL/min/1.73m2 86    Hemoglobin A1C      <5.7 % 5.4       Legend:  (H) High

## 2023-06-16 NOTE — PROGRESS NOTES
CARDIOLOGY CLINIC VISIT  Service Date: 3/22/2023     PRIMARY CARDIOLOGIST:  Dr. Irizarry and Dr. Way for     REASON FOR VISIT:  C.O.R.E. Clinic followup.    HISTORY OF PRESENT ILLNESS:  Mr. Swartz is a delightful 42-year-old gentleman with past medical history significant for the followin.  Tobacco and alcohol abuse.  2.  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  3.  Nonischemic cardiomyopathy diagnosed when he presented with shortness of breath in 2021 with EF of 15%-20% at that time, with it remaining low in spite of medical therapy with last ejection fraction 38% and stable on echocardiogram in   4.  ICD in place.  5.  Dyslipidemia.  6.  Atrial fibrillation and atrial flutter noted on device.  Now status post PVI and flutter ablation on 2022.  7.  Mitral regurg improved to trace to mild with improving EF.  8.  Nonsustained VT on device check.  9.  Normal coronary arteries on angiogram dated .      I last saw Jorge L in March and at that point he had noticed his face was puffy his belly was puffy and his stump is puffy make it difficult to get his prosthetic leg on.  We started him back on Lasix on a as needed basis and he was also found to have some nonsustained VT so we increase Toprol to 150 mg daily.  I also asked him to decrease his alcohol intake.  Today, he notes he feels better.  He denies chest pain, shortness of breath, orthopnea, or PND.  His stump has been fitting better and he has been taking Lasix about 5 days a week.  He has not noticed any palpitations or lightheaded episodes.  He is cut back his drinking a little bit but not much she is definitely eating healthier.    SOCIAL HISTORY:  He is .  He recently moved with his 7-year-old and 5-year-old children and wife to [place] and is running a kael and restaurant.  Previously was drinking 4-5 beers a day plus a liter of vodka, now drinking more on the weekends.  Continues to smoke.  No street drugs.  He  "notes that he was on a supplement called [employer] for about 2-1/2 years as a replacement of opioids.  He is wondering if this could have impacted his heart.    PHYSICAL EXAMINATION:    /80 (BP Location: Right arm, Cuff Size: Adult Regular)   Pulse 54   Ht 1.702 m (5' 7\")   Wt 76.8 kg (169 lb 4.8 oz)   SpO2 100%   BMI 26.52 kg/m    Well-developed well-nourished gentleman in no acute distress.  Fit appearing.  Normocephalic and atraumatic.  Heart is regular in rate and rhythm, I do not appreciate murmur, rub, or gallop.  Lungs are clear without wheezes rales or rhonchi.  Extremities without peripheral edema.  Neck veins are flat at 30 degrees.    Studies reviewed include angiogram dated 12/21 shows normal coronary arteries.    ASSESSMENT AND PLAN:    1.  Nonischemic cardiomyopathy with biventricular heart failure with EF of 15%-20% on diagnosis, improved to 38% with medical management now with ICD in place, class II stage C.  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  I again asked him today to decrease and limiting alcohol if possible.  Is difficult with his work situation.  Today, he appears euvolemic.  We will continue to optimize his medications by increasing Toprol to 200 mg daily.  He will remain on full dose Entresto, Farxiga and spironolactone.  Labs reviewed today and renal function remains normal.  We will plan on repeating an echocardiogram in 6 months.      2.  Hypertension, well controlled.      3.  Hyperlipidemia but no known coronary disease.  Repeat lipid panel pending today.    4.  History of heavy ETOH and ongoing tobacco.      5.  ICD in place with history of AFib and flutter, now status post PVI and flutter ablation.  He was on short-term anticoagulation.  No recurrent A-fib noted.  He has had some nonsustained VT, hopefully this will settle down with increased dose of Toprol.    6.  Erectile dysfunction with Cialis being hopeful we will continue.    7.  Insomnia, I recently " prescribed trazodone, he realizes he needs to establish with a primary care provider and we will do that in the next several weeks.    Thank you for allowing me to participate in this delightful patient's care.  He will get an echocardiogram, labs and visit with Dr. Way in about 6 months.  He will call sooner with concerns.    Ivory Serrano PA-C    [facility]    This note was completed using Dragon voice recognition system.  There are inherent errors in the system and there may be misspelled names or nonsensical words.      Orders this Visit:  Orders Placed This Encounter   Procedures     Lipid panel reflex to direct LDL Fasting     Basic metabolic panel     Follow-Up with Cardiology     Echocardiogram Complete     Orders Placed This Encounter   Medications     furosemide (LASIX) 20 MG tablet     Sig: Take 1 tablet (20 mg) by mouth daily as needed (wt gain, swelling)     Dispense:  90 tablet     Refill:  3     metoprolol succinate ER (TOPROL XL) 200 MG 24 hr tablet     Sig: Take 1 tablet (200 mg) by mouth At Bedtime     Dispense:  90 tablet     Refill:  3     sacubitril-valsartan (ENTRESTO)  MG per tablet     Sig: Take 1 tablet by mouth 2 times daily     Dispense:  180 tablet     Refill:  3     Medications Discontinued During This Encounter   Medication Reason     furosemide (LASIX) 20 MG tablet      oxyCODONE (ROXICODONE) 5 MG tablet      zolpidem ER (AMBIEN CR) 6.25 MG CR tablet      metoprolol succinate ER (TOPROL XL) 100 MG 24 hr tablet      sacubitril-valsartan (ENTRESTO)  MG per tablet Reorder (No AVS / No eCancel)         Encounter Diagnoses   Name Primary?     HFrEF (heart failure with reduced ejection fraction) (H)      Hyperlipidemia LDL goal <100 Yes     SVT (supraventricular tachycardia) (H)      Paroxysmal atrial fibrillation (H)        CURRENT MEDICATIONS:  Current Outpatient Medications   Medication Sig Dispense Refill     dapagliflozin (FARXIGA) 5 MG TABS tablet Take 1  tablet (5 mg) by mouth daily 90 tablet 3     furosemide (LASIX) 20 MG tablet Take 1 tablet (20 mg) by mouth daily as needed (wt gain, swelling) 90 tablet 3     metoprolol succinate ER (TOPROL XL) 200 MG 24 hr tablet Take 1 tablet (200 mg) by mouth At Bedtime 90 tablet 3     Multiple Vitamins-Minerals (MENS MULTIVITAMIN) TABS Take 1 tablet by mouth daily       sacubitril-valsartan (ENTRESTO)  MG per tablet Take 1 tablet by mouth 2 times daily 180 tablet 3     spironolactone (ALDACTONE) 25 MG tablet Take 1 tablet (25 mg) by mouth daily 90 tablet 3     tadalafil (CIALIS) 5 MG tablet Take 1 tablet (5 mg) by mouth every 24 hours If not effective, can take an additional 5 mg 1 hour prior to sexual activity. 30 tablet 11     traZODone (DESYREL) 50 MG tablet Take 1-2 tablets ( mg) by mouth At Bedtime 45 tablet 0     order for DME Equipment being ordered: right lower extremity prosthetic     [employer]  [phone] 1 each 0       ALLERGIES     Allergies   Allergen Reactions     No Known Allergies        PAST MEDICAL HISTORY:  Past Medical History:   Diagnosis Date     [place]ident [place] farm 08/01/2014     Alcohol abuse      Nonischemic cardiomyopathy (H)      Smoking        PAST SURGICAL HISTORY:  Past Surgical History:   Procedure Laterality Date     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     Broken Right arm  1992     CV HEART CATHETERIZATION WITH POSSIBLE INTERVENTION N/A 12/9/2021    Procedure: Heart Catheterization with Possible Intervention;  Surgeon: Leonard Granados MD;  Location:  HEART CARDIAC CATH LAB     EP ABLATION FOCAL AFIB N/A 9/22/2022    Procedure: Ablation Atrial Fibrilation;  Surgeon: Oli Way MD;  Location:  HEART CARDIAC CATH LAB     EP ICD INSERT SINGLE N/A 4/7/2022    Procedure: Implantable Cardioverter Defibrillator Device & Lead Implant - Single or Dual [[id number]];  Surgeon: Sasha Ellsworth MD;  Location:  HEART CARDIAC CATH LAB     LEG SURGERY Right 8/2014       FAMILY  HISTORY:  No family history on file.    SOCIAL HISTORY:  Social History     Socioeconomic History     Marital status:      Spouse name: None     Number of children: None     Years of education: None     Highest education level: None   Occupational History     Employer: UNKNOWN   Tobacco Use     Smoking status: Former     Passive exposure: Never     Smokeless tobacco: Current     Types: Chew   Vaping Use     Vaping status: Never Used   Substance and Sexual Activity     Alcohol use: Yes     Alcohol/week: 0.0 standard drinks of alcohol     Comment: occ.     Drug use: No     Sexual activity: Yes     Partners: Female       Review of Systems:  Skin:  not assessed     Eyes:  not assessed    ENT:  Negative    Respiratory:  Negative shortness of breath;dyspnea on exertion  Cardiovascular:  Negative;palpitations;chest pain;edema    Gastroenterology: Negative    Genitourinary:  Negative    Musculoskeletal:  Negative    Neurologic:  not assessed    Psychiatric:  Positive for sleep disturbances  Heme/Lymph/Imm:  Negative    Endocrine:  Negative        Recent Lab Results: all reviewed today  CBC RESULTS:  Lab Results   Component Value Date    WBC 7.7 12/27/2022    WBC 7.1 08/05/2020    RBC 4.50 12/27/2022    RBC 4.79 08/05/2020    HGB 14.3 12/27/2022    HGB 14.8 08/05/2020    HCT 43.0 12/27/2022    HCT 45.1 08/05/2020    MCV 96 12/27/2022    MCV 94 08/05/2020    MCH 31.8 12/27/2022    MCH 30.9 08/05/2020    MCHC 33.3 12/27/2022    MCHC 32.8 08/05/2020    RDW 13.2 12/27/2022    RDW 13.5 08/05/2020     12/27/2022     08/05/2020       BMP RESULTS:  Lab Results   Component Value Date     06/16/2023     08/05/2020    POTASSIUM 4.8 06/16/2023    POTASSIUM 4.1 12/06/2022    POTASSIUM 3.8 08/05/2020    CHLORIDE 101 06/16/2023    CHLORIDE 103 12/06/2022    CHLORIDE 105 08/05/2020    CO2 26 06/16/2023    CO2 28 12/06/2022    CO2 30 08/05/2020    ANIONGAP 10 06/16/2023    ANIONGAP 3 12/06/2022    ANIONGAP 3  08/05/2020     (H) 06/16/2023     (H) 12/06/2022    GLC 77 08/06/2020    BUN 12.8 06/16/2023    BUN 12 12/06/2022    BUN 14 08/05/2020    CR 1.10 06/16/2023    CR 1.12 08/05/2020    GFRESTIMATED 86 06/16/2023    GFRESTIMATED 82 08/05/2020    GFRESTBLACK >90 08/05/2020    SHAYNA 9.4 06/16/2023    SHAYNA 9.3 08/05/2020        LIPID RESULTS:  Lab Results   Component Value Date    CHOL 334 02/07/2023    CHOL 208 03/03/2016     Lab Results   Component Value Date     02/07/2023    HDL 99 03/03/2016     Lab Results   Component Value Date     02/07/2023    LDL 98 03/03/2016     Lab Results   Component Value Date    TRIG 79 02/07/2023    TRIG 53 03/03/2016     No results found for: CHOLHDLRATIO     INR RESULTS:  Lab Results   Component Value Date    INR 0.92 12/06/2021           IVONNE Serrano, PA-C  [address]  [place]  [place] [address]

## 2023-06-16 NOTE — LETTER
2023    Jason García MD  [address] [facility] [address]    RE: Dipak Swartz       Dear Colleague,     I had the pleasure of seeing Dipak Swartz in the [facility].  CARDIOLOGY CLINIC VISIT  Service Date: 3/22/2023     PRIMARY CARDIOLOGIST:  Dr. Irizarry and Dr. Way for EP    REASON FOR VISIT:  C.O.R.E. Clinic followup.    HISTORY OF PRESENT ILLNESS:  Mr. Swartz is a delightful 42-year-old gentleman with past medical history significant for the followin.  Tobacco and alcohol abuse.  2.  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  3.  Nonischemic cardiomyopathy diagnosed when he presented with shortness of breath in 2021 with EF of 15%-20% at that time, with it remaining low in spite of medical therapy with last ejection fraction 38% and stable on echocardiogram in   4.  ICD in place.  5.  Dyslipidemia.  6.  Atrial fibrillation and atrial flutter noted on device.  Now status post PVI and flutter ablation on 2022.  7.  Mitral regurg improved to trace to mild with improving EF.  8.  Nonsustained VT on device check.  9.  Normal coronary arteries on angiogram dated .      I last saw Jorge L in March and at that point he had noticed his face was puffy his belly was puffy and his stump is puffy make it difficult to get his prosthetic leg on.  We started him back on Lasix on a as needed basis and he was also found to have some nonsustained VT so we increase Toprol to 150 mg daily.  I also asked him to decrease his alcohol intake.  Today, he notes he feels better.  He denies chest pain, shortness of breath, orthopnea, or PND.  His stump has been fitting better and he has been taking Lasix about 5 days a week.  He has not noticed any palpitations or lightheaded episodes.  He is cut back his drinking a little bit but not much she is definitely eating healthier.    SOCIAL HISTORY:  He is .  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  Previously was drinking 4-5 beers a day plus a liter of vodka, now drinking more on the weekends.  Continues to smoke.  No street drugs.  He notes that he was on a supplement called [employer] for about 2-1/2 years as a replacement of opioids.  He is wondering if this could have impacted his heart.    PHYSICAL EXAMINATION:    /80 (BP Location: Right arm, Cuff Size: Adult Regular)   Pulse 54   Ht 1.702 m (5' 7\")   Wt 76.8 kg (169 lb 4.8 oz)   SpO2 100%   BMI 26.52 kg/m    Well-developed well-nourished gentleman in no acute distress.  Fit appearing.  Normocephalic and atraumatic.  Heart is regular in rate and rhythm, I do not appreciate murmur, rub, or gallop.  Lungs are clear without wheezes rales or rhonchi.  Extremities without peripheral edema.  Neck veins are flat at 30 degrees.    Studies reviewed include angiogram dated 12/21 shows normal coronary arteries.    ASSESSMENT AND PLAN:    1.  Nonischemic cardiomyopathy with biventricular heart failure with EF of 15%-20% on diagnosis, improved to 38% with medical management now with ICD in place, class II stage C.  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  I again asked him today to decrease and limiting alcohol if possible.  Is difficult with his work situation.  Today, he appears euvolemic.  We will continue to optimize his medications by increasing Toprol to 200 mg daily.  He will remain on full dose Entresto, Farxiga and spironolactone.  Labs reviewed today and renal function remains normal.  We will plan on repeating an echocardiogram in 6 months.      2.  Hypertension, well controlled.      3.  Hyperlipidemia but no known coronary disease.  Repeat lipid panel pending today.    4.  History of heavy ETOH and ongoing tobacco.      5.  ICD in place with history of AFib and flutter, now status post PVI and flutter ablation.  He was on short-term anticoagulation.  No recurrent A-fib noted. "  He has had some nonsustained VT, hopefully this will settle down with increased dose of Toprol.    6.  Erectile dysfunction with Cialis being hopeful we will continue.    7.  Insomnia, I recently prescribed trazodone, he realizes he needs to establish with a primary care provider and we will do that in the next several weeks.    Thank you for allowing me to participate in this delightful patient's care.  He will get an echocardiogram, labs and visit with Dr. Way in about 6 months.  He will call sooner with concerns.    Ivory Serrano PA-C    [facility]    This note was completed using Dragon voice recognition system.  There are inherent errors in the system and there may be misspelled names or nonsensical words.      Orders this Visit:  Orders Placed This Encounter   Procedures    Lipid panel reflex to direct LDL Fasting    Basic metabolic panel    Follow-Up with Cardiology    Echocardiogram Complete     Orders Placed This Encounter   Medications    furosemide (LASIX) 20 MG tablet     Sig: Take 1 tablet (20 mg) by mouth daily as needed (wt gain, swelling)     Dispense:  90 tablet     Refill:  3    metoprolol succinate ER (TOPROL XL) 200 MG 24 hr tablet     Sig: Take 1 tablet (200 mg) by mouth At Bedtime     Dispense:  90 tablet     Refill:  3    sacubitril-valsartan (ENTRESTO)  MG per tablet     Sig: Take 1 tablet by mouth 2 times daily     Dispense:  180 tablet     Refill:  3     Medications Discontinued During This Encounter   Medication Reason    furosemide (LASIX) 20 MG tablet     oxyCODONE (ROXICODONE) 5 MG tablet     zolpidem ER (AMBIEN CR) 6.25 MG CR tablet     metoprolol succinate ER (TOPROL XL) 100 MG 24 hr tablet     sacubitril-valsartan (ENTRESTO)  MG per tablet Reorder (No AVS / No eCancel)         Encounter Diagnoses   Name Primary?    HFrEF (heart failure with reduced ejection fraction) (H)     Hyperlipidemia LDL goal <100 Yes    SVT (supraventricular tachycardia) (H)      Paroxysmal atrial fibrillation (H)        CURRENT MEDICATIONS:  Current Outpatient Medications   Medication Sig Dispense Refill    dapagliflozin (FARXIGA) 5 MG TABS tablet Take 1 tablet (5 mg) by mouth daily 90 tablet 3    furosemide (LASIX) 20 MG tablet Take 1 tablet (20 mg) by mouth daily as needed (wt gain, swelling) 90 tablet 3    metoprolol succinate ER (TOPROL XL) 200 MG 24 hr tablet Take 1 tablet (200 mg) by mouth At Bedtime 90 tablet 3    Multiple Vitamins-Minerals (MENS MULTIVITAMIN) TABS Take 1 tablet by mouth daily      sacubitril-valsartan (ENTRESTO)  MG per tablet Take 1 tablet by mouth 2 times daily 180 tablet 3    spironolactone (ALDACTONE) 25 MG tablet Take 1 tablet (25 mg) by mouth daily 90 tablet 3    tadalafil (CIALIS) 5 MG tablet Take 1 tablet (5 mg) by mouth every 24 hours If not effective, can take an additional 5 mg 1 hour prior to sexual activity. 30 tablet 11    traZODone (DESYREL) 50 MG tablet Take 1-2 tablets ( mg) by mouth At Bedtime 45 tablet 0    order for DME Equipment being ordered: right lower extremity prosthetic     [employer]  [phone] 1 each 0       ALLERGIES     Allergies   Allergen Reactions    No Known Allergies        PAST MEDICAL HISTORY:  Past Medical History:   Diagnosis Date    [place] 08/01/2014    Alcohol abuse     Nonischemic cardiomyopathy (H)     Smoking        PAST SURGICAL HISTORY:  Past Surgical History:   Procedure Laterality Date    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    Broken Right arm  1992    CV HEART CATHETERIZATION WITH POSSIBLE INTERVENTION N/A 12/9/2021    Procedure: Heart Catheterization with Possible Intervention;  Surgeon: Leonard Granados MD;  Location:  HEART CARDIAC CATH LAB    EP ABLATION FOCAL AFIB N/A 9/22/2022    Procedure: Ablation Atrial Fibrilation;  Surgeon: Oli Way MD;  Location:  HEART CARDIAC CATH LAB    EP ICD INSERT SINGLE N/A 4/7/2022    Procedure: Implantable Cardioverter  Defibrillator Device & Lead Implant - Single or Dual [[id number]];  Surgeon: Sasha Ellsworth MD;  Location:  HEART CARDIAC CATH LAB    LEG SURGERY Right 8/2014       FAMILY HISTORY:  No family history on file.    SOCIAL HISTORY:  Social History     Socioeconomic History    Marital status:      Spouse name: None    Number of children: None    Years of education: None    Highest education level: None   Occupational History     Employer: UNKNOWN   Tobacco Use    Smoking status: Former     Passive exposure: Never    Smokeless tobacco: Current     Types: Chew   Vaping Use    Vaping status: Never Used   Substance and Sexual Activity    Alcohol use: Yes     Alcohol/week: 0.0 standard drinks of alcohol     Comment: occ.    Drug use: No    Sexual activity: Yes     Partners: Female       Review of Systems:  Skin:  not assessed     Eyes:  not assessed    ENT:  Negative    Respiratory:  Negative shortness of breath;dyspnea on exertion  Cardiovascular:  Negative;palpitations;chest pain;edema    Gastroenterology: Negative    Genitourinary:  Negative    Musculoskeletal:  Negative    Neurologic:  not assessed    Psychiatric:  Positive for sleep disturbances  Heme/Lymph/Imm:  Negative    Endocrine:  Negative        Recent Lab Results: all reviewed today  CBC RESULTS:  Lab Results   Component Value Date    WBC 7.7 12/27/2022    WBC 7.1 08/05/2020    RBC 4.50 12/27/2022    RBC 4.79 08/05/2020    HGB 14.3 12/27/2022    HGB 14.8 08/05/2020    HCT 43.0 12/27/2022    HCT 45.1 08/05/2020    MCV 96 12/27/2022    MCV 94 08/05/2020    MCH 31.8 12/27/2022    MCH 30.9 08/05/2020    MCHC 33.3 12/27/2022    MCHC 32.8 08/05/2020    RDW 13.2 12/27/2022    RDW 13.5 08/05/2020     12/27/2022     08/05/2020       BMP RESULTS:  Lab Results   Component Value Date     06/16/2023     08/05/2020    POTASSIUM 4.8 06/16/2023    POTASSIUM 4.1 12/06/2022    POTASSIUM 3.8 08/05/2020    CHLORIDE 101 06/16/2023    CHLORIDE 103  12/06/2022    CHLORIDE 105 08/05/2020    CO2 26 06/16/2023    CO2 28 12/06/2022    CO2 30 08/05/2020    ANIONGAP 10 06/16/2023    ANIONGAP 3 12/06/2022    ANIONGAP 3 08/05/2020     (H) 06/16/2023     (H) 12/06/2022    GLC 77 08/06/2020    BUN 12.8 06/16/2023    BUN 12 12/06/2022    BUN 14 08/05/2020    CR 1.10 06/16/2023    CR 1.12 08/05/2020    GFRESTIMATED 86 06/16/2023    GFRESTIMATED 82 08/05/2020    GFRESTBLACK >90 08/05/2020    SHAYNA 9.4 06/16/2023    SHAYNA 9.3 08/05/2020        LIPID RESULTS:  Lab Results   Component Value Date    CHOL 334 02/07/2023    CHOL 208 03/03/2016     Lab Results   Component Value Date     02/07/2023    HDL 99 03/03/2016     Lab Results   Component Value Date     02/07/2023    LDL 98 03/03/2016     Lab Results   Component Value Date    TRIG 79 02/07/2023    TRIG 53 03/03/2016     No results found for: CHOLHDLRATIO     INR RESULTS:  Lab Results   Component Value Date    INR 0.92 12/06/2021           CC  Ivory Serrano PA-C  [address]  [place] [address]              Thank you for allowing me to participate in the care of your patient.      Sincerely,     Ivory Serrano PA-C     [facility]

## 2023-06-19 NOTE — RESULT ENCOUNTER NOTE
"Per Ivory INMAN, \"Cholesterol is better but still high.  I know he's trying to better with his diet and exercise, but it's still above goal of LDL <100.  If he's willing would start lipitor 20 and repeat lipid panel and alt in 6 wks.\"    Advised Jorge L of recs via [employer]. Will reattempt contact later this week if he's not read/responded."

## 2023-06-28 ENCOUNTER — TELEPHONE (OUTPATIENT)
Dept: FAMILY MEDICINE | Facility: CLINIC | Age: 43
End: 2023-06-28
Payer: COMMERCIAL

## 2023-06-28 DIAGNOSIS — G47.01 INSOMNIA DUE TO MEDICAL CONDITION: ICD-10-CM

## 2023-06-28 NOTE — TELEPHONE ENCOUNTER
Medication Question or Refill    Contacts       Type Contact Phone/Fax    06/28/2023 12:38 PM CDT Phone (Incoming) Jorge L Swartz (Self) [phone] (M)          What medication are you calling about (include dose and sig)?: Trazodone    Preferred Pharmacy:  [facility] [address] [place], [place] - [id number] [facility]  [id number] Counts include 234 beds at the [facility] [address]  Phone: [phone] Fax: [phone]      Controlled Substance Agreement on file:   CSA -- Patient Level:    CSA: None found at the patient level.       Who prescribed the medication?:  Kong Flannery    Do you need a refill? Yes    When did you use the medication last? Last night, 6/27/2023    Patient offered an appointment? Yes, patient has appt scheduled for July 21 with Lauren Bolton and would like refill before his appointment.      Do you have any questions or concerns?  No      Could we [facility] this information to you [facility] or would you prefer to receive a phone call?:   Patient would prefer a phone call   Okay to leave a detailed message?: Yes at Cell number on file:    Telephone Information:   Mobile [phone]

## 2023-06-29 RX ORDER — TRAZODONE HYDROCHLORIDE 50 MG/1
50-100 TABLET, FILM COATED ORAL AT BEDTIME
Qty: 45 TABLET | Refills: 0 | OUTPATIENT
Start: 2023-06-29

## 2023-06-29 NOTE — TELEPHONE ENCOUNTER
Left message to call back for: pt  Information to relay to patient: please see below message from Dr. Bolton

## 2023-06-29 NOTE — TELEPHONE ENCOUNTER
Please inform pt that he has 45 tablets of trazodone refilled at 6/15/2023. It is too early to refill again now.     Lauren Bolton MD on 6/29/2023 at 11:09 AM;

## 2023-06-29 NOTE — TELEPHONE ENCOUNTER
Patient returned call. Message relayed. There was a miscommunication in earlier message. He does not need refill at this time but will before next available appt for medication check. 7/21/2023. Patient advised to request refill as normal when he is due. No additional questions.

## 2023-07-13 ENCOUNTER — ANCILLARY PROCEDURE (OUTPATIENT)
Dept: CARDIOLOGY | Facility: CLINIC | Age: 43
End: 2023-07-13
Attending: INTERNAL MEDICINE
Payer: COMMERCIAL

## 2023-07-13 DIAGNOSIS — Z95.810 ICD (IMPLANTABLE CARDIOVERTER-DEFIBRILLATOR), SINGLE, IN SITU: ICD-10-CM

## 2023-07-13 DIAGNOSIS — I42.8 NON-ISCHEMIC CARDIOMYOPATHY (H): ICD-10-CM

## 2023-07-13 PROCEDURE — 93295 DEV INTERROG REMOTE 1/2/MLT: CPT | Performed by: INTERNAL MEDICINE

## 2023-07-13 PROCEDURE — 93296 REM INTERROG EVL PM/IDS: CPT | Performed by: INTERNAL MEDICINE

## 2023-07-19 ENCOUNTER — THERAPY VISIT (OUTPATIENT)
Dept: SLEEP MEDICINE | Facility: CLINIC | Age: 43
End: 2023-07-19
Attending: PHYSICIAN ASSISTANT
Payer: COMMERCIAL

## 2023-07-19 DIAGNOSIS — I42.9 CARDIOMYOPATHY, UNSPECIFIED TYPE (H): ICD-10-CM

## 2023-07-19 DIAGNOSIS — F51.04 PSYCHOPHYSIOLOGIC INSOMNIA: ICD-10-CM

## 2023-07-19 DIAGNOSIS — R29.818 SUSPECTED SLEEP APNEA: ICD-10-CM

## 2023-07-19 PROCEDURE — 95810 POLYSOM 6/> YRS 4/> PARAM: CPT | Performed by: INTERNAL MEDICINE

## 2023-07-20 NOTE — PROCEDURES
" SLEEP STUDY INTERPRETATION  DIAGNOSTIC POLYSOMNOGRAPHY REPORT      Patient: SHASHANK TREADWELL  YOB: 1980  Study Date: 7/19/2023  MRN: [id number]  Referring Provider: -  Ordering Provider: Wang Herrera    Indications for Polysomnography: The patient is a 42 year old Male who is 5' 7\" and weighs 155.0 lbs. His BMI is 24.3, [place] sleepiness scale 12 and neck circumference is 39CM cm. A diagnostic polysomnogram was performed to evaluate for sleep apnea.    Polysomnogram Data: A full night polysomnogram recorded the standard physiologic parameters including EEG, EOG, EMG, ECG, nasal and oral airflow. Respiratory parameters of chest and abdominal movements were recorded with respiratory inductance plethysmography. Oxygen saturation was recorded by pulse oximetry. Hypopnea scoring rule used: 1B 4%.    Sleep Architecture: Fragmented sleep due to PLM related arousals and spontaneous arousals.   The total recording time of the polysomnogram was 509.1 minutes. The total sleep time was 444.5 minutes. Sleep latency was increased at 24.9 minutes. REM latency was 102.5 minutes. Arousal index was increased at 48.7 arousals per hour. Sleep efficiency was normal at 87.3%. Wake after sleep onset was 39.5 minutes. The patient spent 7.5% of total sleep time in Stage N1, 51.9% in Stage N2, 16.4% in Stage N3, and 24.2% in REM. Time in REM supine was 107.5 minutes.    Respiration: Mild obstructive sleep apnea.     Events ? The polysomnogram revealed a presence of 11 obstructive, 1 central, and - mixed apneas resulting in an apnea index of 1.6 events per hour. There were 39 obstructive hypopneas and - central hypopneas resulting in an obstructive hypopnea index of 5.3 and central hypopnea index of - events per hour. The combined apnea/hypopnea index was 6.9 events per hour (central apnea/hypopnea index was 0.1 events per hour). The REM AHI was 14.5 events per hour. The supine AHI was 8.1 events per hour. The " RERA index was 6.2 events per hour.  The RDI was 13.1 events per hour.    Snoring - was reported as mild.    Respiratory rate and pattern - was notable for normal respiratory rate and pattern.    Sustained Sleep Associated Hypoventilation - Transcutaneous carbon dioxide monitoring was not used; however significant hypoventilation was not suggested by oximetry.    Sleep Associated Hypoxemia - (Greater than 5 minutes O2 sat at or below 88%) was not present. Baseline oxygen saturation was 95.3%. Lowest oxygen saturation was 81.0%. Time spent less than or equal to 88% was 1.6 minutes. Time spent less than or equal to 89% was 9.6 minutes.    Movement Activity: Excessive periodic limb movements of sleep and associated arousals.     Periodic Limb Activity - There were 411 PLMs during the entire study. The PLM index was 55.5 movements per hour. The PLM Arousal Index was 20.2 per hour.    REM EMG Activity - Excessive transient/sustained muscle activity was not present.    Nocturnal Behavior - Abnormal sleep related behaviors were not noted during/arising out of NREM / REM sleep.     Bruxism - None apparent.    Cardiac Summary: Sinus rhythm.   The average pulse rate was 66.1 bpm. The minimum pulse rate was 44.0 bpm while the maximum pulse rate was 95.0 bpm.      Assessment:     This sleep study shows mild degree of obstructive sleep apnea with associated oxygen desaturations and sleep fragmentation. Of note, majority of patient s sleep was in the supine position and sleep apnea events were primarily supine position related.      There was excessive periodic limb movements of sleep and associated arousals.     Recommendations:    If treatment of mild obstructive sleep apnea is clinically indicated, therapy options can include the following.    Patient may be a candidate for dental appliance through referral to Sleep Dentistry for the treatment of obstructive sleep apnea.    If there is excessive daytime sleepiness or other  qualifying medical comorbidity, treatment could be empirically initiated with Auto?titrating PAP therapy with a range of 5 to 15 cmH2O. Recommend clinical follow up with sleep management team.    If treatment of periodic limb movements of sleep is clinically indicated, pharmacologic therapy options include Gabapentinoid or dopaminergic agent.    Diagnostic Codes:   Obstructive Sleep Apnea G47.33  Repetitive Intrusions Into Sleep F51.8    7/19/2023 [facility] Diagnostic Sleep Study (155.0 lbs) - AHI 6.9, RDI 13.1, Supine AHI 8.1, REM AHI 14.5, Low O2 81.0%, Time Spent ?88% 1.6 minutes / Time Spent ?89% 9.6 minutes.     _____________________________________   Electronically Signed By: Garcia Hill MD 07/20/2023

## 2023-07-21 ENCOUNTER — OFFICE VISIT (OUTPATIENT)
Dept: FAMILY MEDICINE | Facility: CLINIC | Age: 43
End: 2023-07-21
Payer: COMMERCIAL

## 2023-07-21 VITALS
DIASTOLIC BLOOD PRESSURE: 80 MMHG | HEIGHT: 67 IN | TEMPERATURE: 98.5 F | SYSTOLIC BLOOD PRESSURE: 112 MMHG | BODY MASS INDEX: 26.53 KG/M2 | WEIGHT: 169 LBS | OXYGEN SATURATION: 98 % | HEART RATE: 46 BPM | RESPIRATION RATE: 20 BRPM

## 2023-07-21 DIAGNOSIS — G47.01 INSOMNIA DUE TO MEDICAL CONDITION: Primary | ICD-10-CM

## 2023-07-21 DIAGNOSIS — Z89.511 STATUS POST BELOW-KNEE AMPUTATION OF RIGHT LOWER EXTREMITY (H): ICD-10-CM

## 2023-07-21 LAB — SLPCOMP: NORMAL

## 2023-07-21 PROCEDURE — 99213 OFFICE O/P EST LOW 20 MIN: CPT | Performed by: FAMILY MEDICINE

## 2023-07-21 RX ORDER — TRAZODONE HYDROCHLORIDE 50 MG/1
50-100 TABLET, FILM COATED ORAL AT BEDTIME
Qty: 135 TABLET | Refills: 3 | Status: SHIPPED | OUTPATIENT
Start: 2023-07-21 | End: 2023-10-02

## 2023-07-21 ASSESSMENT — PATIENT HEALTH QUESTIONNAIRE - PHQ9
10. IF YOU CHECKED OFF ANY PROBLEMS, HOW DIFFICULT HAVE THESE PROBLEMS MADE IT FOR YOU TO DO YOUR WORK, TAKE CARE OF THINGS AT HOME, OR GET ALONG WITH OTHER PEOPLE: SOMEWHAT DIFFICULT
SUM OF ALL RESPONSES TO PHQ QUESTIONS 1-9: 6
SUM OF ALL RESPONSES TO PHQ QUESTIONS 1-9: 6

## 2023-07-21 ASSESSMENT — ANXIETY QUESTIONNAIRES
IF YOU CHECKED OFF ANY PROBLEMS ON THIS QUESTIONNAIRE, HOW DIFFICULT HAVE THESE PROBLEMS MADE IT FOR YOU TO DO YOUR WORK, TAKE CARE OF THINGS AT HOME, OR GET ALONG WITH OTHER PEOPLE: SOMEWHAT DIFFICULT
1. FEELING NERVOUS, ANXIOUS, OR ON EDGE: SEVERAL DAYS
5. BEING SO RESTLESS THAT IT IS HARD TO SIT STILL: MORE THAN HALF THE DAYS
6. BECOMING EASILY ANNOYED OR IRRITABLE: MORE THAN HALF THE DAYS
GAD7 TOTAL SCORE: 11
4. TROUBLE RELAXING: MORE THAN HALF THE DAYS
3. WORRYING TOO MUCH ABOUT DIFFERENT THINGS: SEVERAL DAYS
7. FEELING AFRAID AS IF SOMETHING AWFUL MIGHT HAPPEN: SEVERAL DAYS
2. NOT BEING ABLE TO STOP OR CONTROL WORRYING: MORE THAN HALF THE DAYS
GAD7 TOTAL SCORE: 11

## 2023-07-21 NOTE — PROGRESS NOTES
"  Assessment & Plan     (G47.01) Insomnia due to medical condition  (primary encounter diagnosis)  Comment: pt has insomnia for years. He take trazodone 50 mg at bet time and half tablet when he wakes up at middle night, it helped. No side effect. He wants continue take it. He had sleep study two days ago.   Plan: traZODone (DESYREL) 50 MG tablet        We addressed sleep hygiene. Will continue current plan.     (Z89.511) Status post below-knee amputation of right lower extremity (H)  Comment: pt had right leg amputation below the knee. He needs the new Prosthetic  Plan: [facility] Prosthetic Below         the Knee (BK); Prosthesis; Right          56}     BMI:   Estimated body mass index is 26.47 kg/m  as calculated from the following:    Height as of this encounter: 1.702 m (5' 7\").    Weight as of this encounter: 76.7 kg (169 lb).       See Patient Instructions    Lauren Bolton MD  [facility]    Alicia Coats is a 42 year old, presenting for the following health issues:  Medication Update        12/27/2022     9:17 AM   Additional Questions   Roomed by Shira Diaz   Accompanied by none     History of Present Illness       Reason for visit:  Prosthetic trazadone    He eats 0-1 servings of fruits and vegetables daily.He consumes 1 sweetened beverage(s) daily.He exercises with enough effort to increase his heart rate 9 or less minutes per day.  He exercises with enough effort to increase his heart rate 3 or less days per week.   He is taking medications regularly.    Today's PHQ-9         PHQ-9 Total Score: 6    PHQ-9 Q9 Thoughts of better off dead/self-harm past 2 weeks :   Not at all    How difficult have these problems made it for you to do your work, take care of things at home, or get along with other people: Somewhat difficult  Today's JESSICA-7 Score: 11          Review of Systems   Constitutional, HEENT, cardiovascular, pulmonary, gi and gu systems are negative, " "except as otherwise noted.      Objective    /80   Pulse (!) 46   Temp 98.5  F (36.9  C) (Oral)   Resp 20   Ht 1.702 m (5' 7\")   Wt 76.7 kg (169 lb)   SpO2 98%   BMI 26.47 kg/m    Body mass index is 26.47 kg/m .  Physical Exam   GENERAL: healthy, alert and no distress  Mental status exam; he is alert, orient to time, person and place. Normal thought content, speech, affect, mood and dress are noted.                "

## 2023-07-24 ENCOUNTER — TELEPHONE (OUTPATIENT)
Dept: FAMILY MEDICINE | Facility: CLINIC | Age: 43
End: 2023-07-24
Payer: COMMERCIAL

## 2023-07-24 ENCOUNTER — MEDICAL CORRESPONDENCE (OUTPATIENT)
Dept: HEALTH INFORMATION MANAGEMENT | Facility: CLINIC | Age: 43
End: 2023-07-24
Payer: COMMERCIAL

## 2023-07-24 ENCOUNTER — TRANSFERRED RECORDS (OUTPATIENT)
Dept: HEALTH INFORMATION MANAGEMENT | Facility: CLINIC | Age: 43
End: 2023-07-24
Payer: COMMERCIAL

## 2023-07-24 NOTE — TELEPHONE ENCOUNTER
Forms Request    Name of form/paperwork: [facility]    Have you been seen for this request: appt with Che on 7/21/23    Do we have the form: placed in provider mailbox, Che    When is form needed by: when complete    How would you like the form returned: fax [phone]    Patient Notified form requests are processed in 3-5 business days: na    Okay to leave a detailed message? na

## 2023-08-03 ENCOUNTER — VIRTUAL VISIT (OUTPATIENT)
Dept: SLEEP MEDICINE | Facility: CLINIC | Age: 43
End: 2023-08-03
Attending: PHYSICIAN ASSISTANT
Payer: COMMERCIAL

## 2023-08-03 VITALS — BODY MASS INDEX: 24.33 KG/M2 | HEIGHT: 67 IN | WEIGHT: 155 LBS

## 2023-08-03 DIAGNOSIS — G47.61 PLMD (PERIODIC LIMB MOVEMENT DISORDER): ICD-10-CM

## 2023-08-03 DIAGNOSIS — G47.33 OBSTRUCTIVE SLEEP APNEA: ICD-10-CM

## 2023-08-03 DIAGNOSIS — F51.04 CHRONIC INSOMNIA: Primary | ICD-10-CM

## 2023-08-03 PROCEDURE — 90791 PSYCH DIAGNOSTIC EVALUATION: CPT | Mod: 95 | Performed by: PSYCHOLOGIST

## 2023-08-03 ASSESSMENT — PAIN SCALES - GENERAL: PAINLEVEL: NO PAIN (0)

## 2023-08-03 ASSESSMENT — SLEEP AND FATIGUE QUESTIONNAIRES
HOW LIKELY ARE YOU TO NOD OFF OR FALL ASLEEP WHEN YOU ARE A PASSENGER IN A CAR FOR AN HOUR WITHOUT A BREAK: WOULD NEVER DOZE
HOW LIKELY ARE YOU TO NOD OFF OR FALL ASLEEP WHILE SITTING AND TALKING TO SOMEONE: WOULD NEVER DOZE
HOW LIKELY ARE YOU TO NOD OFF OR FALL ASLEEP WHILE SITTING AND READING: HIGH CHANCE OF DOZING
HOW LIKELY ARE YOU TO NOD OFF OR FALL ASLEEP IN A CAR, WHILE STOPPED FOR A FEW MINUTES IN TRAFFIC: MODERATE CHANCE OF DOZING
HOW LIKELY ARE YOU TO NOD OFF OR FALL ASLEEP WHILE LYING DOWN TO REST IN THE AFTERNOON WHEN CIRCUMSTANCES PERMIT: MODERATE CHANCE OF DOZING
HOW LIKELY ARE YOU TO NOD OFF OR FALL ASLEEP WHILE WATCHING TV: HIGH CHANCE OF DOZING
HOW LIKELY ARE YOU TO NOD OFF OR FALL ASLEEP WHILE SITTING INACTIVE IN A PUBLIC PLACE: HIGH CHANCE OF DOZING
HOW LIKELY ARE YOU TO NOD OFF OR FALL ASLEEP WHILE SITTING QUIETLY AFTER LUNCH WITHOUT ALCOHOL: HIGH CHANCE OF DOZING

## 2023-08-03 NOTE — PATIENT INSTRUCTIONS
Recommendations:    1.  Reduce your time in bed from 9.5 hours to a consistent sleep schedule of 7 hours going to bed at 11:30 PM and getting up every morning by 6:30 AM.  This is based on your current estimate of average sleep time.  This strategy is designed to increase overall sleep drive and reduce awakenings.    2.  Avoid any alcohol within 4 hours of your new bedtime.    3.  Discontinue watching TV in the bedroom, instead relax in the evening watching TV with your wife before going to bed.    4.  Consider talking with your primary care physician about possible treatment options for depression and anxiety.    5.  Take your trazodone at bedtime as prescribed.  Avoid middle of the night use of trazodone.  Has a sufficient the long half-life that taking in the middle the night may result in morning grogginess and tiredness that can affect daytime functioning and increased risk for accidents.    6.  I have requested that an appointment be set up with Wang Arenas to consider and review possible treatment of elevated periodic limb movements.

## 2023-08-03 NOTE — PROGRESS NOTES
Virtual Visit Details    Type of service:  Video Visit     Originating Location (pt. Location): Home    Distant Location (provider location):  Off-site  Platform used for Video Visit: FlowMetric    Visit Start Time: 11:02 AM  Visit End Time:11:40 AM     SLEEP MEDICINE CONSULTATION  Sleep Psychology  Aug 3, 2023    Name: Dipak Swartz MRN# [id number]   Age: 42 year old YOB: 1980     Date of Consultation: Aug 3, 2023  Consultation is requested by: Wang Herrera PA-C  [address] [place] [address]  Primary care provider: Jason García    Reason for Sleep Consultation     Dipak Swartz is a 42 year old male seen today for a behavioral sleep medicine consultation because of insomnia    Assessment and Plan     Sleep Diagnoses:            Chronic insomnia  Obstructive sleep apnea  PLMD (periodic limb movement disorder)      Co-occurring Conditions:         Cardiomyopathy       Major Depressive Disorder       Generaized Anxiety Disorder        HFrEF        History of Alcohol Abuse    Clinical Impressions:    Dipak Swartz was seen for a sleep psychology consultation and possible behavioral sleep intervention and treatment. History and clinical presentation suggest chronic multifactored insomnia associated with significant health burden associated with cardiomyopathy and heart failure, evidence of mild obstructive sleep apnea per recent sleep study, and markedly elevated periodic limb movements of sleep with arousal.  There are clear behavioral and sleep hygiene factors affecting sleep including increased alcohol use near bedtime, extended time in bed and inadequate sleep hygiene.  Patient is a suitable candidate for brief behavioral therapy for insomnia.  I strongly recommend he follow-up with sleep medicine for further medical review of his sleep study and consideration of specific treatment of periodic limb movements of sleep    Recommendations and Counselin.  Implement  brief behavioral therapy for insomnia involving reducing time in bed from 9.5hours to 7 hours with a sleep schedule of 11:30 PM-6:30 AM.  The goal here is to increase homeostatic sleep drive and reduce sleep fragmentation.    2.  Patient agreed to discontinue watching TV in the bed and instead establish a relaxing evening routine.    3.  Follow-up was advised to reduce alcohol use in the evening, and in particular avoid using alcohol within 4 hours of bedtime.  We discussed the negative effects of alcohol cardiomyopathy, depression, anxiety, sleep apnea and periodic limb movements of sleep.    4.  Recommend patient follow-up with his primary care physician to review and consider treatment options for anxiety and depression.  Patient states that he is not currently receiving treatment.    5.  He was advised to take trazodone as prescribed before getting into bed and avoid middle of the night doses.  Trazodone has a sufficiently long half life that may cause excessive drowsiness and motor impairment in the morning if taken in the middle of the night.    Dipak was provided information about the pathophysiology of insomnia, psychophysiological factors contributing to the onset and maintenance of insomnia and how co-occurring medical conditions and intrinsic sleep disorders can affect sleep.  Treatment options were discussed  as applicable to patient specific sleep concerns and symptoms. The benefits and potential early side effects of treatment including increased daytime sleepiness were discussed.     Patient was advised to consult with their prescribing provider around use of or changes to prescription sleep medication.  Patient was counseled on the importance of avoiding driving if drowsy.    Services provided are compliant with the requirements of [place] Statute SS 256B.0625 Subd. 3b and paragraph (b)     Follow-up: 4 weeks     History of Present Sleep Complaint     Dipak Swartz is a 42 year old year old  male with a relevant medical history of  MDD, JESSICA, Cardiomyopathy, ALEX who presents with Activities in bed:      .      Prescribed or OTC sleep medication: trazodone,  mg.  Helps with sleep initiation, not with sleep maintenance    Previous sleep medications patients has tried:  zolpidem, and with his leg amputation.      SLEEP-WAKE SCHEDULE:     Shift Work - Nights  Source of Sleep Estimates:  Verbal Self-report    Time to Bed:  9-10 pm, watch TV for an hour and then turn TV off  Activity in Bed (stimulus control): watch TV  Sleep Latency: 65 minute fromgetting bed and 5 minutes after shutting off TV  Times awakened:  5 times, look at the clock frequently  Total Time Awake After Sleep Onset: 180 minutes  Time Up for the Day: 6:30 AM, on weekends same  Total Time in Bed:  9.5 hours  Estimated Total Sleep Time:  5.5-6 hours  Sleep Efficiency Estimate:  less than 60%    Naps: None    BEHAVIORS AFFECTING SLEEP:    Habits:     Uses computers or electronics within an hour before bedtime, Has trouble winding down in the evening    Sleep Environment:     Bedroom is quiet, comfortable and dark    Substance Use:      Caffeine: None reported   Alcohol:  2-3 alcoholic beverages from 4-8 pm.  Usually stop an hour before bed.  Nicotine: None  Other substances:: None reported    Family History of Sleep Disorders:    None reported.    SCALES      EPWORTH SLEEPINESS SCALE    Likeliness of dozing or falling  asleep:    Sitting and reading: High chance of dozing    Watching TV: High chance of dozing    Sitting, inactive in a public place (e.g. a theatre or a meeting): High chance of dozing    As a passenger in a car for an hour without a break: Would never doze    Lying down to rest in the afternoon when circumstances permit: Moderate chance of dozing    Sitting and talking to someone: Would never doze    Sitting quietly after a lunch without alcohol: High chance of dozing    In a car, while stopped for a few minutes in  "traffic: Moderate chance of dozing    Total score - [place]: 16      INSOMNIA SEVERITY INDEX (RANDY)      The Insomnia Severity Index measures the severity of insomnia symptoms over the past two weeks.    1. Difficulty falling asleep: None    2. Difficulty staying asleep: Severe    3. Problems waking up too early: Severe    4. How SATISFIED/DISSATISFIED are youwith your CURRENT sleep pattern? Very Dissatisfied    5. How NOTICEABLE to others do you think your sleep problem is in terms of impairing the quality of your life? Somewhat      6. How WORRIED/DISTRESSED are you about your sleep problem? Somewhat    7. To what extent do you consider your sleep problem to INTERFERE with your daily functioning (e.g. daytime fatigue, mood, ability to [place] at work/daily chores, concentration, memory, mood, etc.) CURRENTLY?   Somewhat     RANDY Total Score: 16    Guidelines for Scoring/Interpretation:   0-7 = No clinically significant insomnia   8-14 = Subthreshold insomnia   15-21 = Clinical insomnia (moderate severity)  22-28 = Clinical insomnia (severe)      STOP BANG     1. Snoring: Do you snore loudly (louder than talking or loud enough to be heard through closed doors)?  No    2. Tired: Do you often feel tired, fatigued, or sleepy during daytime?  Yes    3. Observed: Has anyone observed you stop breathing during your sleep?  Yes    4. Blood pressure: Do you have or are you being treated for high blood pressure?  Yes    5. BMI: BMI more than 35 kg/m2?  No    6. Age: Age over 50 yr old?  No    7. Neck circumference: Neck circumference greater than 40 cm?  No    8. Gender: Gender male?  No    STOP-BANG Total Score: 3    ALEX Risk  0-2 Low risk for ALEX  3-4  Intermediate risk for ALEX  5-8 High risk      Previous Sleep Studies     Study Date: 7/19/2023  MRN: [id number]  Referring Provider: -  Ordering Provider: Wang Herrera     Indications for Polysomnography: The patient is a 42 year old Male who is 5' 7\" and weighs " 155.0 lbs. His BMI is 24.3, [place] sleepiness scale 12 and neck circumference is 39CM cm. A diagnostic polysomnogram was performed to evaluate for sleep apnea.     Polysomnogram Data: A full night polysomnogram recorded the standard physiologic parameters including EEG, EOG, EMG, ECG, nasal and oral airflow. Respiratory parameters of chest and abdominal movements were recorded with respiratory inductance plethysmography. Oxygen saturation was recorded by pulse oximetry. Hypopnea scoring rule used: 1B 4%.     Sleep Architecture: Fragmented sleep due to PLM related arousals and spontaneous arousals.   The total recording time of the polysomnogram was 509.1 minutes. The total sleep time was 444.5 minutes. Sleep latency was increased at 24.9 minutes. REM latency was 102.5 minutes. Arousal index was increased at 48.7 arousals per hour. Sleep efficiency was normal at 87.3%. Wake after sleep onset was 39.5 minutes. The patient spent 7.5% of total sleep time in Stage N1, 51.9% in Stage N2, 16.4% in Stage N3, and 24.2% in REM. Time in REM supine was 107.5 minutes.     Respiration: Mild obstructive sleep apnea.   Events ? The polysomnogram revealed a presence of 11 obstructive, 1 central, and - mixed apneas resulting in an apnea index of 1.6 events per hour. There were 39 obstructive hypopneas and - central hypopneas resulting in an obstructive hypopnea index of 5.3 and central hypopnea index of - events per hour. The combined apnea/hypopnea index was 6.9 events per hour (central apnea/hypopnea index was 0.1 events per hour). The REM AHI was 14.5 events per hour. The supine AHI was 8.1 events per hour. The RERA index was 6.2 events per hour.  The RDI was 13.1 events per hour.  Snoring - was reported as mild.  Respiratory rate and pattern - was notable for normal respiratory rate and pattern.  Sustained Sleep Associated Hypoventilation - Transcutaneous carbon dioxide monitoring was not used; however significant  hypoventilation was not suggested by oximetry.  Sleep Associated Hypoxemia - (Greater than 5 minutes O2 sat at or below 88%) was not present. Baseline oxygen saturation was 95.3%. Lowest oxygen saturation was 81.0%. Time spent less than or equal to 88% was 1.6 minutes. Time spent less than or equal to 89% was 9.6 minutes.     Movement Activity: Excessive periodic limb movements of sleep and associated arousals.   Periodic Limb Activity - There were 411 PLMs during the entire study. The PLM index was 55.5 movements per hour. The PLM Arousal Index was 20.2 per hour.  REM EMG Activity - Excessive transient/sustained muscle activity was not present.  Nocturnal Behavior - Abnormal sleep related behaviors were not noted during/arising out of NREM / REM sleep.   Bruxism - None apparent.     Cardiac Summary: Sinus rhythm.   The average pulse rate was 66.1 bpm. The minimum pulse rate was 44.0 bpm while the maximum pulse rate was 95.0 bpm.       Assessment:   This sleep study shows mild degree of obstructive sleep apnea with associated oxygen desaturations and sleep fragmentation. Of note, majority of patient s sleep was in the supine position and sleep apnea events were primarily supine position related.    There was excessive periodic limb movements of sleep and associated arousals.      Recommendations:  If treatment of mild obstructive sleep apnea is clinically indicated, therapy options can include the following.  Patient may be a candidate for dental appliance through referral to Sleep Dentistry for the treatment of obstructive sleep apnea.  If there is excessive daytime sleepiness or other qualifying medical comorbidity, treatment could be empirically initiated with Auto?titrating PAP therapy with a range of 5 to 15 cmH2O. Recommend clinical follow up with sleep management team.  If treatment of periodic limb movements of sleep is clinically indicated, pharmacologic therapy options include Gabapentinoid or dopaminergic  agent.     Diagnostic Codes:   Obstructive Sleep Apnea G47.33  Repetitive Intrusions Into Sleep F51.8     7/19/2023 [facility] Sleep Study (155.0 lbs) - AHI 6.9, RDI 13.1, Supine AHI 8.1, REM AHI 14.5, Low O2 81.0%, Time Spent ?88% 1.6 minutes / Time Spent ?89% 9.6 minutes.    Vitals     There were no vitals taken for this visit.     Medical History     Allergies:    Allergies   Allergen Reactions    No Known Allergies        Medications:    Current Outpatient Medications   Medication Sig Dispense Refill    dapagliflozin (FARXIGA) 5 MG TABS tablet Take 1 tablet (5 mg) by mouth daily 90 tablet 3    furosemide (LASIX) 20 MG tablet Take 1 tablet (20 mg) by mouth daily as needed (wt gain, swelling) 90 tablet 3    metoprolol succinate ER (TOPROL XL) 200 MG 24 hr tablet Take 1 tablet (200 mg) by mouth At Bedtime 90 tablet 3    Multiple Vitamins-Minerals (MENS MULTIVITAMIN) TABS Take 1 tablet by mouth daily      order for DME Equipment being ordered: right lower extremity prosthetic     [employer] [facility]  [phone] 1 each 0    sacubitril-valsartan (ENTRESTO)  MG per tablet Take 1 tablet by mouth 2 times daily 180 tablet 3    spironolactone (ALDACTONE) 25 MG tablet Take 1 tablet (25 mg) by mouth daily 90 tablet 3    tadalafil (CIALIS) 5 MG tablet Take 1 tablet (5 mg) by mouth every 24 hours If not effective, can take an additional 5 mg 1 hour prior to sexual activity. 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) 08/05/2020     Priority: Medium    Lateral knee pain, right 06/21/2016     Priority: Medium    Injury of right knee, initial encounter 06/21/2016     Priority: Medium    Tobacco abuse 05/27/2016     Priority: Medium        Past Medical/Surgical History:  Past Medical History:   Diagnosis Date    Accident on farm 08/01/2014    Alcohol abuse     Nonischemic cardiomyopathy (H)     Smoking      Patient Active Problem List    Diagnosis Date Noted    Nonischemic cardiomyopathy (H) 04/07/2022     Priority: Medium    LV dysfunction 04/07/2022     Priority: Medium    HFrEF (heart failure with reduced ejection fraction) (H) 12/08/2021     Priority: Medium    Moderate major depression (H) 09/28/2021     Priority: Medium    JESSICA (generalized anxiety disorder) 09/28/2021     Priority: Medium    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 (H) 08/05/2020     Priority: Medium    Lateral knee pain, right 06/21/2016     Priority: Medium    Injury of right knee, initial encounter 06/21/2016     Priority: Medium    Tobacco abuse 05/27/2016     Priority: Medium     Patient Active Problem List   Diagnosis    Tobacco abuse    Lateral knee pain, right    Injury of right knee, initial encounter    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 (H)    Moderate major depression (H)    JESSICA (generalized anxiety disorder)    HFrEF (heart failure with reduced ejection fraction) (H)    Nonischemic cardiomyopathy (H)    LV dysfunction        Most Recent Labs:  Ancillary Procedure on 07/13/2023   Component Date Value Ref Range Status    Date Time Interrogation Session 07/13/2023 [id number]   Final    Implantable Pulse Generator Manu* 07/13/2023 [employer]   Final    Implantable Pulse Generator Model 07/13/2023 D432 RESONATE EL ICD   Final    Implantable Pulse Generator Serial* 07/13/2023 [id number]   Final    Type Interrogation Session 07/13/2023 Remote   Final     Clinic Name 07/13/2023 [place]   Final    Implantable Pulse Generator Type 07/13/2023 Defibrillator   Final    Implantable Pulse Generator Implan* 07/13/2023 [id number]   Final    Implantable Lead  07/13/2023 [employer]   Final    Implantable Lead Model 07/13/2023 0275 [employer] 4-Site   Final    Implantable Lead Serial Number 07/13/2023 [id number]   Final    Implantable Lead Implant Date 07/13/2023 [id number]   Final    Implantable Lead Polarity Type 07/13/2023 Tripolar Lead   Final    Implantable Lead Location Detail 1 07/13/2023 UNKNOWN   Final    Implantable Lead Location 07/13/2023 Right Ventricle   Final    Ricardo Setting Mode (NBG Code) 07/13/2023 VVI   Final    Ricardo Setting Lower Rate Limit 07/13/2023 40  [beats]/min Final    Lead Channel Setting Sensing Polar* 07/13/2023 Bipolar   Final    Lead Channel Setting Sensing Sensi* 07/13/2023 0.6  mV Final    Lead Channel Setting Sensing Adapt* 07/13/2023 Adaptive   Final    Lead Channel Setting Pacing Polari* 07/13/2023 Bipolar   Final    Lead Channel Setting Pacing Pulse * 07/13/2023 0.4  ms Final    Lead Channel Setting Pacing Amplit* 07/13/2023 2.0  V Final    Lead Channel Setting Pacing Captur* 07/13/2023 Adaptive   Final    Zone Setting Type Category 07/13/2023 VF   Final    Zone Setting Vendor Type Category 07/13/2023 VF   Final    Zone Setting Detection Interval 07/13/2023 250  ms Final    Zone Setting Type Category 07/13/2023 VT   Final    Zone Setting Vendor Type Category 07/13/2023 VT   Final    Zone Setting Detection Interval 07/13/2023 300  ms Final    Zone Setting Type Category 07/13/2023 VT   Final    Zone Setting Vendor Type Category 07/13/2023 VT-1   Final    Zone Setting Detection Interval 07/13/2023 353  ms Final    Lead Channel Impedance Value 07/13/2023 678  ohm Final    Lead Channel Pacing Threshold Ampl* 07/13/2023 0.6  V Final    Lead Channel Pacing Threshold Puls* 07/13/2023 0.4  ms Final    Battery Date Time of  Measurements 07/13/2023 20230713004200   Final    Battery Status 07/13/2023 Beginning of Service   Final    Battery Remaining Longevity 07/13/2023 180  mo Final    Battery Remaining Percentage 07/13/2023 100  % Final    Capacitor Charge Type 07/13/2023 Reformation   Final    Capacitor Last Charge Date Time 07/13/2023 [id number]   Final    Capacitor Charge Time 07/13/2023 10.0  s Final    Ricardo Statistic Date Time Start 07/13/2023 20230412000000   Final    Ricardo Statistic Date Time End 07/13/2023 13399867656060   Final    Ricardo Statistic RV Percent Paced 07/13/2023 0  % Final    Therapy Statistic Recent Shocks De* 07/13/2023 0   Final    Therapy Statistic Recent Shocks Ab* 07/13/2023 0   Final    Therapy Statistic Recent ATP Deliv* 07/13/2023 0   Final    Therapy Statistic Recent Date Time* 07/13/2023 40309900803666   Final    Therapy Statistic Recent Date Time* 07/13/2023 42461791741736   Final    Therapy Statistic Total Shocks Del* 07/13/2023 0   Final    Therapy Statistic Total Shocks Abo* 07/13/2023 0   Final    Therapy Statistic Total ATP Delive* 07/13/2023 0   Final    Therapy Statistic Total  Date Time* 07/13/2023 62296932789833   Final    Therapy Statistic Total  Date Time* 07/13/2023 03079967523379   Final    Episode Statistic Recent Count 07/13/2023 0   Final    Episode Statistic Type Category 07/13/2023 Other   Final    Episode Statistic Recent Count 07/13/2023 4   Final    Episode Statistic Type Category 07/13/2023 VT   Final    Episode Statistic Vendor Type Sagrario* 07/13/2023 NSVT   Final    Episode Statistic Recent Count 07/13/2023 0   Final    Episode Statistic Type Category 07/13/2023 VF   Final    Episode Statistic Vendor Type [employer]* 07/13/2023 VF   Final    Episode Statistic Recent Count 07/13/2023 0   Final    Episode Statistic Type Category 07/13/2023 VT   Final    Episode Statistic Vendor Type [employer]* 07/13/2023 VT   Final    Episode Statistic Recent Count 07/13/2023 0   Final    Episode Statistic  Type Category 07/13/2023 VT   Final    Episode Statistic Vendor Type Sagrario* 07/13/2023 VT-1   Final    Episode Statistic Recent Date Time* 07/13/2023 95287762978159   Final    Episode Statistic Recent Date Time* 07/13/2023 51679931540219   Final    Episode Statistic Recent Date Time* 07/13/2023 93310549392837   Final    Episode Statistic Recent Date Time* 07/13/2023 69559358374362   Final    Episode Statistic Recent Date Time* 07/13/2023 26689616228300   Final    Episode Statistic Recent Date Time* 07/13/2023 75434247250609   Final    Episode Statistic Recent Date Time* 07/13/2023 33504934433344   Final    Episode Statistic Recent Date Time* 07/13/2023 66394104100477   Final    Episode Statistic Recent Date Time* 07/13/2023 80757564554719   Final    Episode Statistic Recent Date Time* 07/13/2023 24175701228298   Final    Episode Identifier 07/13/2023 APM-26   Final    Episode Type Category 07/13/2023 Periodic EGM   Final    Episode Date Time 07/13/2023 [id number]   Final    Episode Identifier 07/13/2023 [id number]   Final    Episode Type Category 07/13/2023 Other   Final    Episode Date Time 07/13/2023 [id number]   Final    Episode Identifier 07/13/2023 V-349   Final    Episode Type Category 07/13/2023 VT   Final    Episode Vendor Type Category 07/13/2023 VT-1   Final    Episode Date Time 07/13/2023 [id number]   Final    Episode Duration 07/13/2023 49  s Final    Episode Identifier 07/13/2023 V-348   Final    Episode Type Category 07/13/2023 VT   Final    Episode Vendor Type Category 07/13/2023 VT-1   Final    Episode Date Time 07/13/2023 [id number]   Final    Episode Duration 07/13/2023 30  s Final    Episode Identifier 07/13/2023 V-347   Final    Episode Type Category 07/13/2023 VT   Final    Episode Vendor Type Category 07/13/2023 VT-1   Final    Episode Date Time 07/13/2023 [id number]   Final    Episode Duration 07/13/2023 85  s Final     Mental Health History     Prior Mental Health Diagnosis:    Depression, Generalized Anxieety Disorder    Mental Health Treatment:   No current mental health treatment, he is not in psychotherapy.    Chemical Abuse/Treatment:    He does not report any prior hemoglobins and treatment.  Medical record indicates diagnoses of alcohol abuse.  Patient current use of alcohol suggest that drinking 2-3 alcoholic beverages in the evening is highly likely harmful to his health and sleep.        Family History     No family history on file.    Social History     Social History     Socioeconomic History    Marital status:    Occupational History     Employer: UNKNOWN   Tobacco Use    Smoking status: Former     Passive exposure: Never    Smokeless tobacco: Current     Types: Chew   Vaping Use    Vaping Use: Never used   Substance and Sexual Activity    Alcohol use: Yes     Alcohol/week: 0.0 standard drinks of alcohol     Comment: occ.    Drug use: No    Sexual activity: Yes     Partners: Female       Patient works full-time managing a [employer]     Mental Status Examination     Dipak presented as oriented X3 with speech and language intact.  The patient was cooperative throughout the evaluation with no signs of hallucinations, delusions, loosening of associations or other thought disturbance.  Mood was normal Affect was congruent with mood. Insight and judgement were intact.  Memory appeared intact for recent and remote elements.  There was no report of suicidal ideation, intention or plan. Attention and concentration were within normal.        Roger Feldman PsyD, LP, DBSM  Diplomate, Behavioral Sleep Medicine  [facility]      Copy:   Jason García PA-C  [address] [place] [address]    Note: This dictation was created using voice recognition software. This document may contain an error not identified before finalizing the document. If the error changes the accuracy of the document, I would appreciate it  being brought to my attention.

## 2023-08-03 NOTE — Clinical Note
Nam Piña, Patient needs medical followup about his sleep study which is slowing mild ALEX and quite elevated PLMs with arousal.  He has been on gabapentin in the past.  I think it is one factor contributing to poor quality sleep.  I'm working to get him to stop using alcohol in the evenings and other behavioral strategies.

## 2023-08-03 NOTE — NURSING NOTE
Is the patient currently in the state of [place]? YES    Visit mode:VIDEO    If the visit is dropped, the patient can be reconnected by: VIDEO VISIT: Text to cell phone: [phone]    Will anyone else be joining the visit? NO      How would you like to obtain your AVS? MyChart    Are changes needed to the allergy or medication list? NO    Reason for visit: Consult  Has patient had flu shot for current/most recent flu season? If so, when? No

## 2023-08-15 LAB
MDC_IDC_EPISODE_DTM: NORMAL
MDC_IDC_EPISODE_DURATION: 30 S
MDC_IDC_EPISODE_DURATION: 49 S
MDC_IDC_EPISODE_DURATION: 85 S
MDC_IDC_EPISODE_ID: NORMAL
MDC_IDC_EPISODE_TYPE: NORMAL
MDC_IDC_EPISODE_VENDOR_TYPE: NORMAL
MDC_IDC_LEAD_IMPLANT_DT: NORMAL
MDC_IDC_LEAD_LOCATION: NORMAL
MDC_IDC_LEAD_LOCATION_DETAIL_1: NORMAL
MDC_IDC_LEAD_MFG: NORMAL
MDC_IDC_LEAD_MODEL: NORMAL
MDC_IDC_LEAD_POLARITY_TYPE: NORMAL
MDC_IDC_LEAD_SERIAL: NORMAL
MDC_IDC_MSMT_BATTERY_DTM: NORMAL
MDC_IDC_MSMT_BATTERY_REMAINING_LONGEVITY: 180 MO
MDC_IDC_MSMT_BATTERY_REMAINING_PERCENTAGE: 100 %
MDC_IDC_MSMT_BATTERY_STATUS: NORMAL
MDC_IDC_MSMT_CAP_CHARGE_DTM: NORMAL
MDC_IDC_MSMT_CAP_CHARGE_TIME: 10 S
MDC_IDC_MSMT_CAP_CHARGE_TYPE: NORMAL
MDC_IDC_MSMT_LEADCHNL_RV_IMPEDANCE_VALUE: 678 OHM
MDC_IDC_MSMT_LEADCHNL_RV_PACING_THRESHOLD_AMPLITUDE: 0.6 V
MDC_IDC_MSMT_LEADCHNL_RV_PACING_THRESHOLD_PULSEWIDTH: 0.4 MS
MDC_IDC_PG_IMPLANT_DTM: NORMAL
MDC_IDC_PG_MFG: NORMAL
MDC_IDC_PG_MODEL: NORMAL
MDC_IDC_PG_SERIAL: NORMAL
MDC_IDC_PG_TYPE: NORMAL
MDC_IDC_SESS_CLINIC_NAME: NORMAL
MDC_IDC_SESS_DTM: NORMAL
MDC_IDC_SESS_TYPE: NORMAL
MDC_IDC_SET_BRADY_LOWRATE: 40 {BEATS}/MIN
MDC_IDC_SET_BRADY_MODE: NORMAL
MDC_IDC_SET_LEADCHNL_RV_PACING_AMPLITUDE: 2 V
MDC_IDC_SET_LEADCHNL_RV_PACING_CAPTURE_MODE: NORMAL
MDC_IDC_SET_LEADCHNL_RV_PACING_POLARITY: NORMAL
MDC_IDC_SET_LEADCHNL_RV_PACING_PULSEWIDTH: 0.4 MS
MDC_IDC_SET_LEADCHNL_RV_SENSING_ADAPTATION_MODE: NORMAL
MDC_IDC_SET_LEADCHNL_RV_SENSING_POLARITY: NORMAL
MDC_IDC_SET_LEADCHNL_RV_SENSING_SENSITIVITY: 0.6 MV
MDC_IDC_SET_ZONE_DETECTION_INTERVAL: 250 MS
MDC_IDC_SET_ZONE_DETECTION_INTERVAL: 300 MS
MDC_IDC_SET_ZONE_DETECTION_INTERVAL: 353 MS
MDC_IDC_SET_ZONE_TYPE: NORMAL
MDC_IDC_SET_ZONE_VENDOR_TYPE: NORMAL
MDC_IDC_STAT_BRADY_DTM_END: NORMAL
MDC_IDC_STAT_BRADY_DTM_START: NORMAL
MDC_IDC_STAT_BRADY_RV_PERCENT_PACED: 0 %
MDC_IDC_STAT_EPISODE_RECENT_COUNT: 0
MDC_IDC_STAT_EPISODE_RECENT_COUNT: 4
MDC_IDC_STAT_EPISODE_RECENT_COUNT_DTM_END: NORMAL
MDC_IDC_STAT_EPISODE_RECENT_COUNT_DTM_START: NORMAL
MDC_IDC_STAT_EPISODE_TYPE: NORMAL
MDC_IDC_STAT_EPISODE_VENDOR_TYPE: NORMAL
MDC_IDC_STAT_TACHYTHERAPY_ATP_DELIVERED_RECENT: 0
MDC_IDC_STAT_TACHYTHERAPY_ATP_DELIVERED_TOTAL: 0
MDC_IDC_STAT_TACHYTHERAPY_RECENT_DTM_END: NORMAL
MDC_IDC_STAT_TACHYTHERAPY_RECENT_DTM_START: NORMAL
MDC_IDC_STAT_TACHYTHERAPY_SHOCKS_ABORTED_RECENT: 0
MDC_IDC_STAT_TACHYTHERAPY_SHOCKS_ABORTED_TOTAL: 0
MDC_IDC_STAT_TACHYTHERAPY_SHOCKS_DELIVERED_RECENT: 0
MDC_IDC_STAT_TACHYTHERAPY_SHOCKS_DELIVERED_TOTAL: 0
MDC_IDC_STAT_TACHYTHERAPY_TOTAL_DTM_END: NORMAL
MDC_IDC_STAT_TACHYTHERAPY_TOTAL_DTM_START: NORMAL

## 2023-08-17 ENCOUNTER — VIRTUAL VISIT (OUTPATIENT)
Dept: SLEEP MEDICINE | Facility: CLINIC | Age: 43
End: 2023-08-17
Payer: COMMERCIAL

## 2023-08-17 ENCOUNTER — TELEPHONE (OUTPATIENT)
Dept: SLEEP MEDICINE | Facility: CLINIC | Age: 43
End: 2023-08-17

## 2023-08-17 VITALS
HEIGHT: 67 IN | WEIGHT: 169 LBS | BODY MASS INDEX: 26.53 KG/M2 | SYSTOLIC BLOOD PRESSURE: 112 MMHG | DIASTOLIC BLOOD PRESSURE: 80 MMHG

## 2023-08-17 DIAGNOSIS — F51.04 CHRONIC INSOMNIA: Primary | ICD-10-CM

## 2023-08-17 DIAGNOSIS — G47.33 OBSTRUCTIVE SLEEP APNEA: ICD-10-CM

## 2023-08-17 DIAGNOSIS — G47.61 PLMD (PERIODIC LIMB MOVEMENT DISORDER): ICD-10-CM

## 2023-08-17 PROCEDURE — 90832 PSYTX W PT 30 MINUTES: CPT | Mod: 95 | Performed by: PSYCHOLOGIST

## 2023-08-17 ASSESSMENT — SLEEP AND FATIGUE QUESTIONNAIRES
HOW LIKELY ARE YOU TO NOD OFF OR FALL ASLEEP WHILE SITTING AND READING: HIGH CHANCE OF DOZING
HOW LIKELY ARE YOU TO NOD OFF OR FALL ASLEEP WHILE LYING DOWN TO REST IN THE AFTERNOON WHEN CIRCUMSTANCES PERMIT: HIGH CHANCE OF DOZING
HOW LIKELY ARE YOU TO NOD OFF OR FALL ASLEEP WHEN YOU ARE A PASSENGER IN A CAR FOR AN HOUR WITHOUT A BREAK: WOULD NEVER DOZE
HOW LIKELY ARE YOU TO NOD OFF OR FALL ASLEEP WHILE WATCHING TV: HIGH CHANCE OF DOZING
HOW LIKELY ARE YOU TO NOD OFF OR FALL ASLEEP WHILE SITTING INACTIVE IN A PUBLIC PLACE: HIGH CHANCE OF DOZING
HOW LIKELY ARE YOU TO NOD OFF OR FALL ASLEEP WHILE SITTING QUIETLY AFTER LUNCH WITHOUT ALCOHOL: MODERATE CHANCE OF DOZING
HOW LIKELY ARE YOU TO NOD OFF OR FALL ASLEEP IN A CAR, WHILE STOPPED FOR A FEW MINUTES IN TRAFFIC: HIGH CHANCE OF DOZING
HOW LIKELY ARE YOU TO NOD OFF OR FALL ASLEEP WHILE SITTING AND TALKING TO SOMEONE: WOULD NEVER DOZE

## 2023-08-17 ASSESSMENT — PAIN SCALES - GENERAL: PAINLEVEL: NO PAIN (0)

## 2023-08-17 NOTE — PROGRESS NOTES
Virtual Visit Details    Type of service:  Video Visit     Originating Location (pt. Location): Home    Distant Location (provider location):  Off-site  Platform used for Video Visit: AmWell    Visit Start Time: 4:05 PM  Visit End Time:4:30 PM     SLEEP MEDICINE VIRTUAL VIDEO FOLLOW-UP VISIT  Sleep Psychology    Patient Name: Dipak Swartz  MRN:  [id number]  Date of Service: Aug 17, 2023       Subjective Report     Dipak Swartz  returns for a telehealth video visit to discuss progress in implementing behavioral strategies for the management of insomnia.  Patient consent for initiation of video visit was obtained and documented prior to initiation of visit.     Dipak reports sleep schedule of 11-6 AM.  He does not report any substantial improvement in sleep consolidation with this strategy as of yet.  However he does note that in the middle of the night when he awakens he continues to try the TV and to review his cell phone.  We discussed stimulus control recommendations to improve the bedroom as a cue for sleep.  He did agree to avoid watching the TV and watching his cell phone.  He states he does need to have some type of weight now is so we discussed strategies for this.  He states he is not napping though he does tend to feel sleepy occasionally during the day.  His schedule does not allow him to take naps.  We discussed the importance of remaining safe for driving and avoid drowsy driving.    Patient did complete his sleep study and has not had any follow-up.  The study was completed in July but a month ago.  During visit engaged in care coordination to facilitate scheduling of a follow-up visit for phone contact from either the interpreting or previous sleep medicine provider.  Results indicate mild obstructive sleep apnea..     .  He continues to take trazodone, 50 mg     Sleep Data:     Source of Sleep Estimates:  Verbal Self-report    Average Time in Bed: 7 hrs.  Average Total Sleep Time: 5  "hrs  Sleep Efficiency estimate: Less than 80%    EPWORTH SLEEPINESS SCALE    Sitting and reading 3   Watching TV 3   Sitting, inactive in a public place (theatre or mtg.) 3    As a passenger in a car 0   Lying down to rest in the afternoon when circumstance permit 3   Sitting and talking to someone 0   Sitting quietly after lunch without alcohol 2   In a car, while stopped for a few minutes in traffic 3   TOTAL SCORE (nl <11) 17     INSOMNIA SEVERITY INDEX     Difficulty falling asleep 2   Difficult staying asleep 3   Problems waking up to early 3   How SATISFIED/DISSATISFIED are you with your CURRENT sleep pattern? 4   How NOTICEABLE to others do you think your sleep pattern is in terms of your quality of life? 1   How WORRIED/DISTRESSED are you about your current sleep pattern? 3   To what extent do you consider your sleep problem to INTERFERE with your daily fuctioning(e.g. daytime fatigue, mood, ability to function at work/daily chores, concentration, mood,etc.) CURRENTLY? 4   INSOMNIA SEVERITY INDEX TOTAL SCORE 20    Absence of insomnia (0-7); sub-threshold insomnia (8-14); moderate insomnia (15-21); and severe insomnia (22-28)       Interventions     Strategies and recommendations including Stimulus control therapy and Sleep compression therapy were reviewed and discussed today.     Services provided are compliant with the requirements of [place] Statute SS 256B.0625 Subd. 3b and paragraph (b)       Vital Signs     Blood Pressure 112/80   Height 1.702 m (5' 7\")   Weight 76.7 kg (169 lb)   Body Mass Index 26.47 kg/m       Mental Status     Orientation:  X3  Mood:  normal  Affect:  Congruent with mood  Speech/Language:  Normal  Thought Process: Intact  Associations:  Normal  Thought Content: Normal  Patient does not report any suicidal ideation, intention or plan.    Diagnostic Impressions and Plan        Chronic insomnia  Obstructive sleep apnea  PLMD (periodic limb movement disorder)    Patient reports he " continues to experience significant awakenings despite compressing of sleep schedule.  Discussed and reviewed his sleep study.  There is not been any sleep medicine follow-up yet.  He has somewhat elevated PLM's with arousal.  This may be contributing to difficulty in consolidating sleep. Plan:    Follow-up: 2 weeks, schedule follow-up with sleep medicine for review of sleep study      Roger Feldman, Aubree, LP, DBSM  Diplomate, Behavioral Sleep Medicine  [facility]      Note: This dictation was created using voice recognition software. This document may contain an error not identified before finalizing the document. If the error changes the accuracy of the document, I would appreciate it being brought to my attention.

## 2023-08-17 NOTE — NURSING NOTE
Is the patient currently in the state of [place]? YES    Visit mode:VIDEO    If the visit is dropped, the patient can be reconnected by: VIDEO VISIT: Text to cell phone:   Telephone Information:   Mobile [phone]       Will anyone else be joining the visit? NO  (If patient encounters technical issues they should call [phone]    How would you like to obtain your AVS? MyChart    Are changes needed to the allergy or medication list? No    Reason for visit: RECHECK    Ivory BYRD    Has patient had flu shot for current/most recent flu season? If so, when? No

## 2023-08-17 NOTE — TELEPHONE ENCOUNTER
Patient voicemail was full.    Upon call back please schedule patient with provider to do PSG follow up regarding results.     Writer did send Abigail Stewartg reminder for follow up with Wang Herrera PA-C.

## 2023-09-20 ENCOUNTER — MYC MEDICAL ADVICE (OUTPATIENT)
Dept: CARDIOLOGY | Facility: CLINIC | Age: 43
End: 2023-09-20
Payer: COMMERCIAL

## 2023-09-20 DIAGNOSIS — I47.10 SVT (SUPRAVENTRICULAR TACHYCARDIA) (H): Primary | ICD-10-CM

## 2023-09-20 NOTE — TELEPHONE ENCOUNTER
[facility] Heart-CORE Clinic    Background: 6/16/23 last CORE visit with Ivory Serrano PAC in follow-up of CM/HFrEF due to ETOH. Clinic weight was 169 lbs and he was feeling improved leg swelling using lasix several times weekly. Ivory deemed him euvolemic. He was asked to decrease ETOH intake.    Today he sends a MyChart message:  Good morning HF team!  I ve been having chest pains and racing heart lately. Wondering if my device shows anything? Or do I need to contact another group?    And in follow-up of my questions:  Comes and goes.   Seems to be stress related.  I can t say anything makes it go just does on it s own. I ll send transmissions shortly     Weight/VS received:  Weight 172, lasix daily   Bp 125/89  88pbm     Plan:   Asked him to send remote device. FYI to Device Team  Will route to Ivory Serrano PAC for review once he lets us know his weight/lasix use.  Call 911 if persistent palpitations/chest pain      Current cardiac meds:  Farxiga 5 mg daily  Lasix 20 mg PRN (weight gain, swelling)  Metoprolol succinate 200 mg nightly  Entresto 97/103 mg BID  Spironolactone 25 mg daily          Future Appointments   Date Time Provider Department Center   10/16/2023 12:00 AM GIL DCR2 SUUMPC UMP PSA CLIN   12/8/2023 10:30 AM Wang Herrera PA-C PHSLP [facility] Sleep PH   12/18/2023 11:00 AM SHCVECHR2 SHCVCV CVIMG   12/18/2023 12:15 PM GIL LAB SHCLB [facility]     Yoly Delgado RN BSN   11:51 AM 09/20/23; updated 1:09 PM 9/20/2023  CORE nurse line M-F 8a-4p: [phone]

## 2023-09-20 NOTE — TELEPHONE ENCOUNTER
Pt has normal coronary arteries so less likely anginal event.    It's possible he's having sx from SVT events that are lasting a bit longer.  Pt has hx of both afib and a flutter ablations 9/22.       Dr. Way, can you look at his device and determine the arhythmia's he's having?  Do you think they're enough to cause chest discomfort? Device team, pls help facilitate.    Ivory Serrano PA-C 9/20/2023 3:27 PM

## 2023-09-20 NOTE — TELEPHONE ENCOUNTER
"[employer] Resonate (S) ICD Remote Device Check - Courtesy Check per CORE request, pt intermittent \"chest pain and heart racing\"  : 0%  Mode: VVI 40  Presenting Rhythm: irregular (PVCs) VS V rates 80's bpm  Historical underlying:  per 4/12/23: SR/ST 70-100s w/ PVCs  Heart Rate: Histogram shows excellent variability with V rates  bpm.  Approx 90% of V rates  bpm  Sensing: WNL  Pacing Threshold: WNL  Impedance: WNL  Battery Status: Estimated 15 years until RRT  Ventricular Arrhythmia: 7 HVR episodes logged since last remote 7/13/23.  6 EGMs do not show onset or offset, no morphology change for likely ST/SVT with V rates 170-180's bpm, 19 sec-0kte25zsd . Most recent episode 9/1/23.  Therapy zones start at 200 bpm. One EGM from 8/6/23 shows NSVT lasting 31 beats, or 10 seconds, in the 170s.  No therapy required.    ATP: 0  Shocks: 0  Tachy Therapy History:       --Resonate (4/2022 - present): none  Care Plan:  Will route to [facility] and YOLANDA Rodriguez RN    "

## 2023-09-21 NOTE — TELEPHONE ENCOUNTER
[facility] Heart - CORE Clinic    -Spoke with Jorge L who state he is agreeable to the change but can't try it until his insurance is reinstated.  He is hopeful it will be active in October. We discussed that if his symptoms worsen to go to the ER.  He will let us know when his insurance is reinstated.    Yvette Davison RN BAN   3:38 PM 9/21/2023    CORE nurse line M-F 8a-4p: [phone]

## 2023-09-21 NOTE — TELEPHONE ENCOUNTER
[facility] Heart - CORE Clinic     Oli Way MD  More, GAURAV Pavon-C5 hours ago (8:34 AM)     QP  Looks like either sinus tach or SVT, need to know what pt was doing during episodes and sxs       Called pt to discuss symptoms during episode. Left a generic VM    Yvette Davison RN BAN   1:46 PM 9/21/2023    CORE nurse line M-F 8a-4p: [phone]

## 2023-09-21 NOTE — TELEPHONE ENCOUNTER
[facility] Heart - CORE Clinic    - Per Jorge L he can't recall what he was doing on any of those dates.  He did say he was at work on Monday doing his usual work and not going anything strenuous.      He says symptoms come and go and are not triggered by activity.  He can get symptoms while laying in bed, sitting at his desk at work and driving to name a few.  He however never gets chest pain when riding his bike which he tries to do for 30 min every day.     Will route back to Dr. Way.    Future Appointments   Date Time Provider Department Center   10/16/2023 12:00 AM GIL DCR2 SUUMPC UMP PSA CLIN   12/8/2023 10:30 AM Wang Herrera PA-C PHSLP [facility] Sleep PH   12/18/2023 11:00 AM SHCVECHR2 SHCVCV CVIMG   12/18/2023 12:15 PM GIL LAB SHCLB [facility]     Yvette Davison RN BAN   1:56 PM 9/21/2023    CORE nurse line M-YONI 8a-4p: [phone]

## 2023-09-21 NOTE — TELEPHONE ENCOUNTER
Oli Way MD  You52 minutes ago (2:22 PM)     QP  Since, we don't know for sure if sxs are caused by SVT it would be best to suppress them for now with med. Reduce toprol from 200 mg daily to 100 mg daily and start sotalol 80 mg bid. Ecg in 1-2 days. Thanks, qp

## 2023-09-21 NOTE — TELEPHONE ENCOUNTER
[facility] Heart - CORE Clinic    -Left  requesting return call to review recommendations.     Yvette Davison RN BAN   3:16 PM 9/21/2023    CORE nurse line M-F 8a-4p: [phone]

## 2023-09-25 NOTE — TELEPHONE ENCOUNTER
Sotalol isn't particularly expensive ~$31 for 3 months at that dose per epocrates.  Pls send to pharmacy for pricing, if affordable he should start. Ivory Serrano PA-C 9/25/2023 6:11 PM

## 2023-09-26 NOTE — TELEPHONE ENCOUNTER
Price using [email or web address] discount card at [facility].    Sotalol 80mg, #60: $18.    Kaelyn Rose  Pharmacy Technician/Liaison, Discharge Pharmacy   [phone] (voice or text)  [email or web address]

## 2023-09-26 NOTE — TELEPHONE ENCOUNTER
"[facility] Heart-CORE Clinic    [employer] message sent to Jorge L,    \"Hi Jorge L,    Morning! As you discussed with my partner last week, Dr. Way and Ivory INMAN are recommending the following medication changes given your symptoms:  DECREASE metoprolol succinate to 100 mg daily (this will be one-half of your current 200 mg tablets)  START sotalol 80 mg twice daily; this will help suppress the abnormal heart beats that may be causing your symptoms. You'll need to have an EKG 1-2 days after you start sotalol.    I talked to our pharmacy team and they said the sotalol will be $18 per month with a discount card at a [facility]. Would this be doable for you while we await your insurance to start? Let me know either way please. If you'd like to proceed I'll need to know what pharmacy location to send the prescription to.    Sincerely,  Yoly TORIBIO\"      Yoly Delgado RN BSN   10:01 AM 09/26/23  CORE nurse line M-F 8a-4p: [phone]      "

## 2023-09-26 NOTE — TELEPHONE ENCOUNTER
Pharmacy team--will you please provide cost estimate for sotalol? Jorge L is without insurance until next week. Thank you!    Yoly Delgado RN BSN   9:47 AM 09/26/23  CORE nurse line M-F 8a-4p: [phone]

## 2023-10-02 DIAGNOSIS — G47.01 INSOMNIA DUE TO MEDICAL CONDITION: ICD-10-CM

## 2023-10-02 RX ORDER — TRAZODONE HYDROCHLORIDE 50 MG/1
50-100 TABLET, FILM COATED ORAL AT BEDTIME
Qty: 60 TABLET | Refills: 0 | Status: SHIPPED | OUTPATIENT
Start: 2023-10-02 | End: 2023-12-27

## 2023-10-13 RX ORDER — SOTALOL HYDROCHLORIDE 80 MG/1
80 TABLET ORAL 2 TIMES DAILY
Qty: 60 TABLET | Refills: 11 | Status: SHIPPED | OUTPATIENT
Start: 2023-10-13 | End: 2024-03-26

## 2023-10-13 NOTE — TELEPHONE ENCOUNTER
[facility] Heart - CORE Clinic    Jorge L sent a [employer] message requesting the [facility] [place]. Rx sent per his request.          Yvette Davison RN BAN   2:04 PM 10/13/2023    CORE nurse line M-F 8a-4p: [phone]

## 2023-10-16 ENCOUNTER — ANCILLARY PROCEDURE (OUTPATIENT)
Dept: CARDIOLOGY | Facility: CLINIC | Age: 43
End: 2023-10-16
Attending: INTERNAL MEDICINE
Payer: COMMERCIAL

## 2023-10-16 DIAGNOSIS — I42.8 NON-ISCHEMIC CARDIOMYOPATHY (H): ICD-10-CM

## 2023-10-16 DIAGNOSIS — Z95.810 ICD (IMPLANTABLE CARDIOVERTER-DEFIBRILLATOR), SINGLE, IN SITU: ICD-10-CM

## 2023-10-16 PROCEDURE — 93296 REM INTERROG EVL PM/IDS: CPT | Performed by: INTERNAL MEDICINE

## 2023-10-16 PROCEDURE — 93295 DEV INTERROG REMOTE 1/2/MLT: CPT | Performed by: INTERNAL MEDICINE

## 2023-10-17 LAB
MDC_IDC_EPISODE_DTM: NORMAL
MDC_IDC_EPISODE_DURATION: 14 S
MDC_IDC_EPISODE_DURATION: 18 S
MDC_IDC_EPISODE_DURATION: 23 S
MDC_IDC_EPISODE_ID: NORMAL
MDC_IDC_EPISODE_TYPE: NORMAL
MDC_IDC_EPISODE_TYPE_INDUCED: NO
MDC_IDC_EPISODE_VENDOR_TYPE: NORMAL
MDC_IDC_LEAD_CONNECTION_STATUS: NORMAL
MDC_IDC_LEAD_IMPLANT_DT: NORMAL
MDC_IDC_LEAD_LOCATION: NORMAL
MDC_IDC_LEAD_LOCATION_DETAIL_1: NORMAL
MDC_IDC_LEAD_MFG: NORMAL
MDC_IDC_LEAD_MODEL: NORMAL
MDC_IDC_LEAD_POLARITY_TYPE: NORMAL
MDC_IDC_LEAD_SERIAL: NORMAL
MDC_IDC_MSMT_BATTERY_DTM: NORMAL
MDC_IDC_MSMT_BATTERY_REMAINING_LONGEVITY: 180 MO
MDC_IDC_MSMT_BATTERY_REMAINING_PERCENTAGE: 100 %
MDC_IDC_MSMT_BATTERY_STATUS: NORMAL
MDC_IDC_MSMT_CAP_CHARGE_DTM: NORMAL
MDC_IDC_MSMT_CAP_CHARGE_TIME: 10.1 S
MDC_IDC_MSMT_CAP_CHARGE_TYPE: NORMAL
MDC_IDC_MSMT_LEADCHNL_RV_IMPEDANCE_VALUE: 685 OHM
MDC_IDC_MSMT_LEADCHNL_RV_PACING_THRESHOLD_AMPLITUDE: 0.7 V
MDC_IDC_MSMT_LEADCHNL_RV_PACING_THRESHOLD_PULSEWIDTH: 0.4 MS
MDC_IDC_PG_IMPLANT_DTM: NORMAL
MDC_IDC_PG_MFG: NORMAL
MDC_IDC_PG_MODEL: NORMAL
MDC_IDC_PG_SERIAL: NORMAL
MDC_IDC_PG_TYPE: NORMAL
MDC_IDC_SESS_CLINIC_NAME: NORMAL
MDC_IDC_SESS_DTM: NORMAL
MDC_IDC_SESS_TYPE: NORMAL
MDC_IDC_SET_BRADY_LOWRATE: 40 {BEATS}/MIN
MDC_IDC_SET_BRADY_MODE: NORMAL
MDC_IDC_SET_LEADCHNL_RV_PACING_AMPLITUDE: 2 V
MDC_IDC_SET_LEADCHNL_RV_PACING_CAPTURE_MODE: NORMAL
MDC_IDC_SET_LEADCHNL_RV_PACING_POLARITY: NORMAL
MDC_IDC_SET_LEADCHNL_RV_PACING_PULSEWIDTH: 0.4 MS
MDC_IDC_SET_LEADCHNL_RV_SENSING_ADAPTATION_MODE: NORMAL
MDC_IDC_SET_LEADCHNL_RV_SENSING_POLARITY: NORMAL
MDC_IDC_SET_LEADCHNL_RV_SENSING_SENSITIVITY: 0.6 MV
MDC_IDC_SET_ZONE_DETECTION_INTERVAL: 250 MS
MDC_IDC_SET_ZONE_DETECTION_INTERVAL: 300 MS
MDC_IDC_SET_ZONE_DETECTION_INTERVAL: 353 MS
MDC_IDC_SET_ZONE_STATUS: NORMAL
MDC_IDC_SET_ZONE_TYPE: NORMAL
MDC_IDC_SET_ZONE_VENDOR_TYPE: NORMAL
MDC_IDC_STAT_BRADY_DTM_END: NORMAL
MDC_IDC_STAT_BRADY_DTM_START: NORMAL
MDC_IDC_STAT_BRADY_RV_PERCENT_PACED: 0 %
MDC_IDC_STAT_EPISODE_RECENT_COUNT: 0
MDC_IDC_STAT_EPISODE_RECENT_COUNT: 8
MDC_IDC_STAT_EPISODE_RECENT_COUNT_DTM_END: NORMAL
MDC_IDC_STAT_EPISODE_RECENT_COUNT_DTM_START: NORMAL
MDC_IDC_STAT_EPISODE_TYPE: NORMAL
MDC_IDC_STAT_EPISODE_VENDOR_TYPE: NORMAL
MDC_IDC_STAT_TACHYTHERAPY_ATP_DELIVERED_RECENT: 0
MDC_IDC_STAT_TACHYTHERAPY_ATP_DELIVERED_TOTAL: 0
MDC_IDC_STAT_TACHYTHERAPY_RECENT_DTM_END: NORMAL
MDC_IDC_STAT_TACHYTHERAPY_RECENT_DTM_START: NORMAL
MDC_IDC_STAT_TACHYTHERAPY_SHOCKS_ABORTED_RECENT: 0
MDC_IDC_STAT_TACHYTHERAPY_SHOCKS_ABORTED_TOTAL: 0
MDC_IDC_STAT_TACHYTHERAPY_SHOCKS_DELIVERED_RECENT: 0
MDC_IDC_STAT_TACHYTHERAPY_SHOCKS_DELIVERED_TOTAL: 0
MDC_IDC_STAT_TACHYTHERAPY_TOTAL_DTM_END: NORMAL
MDC_IDC_STAT_TACHYTHERAPY_TOTAL_DTM_START: NORMAL

## 2023-11-13 ENCOUNTER — TELEPHONE (OUTPATIENT)
Dept: CARDIOLOGY | Facility: CLINIC | Age: 43
End: 2023-11-13

## 2023-11-13 ENCOUNTER — ALLIED HEALTH/NURSE VISIT (OUTPATIENT)
Dept: CARDIOLOGY | Facility: CLINIC | Age: 43
End: 2023-11-13
Payer: COMMERCIAL

## 2023-11-13 DIAGNOSIS — I48.0 PAROXYSMAL ATRIAL FIBRILLATION (H): ICD-10-CM

## 2023-11-13 DIAGNOSIS — F10.10 ETOH ABUSE: Primary | ICD-10-CM

## 2023-11-13 DIAGNOSIS — I42.8 NON-ISCHEMIC CARDIOMYOPATHY (H): ICD-10-CM

## 2023-11-13 DIAGNOSIS — I47.10 SVT (SUPRAVENTRICULAR TACHYCARDIA) (H): ICD-10-CM

## 2023-11-13 DIAGNOSIS — I47.10 SVT (SUPRAVENTRICULAR TACHYCARDIA) (H): Primary | ICD-10-CM

## 2023-11-13 LAB
ATRIAL RATE - MUSE: 68 BPM
DIASTOLIC BLOOD PRESSURE - MUSE: NORMAL MMHG
INTERPRETATION ECG - MUSE: NORMAL
P AXIS - MUSE: 51 DEGREES
PR INTERVAL - MUSE: 194 MS
QRS DURATION - MUSE: 114 MS
QT - MUSE: 440 MS
QTC - MUSE: 467 MS
R AXIS - MUSE: 6 DEGREES
SYSTOLIC BLOOD PRESSURE - MUSE: NORMAL MMHG
T AXIS - MUSE: -43 DEGREES
VENTRICULAR RATE- MUSE: 68 BPM

## 2023-11-13 PROCEDURE — 93000 ELECTROCARDIOGRAM COMPLETE: CPT | Performed by: GENERAL ACUTE CARE HOSPITAL

## 2023-11-13 PROCEDURE — 99207 PR NO CHARGE NURSE ONLY: CPT

## 2023-11-13 NOTE — TELEPHONE ENCOUNTER
[facility] Heart-CORE Clinic  Received message from pt he finally started Sotalol on Friday and wanted options for stopping drinking. Will send to GAURAV Dover for input on options. Messaged pt back that he needs EKG today or tomorrow, as he was to have 1-2 days after starting the medication. Asked pt to let us know if he can come today or tomorrow and then will offer times.         Samia Gonzalez RN, BSN  11/13/23 at 10:12 AM      Future Appointments   Date Time Provider Department Center   12/8/2023 10:30 AM Wang Herrera PA-C PHSLP FV Sleep PH   12/18/2023 11:00 AM SHCVECHR2 SHCVCV CVIMG   12/18/2023 12:15 PM GIL LAB SHCLB [place] PRECIOUS   2/5/2024 12:00 AM GIL DCR2 [facility] PSA CLIN

## 2023-11-13 NOTE — TELEPHONE ENCOUNTER
Pt came in today for an EKG at  Heart Clinic.  VS as follows:    BP:  118/70  HR:  65  SAO2: 93%    MALU Bhatti reviewed EKG and ok'd to be sent to Provider. Thanks    JULIUS Herr

## 2023-11-14 NOTE — TELEPHONE ENCOUNTER
Updated Jorge L of Ivory INMAN's recs/message via [employer].    Yoly Delgado RN BSN   9:37 AM 11/14/23  CORE nurse line M-F 8a-4p: [phone]

## 2023-11-14 NOTE — TELEPHONE ENCOUNTER
Advised via [employer].    Yoly Delgado RN BSN   9:44 AM 11/14/23  CORE nurse line M-F 8a-4p: [phone]

## 2023-11-14 NOTE — TELEPHONE ENCOUNTER
ECG shows sinus rhythm HR 68, , QTc 467 with TWI v2-v6 and twave flattening in II-avF, this is unchanged from previous ECG.      OK to stay on sotalol, QT is NOT prolonged.  Previous intervals 4/22 showed , QTc 465.    Pt has also requested info on help on quitting drinking.  Alcoholics Anonymous is an option, I will also put in a chem dep referral, this is in the form of a mental health visit.  Order placed.      Pls update pt.

## 2023-11-20 ENCOUNTER — TELEPHONE (OUTPATIENT)
Dept: INTERNAL MEDICINE | Facility: CLINIC | Age: 43
End: 2023-11-20
Payer: COMMERCIAL

## 2023-11-20 NOTE — TELEPHONE ENCOUNTER
Needs to be signed by Dr. Bolton. not an order.    Needs Dr. Bolton's updated signature on file for medicare guidelines

## 2023-11-20 NOTE — TELEPHONE ENCOUNTER
Forms Request  Name of form/paperwork: [employer]   Have you been seen for this request:   Do we have the form: ON DR MCDONOUGH'S DESK  When is form needed by: WHEN DONE  How would you like the form returned: [phone]  Patient Notified form requests are processed in 3-5 business days: YES    Okay to leave a detailed message?

## 2023-12-05 ENCOUNTER — HOSPITAL ENCOUNTER (OUTPATIENT)
Dept: CARDIOLOGY | Facility: CLINIC | Age: 43
Discharge: HOME | End: 2023-12-05
Attending: PHYSICIAN ASSISTANT | Admitting: PHYSICIAN ASSISTANT
Payer: COMMERCIAL

## 2023-12-05 DIAGNOSIS — I50.20 HFREF (HEART FAILURE WITH REDUCED EJECTION FRACTION) (H): ICD-10-CM

## 2023-12-05 LAB — LVEF ECHO: NORMAL

## 2023-12-05 PROCEDURE — 999N000208 ECHOCARDIOGRAM COMPLETE

## 2023-12-05 PROCEDURE — 255N000002 HC RX 255 OP 636: Performed by: PHYSICIAN ASSISTANT

## 2023-12-05 PROCEDURE — 93306 TTE W/DOPPLER COMPLETE: CPT | Mod: 26 | Performed by: INTERNAL MEDICINE

## 2023-12-05 RX ADMIN — HUMAN ALBUMIN MICROSPHERES AND PERFLUTREN 3 ML: 10; .22 INJECTION, SOLUTION INTRAVENOUS at 09:22

## 2023-12-07 ENCOUNTER — LAB (OUTPATIENT)
Dept: LAB | Facility: CLINIC | Age: 43
End: 2023-12-07
Payer: COMMERCIAL

## 2023-12-07 DIAGNOSIS — I50.20 HFREF (HEART FAILURE WITH REDUCED EJECTION FRACTION) (H): ICD-10-CM

## 2023-12-07 LAB
ANION GAP SERPL CALCULATED.3IONS-SCNC: 10 MMOL/L (ref 7–15)
BUN SERPL-MCNC: 18.7 MG/DL (ref 6–20)
CALCIUM SERPL-MCNC: 9.7 MG/DL (ref 8.6–10)
CHLORIDE SERPL-SCNC: 101 MMOL/L (ref 98–107)
CREAT SERPL-MCNC: 1.28 MG/DL (ref 0.67–1.17)
DEPRECATED HCO3 PLAS-SCNC: 25 MMOL/L (ref 22–29)
EGFRCR SERPLBLD CKD-EPI 2021: 71 ML/MIN/1.73M2
GLUCOSE SERPL-MCNC: 108 MG/DL (ref 70–99)
POTASSIUM SERPL-SCNC: 4.8 MMOL/L (ref 3.4–5.3)
SODIUM SERPL-SCNC: 136 MMOL/L (ref 135–145)

## 2023-12-07 PROCEDURE — 80048 BASIC METABOLIC PNL TOTAL CA: CPT

## 2023-12-07 PROCEDURE — 36415 COLL VENOUS BLD VENIPUNCTURE: CPT

## 2023-12-22 ENCOUNTER — MYC MEDICAL ADVICE (OUTPATIENT)
Dept: CARDIOLOGY | Facility: CLINIC | Age: 43
End: 2023-12-22

## 2023-12-22 DIAGNOSIS — I50.20 HFREF (HEART FAILURE WITH REDUCED EJECTION FRACTION) (H): Primary | ICD-10-CM

## 2023-12-26 DIAGNOSIS — G47.01 INSOMNIA DUE TO MEDICAL CONDITION: ICD-10-CM

## 2023-12-26 RX ORDER — BUMETANIDE 1 MG/1
1 TABLET ORAL DAILY
Qty: 30 TABLET | Refills: 11 | Status: SHIPPED | OUTPATIENT
Start: 2023-12-26

## 2023-12-26 NOTE — TELEPHONE ENCOUNTER
[facility]: Heart Failure Care Coordination   Follow-Up Outreach     Situation/Background:      Chief Complaint   Patient presents with    CORE     Farxiga question and Weight gain       Current GDMT:   --Lasix 20 mg PRN; has been taking 40 mg daily x 1.5 weeks. Took 3 pills today 12/26  --Spironolactone 25 mg daily  --Entresto  mg 1 tablet BID       Current potassium chloride dose: none   Other pertinent information: Stopped Farxiga in 10/2023 due to insurance cost    Assessment:      Recent home weights:   12/26/23 176 lbs  9/20  172  7/21  169  6/16  169 lbs 4 oz  1/13  163  12/27/22 158 lbs     Baseline weight: TBD        Remote monitoring: No    States he is SOB with activity. Has a cough but no phlegm. Can't sleep flat but thinks this is more do with his back.     Intervention/Plan:      Will await provider response. Pt has been having insurance issues which has limited his medications.     RN CC routing to provider for review. Route to GAURAV Rodriguez.    Future Appointments   Date Time Provider Department Center   2/5/2024 12:00 AM GIL DCR2 [facility] PSA CLIN   3/5/2024  2:15 PM Oli Way MD [facility] PSA CLIN   3/26/2024  2:00 PM Wang Herrera PA-C PHSLP F[facility] Sleep PH     Yvette Davison RN BAN   10:46 AM 12/26/2023    CORE nurse line M-F 8a-4p: [phone]

## 2023-12-26 NOTE — TELEPHONE ENCOUNTER
Wt gain likely from being off of farxiga + holidays.  Will restart that or jardiance when insurance improved.    Since lasix doesn't seem to be working as well, stop lasix, start bumex 1 mg daily.    Ivory Serrano PA-C 12/26/2023 1:20 PM

## 2023-12-26 NOTE — TELEPHONE ENCOUNTER
[facility] Heart - CORE Clinic    -Rx sent to [facility] in [place]. [employer] Message sent to Jorge L with recommendations.     Yvette Davison RN BAN   2:23 PM 12/26/2023    CORE nurse line M-F 8a-4p: [phone]

## 2023-12-27 RX ORDER — TRAZODONE HYDROCHLORIDE 50 MG/1
50-100 TABLET, FILM COATED ORAL AT BEDTIME
Qty: 180 TABLET | Refills: 1 | Status: SHIPPED | OUTPATIENT
Start: 2023-12-27 | End: 2024-06-20

## 2024-02-01 ENCOUNTER — MYC MEDICAL ADVICE (OUTPATIENT)
Dept: CARDIOLOGY | Facility: CLINIC | Age: 44
End: 2024-02-01
Payer: COMMERCIAL

## 2024-02-01 DIAGNOSIS — I50.20 HFREF (HEART FAILURE WITH REDUCED EJECTION FRACTION) (H): ICD-10-CM

## 2024-02-01 RX ORDER — METOPROLOL SUCCINATE 100 MG/1
100 TABLET, EXTENDED RELEASE ORAL AT BEDTIME
Qty: 90 TABLET | Refills: 3 | Status: SHIPPED | OUTPATIENT
Start: 2024-02-01 | End: 2024-02-01

## 2024-02-01 RX ORDER — METOPROLOL SUCCINATE 100 MG/1
100 TABLET, EXTENDED RELEASE ORAL AT BEDTIME
Qty: 90 TABLET | Refills: 3 | Status: SHIPPED | OUTPATIENT
Start: 2024-02-01

## 2024-02-01 RX ORDER — METOPROLOL SUCCINATE 200 MG/1
200 TABLET, EXTENDED RELEASE ORAL AT BEDTIME
Qty: 30 TABLET | Refills: 11 | Status: SHIPPED | OUTPATIENT
Start: 2024-02-01 | End: 2024-02-01 | Stop reason: DRUGHIGH

## 2024-02-01 NOTE — TELEPHONE ENCOUNTER
[facility] Heart - CORE Clinic    Verbally reviewed with GAURAV Rodriguez. She would like him to take Toprol  mg once daily. Updated Rx sent to [facility]. Will update the pt via [employer] message.     Yvette Davison RN BAN   2:00 PM 2/1/2024    CORE nurse line M-F 8a-4p: [phone]

## 2024-02-01 NOTE — TELEPHONE ENCOUNTER
[facility] Heart - CORE Clinic    Pt confirmed he is taking Sotalol 80 mg BID and Toprol  mg daily.     Will route to GAURAV Rodriguez for recs on continuing at current dose.     Future Appointments   Date Time Provider Department Center   2/2/2024  9:30 AM Christal Mendoza APRN CNP TMFMOB MHFV WBTM   2/5/2024 12:00 AM GIL [address] PSA CLIN   3/5/2024  2:15 PM Oli Way MD [facility] PSA CLIN   3/26/2024  2:00 PM Wang Herrera PA-C PHSLP FV Sleep PH       Yvette Davison RN BAN   12:31 PM 2/1/2024    CORE nurse line M-YONI 8a-4p: [phone]

## 2024-02-01 NOTE — TELEPHONE ENCOUNTER
Received refill request for:  Toprol XL  Last OV was: 6/16/23  Labs: 12/7/23 to be repeated at follow-up appt with Dr. Way on 3/5/24; orders placed for repeat BMP. My Chart message sent to update pt that he needs a lab prior to his appt.     Future Appointments   Date Time Provider Department Center   2/2/2024  9:30 AM Christal Mendoza APRN CNP TMFMOB MHFV WBTM   2/5/2024 12:[facility]   3/5/2024  2:15 PM Oli Way MD [facility] PSA CLIN   3/26/2024  2:00 PM Wang Herrera PA-C PHSLP FV Sleep PH     New script sent to: [facility] Cardiology Refill Guideline reviewed.  Medication meets criteria for refill.       MALU Marcelino   10:35 AM 2/1/2024    CORE nurse line M-F 8a-4p: [phone]

## 2024-02-01 NOTE — TELEPHONE ENCOUNTER
[facility] Heart - CORE Clinic    Updated Rx to Toprol  mg once daily. Will route message to GAURAV Rodriguez to see if pt should continue 200 mg daily instead of 100 mg once daily as instructed on 11/14/23. Jorge L was also instructed to start on Sotolal 80 mg twice daily. Awaiting confirmation from the pt if he started this medication and got the repeat EKG.     Yvette Davison RN BAN   11:55 AM 2/1/2024    CORE nurse line M-F 8a-4p: [phone]

## 2024-02-05 ENCOUNTER — ANCILLARY PROCEDURE (OUTPATIENT)
Dept: CARDIOLOGY | Facility: CLINIC | Age: 44
End: 2024-02-05
Attending: INTERNAL MEDICINE
Payer: COMMERCIAL

## 2024-02-05 DIAGNOSIS — Z95.810 ICD (IMPLANTABLE CARDIOVERTER-DEFIBRILLATOR), SINGLE, IN SITU: ICD-10-CM

## 2024-02-05 DIAGNOSIS — I42.8 NON-ISCHEMIC CARDIOMYOPATHY (H): ICD-10-CM

## 2024-02-05 PROCEDURE — 93296 REM INTERROG EVL PM/IDS: CPT | Performed by: INTERNAL MEDICINE

## 2024-02-05 PROCEDURE — 93295 DEV INTERROG REMOTE 1/2/MLT: CPT | Performed by: INTERNAL MEDICINE

## 2024-02-06 LAB
MDC_IDC_EPISODE_DTM: NORMAL
MDC_IDC_EPISODE_DURATION: 13 S
MDC_IDC_EPISODE_DURATION: 15 S
MDC_IDC_EPISODE_DURATION: 18 S
MDC_IDC_EPISODE_DURATION: 18 S
MDC_IDC_EPISODE_DURATION: 30 S
MDC_IDC_EPISODE_DURATION: 32 S
MDC_IDC_EPISODE_DURATION: 34 S
MDC_IDC_EPISODE_DURATION: 35 S
MDC_IDC_EPISODE_DURATION: 36 S
MDC_IDC_EPISODE_DURATION: 42 S
MDC_IDC_EPISODE_DURATION: 54 S
MDC_IDC_EPISODE_DURATION: 64 S
MDC_IDC_EPISODE_DURATION: 70 S
MDC_IDC_EPISODE_ID: NORMAL
MDC_IDC_EPISODE_TYPE: NORMAL
MDC_IDC_EPISODE_TYPE_INDUCED: NO
MDC_IDC_EPISODE_VENDOR_TYPE: NORMAL
MDC_IDC_LEAD_CONNECTION_STATUS: NORMAL
MDC_IDC_LEAD_IMPLANT_DT: NORMAL
MDC_IDC_LEAD_LOCATION: NORMAL
MDC_IDC_LEAD_LOCATION_DETAIL_1: NORMAL
MDC_IDC_LEAD_MFG: NORMAL
MDC_IDC_LEAD_MODEL: NORMAL
MDC_IDC_LEAD_POLARITY_TYPE: NORMAL
MDC_IDC_LEAD_SERIAL: NORMAL
MDC_IDC_MSMT_BATTERY_DTM: NORMAL
MDC_IDC_MSMT_BATTERY_REMAINING_LONGEVITY: 180 MO
MDC_IDC_MSMT_BATTERY_REMAINING_PERCENTAGE: 100 %
MDC_IDC_MSMT_BATTERY_STATUS: NORMAL
MDC_IDC_MSMT_CAP_CHARGE_DTM: NORMAL
MDC_IDC_MSMT_CAP_CHARGE_TIME: 10.1 S
MDC_IDC_MSMT_CAP_CHARGE_TYPE: NORMAL
MDC_IDC_MSMT_LEADCHNL_RV_IMPEDANCE_VALUE: 785 OHM
MDC_IDC_MSMT_LEADCHNL_RV_PACING_THRESHOLD_AMPLITUDE: 0.7 V
MDC_IDC_MSMT_LEADCHNL_RV_PACING_THRESHOLD_PULSEWIDTH: 0.4 MS
MDC_IDC_PG_IMPLANT_DTM: NORMAL
MDC_IDC_PG_MFG: NORMAL
MDC_IDC_PG_MODEL: NORMAL
MDC_IDC_PG_SERIAL: NORMAL
MDC_IDC_PG_TYPE: NORMAL
MDC_IDC_SESS_CLINIC_NAME: NORMAL
MDC_IDC_SESS_DTM: NORMAL
MDC_IDC_SESS_TYPE: NORMAL
MDC_IDC_SET_BRADY_LOWRATE: 40 {BEATS}/MIN
MDC_IDC_SET_BRADY_MODE: NORMAL
MDC_IDC_SET_LEADCHNL_RV_PACING_AMPLITUDE: 2 V
MDC_IDC_SET_LEADCHNL_RV_PACING_CAPTURE_MODE: NORMAL
MDC_IDC_SET_LEADCHNL_RV_PACING_POLARITY: NORMAL
MDC_IDC_SET_LEADCHNL_RV_PACING_PULSEWIDTH: 0.4 MS
MDC_IDC_SET_LEADCHNL_RV_SENSING_ADAPTATION_MODE: NORMAL
MDC_IDC_SET_LEADCHNL_RV_SENSING_POLARITY: NORMAL
MDC_IDC_SET_LEADCHNL_RV_SENSING_SENSITIVITY: 0.6 MV
MDC_IDC_SET_ZONE_DETECTION_INTERVAL: 250 MS
MDC_IDC_SET_ZONE_DETECTION_INTERVAL: 300 MS
MDC_IDC_SET_ZONE_DETECTION_INTERVAL: 353 MS
MDC_IDC_SET_ZONE_STATUS: NORMAL
MDC_IDC_SET_ZONE_TYPE: NORMAL
MDC_IDC_SET_ZONE_VENDOR_TYPE: NORMAL
MDC_IDC_STAT_BRADY_DTM_END: NORMAL
MDC_IDC_STAT_BRADY_DTM_START: NORMAL
MDC_IDC_STAT_BRADY_RV_PERCENT_PACED: 0 %
MDC_IDC_STAT_EPISODE_RECENT_COUNT: 0
MDC_IDC_STAT_EPISODE_RECENT_COUNT: 13
MDC_IDC_STAT_EPISODE_RECENT_COUNT_DTM_END: NORMAL
MDC_IDC_STAT_EPISODE_RECENT_COUNT_DTM_START: NORMAL
MDC_IDC_STAT_EPISODE_TYPE: NORMAL
MDC_IDC_STAT_EPISODE_VENDOR_TYPE: NORMAL
MDC_IDC_STAT_TACHYTHERAPY_ATP_DELIVERED_RECENT: 0
MDC_IDC_STAT_TACHYTHERAPY_ATP_DELIVERED_TOTAL: 0
MDC_IDC_STAT_TACHYTHERAPY_RECENT_DTM_END: NORMAL
MDC_IDC_STAT_TACHYTHERAPY_RECENT_DTM_START: NORMAL
MDC_IDC_STAT_TACHYTHERAPY_SHOCKS_ABORTED_RECENT: 0
MDC_IDC_STAT_TACHYTHERAPY_SHOCKS_ABORTED_TOTAL: 0
MDC_IDC_STAT_TACHYTHERAPY_SHOCKS_DELIVERED_RECENT: 0
MDC_IDC_STAT_TACHYTHERAPY_SHOCKS_DELIVERED_TOTAL: 0
MDC_IDC_STAT_TACHYTHERAPY_TOTAL_DTM_END: NORMAL
MDC_IDC_STAT_TACHYTHERAPY_TOTAL_DTM_START: NORMAL

## 2024-02-17 ENCOUNTER — HEALTH MAINTENANCE LETTER (OUTPATIENT)
Age: 44
End: 2024-02-17

## 2024-02-28 DIAGNOSIS — N52.9 ERECTILE DYSFUNCTION, UNSPECIFIED ERECTILE DYSFUNCTION TYPE: ICD-10-CM

## 2024-02-28 RX ORDER — TADALAFIL 5 MG/1
5 TABLET ORAL EVERY 24 HOURS
Qty: 30 TABLET | Refills: 0 | Status: SHIPPED | OUTPATIENT
Start: 2024-02-28 | End: 2024-03-25

## 2024-02-28 NOTE — TELEPHONE ENCOUNTER
Chief Complaint                cellulitis, bilateral leg swelling.                     History of Present Illness                       Next leg position change the date the patient is a 59-year-old white male who?has a history of diabetes with neuropathy and Charcot joint disease sleep apnea hypertension coronary artery disease who complains of 10 days of swelling of the right leg with redness and warmth to his right knee. ?He has had previous episodes of this usually in the springtime. ?He noticed weeping of his right leg and he had some shaking chills he did not check his temperature. ?His sugars usually run in the 300s but this would have been in the 190 range. ?He noticed increasing pain to the right leg in conjunction with the redness. ?                           Review of Systems                       Constitutional positive for chills.           Skin chronic edema and chronic picking of the skin          Cardiac no chest pain no shortness of breath          Respiratory no cough dyspnea on walking outside          GI no constipation or diarrhea          Endocrine his sugars have been high          Immunological negative for immune problems          Neurologic he has to walk with a cane no seizures          Other review systems are negative                              Medical History                   Ongoing          No resolved problems                 Resolved          No resolved problems                 Diabetes?       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                     Family History               Family history is negative              Family history is negative for seizures                Social History                      Alcohol         [facility] Refill Guideline reviewed.  Medication meets criteria for refill.             Use: None. Longest Period of Abstinence: Quit in 1986, recovering alcoholic.                       Cultural/Synagogue Practices                Synagogue or Cultural Practices While in Hospital: No.                       Home/Environment                Alcohol Abuse in Household: No. Substance Abuse in Household: No. Smoker in Household: Yes.                       Substance Abuse                Use: Past. Type: Marijuana, Prescription medications. Last Use: Quit in 1986..                       Tobacco                Smoked/Smokeless Tobacco Last 30 Days: Yes. Use: Current every day smoker. Type: Cigars. Cigars/Pipes How Often: Daily. Year(s): 22.                           Allergies                No Known Medication Allergies                     Home Medications                aspirin, 81 mg           carvedilol, 12.5 mg           citalopram oral 20 mg tablet, 20 mg, 1 tab, Oral, Daily           cloNIDine oral 0.2 mg tablet, 0.2 mg, 1 tab, Oral, BID           famotidine oral 20 mg tablet, 20 mg, 1 tab, Oral, BID           furosemide oral 40 mg tablet, 40 mg, 1 tab, Oral, BID           gabapentin oral 600 mg tablet, 600 mg, 1 tab, Oral, BID           Klor-Con 10 oral tablet, extended release, 10 mEq, 1 tab, Oral, BID           Levemir, 45, Subcutaneous, BID           lisinopril oral 40 mg tablet, 40 mg, 1 tab, Oral, Daily           metFORMIN, 1000 mg, Oral, BID           NovoLOG, 20 unit, Subcutaneous, TID [before meals]           Plavix oral 75 mg tablet, 75 mg, 1 tab, Oral, Daily           pravastatin, 40 mg, Oral, Daily           traMADol oral 50 mg tablet, 50 mg, 1 tab, Oral, Q6H, PRN                     Physical Exam                Vitals & Measurements                 T:?    37.1? ?C ?            TMIN:?    36.6? ?C ?            TMAX:?    37.1? ?C ?            HR:?    71?(Monitored)?            RR:?    18?            BP:?      155    /     73    ?            SpO2:?    93%?            WT:?    127.9?kg?             WT:?    127.9?kg?                            Maximilian is normocephalic atraumatic obese white male in no acute distress?       neck is without JVD?        cardiac S1-S2 without murmur rub or gallop lungs are clear to A&P no wheezes?        abdomen is soft nontender positive bowel sounds        extremities is bilateral edema right greater than left black ulcers on his left leg and redness with weightbearing on his right leg and eschar over his right knee was swelling of the lobe in the right knee        Neurologic he is alert and oriented          Yes?he is moving all extremitie                                      Lab Results            His white count is elevated at 15.9 mild anemia with a hemoglobin of 12.3 his sed rate is 110 C-reactive protein 7.9 sugars are 129 his creatinine is 1.12 electrolytes are normal                  Radiology Results            His x-ray just shows osteoarthritis                  Assessment/Plan                Abscess of right knee            Abscess in the right knee will obtain a CAT scan to have surgery see him. ?Evaluate              CAD - Coronary artery disease            Coronary artery disease will keep him on Plavix appears stable at this time              Cellulitis of right knee            Cellulitis we have him on ceftriaxone and ID will evaluate              Cellulitis of right leg               Diabetes            Diabetes we have on ins insulin coverage              Diabetic neuropathy               HTN - Hypertension            Hypertension is on lisinopril and furosemide              Knee pain-swelling            For the swelling will check a venous Doppler to rule out a DVT              Sleep apnea            Now stable              Orders:           acetaminophen, 650 mg = 2 tab, Oral, Q6H, PRN pain, 04/11/17 9:42:00, Routine, Tab           acetaminophen-HYDROcodone, 1 tab, Oral, Q6H, PRN pain, 04/11/17 9:42:00, Routine, Tab           albuterol-ipratropium, 3 mL,  Nebulizer, Q4H, PRN shortness of breath or wheezing, 04/11/17 9:42:00, Routine, Inhalation           docusate, 100 mg = 1 cap, Oral, BID, PRN constipation, 04/11/17 9:42:00, Routine, Cap           heparin, 5,000 unit = 1 mL, Subcutaneous, Q8H, 04/11/17 14:00:00, Routine, Injection           insulin lispro, 1-8 units, Subcutaneous, QID [after meals & HS], 04/11/17 13:00:00, Routine, Injection, MEDIUM CORRECTION DOSE           morphine, 2 mg = 1 mL, IV Push, Q2H, PRN pain, 04/11/17 9:42:00, Routine, Injection           ondansetron, 4 mg = 2 mL, Slow IV Push, Q8H, PRN nausea, 04/11/17 9:42:00, Routine, Injection           zolpidem, 5 mg = 1 tab, Oral, Q Bedtime, PRN sleep, 04/11/17 9:42:00, Routine, Tab           Activity Patient           Cardiac Diet           Change Attending Physician           Consult Physician           CT EXT LOWR RT WO CON           Glucose Fingerstick Bedside           Intake and Output           Notify Physician If           Nurse to Initiate           VASC EXT LOWR VENOUS DPLX RT           Vital Signs per Unit Routine           Wound Care Consult                                Electronically Signed On 04/11/2017 10:26  __________________________________________________   LEONORA HERMOSILLO

## 2024-03-05 ENCOUNTER — OFFICE VISIT (OUTPATIENT)
Dept: CARDIOLOGY | Facility: CLINIC | Age: 44
End: 2024-03-05
Attending: PHYSICIAN ASSISTANT
Payer: COMMERCIAL

## 2024-03-05 VITALS
HEIGHT: 66 IN | OXYGEN SATURATION: 100 % | HEART RATE: 82 BPM | DIASTOLIC BLOOD PRESSURE: 67 MMHG | BODY MASS INDEX: 27.32 KG/M2 | RESPIRATION RATE: 17 BRPM | SYSTOLIC BLOOD PRESSURE: 106 MMHG | WEIGHT: 170 LBS

## 2024-03-05 DIAGNOSIS — I48.0 PAROXYSMAL ATRIAL FIBRILLATION (H): ICD-10-CM

## 2024-03-05 DIAGNOSIS — I50.20 HFREF (HEART FAILURE WITH REDUCED EJECTION FRACTION) (H): ICD-10-CM

## 2024-03-05 DIAGNOSIS — I47.10 SVT (SUPRAVENTRICULAR TACHYCARDIA) (H): ICD-10-CM

## 2024-03-05 PROCEDURE — 99214 OFFICE O/P EST MOD 30 MIN: CPT | Performed by: INTERNAL MEDICINE

## 2024-03-05 NOTE — LETTER
3/5/2024    Lauren Bolton MD  [address] [facility]  [address]  [facility] [address]    RE: Dipak Swartz       Dear Colleague,     I had the pleasure of seeing Dipak Swartz in the [facility].    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  Unfortunately, despite optimization of LV function, ejection fraction remained less than 30%, prompting a single-chamber ICD implantation for primary prevention.     Subsequently, the patient was noted to have paroxysmal atrial fibrillation and atrial flutter on his device interrogations associated with symptoms of palpitation.     I had the pleasure of meeting Jorge L for the first time in  August of 2022.  We discussed at length about options going forward, including antiarrhythmic drug versus ablation for rhythm control strategy.  Given his young age, Jorge L opted for the latter.  The patient underwent uneventful isolation of pulmonary veins and ablation of induced right sided typical atrial flutter a month later in 9/22.     Since then, he is doing quite well.  Ejection fraction has improved slightly with EF near 40% now.  no AF noted on device interrogations but ?PSVT or ST at 170 bpm with longest episode <10 min. On sotalol 80 mg bid along with metoprolol. I am assuming sotalol is for that reason as there is no clear documentation that I found.     Pt wanted to know if ok to take extra bumex when feeling bloated from eating out. I told him to check with Ivory More. In the mean time I would like to stop Sotalol and reassess. It's been a year and half since ablatio and so far no AF noted. Somewhat optimistic about long term outcome.              Review of Systems  "    12-pt ROS is negative except for as noted in the HPI.          Physical Exam     Vitals: /67   Pulse 82   Resp 17   Ht 1.676 m (5' 6\")   Wt 77.1 kg (170 lb)   SpO2 100%   BMI 27.44 kg/m    Wt Readings from Last 10 Encounters:   03/05/24 77.1 kg (170 lb)   08/17/23 76.7 kg (169 lb)   08/03/23 70.3 kg (155 lb)   07/21/23 76.7 kg (169 lb)   06/16/23 76.8 kg (169 lb 4.8 oz)   03/22/23 77.4 kg (170 lb 9.6 oz)   01/13/23 74.1 kg (163 lb 4.8 oz)   12/27/22 71.7 kg (158 lb)   12/23/22 73 kg (161 lb)   12/06/22 74.6 kg (164 lb 6.4 oz)       Constitutional:  Patient is pleasant, alert, cooperative, and in NAD.  HEENT:  NCAT. PERRLA. EOM's intact.   Neck:  CVP appears normal. No carotid bruits.   Pulmonary: Normal respiratory effort. CTAB.   Cardiac: RRR, normal S1/S2, no S3/S4, no murmur or rub.   Abdomen:  Non-tender abdomen, no hepatosplenomegaly appreciated.   Vascular: Pulses in the upper and lower extremities are 2+ and equal bilaterally.  Extremities: No edema, erythema, cyanosis or tenderness appreciated.  Skin:  No rashes or lesions appreciated.   Neurological:  No gross motor or sensory deficits.   Psych: Appropriate affect.          Data   Labs reviewed:  Recent Labs   Lab Test 06/16/23  0859 02/07/23  1331 12/06/22  0844 08/09/22  1038 12/09/21  0558 01/09/18  0930 05/27/16  1454   * 204* 75  --    < >  --   --     114 108  --    < >  --   --    NHDL 172* 220* 91  --    < >  --   --    CHOL 280* 334* 199  --    < >  --   --    TRIG 69 79 79  --    < >  --   --    TSH  --   --   --   --   --  0.89 1.02   NTBNP  --   --  132 256  --   --   --    RUDDY  --   --   --   --   --  92  --     < > = values in this interval not displayed.       Lab Results   Component Value Date    WBC 7.7 12/27/2022    WBC 7.1 08/05/2020    RBC 4.50 12/27/2022    RBC 4.79 08/05/2020    HGB 14.3 12/27/2022    HGB 14.8 08/05/2020    HCT 43.0 12/27/2022    HCT 45.1 08/05/2020    MCV 96 12/27/2022    MCV 94 " 08/05/2020    MCH 31.8 12/27/2022    MCH 30.9 08/05/2020    MCHC 33.3 12/27/2022    MCHC 32.8 08/05/2020    RDW 13.2 12/27/2022    RDW 13.5 08/05/2020     12/27/2022     08/05/2020       Lab Results   Component Value Date     12/07/2023     08/05/2020    POTASSIUM 4.8 12/07/2023    POTASSIUM 4.1 12/06/2022    POTASSIUM 3.8 08/05/2020    CHLORIDE 101 12/07/2023    CHLORIDE 103 12/06/2022    CHLORIDE 105 08/05/2020    CO2 25 12/07/2023    CO2 28 12/06/2022    CO2 30 08/05/2020    ANIONGAP 10 12/07/2023    ANIONGAP 3 12/06/2022    ANIONGAP 3 08/05/2020     (H) 12/07/2023     (H) 12/06/2022    GLC 77 08/06/2020    BUN 18.7 12/07/2023    BUN 12 12/06/2022    BUN 14 08/05/2020    CR 1.28 (H) 12/07/2023    CR 1.12 08/05/2020    GFRESTIMATED 71 12/07/2023    GFRESTIMATED 82 08/05/2020    GFRESTBLACK >90 08/05/2020    SHAYNA 9.7 12/07/2023    SHAYNA 9.3 08/05/2020      Lab Results   Component Value Date    AST 22 12/10/2021    AST 13 08/05/2020    ALT 30 12/06/2022    ALT 26 08/05/2020       Lab Results   Component Value Date    A1C 5.4 06/16/2023    A1C 5.2 08/06/2020       Lab Results   Component Value Date    INR 0.92 12/06/2021            Problem List     Patient Active Problem List   Diagnosis    Tobacco abuse    Lateral knee pain, right    Injury of right knee, initial encounter    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 (H)    Moderate major depression (H)    JESSICA (generalized anxiety disorder)    HFrEF (heart failure with reduced ejection fraction) (H)    Nonischemic cardiomyopathy (H)    LV dysfunction            Medications     Current Outpatient Medications   Medication Sig Dispense Refill    bumetanide (BUMEX) 1 MG tablet Take 1 tablet (1 mg) by mouth daily 30 tablet 11    dapagliflozin (FARXIGA) 5 MG TABS tablet Take 1 tablet (5 mg) by mouth daily 90 tablet 3    metoprolol succinate ER (TOPROL XL) 100 MG 24 hr tablet Take 1 tablet (100 mg) by mouth at bedtime  90 tablet 3    Multiple Vitamins-Minerals (MENS MULTIVITAMIN) TABS Take 1 tablet by mouth daily      order for DME Equipment being ordered: right lower extremity prosthetic     [employer] [facility]  [phone] 1 each 0    sacubitril-valsartan (ENTRESTO)  MG per tablet Take 1 tablet by mouth 2 times daily 180 tablet 3    sotalol (BETAPACE) 80 MG tablet Take 1 tablet (80 mg) by mouth 2 times daily 60 tablet 11    spironolactone (ALDACTONE) 25 MG tablet Take 1 tablet (25 mg) by mouth daily 90 tablet 3    tadalafil (CIALIS) 5 MG tablet Take 1 tablet (5 mg) by mouth every 24 hours If not effective, can take an additional 5 mg 1 hour prior to sexual activity. 30 tablet 0    traZODone (DESYREL) 50 MG tablet TAKE ONE TO TWO TABLETS BY MOUTH AT BEDTIME 180 tablet 1            Past Medical History     Past Medical History:   Diagnosis Date    Accident on farm 08/01/2014    Alcohol abuse     Nonischemic cardiomyopathy (H)     Smoking      Past Surgical History:   Procedure Laterality Date    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    Broken Right arm  1992    CV HEART CATHETERIZATION WITH POSSIBLE INTERVENTION N/A 12/9/2021    Procedure: Heart Catheterization with Possible Intervention;  Surgeon: Leonard Granados MD;  Location:  HEART CARDIAC CATH LAB    EP ABLATION FOCAL AFIB N/A 9/22/2022    Procedure: Ablation Atrial Fibrilation;  Surgeon: Oli Way MD;  Location: [facility] CARDIAC CATH LAB    EP ICD INSERT SINGLE N/A 4/7/2022    Procedure: Implantable Cardioverter Defibrillator Device & Lead Implant - Single or Dual [[id number]];  Surgeon: Sasha Ellsworth MD;  Location:  HEART CARDIAC CATH LAB    LEG SURGERY Right 8/2014     No family history on file.  Social History     Socioeconomic History    Marital status:      Spouse name: Not on file    Number of children: Not on file    Years of education: Not on file    Highest education level: Not on file   Occupational History     Employer: UNKNOWN   Tobacco  Use    Smoking status: Former     Passive exposure: Never    Smokeless tobacco: Current     Types: Chew   Vaping Use    Vaping Use: Never used   Substance and Sexual Activity    Alcohol use: Yes     Alcohol/week: 0.0 standard drinks of alcohol     Comment: occ.    Drug use: No    Sexual activity: Yes     Partners: Female   Other Topics Concern    Parent/sibling w/ CABG, MI or angioplasty before 65F 55M? Not Asked   Social History Narrative    Not on file     Social Determinants of Health     Financial Resource Strain: Not on file   Food Insecurity: Not on file   Transportation Needs: Not on file   Physical Activity: Not on file   Stress: Not on file   Social Connections: Not on file   Interpersonal Safety: Not on file   Housing Stability: Not on file            Allergies   No known allergies    Today's clinic visit entailed:  The following tests were independently interpreted by me as noted in my documentation: device checks  30 minutes spent by me on the date of the encounter doing chart review, history and exam, documentation and further activities per the note  Provider  Link to [facility] Help Grid     The level of medical decision making during this visit was of moderate complexity.       Thank you for allowing me to participate in the care of your patient.      Sincerely,     Oli Romero MD     [facility]  cc:   Ivory Serrano PA-C  [address]  [place] [address]

## 2024-03-05 NOTE — PROGRESS NOTES
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  Unfortunately, despite optimization of LV function, ejection fraction remained less than 30%, prompting a single-chamber ICD implantation for primary prevention.     Subsequently, the patient was noted to have paroxysmal atrial fibrillation and atrial flutter on his device interrogations associated with symptoms of palpitation.     I had the pleasure of meeting Jorge L for the first time in  August of 2022.  We discussed at length about options going forward, including antiarrhythmic drug versus ablation for rhythm control strategy.  Given his young age, Jorge L opted for the latter.  The patient underwent uneventful isolation of pulmonary veins and ablation of induced right sided typical atrial flutter a month later in 9/22.     Since then, he is doing quite well.  Ejection fraction has improved slightly with EF near 40% now.  no AF noted on device interrogations but ?PSVT or ST at 170 bpm with longest episode <10 min. On sotalol 80 mg bid along with metoprolol. I am assuming sotalol is for that reason as there is no clear documentation that I found.     Pt wanted to know if ok to take extra bumex when feeling bloated from eating out. I told him to check with Ivory More. In the mean time I would like to stop Sotalol and reassess. It's been a year and half since ablatio and so far no AF noted. Somewhat optimistic about long term outcome.              Review of Systems     12-pt ROS is negative except for as noted in the HPI.          Physical Exam     Vitals: /67   Pulse 82   Resp 17   Ht 1.676 m (5' 6\")   Wt 77.1 kg (170 lb)   SpO2 100%   BMI 27.44 kg/m    Wt Readings " from Last 10 Encounters:   03/05/24 77.1 kg (170 lb)   08/17/23 76.7 kg (169 lb)   08/03/23 70.3 kg (155 lb)   07/21/23 76.7 kg (169 lb)   06/16/23 76.8 kg (169 lb 4.8 oz)   03/22/23 77.4 kg (170 lb 9.6 oz)   01/13/23 74.1 kg (163 lb 4.8 oz)   12/27/22 71.7 kg (158 lb)   12/23/22 73 kg (161 lb)   12/06/22 74.6 kg (164 lb 6.4 oz)       Constitutional:  Patient is pleasant, alert, cooperative, and in NAD.  HEENT:  NCAT. PERRLA. EOM's intact.   Neck:  CVP appears normal. No carotid bruits.   Pulmonary: Normal respiratory effort. CTAB.   Cardiac: RRR, normal S1/S2, no S3/S4, no murmur or rub.   Abdomen:  Non-tender abdomen, no hepatosplenomegaly appreciated.   Vascular: Pulses in the upper and lower extremities are 2+ and equal bilaterally.  Extremities: No edema, erythema, cyanosis or tenderness appreciated.  Skin:  No rashes or lesions appreciated.   Neurological:  No gross motor or sensory deficits.   Psych: Appropriate affect.          Data   Labs reviewed:  Recent Labs   Lab Test 06/16/23  0859 02/07/23  1331 12/06/22  0844 08/09/22  1038 12/09/21  0558 01/09/18  0930 05/27/16  1454   * 204* 75  --    < >  --   --     114 108  --    < >  --   --    NHDL 172* 220* 91  --    < >  --   --    CHOL 280* 334* 199  --    < >  --   --    TRIG 69 79 79  --    < >  --   --    TSH  --   --   --   --   --  0.89 1.02   NTBNP  --   --  132 256  --   --   --    RUDDY  --   --   --   --   --  92  --     < > = values in this interval not displayed.       Lab Results   Component Value Date    WBC 7.7 12/27/2022    WBC 7.1 08/05/2020    RBC 4.50 12/27/2022    RBC 4.79 08/05/2020    HGB 14.3 12/27/2022    HGB 14.8 08/05/2020    HCT 43.0 12/27/2022    HCT 45.1 08/05/2020    MCV 96 12/27/2022    MCV 94 08/05/2020    MCH 31.8 12/27/2022    MCH 30.9 08/05/2020    MCHC 33.3 12/27/2022    MCHC 32.8 08/05/2020    RDW 13.2 12/27/2022    RDW 13.5 08/05/2020     12/27/2022     08/05/2020       Lab Results   Component  Value Date     12/07/2023     08/05/2020    POTASSIUM 4.8 12/07/2023    POTASSIUM 4.1 12/06/2022    POTASSIUM 3.8 08/05/2020    CHLORIDE 101 12/07/2023    CHLORIDE 103 12/06/2022    CHLORIDE 105 08/05/2020    CO2 25 12/07/2023    CO2 28 12/06/2022    CO2 30 08/05/2020    ANIONGAP 10 12/07/2023    ANIONGAP 3 12/06/2022    ANIONGAP 3 08/05/2020     (H) 12/07/2023     (H) 12/06/2022    GLC 77 08/06/2020    BUN 18.7 12/07/2023    BUN 12 12/06/2022    BUN 14 08/05/2020    CR 1.28 (H) 12/07/2023    CR 1.12 08/05/2020    GFRESTIMATED 71 12/07/2023    GFRESTIMATED 82 08/05/2020    GFRESTBLACK >90 08/05/2020    SHAYNA 9.7 12/07/2023    SHAYNA 9.3 08/05/2020      Lab Results   Component Value Date    AST 22 12/10/2021    AST 13 08/05/2020    ALT 30 12/06/2022    ALT 26 08/05/2020       Lab Results   Component Value Date    A1C 5.4 06/16/2023    A1C 5.2 08/06/2020       Lab Results   Component Value Date    INR 0.92 12/06/2021            Problem List     Patient Active Problem List   Diagnosis    Tobacco abuse    Lateral knee pain, right    Injury of right knee, initial encounter    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 (H)    Moderate major depression (H)    JESSICA (generalized anxiety disorder)    HFrEF (heart failure with reduced ejection fraction) (H)    Nonischemic cardiomyopathy (H)    LV dysfunction            Medications     Current Outpatient Medications   Medication Sig Dispense Refill    bumetanide (BUMEX) 1 MG tablet Take 1 tablet (1 mg) by mouth daily 30 tablet 11    dapagliflozin (FARXIGA) 5 MG TABS tablet Take 1 tablet (5 mg) by mouth daily 90 tablet 3    metoprolol succinate ER (TOPROL XL) 100 MG 24 hr tablet Take 1 tablet (100 mg) by mouth at bedtime 90 tablet 3    Multiple Vitamins-Minerals (MENS MULTIVITAMIN) TABS Take 1 tablet by mouth daily      order for DME Equipment being ordered: right lower extremity prosthetic     [employer]  [phone] 1 each 0     sacubitril-valsartan (ENTRESTO)  MG per tablet Take 1 tablet by mouth 2 times daily 180 tablet 3    sotalol (BETAPACE) 80 MG tablet Take 1 tablet (80 mg) by mouth 2 times daily 60 tablet 11    spironolactone (ALDACTONE) 25 MG tablet Take 1 tablet (25 mg) by mouth daily 90 tablet 3    tadalafil (CIALIS) 5 MG tablet Take 1 tablet (5 mg) by mouth every 24 hours If not effective, can take an additional 5 mg 1 hour prior to sexual activity. 30 tablet 0    traZODone (DESYREL) 50 MG tablet TAKE ONE TO TWO TABLETS BY MOUTH AT BEDTIME 180 tablet 1            Past Medical History     Past Medical History:   Diagnosis Date    Accident on farm 08/01/2014    Alcohol abuse     Nonischemic cardiomyopathy (H)     Smoking      Past Surgical History:   Procedure Laterality Date    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    Broken Right arm  1992    CV HEART CATHETERIZATION WITH POSSIBLE INTERVENTION N/A 12/9/2021    Procedure: Heart Catheterization with Possible Intervention;  Surgeon: Leonard Granados MD;  Location: [facility] CARDIAC CATH LAB    EP ABLATION FOCAL AFIB N/A 9/22/2022    Procedure: Ablation Atrial Fibrilation;  Surgeon: Oli Way MD;  Location: [facility] CARDIAC CATH LAB    EP ICD INSERT SINGLE N/A 4/7/2022    Procedure: Implantable Cardioverter Defibrillator Device & Lead Implant - Single or Dual [[id number]];  Surgeon: Sasha Ellsworth MD;  Location:  HEART CARDIAC CATH LAB    LEG SURGERY Right 8/2014     No family history on file.  Social History     Socioeconomic History    Marital status:      Spouse name: Not on file    Number of children: Not on file    Years of education: Not on file    Highest education level: Not on file   Occupational History     Employer: UNKNOWN   Tobacco Use    Smoking status: Former     Passive exposure: Never    Smokeless tobacco: Current     Types: Chew   Vaping Use    Vaping Use: Never used   Substance and Sexual Activity    Alcohol use: Yes     Alcohol/week: 0.0 standard  drinks of alcohol     Comment: occ.    Drug use: No    Sexual activity: Yes     Partners: Female   Other Topics Concern    Parent/sibling w/ CABG, MI or angioplasty before 65F 55M? Not Asked   Social History Narrative    Not on file     Social Determinants of Health     Financial Resource Strain: Not on file   Food Insecurity: Not on file   Transportation Needs: Not on file   Physical Activity: Not on file   Stress: Not on file   Social Connections: Not on file   Interpersonal Safety: Not on file   Housing Stability: Not on file            Allergies   No known allergies    Today's clinic visit entailed:  The following tests were independently interpreted by me as noted in my documentation: device checks  30 minutes spent by me on the date of the encounter doing chart review, history and exam, documentation and further activities per the note  Provider  Link to MDM Help Grid     The level of medical decision making during this visit was of moderate complexity.

## 2024-03-11 DIAGNOSIS — I50.20 HFREF (HEART FAILURE WITH REDUCED EJECTION FRACTION) (H): ICD-10-CM

## 2024-03-11 RX ORDER — SPIRONOLACTONE 25 MG/1
25 TABLET ORAL DAILY
Qty: 90 TABLET | Refills: 4 | Status: SHIPPED | OUTPATIENT
Start: 2024-03-11

## 2024-03-25 ENCOUNTER — MYC MEDICAL ADVICE (OUTPATIENT)
Dept: CARDIOLOGY | Facility: CLINIC | Age: 44
End: 2024-03-25

## 2024-03-25 DIAGNOSIS — N52.9 ERECTILE DYSFUNCTION, UNSPECIFIED ERECTILE DYSFUNCTION TYPE: ICD-10-CM

## 2024-03-25 RX ORDER — TADALAFIL 5 MG/1
5 TABLET ORAL EVERY 24 HOURS
Qty: 30 TABLET | Refills: 1 | Status: SHIPPED | OUTPATIENT
Start: 2024-03-25 | End: 2024-06-03

## 2024-03-26 ENCOUNTER — MYC MEDICAL ADVICE (OUTPATIENT)
Dept: CARDIOLOGY | Facility: CLINIC | Age: 44
End: 2024-03-26

## 2024-03-26 DIAGNOSIS — I48.0 PAROXYSMAL ATRIAL FIBRILLATION (H): Primary | ICD-10-CM

## 2024-03-26 NOTE — TELEPHONE ENCOUNTER
Donis Cabrera MD  You12 minutes ago (3:15 PM)     DI  The rhythm was irregular on his device interrogation.  It is difficult to know whether he has had true AF.  If he is okay with it, please have him wear a 7-day Zio, this can be mailed to him.  DI       Sent [employer] message asking pt if he would be willing to wear a 7 day monitor.  Will await his response.

## 2024-03-26 NOTE — TELEPHONE ENCOUNTER
Pt concerned about  episodes of afib that are claiming to be tracked on his new watch after stopping Sotalol. Per Rayna's note on 3/5/24 with patient:     In the mean time I would like to stop Sotalol and reassess. It's been a year and half since ablatio and so far no AF noted.         Also see Latrice's courtesy device check below.  Rhythm looks somewhat irregular, but because of a single lead, unable to confirm if pt truly in afib.     Will route to on-call MD to get their recommendations.

## 2024-03-26 NOTE — TELEPHONE ENCOUNTER
Transmission received, results as below:    Courtesy [employer] (S) ICD Remote Device Check    : 0%    Mode: VVI 40    Presenting Rhythm: irregular VS, suggestive of AF    Heart Rate: large variability    Leads: stable    Battery Status: 14.5 yrs    Atrial Arrhythmia: unable to obtain AF burden... single chamber [employer] device    Ventricular Arrhythmia: 3 HVR episodes logged... EGMs show ST vs AF w/ RVR lasting 13s to 1min, rates 160-170s    MALU Heredia        Presenting Rhythm:        Trend Graphs (Avg HR):

## 2024-03-28 ENCOUNTER — ORDERS ONLY (AUTO-RELEASED) (OUTPATIENT)
Dept: CARDIOLOGY | Facility: CLINIC | Age: 44
End: 2024-03-28

## 2024-03-28 DIAGNOSIS — I48.0 PAROXYSMAL ATRIAL FIBRILLATION (H): ICD-10-CM

## 2024-04-17 PROCEDURE — 93244 EXT ECG>48HR<7D REV&INTERPJ: CPT | Performed by: INTERNAL MEDICINE

## 2024-04-29 ENCOUNTER — MYC MEDICAL ADVICE (OUTPATIENT)
Dept: CARDIOLOGY | Facility: CLINIC | Age: 44
End: 2024-04-29

## 2024-05-01 NOTE — TELEPHONE ENCOUNTER
5/1/24 Msg recd from Dr Way    Please inform pt that the good news is that no AF was noted on zio. His reported symptoms were mostly related to frequent pacs (11%). There was one episode of psvt at 170 bpm for which he was asymptomatic. I think when he was on sotalol 80 mg bid he had less symptoms. If so please restart sotalol. Thanks, qp     Pt update via [employer] 3975 am

## 2024-05-08 ENCOUNTER — MYC MEDICAL ADVICE (OUTPATIENT)
Dept: CARDIOLOGY | Facility: CLINIC | Age: 44
End: 2024-05-08
Payer: COMMERCIAL

## 2024-05-08 DIAGNOSIS — I49.3 PVC'S (PREMATURE VENTRICULAR CONTRACTIONS): Primary | ICD-10-CM

## 2024-05-08 NOTE — TELEPHONE ENCOUNTER
[facility]     See [employer] messaging re: request for temporary coupon for Entresto. Message re: sotalol routed to EP team.    Yoly Delgado RN BSN   4:23 PM 05/08/24  Nurse line M-F 8a-4p: [phone]

## 2024-05-09 RX ORDER — SOTALOL HYDROCHLORIDE 80 MG/1
80 TABLET ORAL 2 TIMES DAILY
Qty: 180 TABLET | Refills: 1 | Status: SHIPPED | OUTPATIENT
Start: 2024-05-09

## 2024-05-09 NOTE — TELEPHONE ENCOUNTER
5/9/24 Rx for Sotalol 80 mg BID sen to [facility] in [place] . [facility]

## 2024-06-03 DIAGNOSIS — N52.9 ERECTILE DYSFUNCTION, UNSPECIFIED ERECTILE DYSFUNCTION TYPE: ICD-10-CM

## 2024-06-03 RX ORDER — TADALAFIL 5 MG/1
5 TABLET ORAL EVERY 24 HOURS
Qty: 30 TABLET | Refills: 2 | Status: SHIPPED | OUTPATIENT
Start: 2024-06-03 | End: 2024-08-21

## 2024-06-05 ENCOUNTER — ANCILLARY PROCEDURE (OUTPATIENT)
Dept: CARDIOLOGY | Facility: CLINIC | Age: 44
End: 2024-06-05
Attending: INTERNAL MEDICINE
Payer: COMMERCIAL

## 2024-06-05 DIAGNOSIS — I42.8 NON-ISCHEMIC CARDIOMYOPATHY (H): ICD-10-CM

## 2024-06-05 DIAGNOSIS — Z95.810 ICD (IMPLANTABLE CARDIOVERTER-DEFIBRILLATOR), SINGLE, IN SITU: ICD-10-CM

## 2024-06-05 LAB
MDC_IDC_EPISODE_DTM: NORMAL
MDC_IDC_EPISODE_DURATION: 103 S
MDC_IDC_EPISODE_DURATION: 11 S
MDC_IDC_EPISODE_DURATION: 128 S
MDC_IDC_EPISODE_DURATION: 14 S
MDC_IDC_EPISODE_DURATION: 16 S
MDC_IDC_EPISODE_DURATION: 17 S
MDC_IDC_EPISODE_DURATION: 18 S
MDC_IDC_EPISODE_DURATION: 187 S
MDC_IDC_EPISODE_DURATION: 20 S
MDC_IDC_EPISODE_DURATION: 22 S
MDC_IDC_EPISODE_DURATION: 23 S
MDC_IDC_EPISODE_DURATION: 35 S
MDC_IDC_EPISODE_DURATION: 40 S
MDC_IDC_EPISODE_DURATION: 47 S
MDC_IDC_EPISODE_DURATION: 55 S
MDC_IDC_EPISODE_DURATION: 66 S
MDC_IDC_EPISODE_DURATION: 81 S
MDC_IDC_EPISODE_DURATION: 89 S
MDC_IDC_EPISODE_ID: NORMAL
MDC_IDC_EPISODE_TYPE: NORMAL
MDC_IDC_EPISODE_TYPE_INDUCED: NO
MDC_IDC_EPISODE_VENDOR_TYPE: NORMAL
MDC_IDC_LEAD_CONNECTION_STATUS: NORMAL
MDC_IDC_LEAD_IMPLANT_DT: NORMAL
MDC_IDC_LEAD_LOCATION: NORMAL
MDC_IDC_LEAD_LOCATION_DETAIL_1: NORMAL
MDC_IDC_LEAD_MFG: NORMAL
MDC_IDC_LEAD_MODEL: NORMAL
MDC_IDC_LEAD_POLARITY_TYPE: NORMAL
MDC_IDC_LEAD_SERIAL: NORMAL
MDC_IDC_MSMT_BATTERY_DTM: NORMAL
MDC_IDC_MSMT_BATTERY_REMAINING_LONGEVITY: 174 MO
MDC_IDC_MSMT_BATTERY_REMAINING_PERCENTAGE: 100 %
MDC_IDC_MSMT_BATTERY_STATUS: NORMAL
MDC_IDC_MSMT_CAP_CHARGE_DTM: NORMAL
MDC_IDC_MSMT_CAP_CHARGE_TIME: 10.2 S
MDC_IDC_MSMT_CAP_CHARGE_TYPE: NORMAL
MDC_IDC_MSMT_LEADCHNL_RV_IMPEDANCE_VALUE: 768 OHM
MDC_IDC_MSMT_LEADCHNL_RV_PACING_THRESHOLD_AMPLITUDE: 0.7 V
MDC_IDC_MSMT_LEADCHNL_RV_PACING_THRESHOLD_PULSEWIDTH: 0.4 MS
MDC_IDC_PG_IMPLANT_DTM: NORMAL
MDC_IDC_PG_MFG: NORMAL
MDC_IDC_PG_MODEL: NORMAL
MDC_IDC_PG_SERIAL: NORMAL
MDC_IDC_PG_TYPE: NORMAL
MDC_IDC_SESS_CLINIC_NAME: NORMAL
MDC_IDC_SESS_DTM: NORMAL
MDC_IDC_SESS_TYPE: NORMAL
MDC_IDC_SET_BRADY_LOWRATE: 40 {BEATS}/MIN
MDC_IDC_SET_BRADY_MODE: NORMAL
MDC_IDC_SET_LEADCHNL_RV_PACING_AMPLITUDE: 2 V
MDC_IDC_SET_LEADCHNL_RV_PACING_CAPTURE_MODE: NORMAL
MDC_IDC_SET_LEADCHNL_RV_PACING_POLARITY: NORMAL
MDC_IDC_SET_LEADCHNL_RV_PACING_PULSEWIDTH: 0.4 MS
MDC_IDC_SET_LEADCHNL_RV_SENSING_ADAPTATION_MODE: NORMAL
MDC_IDC_SET_LEADCHNL_RV_SENSING_POLARITY: NORMAL
MDC_IDC_SET_LEADCHNL_RV_SENSING_SENSITIVITY: 0.6 MV
MDC_IDC_SET_ZONE_DETECTION_INTERVAL: 250 MS
MDC_IDC_SET_ZONE_DETECTION_INTERVAL: 300 MS
MDC_IDC_SET_ZONE_DETECTION_INTERVAL: 353 MS
MDC_IDC_SET_ZONE_STATUS: NORMAL
MDC_IDC_SET_ZONE_TYPE: NORMAL
MDC_IDC_SET_ZONE_VENDOR_TYPE: NORMAL
MDC_IDC_STAT_BRADY_DTM_END: NORMAL
MDC_IDC_STAT_BRADY_DTM_START: NORMAL
MDC_IDC_STAT_BRADY_RV_PERCENT_PACED: 0 %
MDC_IDC_STAT_EPISODE_RECENT_COUNT: 0
MDC_IDC_STAT_EPISODE_RECENT_COUNT: 23
MDC_IDC_STAT_EPISODE_RECENT_COUNT_DTM_END: NORMAL
MDC_IDC_STAT_EPISODE_RECENT_COUNT_DTM_START: NORMAL
MDC_IDC_STAT_EPISODE_TYPE: NORMAL
MDC_IDC_STAT_EPISODE_VENDOR_TYPE: NORMAL
MDC_IDC_STAT_TACHYTHERAPY_ATP_DELIVERED_RECENT: 0
MDC_IDC_STAT_TACHYTHERAPY_ATP_DELIVERED_TOTAL: 0
MDC_IDC_STAT_TACHYTHERAPY_RECENT_DTM_END: NORMAL
MDC_IDC_STAT_TACHYTHERAPY_RECENT_DTM_START: NORMAL
MDC_IDC_STAT_TACHYTHERAPY_SHOCKS_ABORTED_RECENT: 0
MDC_IDC_STAT_TACHYTHERAPY_SHOCKS_ABORTED_TOTAL: 0
MDC_IDC_STAT_TACHYTHERAPY_SHOCKS_DELIVERED_RECENT: 0
MDC_IDC_STAT_TACHYTHERAPY_SHOCKS_DELIVERED_TOTAL: 0
MDC_IDC_STAT_TACHYTHERAPY_TOTAL_DTM_END: NORMAL
MDC_IDC_STAT_TACHYTHERAPY_TOTAL_DTM_START: NORMAL

## 2024-06-05 PROCEDURE — 93295 DEV INTERROG REMOTE 1/2/MLT: CPT | Performed by: INTERNAL MEDICINE

## 2024-06-05 PROCEDURE — 93296 REM INTERROG EVL PM/IDS: CPT | Performed by: INTERNAL MEDICINE

## 2024-06-20 DIAGNOSIS — G47.01 INSOMNIA DUE TO MEDICAL CONDITION: ICD-10-CM

## 2024-06-20 RX ORDER — TRAZODONE HYDROCHLORIDE 50 MG/1
50-100 TABLET, FILM COATED ORAL AT BEDTIME
Qty: 180 TABLET | Refills: 0 | Status: SHIPPED | OUTPATIENT
Start: 2024-06-20

## 2024-06-24 DIAGNOSIS — I50.20 HFREF (HEART FAILURE WITH REDUCED EJECTION FRACTION) (H): ICD-10-CM

## 2024-06-24 RX ORDER — DAPAGLIFLOZIN 5 MG/1
5 TABLET, FILM COATED ORAL DAILY
Qty: 90 TABLET | Refills: 3 | Status: CANCELLED | OUTPATIENT
Start: 2024-06-24

## 2024-06-24 NOTE — TELEPHONE ENCOUNTER
"Received refill request for: farxiga  Last OV was: CORE visit 6/16/23 Ivory Serrano PAC, \"He will remain on full dose Entresto, Farxiga and spironolactone\" with plans to repeat echo in 6 months (12/2023)  --Then there is a note on 12/26/23 indicating that he had self d/c'd farxiga due to insurance cost issues. Ivory Serrano PAC advised that SGLT2 be resumed if/when these issues were resolved.  --3/2024 OV w/ Dr. Way, sotalol was stopped due to no afib on device interrogations, though appears Dr. Way wrote on 9/21/23 that he should start the sotalol to suppress his symptoms thought to be caused by SVT  --a repeat zio monitor showed no afib, but 11% burden of PACs and sotalol was restarted by Dr. Way  --next follow-up is ordered for EP visit 3/2025; no future CORE follow-up ordered.  Labs:     Future Appointments   Date Time Provider Department Center   8/19/2024  9:30 AM Emile Chapa DO [facility] BK SLEEP   10/2/2024 12:15 [facility]     New script sent to: pending    Need clarification from Jorge L re: if he's been taking farxiga/if cost issues have been resolved. Sqrlt message sent.    Once that info is received, will ask Ivory Serrano PAC for comment on when CORE/HF follow-up is needed. I see he's overdue for repeat BMP.    Yoly Delgado RN BSN   12:51 PM 06/24/24  Nurse line M-F 8a-4p: [phone]      "

## 2024-06-26 ENCOUNTER — TELEPHONE (OUTPATIENT)
Dept: SLEEP MEDICINE | Facility: CLINIC | Age: 44
End: 2024-06-26
Payer: COMMERCIAL

## 2024-06-26 NOTE — TELEPHONE ENCOUNTER
Pt was called to let them know that their appt on 8/19 is now an in person appt d/t changes in Dr Chapa's template. LVM for them to call back if that doesn't work for them    Kyung FLEMING    Sarah Jeffers

## 2024-07-16 ENCOUNTER — LAB (OUTPATIENT)
Dept: LAB | Facility: CLINIC | Age: 44
End: 2024-07-16
Payer: COMMERCIAL

## 2024-07-16 DIAGNOSIS — Z13.220 SCREENING FOR HYPERLIPIDEMIA: Primary | ICD-10-CM

## 2024-07-16 DIAGNOSIS — I50.20 HFREF (HEART FAILURE WITH REDUCED EJECTION FRACTION) (H): ICD-10-CM

## 2024-07-16 LAB
ERYTHROCYTE [DISTWIDTH] IN BLOOD BY AUTOMATED COUNT: 13.3 % (ref 10–15)
HCT VFR BLD AUTO: 41.2 % (ref 40–53)
HGB BLD-MCNC: 14.5 G/DL (ref 13.3–17.7)
MCH RBC QN AUTO: 34.1 PG (ref 26.5–33)
MCHC RBC AUTO-ENTMCNC: 35.2 G/DL (ref 31.5–36.5)
MCV RBC AUTO: 97 FL (ref 78–100)
PLATELET # BLD AUTO: 250 10E3/UL (ref 150–450)
RBC # BLD AUTO: 4.25 10E6/UL (ref 4.4–5.9)
WBC # BLD AUTO: 5.8 10E3/UL (ref 4–11)

## 2024-07-16 PROCEDURE — 85027 COMPLETE CBC AUTOMATED: CPT

## 2024-07-16 PROCEDURE — 80061 LIPID PANEL: CPT

## 2024-07-16 PROCEDURE — 80048 BASIC METABOLIC PNL TOTAL CA: CPT

## 2024-07-16 PROCEDURE — 84460 ALANINE AMINO (ALT) (SGPT): CPT

## 2024-07-16 PROCEDURE — 36415 COLL VENOUS BLD VENIPUNCTURE: CPT

## 2024-07-17 ENCOUNTER — PATIENT OUTREACH (OUTPATIENT)
Dept: CARDIOLOGY | Facility: CLINIC | Age: 44
End: 2024-07-17
Payer: COMMERCIAL

## 2024-07-17 DIAGNOSIS — I42.8 NONISCHEMIC CARDIOMYOPATHY (H): ICD-10-CM

## 2024-07-17 DIAGNOSIS — E78.5 HYPERLIPIDEMIA LDL GOAL <100: ICD-10-CM

## 2024-07-17 DIAGNOSIS — I50.20 HFREF (HEART FAILURE WITH REDUCED EJECTION FRACTION) (H): Primary | ICD-10-CM

## 2024-07-17 LAB
ALT SERPL W P-5'-P-CCNC: 56 U/L (ref 0–70)
ANION GAP SERPL CALCULATED.3IONS-SCNC: 9 MMOL/L (ref 7–15)
BUN SERPL-MCNC: 15.1 MG/DL (ref 6–20)
CALCIUM SERPL-MCNC: 9.9 MG/DL (ref 8.8–10.4)
CHLORIDE SERPL-SCNC: 96 MMOL/L (ref 98–107)
CHOLEST SERPL-MCNC: 301 MG/DL
CREAT SERPL-MCNC: 1.24 MG/DL (ref 0.67–1.17)
EGFRCR SERPLBLD CKD-EPI 2021: 74 ML/MIN/1.73M2
FASTING STATUS PATIENT QL REPORTED: ABNORMAL
FASTING STATUS PATIENT QL REPORTED: ABNORMAL
GLUCOSE SERPL-MCNC: 117 MG/DL (ref 70–99)
HCO3 SERPL-SCNC: 28 MMOL/L (ref 22–29)
HDLC SERPL-MCNC: 83 MG/DL
LDLC SERPL CALC-MCNC: 192 MG/DL
NONHDLC SERPL-MCNC: 218 MG/DL
POTASSIUM SERPL-SCNC: 4 MMOL/L (ref 3.4–5.3)
SODIUM SERPL-SCNC: 133 MMOL/L (ref 135–145)
TRIGL SERPL-MCNC: 130 MG/DL

## 2024-07-17 RX ORDER — ATORVASTATIN CALCIUM 20 MG/1
20 TABLET, FILM COATED ORAL DAILY
Qty: 90 TABLET | Refills: 3 | Status: SHIPPED | OUTPATIENT
Start: 2024-07-17

## 2024-07-17 NOTE — PROGRESS NOTES
[facility]: Heart Failure Care Coordination   Follow-Up Outreach     Situation/Background:      Chief Complaint   Patient presents with    Results    CORE     Last CORE visit 6/2023, euvolemic. In the interim Dr. Way adjusted Toprol XL and added sotalol given SVT on device check. 3/5/24 Dr. Way visit, no changes, ordered EP follow-up for 3/2025.  Jorge L recently resumed SGLT2 after being off this ~6 months due to an insurance issue.  PCP ordered lipid panel  which was drawn yesterday and shows . I see in Ivory Serrano PAC clinic note 12/2022 that Jorge L preferred to be off statin, which Ivory thought was reasonable given no known CAD and previous controlled lipid panel.    Assessment:        Ivroy Serrano PA-C  7/17/2024 10:08 AM CDT       Stable bmp, cholesterol is horrible pt was going to work on diet and exercise, but LDL is worse.    Recommend he work on diet, exercise and start atorvastatin 20 mg daily.  Repeat iipid and alt in 3 months.    Needs CORE f/u- ideally with me in about 3 months with lipids at that time.       Intervention/Plan:      Advised of Ivory Serrano PAC's recs via modu. Rx sent and orders for follow-up placed. Asked Jorge L to schedule follow-up via modu.      Future Appointments   Date Time Provider Department Center   8/19/2024  9:30 AM Emile Chapa DO [facility] BK SLEEP   10/2/2024 12:15 AM GIL DCR2 [facility] UMP PSA CLIN     Yoly Delgado RN BSN   1:15 PM 07/17/24  Nurse line M-F 8a-4p: [phone]

## 2024-08-21 DIAGNOSIS — N52.9 ERECTILE DYSFUNCTION, UNSPECIFIED ERECTILE DYSFUNCTION TYPE: ICD-10-CM

## 2024-08-21 RX ORDER — TADALAFIL 5 MG/1
5 TABLET ORAL EVERY 24 HOURS
Qty: 30 TABLET | Refills: 2 | Status: SHIPPED | OUTPATIENT
Start: 2024-08-21

## 2024-09-22 DIAGNOSIS — G47.01 INSOMNIA DUE TO MEDICAL CONDITION: ICD-10-CM

## 2024-09-23 RX ORDER — TRAZODONE HYDROCHLORIDE 50 MG/1
50-100 TABLET, FILM COATED ORAL AT BEDTIME
Qty: 180 TABLET | Refills: 0 | OUTPATIENT
Start: 2024-09-23

## 2024-10-02 ENCOUNTER — ANCILLARY PROCEDURE (OUTPATIENT)
Dept: CARDIOLOGY | Facility: CLINIC | Age: 44
End: 2024-10-02
Attending: INTERNAL MEDICINE

## 2024-10-02 DIAGNOSIS — I42.8 NON-ISCHEMIC CARDIOMYOPATHY (H): ICD-10-CM

## 2024-10-02 DIAGNOSIS — Z95.810 ICD (IMPLANTABLE CARDIOVERTER-DEFIBRILLATOR), SINGLE, IN SITU: ICD-10-CM

## 2024-10-02 PROCEDURE — 93295 DEV INTERROG REMOTE 1/2/MLT: CPT | Performed by: INTERNAL MEDICINE

## 2024-10-02 PROCEDURE — 93296 REM INTERROG EVL PM/IDS: CPT | Performed by: INTERNAL MEDICINE

## 2024-10-04 LAB
MDC_IDC_EPISODE_DTM: NORMAL
MDC_IDC_EPISODE_DURATION: 120 S
MDC_IDC_EPISODE_DURATION: 15 S
MDC_IDC_EPISODE_DURATION: 16 S
MDC_IDC_EPISODE_DURATION: 16 S
MDC_IDC_EPISODE_DURATION: 39 S
MDC_IDC_EPISODE_DURATION: 46 S
MDC_IDC_EPISODE_DURATION: 47 S
MDC_IDC_EPISODE_DURATION: 52 S
MDC_IDC_EPISODE_DURATION: 68 S
MDC_IDC_EPISODE_ID: NORMAL
MDC_IDC_EPISODE_TYPE: NORMAL
MDC_IDC_EPISODE_TYPE_INDUCED: NO
MDC_IDC_EPISODE_VENDOR_TYPE: NORMAL
MDC_IDC_LEAD_CONNECTION_STATUS: NORMAL
MDC_IDC_LEAD_IMPLANT_DT: NORMAL
MDC_IDC_LEAD_LOCATION: NORMAL
MDC_IDC_LEAD_LOCATION_DETAIL_1: NORMAL
MDC_IDC_LEAD_MFG: NORMAL
MDC_IDC_LEAD_MODEL: NORMAL
MDC_IDC_LEAD_POLARITY_TYPE: NORMAL
MDC_IDC_LEAD_SERIAL: NORMAL
MDC_IDC_MSMT_BATTERY_DTM: NORMAL
MDC_IDC_MSMT_BATTERY_REMAINING_LONGEVITY: 162 MO
MDC_IDC_MSMT_BATTERY_REMAINING_PERCENTAGE: 100 %
MDC_IDC_MSMT_BATTERY_STATUS: NORMAL
MDC_IDC_MSMT_CAP_CHARGE_DTM: NORMAL
MDC_IDC_MSMT_CAP_CHARGE_TIME: 10.2 S
MDC_IDC_MSMT_CAP_CHARGE_TYPE: NORMAL
MDC_IDC_MSMT_LEADCHNL_RV_IMPEDANCE_VALUE: 753 OHM
MDC_IDC_MSMT_LEADCHNL_RV_PACING_THRESHOLD_AMPLITUDE: 0.7 V
MDC_IDC_MSMT_LEADCHNL_RV_PACING_THRESHOLD_PULSEWIDTH: 0.4 MS
MDC_IDC_PG_IMPLANT_DTM: NORMAL
MDC_IDC_PG_MFG: NORMAL
MDC_IDC_PG_MODEL: NORMAL
MDC_IDC_PG_SERIAL: NORMAL
MDC_IDC_PG_TYPE: NORMAL
MDC_IDC_SESS_CLINIC_NAME: NORMAL
MDC_IDC_SESS_DTM: NORMAL
MDC_IDC_SESS_TYPE: NORMAL
MDC_IDC_SET_BRADY_LOWRATE: 40 {BEATS}/MIN
MDC_IDC_SET_BRADY_MODE: NORMAL
MDC_IDC_SET_LEADCHNL_RV_PACING_AMPLITUDE: 2 V
MDC_IDC_SET_LEADCHNL_RV_PACING_CAPTURE_MODE: NORMAL
MDC_IDC_SET_LEADCHNL_RV_PACING_POLARITY: NORMAL
MDC_IDC_SET_LEADCHNL_RV_PACING_PULSEWIDTH: 0.4 MS
MDC_IDC_SET_LEADCHNL_RV_SENSING_ADAPTATION_MODE: NORMAL
MDC_IDC_SET_LEADCHNL_RV_SENSING_POLARITY: NORMAL
MDC_IDC_SET_LEADCHNL_RV_SENSING_SENSITIVITY: 0.6 MV
MDC_IDC_SET_ZONE_DETECTION_INTERVAL: 250 MS
MDC_IDC_SET_ZONE_DETECTION_INTERVAL: 300 MS
MDC_IDC_SET_ZONE_DETECTION_INTERVAL: 353 MS
MDC_IDC_SET_ZONE_STATUS: NORMAL
MDC_IDC_SET_ZONE_TYPE: NORMAL
MDC_IDC_SET_ZONE_VENDOR_TYPE: NORMAL
MDC_IDC_STAT_BRADY_DTM_END: NORMAL
MDC_IDC_STAT_BRADY_DTM_START: NORMAL
MDC_IDC_STAT_BRADY_RV_PERCENT_PACED: 0 %
MDC_IDC_STAT_EPISODE_RECENT_COUNT: 0
MDC_IDC_STAT_EPISODE_RECENT_COUNT: 26
MDC_IDC_STAT_EPISODE_RECENT_COUNT_DTM_END: NORMAL
MDC_IDC_STAT_EPISODE_RECENT_COUNT_DTM_START: NORMAL
MDC_IDC_STAT_EPISODE_TYPE: NORMAL
MDC_IDC_STAT_EPISODE_VENDOR_TYPE: NORMAL
MDC_IDC_STAT_TACHYTHERAPY_ATP_DELIVERED_RECENT: 0
MDC_IDC_STAT_TACHYTHERAPY_ATP_DELIVERED_TOTAL: 0
MDC_IDC_STAT_TACHYTHERAPY_RECENT_DTM_END: NORMAL
MDC_IDC_STAT_TACHYTHERAPY_RECENT_DTM_START: NORMAL
MDC_IDC_STAT_TACHYTHERAPY_SHOCKS_ABORTED_RECENT: 0
MDC_IDC_STAT_TACHYTHERAPY_SHOCKS_ABORTED_TOTAL: 0
MDC_IDC_STAT_TACHYTHERAPY_SHOCKS_DELIVERED_RECENT: 0
MDC_IDC_STAT_TACHYTHERAPY_SHOCKS_DELIVERED_TOTAL: 0
MDC_IDC_STAT_TACHYTHERAPY_TOTAL_DTM_END: NORMAL
MDC_IDC_STAT_TACHYTHERAPY_TOTAL_DTM_START: NORMAL

## 2024-11-01 ENCOUNTER — ANCILLARY PROCEDURE (OUTPATIENT)
Dept: GENERAL RADIOLOGY | Facility: CLINIC | Age: 44
End: 2024-11-01
Attending: NURSE PRACTITIONER
Payer: MEDICAID

## 2024-11-01 ENCOUNTER — OFFICE VISIT (OUTPATIENT)
Dept: FAMILY MEDICINE | Facility: CLINIC | Age: 44
End: 2024-11-01
Payer: MEDICAID

## 2024-11-01 VITALS
WEIGHT: 174 LBS | HEIGHT: 66 IN | OXYGEN SATURATION: 98 % | RESPIRATION RATE: 18 BRPM | SYSTOLIC BLOOD PRESSURE: 115 MMHG | BODY MASS INDEX: 27.97 KG/M2 | TEMPERATURE: 98 F | HEART RATE: 75 BPM | DIASTOLIC BLOOD PRESSURE: 66 MMHG
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
## 2024-11-01 DIAGNOSIS — I50.20 HFREF (HEART FAILURE WITH REDUCED EJECTION FRACTION) (H): ICD-10-CM

## 2024-11-01 DIAGNOSIS — M54.6 ACUTE LEFT-SIDED THORACIC BACK PAIN: Primary | ICD-10-CM

## 2024-11-01 DIAGNOSIS — M54.6 ACUTE LEFT-SIDED THORACIC BACK PAIN: ICD-10-CM

## 2024-11-01 PROCEDURE — 99214 OFFICE O/P EST MOD 30 MIN: CPT | Performed by: NURSE PRACTITIONER

## 2024-11-01 PROCEDURE — 71101 X-RAY EXAM UNILAT RIBS/CHEST: CPT | Mod: TC | Performed by: RADIOLOGY

## 2024-11-01 PROCEDURE — 72070 X-RAY EXAM THORAC SPINE 2VWS: CPT | Mod: TC | Performed by: RADIOLOGY

## 2024-11-01 RX ORDER — OXYCODONE HYDROCHLORIDE 5 MG/1
5 TABLET ORAL EVERY 6 HOURS PRN
Qty: 12 TABLET | Refills: 0 | Status: SHIPPED | OUTPATIENT
Start: 2024-11-01 | End: 2024-11-04

## 2024-11-01 RX ORDER — DAPAGLIFLOZIN 5 MG/1
5 TABLET, FILM COATED ORAL DAILY
Qty: 90 TABLET | Refills: 2 | Status: SHIPPED | OUTPATIENT
Start: 2024-11-01

## 2024-11-01 RX ORDER — SACUBITRIL AND VALSARTAN 97; 103 MG/1; MG/1
1 TABLET, FILM COATED ORAL 2 TIMES DAILY
Qty: 180 TABLET | Refills: 2 | Status: SHIPPED | OUTPATIENT
Start: 2024-11-01

## 2024-11-01 RX ORDER — GABAPENTIN 100 MG/1
200 CAPSULE ORAL 3 TIMES DAILY
Qty: 28 CAPSULE | Refills: 0 | Status: SHIPPED | OUTPATIENT
Start: 2024-11-01

## 2024-11-01 RX ORDER — CYCLOBENZAPRINE HCL 10 MG
10 TABLET ORAL 3 TIMES DAILY PRN
Qty: 90 TABLET | Refills: 0 | Status: SHIPPED | OUTPATIENT
Start: 2024-11-01

## 2024-11-01 RX ORDER — IBUPROFEN 600 MG/1
600 TABLET, FILM COATED ORAL EVERY 6 HOURS PRN
Qty: 60 TABLET | Refills: 0 | Status: SHIPPED | OUTPATIENT
Start: 2024-11-01

## 2024-11-01 ASSESSMENT — PATIENT HEALTH QUESTIONNAIRE - PHQ9
SUM OF ALL RESPONSES TO PHQ QUESTIONS 1-9: 6
SUM OF ALL RESPONSES TO PHQ QUESTIONS 1-9: 6
10. IF YOU CHECKED OFF ANY PROBLEMS, HOW DIFFICULT HAVE THESE PROBLEMS MADE IT FOR YOU TO DO YOUR WORK, TAKE CARE OF THINGS AT HOME, OR GET ALONG WITH OTHER PEOPLE: NOT DIFFICULT AT ALL

## 2024-11-01 ASSESSMENT — ENCOUNTER SYMPTOMS: BACK PAIN: 1

## 2024-11-01 NOTE — PROGRESS NOTES
Assessment & Plan     Fall from stairs  Acute left-sided thoracic back pain  43-year-old male patient presenting today after an acute fall from 4 feet in the ER stairs, which he fell on his back left side, and acute reinjuring of this back last night when patient first heard an audible pop, his back pain radiates white hot sharp pain down the left side ending in the upper thigh, no effects on bowel or urination, pain is exacerbation with any movement of the upper body including deep breaths and hiccups, is experiencing a fair amount of muscle spasms.    Will do an x-ray today to rule out rib fractures as well as thoracic spinal fracture, referral is plan placed for a back specialist    Medications prescribed today including Flexeril for muscle spasms to be used as needed every 8 hours, gabapentin 1 to 2 capsules as needed every 8 hours for neuropathic pain, ibuprofen 600 mg to be used every 8 hours as needed for pain.  Discussed that ibuprofen and loop diuretics together can increase kidney disease.  Patient to promote high amount of oral intake of water.  Patient should not continue ibuprofen past 7 days without being seen by provider.  For his severe pain to help we will also give oxycodone as needed for severe breakthrough pain.  Discussed that gabapentin, cyclobenzaprine, oxycodone are all CNS depressants and can all cause additive sedation and should not be taken together.  Patient instructed to not drive after taking these medications    Patient verbalizes understanding and agrees with plan of care    - XR Thoracic Spine 2 Views  - Spine  Referral  - cyclobenzaprine (FLEXERIL) 10 MG tablet  Dispense: 90 tablet; Refill: 0  - gabapentin (NEURONTIN) 100 MG capsule  Dispense: 28 capsule; Refill: 0  - ibuprofen (ADVIL/MOTRIN) 600 MG tablet  Dispense: 60 tablet; Refill: 0  - oxyCODONE (ROXICODONE) 5 MG tablet  Dispense: 12 tablet; Refill: 0  - XR Ribs & Chest Left G/E 3 Views              BMI  Estimated  "body mass index is 28.08 kg/m  as calculated from the following:    Height as of this encounter: 1.676 m (5' 6\").    Weight as of this encounter: 78.9 kg (174 lb).   Weight management plan: Patient was referred to their PCP to discuss a diet and exercise plan.          Alicia Coats is a 43 year old, presenting for the following health issues:  Back Pain        11/1/2024     3:19 PM   Additional Questions   Roomed by florentin chino   Accompanied by self     History of Present Illness       Back Pain:  He presents for follow up of back pain. Patient's back pain is a new problem.    Original cause of back pain: a fall  First noticed back pain: in the last week  Patient feels back pain: constantlyLocation of back pain:  Right middle of back  Description of back pain: sharp  Back pain spreads: right buttocks, right thigh, right shoulder and right side of neck    Since patient first noticed back pain, pain is: rapidly worsening  Does back pain interfere with his job:  Yes  On a scale of 1-10 (10 being the worst), patient describes pain as:  9  What makes back pain worse: bending, coughing, certain positions, lying down, sitting, standing and twisting   Acupuncture: not tried  Acetaminophen: not helpful  Activity or exercise: not helpful  Chiropractor:  Not tried  Cold: not helpful  Muscle relaxants: helpful  Opioids: helpful  Rest: not helpful  Steroid Injection: not tried  Stretching: not helpful  Surgery: not tried  TENS unit: not tried  Topical pain relievers: not helpful  Other healthcare providers patient is seeing for back pain: None   He is taking medications regularly.     Feel off the stairs about 4 feet flat on the back - last night was on his left side lying down and rotated to the right and felt a lot of pain and a snap,   Feeling sharp hot pain, pain is worse when sitting, moving to sit or stand, rotating of the torso, deep breaths, hiccups, small quick movements cause pain, patient does not feel he is able to " "do large movements at this time    No changes in baseline urination                     Objective    /66 (BP Location: Right arm, Patient Position: Sitting, Cuff Size: Adult Large)   Pulse 75   Temp 98  F (36.7  C) (Oral)   Resp 18   Ht 1.676 m (5' 6\")   Wt 78.9 kg (174 lb)   SpO2 98%   BMI 28.08 kg/m    Body mass index is 28.08 kg/m .  Physical Exam  Musculoskeletal:      Thoracic back: Spasms and tenderness present. No swelling, edema, deformity, signs of trauma, lacerations or bony tenderness. Decreased range of motion. No scoliosis.        Back:       Comments: Pain on palpation of the above indicated region, no radiation of pain with palpation of the spinous processes,    Decreased range of motion of flexion, extension, lateral bending, and rotation in all directions.    Patient walks in it with an antalgic gait, and appears very stiff while walking.     Of note patient does have a prosthetic of the right leg                    Signed Electronically by: JOSE Sanders CNP    "

## 2024-11-04 ENCOUNTER — PATIENT OUTREACH (OUTPATIENT)
Dept: CARE COORDINATION | Facility: CLINIC | Age: 44
End: 2024-11-04
Payer: MEDICAID

## 2024-11-05 ENCOUNTER — MYC MEDICAL ADVICE (OUTPATIENT)
Dept: FAMILY MEDICINE | Facility: CLINIC | Age: 44
End: 2024-11-05
Payer: MEDICAID

## 2024-11-05 DIAGNOSIS — S22.32XD CLOSED FRACTURE OF ONE RIB OF LEFT SIDE WITH ROUTINE HEALING, SUBSEQUENT ENCOUNTER: Primary | ICD-10-CM

## 2024-11-05 NOTE — TELEPHONE ENCOUNTER
Hi Jorge L,  It looks like you do have a rib fracture on the ninth rib on the left side.  At this point there is nothing we can do as far as casting.  But I would take it easy and take over-the-counter analgesics such as Tylenol or ibuprofen as needed for pain around-the-clock 600 mg of ibuprofen every 8 hours as needed, alternating with 1000 mg of acetaminophen every 8 hours.  As long as we do not reinjure this, it should take 6 weeks minimum to see if this heal.  Please avoid strenuous lifting (do not lift anything over 10 pounds), and anything that would cause impact to the rib cage or back.     Please do take very deep breaths at least 3 times an hour.  Due to lack of deep inhalation we are starting to see some of your lungs collapse and this can cause pneumonia.     Please let me know if you have any questions or concerns.     Thank you for trusting us with your care. It was a pleasure seeing you.  JOSE Sanders CNP on 11/4/2024 at 6:52 PM

## 2024-11-06 ENCOUNTER — PATIENT OUTREACH (OUTPATIENT)
Dept: CARE COORDINATION | Facility: CLINIC | Age: 44
End: 2024-11-06
Payer: MEDICAID

## 2024-11-07 RX ORDER — TRAMADOL HYDROCHLORIDE 50 MG/1
50 TABLET ORAL EVERY 6 HOURS PRN
Qty: 10 TABLET | Refills: 0 | Status: SHIPPED | OUTPATIENT
Start: 2024-11-07 | End: 2024-11-10

## 2024-11-14 DIAGNOSIS — I49.3 PVC'S (PREMATURE VENTRICULAR CONTRACTIONS): ICD-10-CM

## 2024-11-14 RX ORDER — SOTALOL HYDROCHLORIDE 80 MG/1
80 TABLET ORAL 2 TIMES DAILY
Qty: 180 TABLET | Refills: 1 | Status: SHIPPED | OUTPATIENT
Start: 2024-11-14

## 2024-11-18 ENCOUNTER — MYC REFILL (OUTPATIENT)
Dept: FAMILY MEDICINE | Facility: CLINIC | Age: 44
End: 2024-11-18
Payer: MEDICAID

## 2024-11-18 DIAGNOSIS — G47.01 INSOMNIA DUE TO MEDICAL CONDITION: ICD-10-CM

## 2024-11-18 RX ORDER — TRAZODONE HYDROCHLORIDE 50 MG/1
50-100 TABLET, FILM COATED ORAL AT BEDTIME
Qty: 90 TABLET | Refills: 0 | Status: SHIPPED | OUTPATIENT
Start: 2024-11-18

## 2024-12-17 DIAGNOSIS — I50.20 HFREF (HEART FAILURE WITH REDUCED EJECTION FRACTION) (H): ICD-10-CM

## 2024-12-17 RX ORDER — BUMETANIDE 1 MG/1
1 TABLET ORAL DAILY
Qty: 30 TABLET | Refills: 3 | Status: SHIPPED | OUTPATIENT
Start: 2024-12-17

## 2025-01-06 ENCOUNTER — OFFICE VISIT (OUTPATIENT)
Dept: SLEEP MEDICINE | Facility: CLINIC | Age: 45
End: 2025-01-06
Payer: MEDICAID

## 2025-01-06 VITALS
HEART RATE: 85 BPM | OXYGEN SATURATION: 98 % | RESPIRATION RATE: 16 BRPM | WEIGHT: 172 LBS | HEIGHT: 67 IN | SYSTOLIC BLOOD PRESSURE: 132 MMHG | DIASTOLIC BLOOD PRESSURE: 93 MMHG | BODY MASS INDEX: 27 KG/M2

## 2025-01-06 DIAGNOSIS — G47.00 PERSISTENT INSOMNIA: ICD-10-CM

## 2025-01-06 DIAGNOSIS — G47.33 OSA (OBSTRUCTIVE SLEEP APNEA): Primary | ICD-10-CM

## 2025-01-06 PROCEDURE — G2211 COMPLEX E/M VISIT ADD ON: HCPCS | Performed by: INTERNAL MEDICINE

## 2025-01-06 PROCEDURE — 99214 OFFICE O/P EST MOD 30 MIN: CPT | Performed by: INTERNAL MEDICINE

## 2025-01-06 ASSESSMENT — SLEEP AND FATIGUE QUESTIONNAIRES
HOW LIKELY ARE YOU TO NOD OFF OR FALL ASLEEP WHILE LYING DOWN TO REST IN THE AFTERNOON WHEN CIRCUMSTANCES PERMIT: MODERATE CHANCE OF DOZING
HOW LIKELY ARE YOU TO NOD OFF OR FALL ASLEEP WHILE SITTING AND READING: HIGH CHANCE OF DOZING
HOW LIKELY ARE YOU TO NOD OFF OR FALL ASLEEP IN A CAR, WHILE STOPPED FOR A FEW MINUTES IN TRAFFIC: SLIGHT CHANCE OF DOZING
HOW LIKELY ARE YOU TO NOD OFF OR FALL ASLEEP WHEN YOU ARE A PASSENGER IN A CAR FOR AN HOUR WITHOUT A BREAK: SLIGHT CHANCE OF DOZING
HOW LIKELY ARE YOU TO NOD OFF OR FALL ASLEEP WHILE SITTING AND TALKING TO SOMEONE: SLIGHT CHANCE OF DOZING
HOW LIKELY ARE YOU TO NOD OFF OR FALL ASLEEP WHILE SITTING INACTIVE IN A PUBLIC PLACE: HIGH CHANCE OF DOZING
HOW LIKELY ARE YOU TO NOD OFF OR FALL ASLEEP WHILE WATCHING TV: HIGH CHANCE OF DOZING
HOW LIKELY ARE YOU TO NOD OFF OR FALL ASLEEP WHILE SITTING QUIETLY AFTER LUNCH WITHOUT ALCOHOL: HIGH CHANCE OF DOZING

## 2025-01-06 NOTE — PATIENT INSTRUCTIONS
"Jorge L to follow up with Primary Care provider regarding elevated blood pressure.    Please do not drive drowsy under any circumstances.  If you find yourself in a situation in which you are driving and feeling sleepy, please pull over right away in a safe place and take a quick nap before getting back on the road.    What is insomnia? -- Insomnia is a problem with sleep. People with insomnia have trouble falling or staying asleep, or they do not feel rested when they wake up. Insomnia is not about the number of hours of sleep a person gets. Everyone needs a different amount of sleep.  What are the symptoms of insomnia? -- People with insomnia often:  ?Have trouble falling or staying asleep  ?Feel tired or sleepy during the day   ?Forget things or have trouble thinking clearly  ?Get cranky, anxious, irritable, or depressed  ?Have less energy or interest in doing things  ?Make mistakes or get into accidents more often than normal  ?Worry about their lack of sleep  These symptoms can be so bad that they affect a person's relationships or work life. Plus, they can happen even in people who seem to be sleeping enough hours.  Are there tests I should have? -- Probably not. Most people with insomnia need no tests. Your doctor or nurse will probably be able to tell what is wrong just by talking to you. He or she might also ask you to keep a daily log for 1 to 2 weeks, where you keep track of how you sleep each night.  In some cases, people do need special sleep tests, such as \"polysomnography\" or \"actigraphy.\"  ?Polysomnography - Polysomnography is a test that usually lasts all night and that is done in a sleep lab. During the test, monitors are attached to your body to record movement, brain activity, breathing, and other body functions.  ?Actigraphy - Actigraphy records activity and movement with a monitor or motion detector that is usually worn on the wrist. The test is done at home, over several days and nights. It will " "record how much you actually sleep and when.  What can I do to improve my insomnia? -- You can follow good \"sleep hygiene.\" That means that you:  ?Sleep only long enough to feel rested and then get out of bed  ?Go to bed and get up at the same time every day  ?Do not try to force yourself to sleep. If you can't sleep, get out of bed and try again later.  ?Have coffee, tea, and other foods that have caffeine only in the morning  ?Avoid alcohol in the late afternoon, evening, and bedtime  ?Avoid smoking, especially in the evening  ?Keep your bedroom dark, cool, quiet, and free of reminders of work or other things that cause you stress  ?Solve problems you have before you go to bed  ?Exercise several days a week, but not right before bed  ?Avoid looking at phones or reading devices (\"e-books\") that give off light before bed. This can make it harder to fall asleep.  Other things that can improve sleep include:  ?Relaxation therapy, in which you focus on relaxing all the muscles in your body one by one  ?Working with a counselor or psychologist to deal with the problems that might be causing poor sleep  Should I see a doctor or nurse? -- Yes. If you have insomnia, and it is troubling you, see your doctor or nurse. He or she might have suggestions on how to fix the problem.  Are there medicines to help me sleep? -- Yes, there are medicines to help with sleep. But you should try them only after you try the techniques described above. You also should not use sleep medicines every night for long periods of time. Otherwise, you can become dependent on them for sleep.  Insomnia is sometimes caused by mental health problems, such as depression or anxiety. If that's the case for you, you might benefit from an antidepressant rather than a sleep aid. Antidepressants often improve sleep and can help with other worries, too.  Can I use alcohol to help me sleep? -- No, do not use alcohol as a sleep aid. Even though alcohol makes you " "sleepy at first, it disrupts sleep later in the night.    Consider reading \"Say Good Night to Insomnia\" by Ric Zhang PhD.        "

## 2025-01-06 NOTE — PROGRESS NOTES
"Additional 15 minutes on the date of service was spent performing the following:    -Preparing to see the patient  -Obtaining and/or reviewing separately obtained history   -Ordering medications, tests, or procedures   -Documenting clinical information in the electronic or other health record     Thank you for the opportunity to participate in the care of Dipak Swartz.     He is a 44 year old  male patient who comes to the sleep medicine clinic for review of sleep study results. The patient had a sleep study performed on 07/19/2023 (AHI= 6.9).  The patient has been following up with sleep psychology for CBT-I for the treatment of his insomnia.  He states that he still has difficulty initiating and maintaining sleep.  Some of the advice from sleep psychology did help but not all of it.    Assessment and Plan:  In summary Dipak Swartz is a 44 year old year old male here for review of sleep study results.  1. Obstructive Sleep Apnea  We had an extensive conversation to review the results of his sleep study and to  him on the importance of treating sleep apnea. We discussed the options of treatment with oral appliance versus CPAP. Patient decided to proceed with CPAP. He will start using the device as soon as he receives it with the intention to use if for the entire night. We discussed some tips to increase PAP tolerance as well as the normal curve of adaptation. CPAP is going to provide improved respiratory function during the night but it can cause some sleep disruption that tends to improve with continuous usage. He should schedule for return to the clinic after 7 weeks of PAP usage to review compliance and efficacy monitoring. Since the patient does not have any preference, we will use [employer] as the [employer].   - COMPREHENSIVE DME    2. Persistent insomnia  I will also give the patient a handout on insomnia strongly recommend that he read the book, \"good night to " "insomnia\".  - COMPREHENSIVE DME    Lab reviewed: Discussed with patient.    Sleep-Wake Cycle:    The patient likes to initiate sleep at around 10 PM with a sleep latency of around 20 minute to more than 2 hours. The patient has 6 nocturnal awakenings. Final wake up time is around 4-6 AM.    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 30 tablet 3    dapagliflozin (FARXIGA) 5 MG TABS tablet Take 1 tablet (5 mg) by mouth daily. 90 tablet 2    ibuprofen (ADVIL/MOTRIN) 600 MG tablet Take 1 tablet (600 mg) by mouth every 6 hours as needed for moderate pain. With food 60 tablet 0    metoprolol succinate ER (TOPROL XL) 100 MG 24 hr tablet Take 1 tablet (100 mg) by mouth at bedtime. 90 tablet 0    Multiple Vitamins-Minerals (MENS MULTIVITAMIN) TABS Take 1 tablet by mouth daily      order for DME Equipment being ordered: right lower extremity prosthetic     [employer]  [phone] 1 each 0    sacubitril-valsartan (ENTRESTO)  MG per tablet Take 1 tablet by mouth 2 times daily. 180 tablet 2    sotalol (BETAPACE) 80 MG tablet Take 1 tablet (80 mg) by mouth 2 times daily. 180 tablet 1    spironolactone (ALDACTONE) 25 MG tablet Take 1 tablet (25 mg) by mouth daily 90 tablet 4    tadalafil (CIALIS) 5 MG tablet Take 1 tablet (5 mg) by mouth every 24 hours. " "If not effective, can take an additional 5 mg 1 hour prior to sexual activity. 30 tablet 1    cyclobenzaprine (FLEXERIL) 10 MG tablet Take 1 tablet (10 mg) by mouth 3 times daily as needed for muscle spasms. (Patient not taking: Reported on 1/6/2025) 90 tablet 0    traZODone (DESYREL) 50 MG tablet Take 1-2 tablets ( mg) by mouth at bedtime. (Patient not taking: Reported on 1/6/2025) 90 tablet 0       Allergies   Allergen Reactions    No Known Allergies      Physical Exam:  BP (!) 132/93   Pulse 85   Resp 16   Ht 1.702 m (5' 7.01\")   Wt 78 kg (172 lb)   SpO2 98%   BMI 26.93 kg/m    BMI:Body mass index is 26.93 kg/m .   GEN: NAD, appropriate for age  Head: Normocephalic.  EYES: EOMI  Psych: normal mood, normal affect     Labs/Studies:  - We reviewed the results of the overnight study as described on the HPI.     Lab Results   Component Value Date    TSH 0.89 01/09/2018    TSH 1.02 05/27/2016     Lab Results   Component Value Date     (H) 07/16/2024     (H) 12/07/2023     Lab Results   Component Value Date    HGB 14.5 07/16/2024    HGB 14.3 12/27/2022     Lab Results   Component Value Date    BUN 15.1 07/16/2024    BUN 18.7 12/07/2023    CR 1.24 (H) 07/16/2024    CR 1.28 (H) 12/07/2023     Lab Results   Component Value Date    AST 22 12/10/2021    AST 28 12/06/2021    ALT 56 07/16/2024    ALT 30 12/06/2022    ALKPHOS 57 12/10/2021    ALKPHOS 61 12/06/2021    BILITOTAL 0.3 12/10/2021    BILITOTAL 0.7 12/06/2021       Recent Labs   Lab Test 07/16/24  1005 12/07/23  1035   * 136   POTASSIUM 4.0 4.8   CHLORIDE 96* 101   CO2 28 25   ANIONGAP 9 10   * 108*   BUN 15.1 18.7   CR 1.24* 1.28*   SHAYNA 9.9 9.7     Patient was strongly advised in AVS to avoid driving, operating any heavy machinery or other hazardous situations while drowsy or sleepy. Patient was counseled on the importance of driving while alert, to pull over if drowsy, or nap before getting into the vehicle if sleepy. "     Options for treatment and follow-up care were reviewed with the patient and/or guardian. Patient and/or guardian engaged in the decision making process and verbalized understanding of the options discussed and agreed with the final plan.     The longitudinal plan of care for the diagnosis(es)/condition(s) as documented were addressed during this visit. Due to the added complexity in care, I will continue to support the patient in the subsequent management and with ongoing continuity of care.     Patient verbalized understanding of these issues, agrees with the plan and all questions were answered today. Patient was given an opportuntity to voice any other symptoms or concerns not listed above. Patient did not have any other symptoms or concerns.      Emile Chapa DO  Board Certified in Internal Medicine and Sleep Medicine      (Note created with Dragon voice recognition and unintended spelling errors and word substitutions may occur)

## 2025-01-06 NOTE — NURSING NOTE
"Chief Complaint   Patient presents with    Study Results       Initial BP (!) 132/93   Pulse 85   Resp 16   Ht 1.702 m (5' 7.01\")   Wt 78 kg (172 lb)   SpO2 98%   BMI 26.93 kg/m   Estimated body mass index is 26.93 kg/m  as calculated from the following:    Height as of this encounter: 1.702 m (5' 7.01\").    Weight as of this encounter: 78 kg (172 lb).    Medication Reconciliation: complete    Neck circumference: 15.25 inches / 39 centimeters.    Darlin Coon CMA on 1/6/2025 at 12:50 PM      "

## 2025-02-03 ENCOUNTER — ANCILLARY PROCEDURE (OUTPATIENT)
Dept: CARDIOLOGY | Facility: CLINIC | Age: 45
End: 2025-02-03
Attending: INTERNAL MEDICINE
Payer: COMMERCIAL

## 2025-02-03 DIAGNOSIS — Z95.810 ICD (IMPLANTABLE CARDIOVERTER-DEFIBRILLATOR), SINGLE, IN SITU: ICD-10-CM

## 2025-02-03 DIAGNOSIS — I42.8 NON-ISCHEMIC CARDIOMYOPATHY (H): ICD-10-CM

## 2025-02-03 PROCEDURE — 93296 REM INTERROG EVL PM/IDS: CPT | Performed by: INTERNAL MEDICINE

## 2025-02-03 PROCEDURE — 93295 DEV INTERROG REMOTE 1/2/MLT: CPT | Performed by: INTERNAL MEDICINE

## 2025-02-06 LAB
MDC_IDC_EPISODE_DTM: NORMAL
MDC_IDC_EPISODE_DURATION: 116 S
MDC_IDC_EPISODE_DURATION: 12 S
MDC_IDC_EPISODE_DURATION: 144 S
MDC_IDC_EPISODE_DURATION: 16 S
MDC_IDC_EPISODE_DURATION: 18 S
MDC_IDC_EPISODE_DURATION: 22 S
MDC_IDC_EPISODE_DURATION: 23 S
MDC_IDC_EPISODE_DURATION: 60 S
MDC_IDC_EPISODE_DURATION: 64 S
MDC_IDC_EPISODE_DURATION: 71 S
MDC_IDC_EPISODE_ID: NORMAL
MDC_IDC_EPISODE_TYPE: NORMAL
MDC_IDC_EPISODE_TYPE_INDUCED: NO
MDC_IDC_EPISODE_VENDOR_TYPE: NORMAL
MDC_IDC_LEAD_CONNECTION_STATUS: NORMAL
MDC_IDC_LEAD_IMPLANT_DT: NORMAL
MDC_IDC_LEAD_LOCATION: NORMAL
MDC_IDC_LEAD_LOCATION_DETAIL_1: NORMAL
MDC_IDC_LEAD_MFG: NORMAL
MDC_IDC_LEAD_MODEL: NORMAL
MDC_IDC_LEAD_POLARITY_TYPE: NORMAL
MDC_IDC_LEAD_SERIAL: NORMAL
MDC_IDC_MSMT_BATTERY_DTM: NORMAL
MDC_IDC_MSMT_BATTERY_REMAINING_LONGEVITY: 168 MO
MDC_IDC_MSMT_BATTERY_REMAINING_PERCENTAGE: 100 %
MDC_IDC_MSMT_BATTERY_STATUS: NORMAL
MDC_IDC_MSMT_CAP_CHARGE_DTM: NORMAL
MDC_IDC_MSMT_CAP_CHARGE_TIME: 10.2 S
MDC_IDC_MSMT_CAP_CHARGE_TYPE: NORMAL
MDC_IDC_MSMT_LEADCHNL_RV_IMPEDANCE_VALUE: 749 OHM
MDC_IDC_MSMT_LEADCHNL_RV_PACING_THRESHOLD_AMPLITUDE: 0.8 V
MDC_IDC_MSMT_LEADCHNL_RV_PACING_THRESHOLD_PULSEWIDTH: 0.4 MS
MDC_IDC_PG_IMPLANT_DTM: NORMAL
MDC_IDC_PG_MFG: NORMAL
MDC_IDC_PG_MODEL: NORMAL
MDC_IDC_PG_SERIAL: NORMAL
MDC_IDC_PG_TYPE: NORMAL
MDC_IDC_SESS_CLINIC_NAME: NORMAL
MDC_IDC_SESS_DTM: NORMAL
MDC_IDC_SESS_TYPE: NORMAL
MDC_IDC_SET_BRADY_LOWRATE: 40 {BEATS}/MIN
MDC_IDC_SET_BRADY_MODE: NORMAL
MDC_IDC_SET_LEADCHNL_RV_PACING_AMPLITUDE: 2 V
MDC_IDC_SET_LEADCHNL_RV_PACING_CAPTURE_MODE: NORMAL
MDC_IDC_SET_LEADCHNL_RV_PACING_POLARITY: NORMAL
MDC_IDC_SET_LEADCHNL_RV_PACING_PULSEWIDTH: 0.4 MS
MDC_IDC_SET_LEADCHNL_RV_SENSING_ADAPTATION_MODE: NORMAL
MDC_IDC_SET_LEADCHNL_RV_SENSING_POLARITY: NORMAL
MDC_IDC_SET_LEADCHNL_RV_SENSING_SENSITIVITY: 0.6 MV
MDC_IDC_SET_ZONE_DETECTION_INTERVAL: 250 MS
MDC_IDC_SET_ZONE_DETECTION_INTERVAL: 300 MS
MDC_IDC_SET_ZONE_DETECTION_INTERVAL: 353 MS
MDC_IDC_SET_ZONE_STATUS: NORMAL
MDC_IDC_SET_ZONE_TYPE: NORMAL
MDC_IDC_SET_ZONE_VENDOR_TYPE: NORMAL
MDC_IDC_STAT_BRADY_DTM_END: NORMAL
MDC_IDC_STAT_BRADY_DTM_START: NORMAL
MDC_IDC_STAT_BRADY_RV_PERCENT_PACED: 0 %
MDC_IDC_STAT_EPISODE_RECENT_COUNT: 0
MDC_IDC_STAT_EPISODE_RECENT_COUNT: 29
MDC_IDC_STAT_EPISODE_RECENT_COUNT_DTM_END: NORMAL
MDC_IDC_STAT_EPISODE_RECENT_COUNT_DTM_START: NORMAL
MDC_IDC_STAT_EPISODE_TYPE: NORMAL
MDC_IDC_STAT_EPISODE_VENDOR_TYPE: NORMAL
MDC_IDC_STAT_TACHYTHERAPY_ATP_DELIVERED_RECENT: 0
MDC_IDC_STAT_TACHYTHERAPY_ATP_DELIVERED_TOTAL: 0
MDC_IDC_STAT_TACHYTHERAPY_RECENT_DTM_END: NORMAL
MDC_IDC_STAT_TACHYTHERAPY_RECENT_DTM_START: NORMAL
MDC_IDC_STAT_TACHYTHERAPY_SHOCKS_ABORTED_RECENT: 0
MDC_IDC_STAT_TACHYTHERAPY_SHOCKS_ABORTED_TOTAL: 0
MDC_IDC_STAT_TACHYTHERAPY_SHOCKS_DELIVERED_RECENT: 0
MDC_IDC_STAT_TACHYTHERAPY_SHOCKS_DELIVERED_TOTAL: 0
MDC_IDC_STAT_TACHYTHERAPY_TOTAL_DTM_END: NORMAL
MDC_IDC_STAT_TACHYTHERAPY_TOTAL_DTM_START: NORMAL

## 2025-03-05 ENCOUNTER — TELEPHONE (OUTPATIENT)
Dept: CARDIOLOGY | Facility: CLINIC | Age: 45
End: 2025-03-05
Payer: COMMERCIAL

## 2025-03-05 NOTE — TELEPHONE ENCOUNTER
Pt is scheduled for a [facility] appt on 03/14/25.      Pt with a history of cardiomyopathy..  Medication list reviewed and pt is not currently on Entresto, Jardiance, and Farxiga.    Please initiate coverage check for the above medications.            Caroline Burkett Stacy, [facility]  Caller: Unspecified (Today,  9:57 AM)  Hi there,    The patient has a commercial plan with [employer] for pharmacy benefits and cost are below    Entresto $200 per 30 days supply (non-form/higher tier)  Farxiga $25 per 30 days supply  Jardiance $25 per 30 days supply    This is a commercial plan so the patient can use copay assistance cards found easily online to bring the copays down to $10 for Farxiga and Jardiance.    Thank you for allowing me to help with your patient  Caroline Kwadwo [facility]  Pharmacy Discharge Liaison  [place]/[facility] locations  Available on teams and [employer]  Phone [phone] Fax [phone]    Disclaimer: Pharmacy test claims are estimates and may not reflect final costs. Suggested alternatives aim to be cost-effective but may not be therapeutically equivalent as this consult is informational and does not constitute medical advice. Clinical decisions should be made by qualified healthcare providers.          Previous Messages

## 2025-03-08 ENCOUNTER — HEALTH MAINTENANCE LETTER (OUTPATIENT)
Age: 45
End: 2025-03-08

## 2025-03-11 ENCOUNTER — LAB (OUTPATIENT)
Dept: LAB | Facility: CLINIC | Age: 45
End: 2025-03-11
Payer: COMMERCIAL

## 2025-03-11 DIAGNOSIS — I42.8 NON-ISCHEMIC CARDIOMYOPATHY (H): ICD-10-CM

## 2025-03-11 DIAGNOSIS — I50.20 HFREF (HEART FAILURE WITH REDUCED EJECTION FRACTION) (H): ICD-10-CM

## 2025-03-11 DIAGNOSIS — E78.5 HYPERLIPIDEMIA LDL GOAL <100: ICD-10-CM

## 2025-03-11 PROCEDURE — 80048 BASIC METABOLIC PNL TOTAL CA: CPT

## 2025-03-11 PROCEDURE — 82550 ASSAY OF CK (CPK): CPT

## 2025-03-12 LAB
ALT SERPL W P-5'-P-CCNC: 68 U/L (ref 0–70)
CHOLEST SERPL-MCNC: 262 MG/DL
FASTING STATUS PATIENT QL REPORTED: YES
HDLC SERPL-MCNC: 85 MG/DL
LDLC SERPL CALC-MCNC: 157 MG/DL
NONHDLC SERPL-MCNC: 177 MG/DL
TRIGL SERPL-MCNC: 101 MG/DL

## 2025-03-12 NOTE — RESULT ENCOUNTER NOTE
Will review or did review during clinic visit.  Please see progress note for plan.  Ivory Serrano PA-C 3/12/2025 12:41 PM

## 2025-03-13 ENCOUNTER — OFFICE VISIT (OUTPATIENT)
Dept: CARDIOLOGY | Facility: CLINIC | Age: 45
End: 2025-03-13
Attending: PHYSICIAN ASSISTANT
Payer: COMMERCIAL

## 2025-03-13 VITALS
BODY MASS INDEX: 27.64 KG/M2 | WEIGHT: 176.1 LBS | DIASTOLIC BLOOD PRESSURE: 65 MMHG | HEART RATE: 65 BPM | SYSTOLIC BLOOD PRESSURE: 121 MMHG | HEIGHT: 67 IN | OXYGEN SATURATION: 99 %

## 2025-03-13 DIAGNOSIS — I50.20 HFREF (HEART FAILURE WITH REDUCED EJECTION FRACTION) (H): ICD-10-CM

## 2025-03-13 DIAGNOSIS — E78.5 HYPERLIPIDEMIA LDL GOAL <100: Primary | ICD-10-CM

## 2025-03-13 DIAGNOSIS — I49.3 PVC'S (PREMATURE VENTRICULAR CONTRACTIONS): ICD-10-CM

## 2025-03-13 DIAGNOSIS — I42.8 NON-ISCHEMIC CARDIOMYOPATHY (H): ICD-10-CM

## 2025-03-13 LAB
ANION GAP SERPL CALCULATED.3IONS-SCNC: 12 MMOL/L (ref 7–15)
BUN SERPL-MCNC: 14.7 MG/DL (ref 6–20)
CALCIUM SERPL-MCNC: 9.7 MG/DL (ref 8.8–10.4)
CHLORIDE SERPL-SCNC: 100 MMOL/L (ref 98–107)
CK SERPL-CCNC: 93 U/L (ref 39–308)
CREAT SERPL-MCNC: 1.28 MG/DL (ref 0.67–1.17)
EGFRCR SERPLBLD CKD-EPI 2021: 71 ML/MIN/1.73M2
FASTING STATUS PATIENT QL REPORTED: YES
GLUCOSE SERPL-MCNC: 118 MG/DL (ref 70–99)
HCO3 SERPL-SCNC: 29 MMOL/L (ref 22–29)
POTASSIUM SERPL-SCNC: 3.5 MMOL/L (ref 3.4–5.3)
SODIUM SERPL-SCNC: 141 MMOL/L (ref 135–145)

## 2025-03-13 RX ORDER — METOPROLOL SUCCINATE 100 MG/1
100 TABLET, EXTENDED RELEASE ORAL AT BEDTIME
Qty: 90 TABLET | Refills: 3 | Status: SHIPPED | OUTPATIENT
Start: 2025-03-13

## 2025-03-13 RX ORDER — ROSUVASTATIN CALCIUM 40 MG/1
40 TABLET, COATED ORAL DAILY
Qty: 90 TABLET | Refills: 3 | Status: SHIPPED | OUTPATIENT
Start: 2025-03-13

## 2025-03-13 RX ORDER — BUMETANIDE 1 MG/1
1 TABLET ORAL DAILY
Qty: 90 TABLET | Refills: 3 | Status: SHIPPED | OUTPATIENT
Start: 2025-03-13

## 2025-03-13 RX ORDER — SACUBITRIL AND VALSARTAN 97; 103 MG/1; MG/1
1 TABLET, FILM COATED ORAL 2 TIMES DAILY
Qty: 180 TABLET | Refills: 3 | Status: SHIPPED | OUTPATIENT
Start: 2025-03-13

## 2025-03-13 RX ORDER — SPIRONOLACTONE 25 MG/1
25 TABLET ORAL DAILY
Qty: 90 TABLET | Refills: 3 | Status: SHIPPED | OUTPATIENT
Start: 2025-03-13

## 2025-03-13 RX ORDER — SOTALOL HYDROCHLORIDE 80 MG/1
80 TABLET ORAL 2 TIMES DAILY
Qty: 180 TABLET | Refills: 3 | Status: SHIPPED | OUTPATIENT
Start: 2025-03-13

## 2025-03-13 NOTE — LETTER
3/13/2025    Lauren Bolton MD  [address]  [place] [address]    RE: Dipak Swartz       Dear Colleague,     I had the [facility].    Cardiology Clinic Progress Note    Service Date: 2025    Primary Cardiologist: Had been Dr. Irizarry for general, EP is Dr. Way      Reason for Visit: Heart failure with reduced ejection fraction    HPI:   Dipak Swartz is a delightful 44-year-old gentleman with PMH significant for the followin.  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  2.  Nonischemic cardiomyopathy diagnosed when he presented with shortness of breath in 2021 with EF of 15%-20% at that time, with it remaining low in spite of medical therapy with last ejection fraction  showing EF 35 to 40%  3.  ICD in place.  4.  Dyslipidemia.  5.  Atrial fibrillation and atrial flutter noted on device.  Now status post PVI and flutter ablation on 2022.  6.  Mitral regurg improved to trace to mild with improving EF.  7.  Nonsustained VT on device check.  8.  Normal coronary arteries on angiogram dated .   9.  Daily EtOH, tobacco use    Jorge L comes in today doing okay.  He was off his meds for about 9 days as he just did not get around to refilling them and noticed that he started feeling worse after that he was puffy his arms were tingly is more short of breath he could not take big even smooth breaths.  He had some swelling in his legs.  He has chest pain but this is related to moving with his arm it is never related to activity.  This is in his left lateral pectoral area.  He has not had an ICD shock.  He is back on his meds and things are starting to feel better.  He is a little bit lightheaded since he restarted them but is overall doing okay.  Unfortunately he is drinking almost daily again about 6 a day 5 days a week.  He also notes significant muscle aches.    Social History:  He is .  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  Since he is in the restaurant all the time he drinks about 6 drinks 5 days a week is off 2 days.  His 2 kids 10 and 8.  His wife also works bartending.  Ongoing tobacco.  No street drugs.      Physical Exam:  /65 (BP Location: Left arm, Patient Position: Sitting)   Pulse 65   Ht 1.702 m (5' 7.01\")   Wt 79.9 kg (176 lb 1.6 oz)   SpO2 99%   BMI 27.57 kg/m     Well-developed well-nourished gentleman in no acute distress.  Normocephalic atraumatic.  Heart is regular in rate and rhythm with early beats consistent with PVCs.  Lungs are clear without wheezes rales or rhonchi.  Trace peripheral edema.  Neck veins are flat at 45 degrees.  Skin is warm and dry.    Data reviewed:  Echo 12/23 shows EF 35 to 40% trace MR    Last device check 2/6/2025 for ICD shows 0% A and V pacing with 23 high ventricular rate episodes suggesting sinus tach or SVT, no ATP or shocks.    BMP shows creatinine is 1.28, potassium 3.5, your BUN 14.7, glucose 118, sodium 141.    Assessment and Plan:  .  Heart failure with reduced ejection fraction and nonischemic cardiomyopathy diagnosed 12/21 when presented with shortness of breath.  EF was 15 to 20% on diagnosis and last echocardiogram was improved about 35 to 40% 2/23.  He currently has class II-III stage C heart failure and has been off his meds but things are improving back on meds.  Anticipate they will continue to improve but if not he should call us.  Would consider his dry weight 170 to 175 pounds.  He is on Farxiga, metoprolol, spironolactone and high dose Entresto.  He has previously had lightheadedness on higher dose metoprolol.  Will repeat an echocardiogram especially since he has been off meds and has heavy EtOH use to make sure he is not dropped back to a lower EF.  Strongly recommend he discontinue alcohol completely if not decreasing significantly.    2.  Muscle aches, potentially statin induced will add on a CK " and switch him to Crestor 40.    3.  Hyperlipidemia, likely familial with LDL still 157 HDL 85 total cholesterol 262 on atorvastatin.  As above switch to Crestor 40 and repeat lipid panel in 6 months.    4.  A-fib and flutter history of with history of PVI and flutter ablation 9/22 does not require anticoagulation as there has been none noted on his device check.    5.  ICD in place last device check 2/25 showing high rates more consistent with sinus tach and sinus supraventricular tachycardia no ATP or no shocks.    6.  Normal coronaries on angiogram 12/21.    7.  Tobacco use ongoing recommend complete cessation.    Thank you for allowing me to participate in this delightful patient's care.  He will get an echocardiogram then get a lipid panel and follow-up with Dr. Way and EP in about 6 months, sooner with concerns..      Ivory Serrano PA-C  [facility]     This note was written using Dragon voice recognition system, please excuse any misspelled names, or nonsensical words and call with any questions.      The longitudinal plan of care for the diagnosis(es)/condition(s) as documented were addressed during this visit. Due to the added complexity in care, I will continue to support Jorge L in the subsequent management and with ongoing continuity of care.    Orders this visit:  Orders Placed This Encounter   Procedures     CK total     Follow-Up with Cardiology     Echocardiogram Complete     Orders Placed This Encounter   Medications     metoprolol succinate ER (TOPROL XL) 100 MG 24 hr tablet     Sig: Take 1 tablet (100 mg) by mouth at bedtime.     Dispense:  90 tablet     Refill:  3     spironolactone (ALDACTONE) 25 MG tablet     Sig: Take 1 tablet (25 mg) by mouth daily.     Dispense:  90 tablet     Refill:  3     rosuvastatin (CRESTOR) 40 MG tablet     Sig: Take 1 tablet (40 mg) by mouth daily.     Dispense:  90 tablet     Refill:  3     sotalol (BETAPACE) 80 MG tablet     Sig: Take 1 tablet (80 mg) by  mouth 2 times daily.     Dispense:  180 tablet     Refill:  3     sacubitril-valsartan (ENTRESTO)  MG per tablet     Sig: Take 1 tablet by mouth 2 times daily.     Dispense:  180 tablet     Refill:  3     bumetanide (BUMEX) 1 MG tablet     Sig: Take 1 tablet (1 mg) by mouth daily.     Dispense:  90 tablet     Refill:  3     semaglutide (OZEMPIC) 2 MG/3ML pen     Sig: Inject 0.25 mg subcutaneously every 7 days.     Dispense:  3 mL     Refill:  3     Medications Discontinued During This Encounter   Medication Reason     ibuprofen (ADVIL/MOTRIN) 600 MG tablet Therapy completed (No AVS)     cyclobenzaprine (FLEXERIL) 10 MG tablet Therapy completed (No AVS)     atorvastatin (LIPITOR) 20 MG tablet      spironolactone (ALDACTONE) 25 MG tablet Reorder (No AVS)     sacubitril-valsartan (ENTRESTO)  MG per tablet      sotalol (BETAPACE) 80 MG tablet      bumetanide (BUMEX) 1 MG tablet      metoprolol succinate ER (TOPROL XL) 100 MG 24 hr tablet Reorder (No AVS)     Encounter Diagnoses   Name Primary?     Non-ischemic cardiomyopathy (H)      HFrEF (heart failure with reduced ejection fraction) (H)      PVC's (premature ventricular contractions)      Hyperlipidemia LDL goal <100 Yes       Current Medications:  Current Outpatient Medications   Medication Sig Dispense Refill     bumetanide (BUMEX) 1 MG tablet Take 1 tablet (1 mg) by mouth daily. 90 tablet 3     dapagliflozin (FARXIGA) 5 MG TABS tablet Take 1 tablet (5 mg) by mouth daily. 90 tablet 2     metoprolol succinate ER (TOPROL XL) 100 MG 24 hr tablet Take 1 tablet (100 mg) by mouth at bedtime. 90 tablet 3     Multiple Vitamins-Minerals (MENS MULTIVITAMIN) TABS Take 1 tablet by mouth daily       order for DME Equipment being ordered: right lower extremity prosthetic     [employer]  [phone] 1 each 0     rosuvastatin (CRESTOR) 40 MG tablet Take 1 tablet (40 mg) by mouth daily. 90 tablet 3     sacubitril-valsartan (ENTRESTO)  MG per tablet  Take 1 tablet by mouth 2 times daily. 180 tablet 3     semaglutide (OZEMPIC) 2 MG/3ML pen Inject 0.25 mg subcutaneously every 7 days. 3 mL 3     sotalol (BETAPACE) 80 MG tablet Take 1 tablet (80 mg) by mouth 2 times daily. 180 tablet 3     spironolactone (ALDACTONE) 25 MG tablet Take 1 tablet (25 mg) by mouth daily. 90 tablet 3     tadalafil (CIALIS) 5 MG tablet Take 1 tablet (5 mg) by mouth every 24 hours. If not effective, can take an additional 5 mg 1 hour prior to sexual activity. 30 tablet 1     traZODone (DESYREL) 50 MG tablet Take 1-2 tablets ( mg) by mouth at bedtime. 90 tablet 0       Allergies Reviewed and Updated:  Allergies   Allergen Reactions     No Known Allergies        Review of Systems:  Skin:        Eyes:       ENT:       Respiratory:  Positive for shortness of breath, dyspnea at rest, cough, sleep apnea  Cardiovascular:  Negative, palpitations, chest pain, edema, syncope or near-syncope Positive for, chest pain, edema, fatigue, lightheadedness, dizziness  Gastroenterology:      Genitourinary:       Musculoskeletal:  Negative back pain, neck pain, joint pain  Neurologic:  Positive for numbness or tingling of hands  Psychiatric:  Positive for sleep disturbances  Heme/Lymph/Imm:  Negative allergies  Endocrine:  Negative       Pertinent Past Medical, Surgical and Family History Reviewed and noted above.     CC  Ivory Serrano PA-C  [address]  [place] [address]      Thank you for allowing me to participate in the care of your patient.      Sincerely,     Ivory Serrano PA-C     [facility]  cc:   Ivory Serrano PA-C  [address]  [place] [address]

## 2025-03-13 NOTE — PROGRESS NOTES
Cardiology Clinic Progress Note    Service Date: 2025    Primary Cardiologist: Had been Dr. Irizarry for general, EP is Dr. Way      Reason for Visit: Heart failure with reduced ejection fraction    HPI:   Dipak Swartz is a delightful 44-year-old gentleman with PMH significant for the followin.  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  2.  Nonischemic cardiomyopathy diagnosed when he presented with shortness of breath in 2021 with EF of 15%-20% at that time, with it remaining low in spite of medical therapy with last ejection fraction  showing EF 35 to 40%  3.  ICD in place.  4.  Dyslipidemia.  5.  Atrial fibrillation and atrial flutter noted on device.  Now status post PVI and flutter ablation on 2022.  6.  Mitral regurg improved to trace to mild with improving EF.  7.  Nonsustained VT on device check.  8.  Normal coronary arteries on angiogram dated .   9.  Daily EtOH, tobacco use    Jorge L comes in today doing okay.  He was off his meds for about 9 days as he just did not get around to refilling them and noticed that he started feeling worse after that he was puffy his arms were tingly is more short of breath he could not take big even smooth breaths.  He had some swelling in his legs.  He has chest pain but this is related to moving with his arm it is never related to activity.  This is in his left lateral pectoral area.  He has not had an ICD shock.  He is back on his meds and things are starting to feel better.  He is a little bit lightheaded since he restarted them but is overall doing okay.  Unfortunately he is drinking almost daily again about 6 a day 5 days a week.  He also notes significant muscle aches.    Social History:  He is .  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  Since he is in the restaurant all the time he drinks about 6 drinks 5 days a week is off 2 days.  His 2 kids 10 and 8.  His wife also works bartending.  " Ongoing tobacco.  No street drugs.      Physical Exam:  /65 (BP Location: Left arm, Patient Position: Sitting)   Pulse 65   Ht 1.702 m (5' 7.01\")   Wt 79.9 kg (176 lb 1.6 oz)   SpO2 99%   BMI 27.57 kg/m     Well-developed well-nourished gentleman in no acute distress.  Normocephalic atraumatic.  Heart is regular in rate and rhythm with early beats consistent with PVCs.  Lungs are clear without wheezes rales or rhonchi.  Trace peripheral edema.  Neck veins are flat at 45 degrees.  Skin is warm and dry.    Data reviewed:  Echo 12/23 shows EF 35 to 40% trace MR    Last device check 2/6/2025 for ICD shows 0% A and V pacing with 23 high ventricular rate episodes suggesting sinus tach or SVT, no ATP or shocks.    BMP shows creatinine is 1.28, potassium 3.5, your BUN 14.7, glucose 118, sodium 141.    Assessment and Plan:  .  Heart failure with reduced ejection fraction and nonischemic cardiomyopathy diagnosed 12/21 when presented with shortness of breath.  EF was 15 to 20% on diagnosis and last echocardiogram was improved about 35 to 40% 2/23.  He currently has class II-III stage C heart failure and has been off his meds but things are improving back on meds.  Anticipate they will continue to improve but if not he should call us.  Would consider his dry weight 170 to 175 pounds.  He is on Farxiga, metoprolol, spironolactone and high dose Entresto.  He has previously had lightheadedness on higher dose metoprolol.  Will repeat an echocardiogram especially since he has been off meds and has heavy EtOH use to make sure he is not dropped back to a lower EF.  Strongly recommend he discontinue alcohol completely if not decreasing significantly.    2.  Muscle aches, potentially statin induced will add on a CK and switch him to Crestor 40.    3.  Hyperlipidemia, likely familial with LDL still 157 HDL 85 total cholesterol 262 on atorvastatin.  As above switch to Crestor 40 and repeat lipid panel in 6 months.    4.  A-fib " and flutter history of with history of PVI and flutter ablation 9/22 does not require anticoagulation as there has been none noted on his device check.    5.  ICD in place last device check 2/25 showing high rates more consistent with sinus tach and sinus supraventricular tachycardia no ATP or no shocks.    6.  Normal coronaries on angiogram 12/21.    7.  Tobacco use ongoing recommend complete cessation.    Thank you for allowing me to participate in this delightful patient's care.  He will get an echocardiogram then get a lipid panel and follow-up with Dr. Way and EP in about 6 months, sooner with concerns..      Ivory Serrano PA-C  [facility]     This note was written using Dragon voice recognition system, please excuse any misspelled names, or nonsensical words and call with any questions.      The longitudinal plan of care for the diagnosis(es)/condition(s) as documented were addressed during this visit. Due to the added complexity in care, I will continue to support Jorge L in the subsequent management and with ongoing continuity of care.    Orders this visit:  Orders Placed This Encounter   Procedures    CK total    Follow-Up with Cardiology    Echocardiogram Complete     Orders Placed This Encounter   Medications    metoprolol succinate ER (TOPROL XL) 100 MG 24 hr tablet     Sig: Take 1 tablet (100 mg) by mouth at bedtime.     Dispense:  90 tablet     Refill:  3    spironolactone (ALDACTONE) 25 MG tablet     Sig: Take 1 tablet (25 mg) by mouth daily.     Dispense:  90 tablet     Refill:  3    rosuvastatin (CRESTOR) 40 MG tablet     Sig: Take 1 tablet (40 mg) by mouth daily.     Dispense:  90 tablet     Refill:  3    sotalol (BETAPACE) 80 MG tablet     Sig: Take 1 tablet (80 mg) by mouth 2 times daily.     Dispense:  180 tablet     Refill:  3    sacubitril-valsartan (ENTRESTO)  MG per tablet     Sig: Take 1 tablet by mouth 2 times daily.     Dispense:  180 tablet     Refill:  3    bumetanide  (BUMEX) 1 MG tablet     Sig: Take 1 tablet (1 mg) by mouth daily.     Dispense:  90 tablet     Refill:  3    semaglutide (OZEMPIC) 2 MG/3ML pen     Sig: Inject 0.25 mg subcutaneously every 7 days.     Dispense:  3 mL     Refill:  3     Medications Discontinued During This Encounter   Medication Reason    ibuprofen (ADVIL/MOTRIN) 600 MG tablet Therapy completed (No AVS)    cyclobenzaprine (FLEXERIL) 10 MG tablet Therapy completed (No AVS)    atorvastatin (LIPITOR) 20 MG tablet     spironolactone (ALDACTONE) 25 MG tablet Reorder (No AVS)    sacubitril-valsartan (ENTRESTO)  MG per tablet     sotalol (BETAPACE) 80 MG tablet     bumetanide (BUMEX) 1 MG tablet     metoprolol succinate ER (TOPROL XL) 100 MG 24 hr tablet Reorder (No AVS)     Encounter Diagnoses   Name Primary?    Non-ischemic cardiomyopathy (H)     HFrEF (heart failure with reduced ejection fraction) (H)     PVC's (premature ventricular contractions)     Hyperlipidemia LDL goal <100 Yes       Current Medications:  Current Outpatient Medications   Medication Sig Dispense Refill    bumetanide (BUMEX) 1 MG tablet Take 1 tablet (1 mg) by mouth daily. 90 tablet 3    dapagliflozin (FARXIGA) 5 MG TABS tablet Take 1 tablet (5 mg) by mouth daily. 90 tablet 2    metoprolol succinate ER (TOPROL XL) 100 MG 24 hr tablet Take 1 tablet (100 mg) by mouth at bedtime. 90 tablet 3    Multiple Vitamins-Minerals (MENS MULTIVITAMIN) TABS Take 1 tablet by mouth daily      order for DME Equipment being ordered: right lower extremity prosthetic     [employer]  [phone] 1 each 0    rosuvastatin (CRESTOR) 40 MG tablet Take 1 tablet (40 mg) by mouth daily. 90 tablet 3    sacubitril-valsartan (ENTRESTO)  MG per tablet Take 1 tablet by mouth 2 times daily. 180 tablet 3    semaglutide (OZEMPIC) 2 MG/3ML pen Inject 0.25 mg subcutaneously every 7 days. 3 mL 3    sotalol (BETAPACE) 80 MG tablet Take 1 tablet (80 mg) by mouth 2 times daily. 180 tablet 3     spironolactone (ALDACTONE) 25 MG tablet Take 1 tablet (25 mg) by mouth daily. 90 tablet 3    tadalafil (CIALIS) 5 MG tablet Take 1 tablet (5 mg) by mouth every 24 hours. If not effective, can take an additional 5 mg 1 hour prior to sexual activity. 30 tablet 1    traZODone (DESYREL) 50 MG tablet Take 1-2 tablets ( mg) by mouth at bedtime. 90 tablet 0       Allergies Reviewed and Updated:  Allergies   Allergen Reactions    No Known Allergies        Review of Systems:  Skin:        Eyes:       ENT:       Respiratory:  Positive for shortness of breath, dyspnea at rest, cough, sleep apnea  Cardiovascular:  Negative, palpitations, chest pain, edema, syncope or near-syncope Positive for, chest pain, edema, fatigue, lightheadedness, dizziness  Gastroenterology:      Genitourinary:       Musculoskeletal:  Negative back pain, neck pain, joint pain  Neurologic:  Positive for numbness or tingling of hands  Psychiatric:  Positive for sleep disturbances  Heme/Lymph/Imm:  Negative allergies  Endocrine:  Negative       Pertinent Past Medical, Surgical and Family History Reviewed and noted above.     CC  Ivory Serrano, PA-C  [address]  [place] [address]

## 2025-03-13 NOTE — PATIENT INSTRUCTIONS
Call [facility] nurse for any questions or concerns Mon-Fri 8am-4pm:                                                #[phone]                                       For concerns after hours:                                               #[phone]     1: Medication changes: stop taking atorvastatin.   If it's approved, start ozempic 0.25 mg injection once a week.    Continue other medications.      2: Plan from today:   Reduce drinking significantly!  Echocardiogram in the next couple of weeks.    Please see Dr. Way in 6 months.

## 2025-03-13 NOTE — RESULT ENCOUNTER NOTE
Will review or did review during clinic visit.  Please see progress note for plan.  Ivory Serrano PA-C 3/13/2025 9:57 AM

## 2025-03-18 ENCOUNTER — TELEPHONE (OUTPATIENT)
Dept: CARDIOLOGY | Facility: CLINIC | Age: 45
End: 2025-03-18
Payer: COMMERCIAL

## 2025-03-18 NOTE — TELEPHONE ENCOUNTER
[facility]     Jorge L notified us that his insurance requires PA for semaglutide. Will ask PA team to assist. Appreciate their help!    Yoly Delgado RN BSN   9:49 AM 03/18/25  Nurse line M-F 8a-4p: [phone]

## 2025-03-20 NOTE — TELEPHONE ENCOUNTER
PA Initiation    Medication: OZEMPIC (0.25 OR 0.5 MG/DOSE) 2 MG/3ML SC SOPN  Insurance Company: [employer] - Phone [phone] Fax [phone]  Pharmacy Filling the Rx:    Filling Pharmacy Phone:    Filling Pharmacy Fax:    Start Date: 3/20/2025     Key [id number]

## 2025-03-20 NOTE — TELEPHONE ENCOUNTER
PRIOR AUTHORIZATION DENIED    Medication: OZEMPIC (0.25 OR 0.5 MG/DOSE) 2 MG/3ML SC SOPN  Insurance Company: [employer] - Phone [phone] Fax [phone]  Denial Date: 3/20/2025  Denial Reason(s):   Appeal Information:   Patient Notified: clinic to discuss with pt what they would like to do

## 2025-03-21 NOTE — TELEPHONE ENCOUNTER
[facility] -      [employer] message sent to pt requesting he discuss with his PCP.     Yvette Davison RN BAN   1:24 PM 3/21/2025    Nurse line M-F 8a-4p: [phone]

## 2025-03-21 NOTE — TELEPHONE ENCOUNTER
Pt without DMII, without CAD on angiogram.  IF he wants it for wt loss additional info from pcp pls.    Ivory Serrano PA-C 3/21/2025 1:22 PM

## 2025-03-31 ENCOUNTER — VIRTUAL VISIT (OUTPATIENT)
Dept: FAMILY MEDICINE | Facility: CLINIC | Age: 45
End: 2025-03-31
Payer: COMMERCIAL

## 2025-03-31 DIAGNOSIS — R73.03 PREDIABETES: ICD-10-CM

## 2025-03-31 DIAGNOSIS — I50.20 HFREF (HEART FAILURE WITH REDUCED EJECTION FRACTION) (H): ICD-10-CM

## 2025-03-31 DIAGNOSIS — G47.33 OSA (OBSTRUCTIVE SLEEP APNEA): ICD-10-CM

## 2025-03-31 DIAGNOSIS — E66.3 OVERWEIGHT (BMI 25.0-29.9): ICD-10-CM

## 2025-03-31 DIAGNOSIS — I42.8 NONISCHEMIC CARDIOMYOPATHY (H): Primary | ICD-10-CM

## 2025-03-31 PROCEDURE — 98006 SYNCH AUDIO-VIDEO EST MOD 30: CPT | Performed by: PHYSICIAN ASSISTANT

## 2025-03-31 ASSESSMENT — PATIENT HEALTH QUESTIONNAIRE - PHQ9
10. IF YOU CHECKED OFF ANY PROBLEMS, HOW DIFFICULT HAVE THESE PROBLEMS MADE IT FOR YOU TO DO YOUR WORK, TAKE CARE OF THINGS AT HOME, OR GET ALONG WITH OTHER PEOPLE: NOT DIFFICULT AT ALL
SUM OF ALL RESPONSES TO PHQ QUESTIONS 1-9: 0
SUM OF ALL RESPONSES TO PHQ QUESTIONS 1-9: 0

## 2025-03-31 NOTE — PROGRESS NOTES
Jorge L is a 44 year old who is being evaluated via a billable video visit.    What phone number would you like to be contacted at? [phone]  How would you like to obtain your AVS? MyChart      Assessment & Plan     Nonischemic cardiomyopathy (H)  Nonischemic cardiomyopathy s/p ICD placement secondary to primary prevention.  Last echocardiogram performed 12/2023 demonstrated an EF of 35 to 40% without any valvular dysfunction.  He continues on current medical management.  - tirzepatide-Weight Management (ZEPBOUND) 2.5 MG/0.5ML prefilled pen; Inject 0.5 mLs (2.5 mg) subcutaneously every 7 days for 4 doses.  - tirzepatide-Weight Management (ZEPBOUND) 5 MG/0.5ML prefilled pen; Inject 0.5 mLs (5 mg) subcutaneously every 7 days.    HFrEF (heart failure with reduced ejection fraction) (H)  Last cardiac echo December 2023 demonstrated an EF of 35 to 40% without any valvular dysfunction.  Continues on spironolactone, and Crestor, Farxiga, metoprolol, and Bume  He is interested in starting a GLP-1 to aid in preserving heart function.  He was prescribed Ozempic by cardiology but unfortunately this was declined.  He is also interested in starting this medication to aid in weight loss.  We will see if true his appetite would be covered.  Will start at 2.5 mg for 1 month then increase to 5 mg.  Side effects of the medication discussed.  - tirzepatide-Weight Management (ZEPBOUND) 2.5 MG/0.5ML prefilled pen; Inject 0.5 mLs (2.5 mg) subcutaneously every 7 days for 4 doses.  - tirzepatide-Weight Management (ZEPBOUND) 5 MG/0.5ML prefilled pen; Inject 0.5 mLs (5 mg) subcutaneously every 7 days.    ALEX (obstructive sleep apnea)  Recently diagnosed with obstructive sleep apnea.  Has not got his CPAP yet.  - tirzepatide-Weight Management (ZEPBOUND) 2.5 MG/0.5ML prefilled pen; Inject 0.5 mLs (2.5 mg) subcutaneously every 7 days for 4 doses.  - tirzepatide-Weight Management (ZEPBOUND) 5 MG/0.5ML prefilled pen; Inject 0.5 mLs (5 mg)  subcutaneously every 7 days.    Prediabetes  History of prediabetes.  Last A1c as of June 2023 was stable at 5.4.  A1c February 2022 elevated at 5.7.  Most recent fasting blood sugar is at 118.  He is to continue to watch diet and stay active.  Will have him stop in the clinic to do lab only and recheck an A1c as it has been over a year.  Most recent BMP stable.  - tirzepatide-Weight Management (ZEPBOUND) 2.5 MG/0.5ML prefilled pen; Inject 0.5 mLs (2.5 mg) subcutaneously every 7 days for 4 doses.  - tirzepatide-Weight Management (ZEPBOUND) 5 MG/0.5ML prefilled pen; Inject 0.5 mLs (5 mg) subcutaneously every 7 days.  - Hemoglobin A1c; Future    Overweight BMI of 25-29  He is interested in starting GLP-1 medications to aid in weight loss.  Prescribed Ozempic but not covered by insurance.  We discussed we will see if true his appetite it will be covered.  Will start at 2.5 mg x 1 month then increase to 5 mg.  Unsure if this will be able to be compounded after April 1.  We also discussed trialing an oral GLP-1 medication Rybelsus.  He would be interested in Rybulus this if the injectable medication is not covered.    The longitudinal plan of care for the diagnosis(es)/condition(s) as documented were addressed during this visit. Due to the added complexity in care, I will continue to support Jorge L in the subsequent management and with ongoing continuity of care.      Subjective   Jorge L is a 44 year old, presenting for the following health issues:  RECHECK (Recently saw cardiology who prescribed Ozempic but was denied by insurance. Instructed to PCP clinic for further eval. Last Hgb A1c was 1 louis ago and was 5.4)        3/31/2025     3:01 PM   Additional Questions   Roomed by Shira Diaz   Accompanied by none     Pat is a pleasant 44-year-old male presents via video visit today to discuss GLP-1 medications.  Jorge L has a past medical history of nonischemic cardiomyopathy, congestive heart failure with reduced ejection fraction,  hyperlipidemia, atrial fibrillation, nonsustained ventricular tachycardia, obstructive sleep apnea, and prediabetes.  Recently cardiology started Jorge L on Ozempic to help with weight loss, prediabetes, and, congestive heart failure with reduced ejection fraction.  Unfortunately insurance denied the Ozempic.  Jorge L also recently underwent a sleep study and was diagnosed with obstructive sleep apnea.  He has not started to use his CPAP machine.  He is interested in trying to see if another medication is covered to help with weight loss and heart function.  He is currently on the standard medical treatment for heart failure with preserved ejection fraction.  Last echocardiogram as of December 2023 demonstrated an EF of 35 to 40% without any valvular dysfunction.    History of Present Illness       Reason for visit:  Ozempic denial   He is taking medications regularly.          Objective           Vitals:  No vitals were obtained today due to virtual visit.    Physical Exam   GENERAL: alert and no distress  EYES: Eyes grossly normal to inspection.  No discharge or erythema, or obvious scleral/conjunctival abnormalities.  RESP: No audible wheeze, cough, or visible cyanosis.    SKIN: Visible skin clear. No significant rash, abnormal pigmentation or lesions.  NEURO: Cranial nerves grossly intact.  Mentation and speech appropriate for age.  PSYCH: Appropriate affect, tone, and pace of words        Video-Visit Details    Type of service:  Video Visit   Originating Location (pt. Location): Home    Distant Location (provider location):  On-site  Platform used for Video Visit: Dewey  Signed Electronically by: Bob Walker PA-C

## 2025-04-15 ENCOUNTER — MYC MEDICAL ADVICE (OUTPATIENT)
Dept: FAMILY MEDICINE | Facility: CLINIC | Age: 45
End: 2025-04-15
Payer: COMMERCIAL

## 2025-04-15 DIAGNOSIS — E66.3 OVERWEIGHT (BMI 25.0-29.9): ICD-10-CM

## 2025-04-15 DIAGNOSIS — I50.20 HFREF (HEART FAILURE WITH REDUCED EJECTION FRACTION) (H): Primary | ICD-10-CM

## 2025-04-30 ENCOUNTER — MYC REFILL (OUTPATIENT)
Dept: CARDIOLOGY | Facility: CLINIC | Age: 45
End: 2025-04-30
Payer: COMMERCIAL

## 2025-04-30 DIAGNOSIS — N52.9 ERECTILE DYSFUNCTION, UNSPECIFIED ERECTILE DYSFUNCTION TYPE: ICD-10-CM

## 2025-04-30 RX ORDER — TADALAFIL 5 MG/1
5 TABLET ORAL EVERY 24 HOURS
Qty: 30 TABLET | Refills: 0 | Status: SHIPPED | OUTPATIENT
Start: 2025-04-30

## 2025-05-08 ENCOUNTER — ANCILLARY PROCEDURE (OUTPATIENT)
Dept: CARDIOLOGY | Facility: CLINIC | Age: 45
End: 2025-05-08
Attending: INTERNAL MEDICINE
Payer: COMMERCIAL

## 2025-05-08 DIAGNOSIS — I42.8 NON-ISCHEMIC CARDIOMYOPATHY (H): ICD-10-CM

## 2025-05-08 DIAGNOSIS — Z95.810 ICD (IMPLANTABLE CARDIOVERTER-DEFIBRILLATOR), SINGLE, IN SITU: ICD-10-CM

## 2025-05-08 PROCEDURE — 93296 REM INTERROG EVL PM/IDS: CPT | Performed by: INTERNAL MEDICINE

## 2025-05-08 PROCEDURE — 93295 DEV INTERROG REMOTE 1/2/MLT: CPT | Performed by: INTERNAL MEDICINE

## 2025-05-14 ENCOUNTER — TELEPHONE (OUTPATIENT)
Dept: CARDIOLOGY | Facility: CLINIC | Age: 45
End: 2025-05-14
Payer: COMMERCIAL

## 2025-05-14 DIAGNOSIS — I42.8 NON-ISCHEMIC CARDIOMYOPATHY (H): Primary | ICD-10-CM

## 2025-05-14 DIAGNOSIS — Z95.810 ICD (IMPLANTABLE CARDIOVERTER-DEFIBRILLATOR), SINGLE, IN SITU: ICD-10-CM

## 2025-05-14 LAB
MDC_IDC_EPISODE_DTM: NORMAL
MDC_IDC_EPISODE_DURATION: 112 S
MDC_IDC_EPISODE_DURATION: 118 S
MDC_IDC_EPISODE_DURATION: 14 S
MDC_IDC_EPISODE_DURATION: 14 S
MDC_IDC_EPISODE_DURATION: 15 S
MDC_IDC_EPISODE_DURATION: 16 S
MDC_IDC_EPISODE_DURATION: 16 S
MDC_IDC_EPISODE_DURATION: 19 S
MDC_IDC_EPISODE_DURATION: 22 S
MDC_IDC_EPISODE_DURATION: 318 S
MDC_IDC_EPISODE_DURATION: 33 S
MDC_IDC_EPISODE_DURATION: 35 S
MDC_IDC_EPISODE_DURATION: 35 S
MDC_IDC_EPISODE_DURATION: 41 S
MDC_IDC_EPISODE_DURATION: 43 S
MDC_IDC_EPISODE_DURATION: 47 S
MDC_IDC_EPISODE_DURATION: 49 S
MDC_IDC_EPISODE_DURATION: 50 S
MDC_IDC_EPISODE_DURATION: 54 S
MDC_IDC_EPISODE_DURATION: 61 S
MDC_IDC_EPISODE_DURATION: 62 S
MDC_IDC_EPISODE_DURATION: 69 S
MDC_IDC_EPISODE_DURATION: 79 S
MDC_IDC_EPISODE_ID: NORMAL
MDC_IDC_EPISODE_TYPE: NORMAL
MDC_IDC_EPISODE_TYPE_INDUCED: NO
MDC_IDC_EPISODE_VENDOR_TYPE: NORMAL
MDC_IDC_LEAD_CONNECTION_STATUS: NORMAL
MDC_IDC_LEAD_IMPLANT_DT: NORMAL
MDC_IDC_LEAD_LOCATION: NORMAL
MDC_IDC_LEAD_LOCATION_DETAIL_1: NORMAL
MDC_IDC_LEAD_MFG: NORMAL
MDC_IDC_LEAD_MODEL: NORMAL
MDC_IDC_LEAD_POLARITY_TYPE: NORMAL
MDC_IDC_LEAD_SERIAL: NORMAL
MDC_IDC_MSMT_BATTERY_DTM: NORMAL
MDC_IDC_MSMT_BATTERY_REMAINING_LONGEVITY: 156 MO
MDC_IDC_MSMT_BATTERY_REMAINING_PERCENTAGE: 100 %
MDC_IDC_MSMT_BATTERY_STATUS: NORMAL
MDC_IDC_MSMT_CAP_CHARGE_DTM: NORMAL
MDC_IDC_MSMT_CAP_CHARGE_TIME: 10.2 S
MDC_IDC_MSMT_CAP_CHARGE_TYPE: NORMAL
MDC_IDC_MSMT_LEADCHNL_RV_IMPEDANCE_VALUE: 792 OHM
MDC_IDC_MSMT_LEADCHNL_RV_PACING_THRESHOLD_AMPLITUDE: 0.7 V
MDC_IDC_MSMT_LEADCHNL_RV_PACING_THRESHOLD_PULSEWIDTH: 0.4 MS
MDC_IDC_PG_IMPLANT_DTM: NORMAL
MDC_IDC_PG_MFG: NORMAL
MDC_IDC_PG_MODEL: NORMAL
MDC_IDC_PG_SERIAL: NORMAL
MDC_IDC_PG_TYPE: NORMAL
MDC_IDC_SESS_CLINIC_NAME: NORMAL
MDC_IDC_SESS_DTM: NORMAL
MDC_IDC_SESS_TYPE: NORMAL
MDC_IDC_SET_BRADY_LOWRATE: 40 {BEATS}/MIN
MDC_IDC_SET_BRADY_MODE: NORMAL
MDC_IDC_SET_LEADCHNL_RV_PACING_AMPLITUDE: 2 V
MDC_IDC_SET_LEADCHNL_RV_PACING_CAPTURE_MODE: NORMAL
MDC_IDC_SET_LEADCHNL_RV_PACING_POLARITY: NORMAL
MDC_IDC_SET_LEADCHNL_RV_PACING_PULSEWIDTH: 0.4 MS
MDC_IDC_SET_LEADCHNL_RV_SENSING_ADAPTATION_MODE: NORMAL
MDC_IDC_SET_LEADCHNL_RV_SENSING_POLARITY: NORMAL
MDC_IDC_SET_LEADCHNL_RV_SENSING_SENSITIVITY: 0.6 MV
MDC_IDC_SET_ZONE_DETECTION_INTERVAL: 250 MS
MDC_IDC_SET_ZONE_DETECTION_INTERVAL: 300 MS
MDC_IDC_SET_ZONE_DETECTION_INTERVAL: 353 MS
MDC_IDC_SET_ZONE_STATUS: NORMAL
MDC_IDC_SET_ZONE_TYPE: NORMAL
MDC_IDC_SET_ZONE_VENDOR_TYPE: NORMAL
MDC_IDC_STAT_BRADY_DTM_END: NORMAL
MDC_IDC_STAT_BRADY_DTM_START: NORMAL
MDC_IDC_STAT_BRADY_RV_PERCENT_PACED: 0 %
MDC_IDC_STAT_EPISODE_RECENT_COUNT: 0
MDC_IDC_STAT_EPISODE_RECENT_COUNT: 0
MDC_IDC_STAT_EPISODE_RECENT_COUNT: 1
MDC_IDC_STAT_EPISODE_RECENT_COUNT: 38
MDC_IDC_STAT_EPISODE_RECENT_COUNT_DTM_END: NORMAL
MDC_IDC_STAT_EPISODE_RECENT_COUNT_DTM_START: NORMAL
MDC_IDC_STAT_EPISODE_TYPE: NORMAL
MDC_IDC_STAT_EPISODE_VENDOR_TYPE: NORMAL
MDC_IDC_STAT_TACHYTHERAPY_ATP_DELIVERED_RECENT: 0
MDC_IDC_STAT_TACHYTHERAPY_ATP_DELIVERED_TOTAL: 0
MDC_IDC_STAT_TACHYTHERAPY_RECENT_DTM_END: NORMAL
MDC_IDC_STAT_TACHYTHERAPY_RECENT_DTM_START: NORMAL
MDC_IDC_STAT_TACHYTHERAPY_SHOCKS_ABORTED_RECENT: 0
MDC_IDC_STAT_TACHYTHERAPY_SHOCKS_ABORTED_TOTAL: 0
MDC_IDC_STAT_TACHYTHERAPY_SHOCKS_DELIVERED_RECENT: 0
MDC_IDC_STAT_TACHYTHERAPY_SHOCKS_DELIVERED_TOTAL: 0
MDC_IDC_STAT_TACHYTHERAPY_TOTAL_DTM_END: NORMAL
MDC_IDC_STAT_TACHYTHERAPY_TOTAL_DTM_START: NORMAL

## 2025-05-14 NOTE — TELEPHONE ENCOUNTER
Called and left message for patient requesting call back to review remote results and schedule follow-up.  Pt's last in-clinic device check was 4/12/2023.  Will attempt to schedule in-clinic in 8/2025 and will also confirm if patient is active/exercises as he has had 29 tachy episodes in the 170s logged since 2/3/25.  These have gradual onset/offset in the 170s and appear to be ST (see remote device check for full details).  Device clinic phone number provided.  Back-up remote scheduled.     MALU Rdz       Histogram:         Episode Logbook:

## 2025-05-15 ENCOUNTER — TELEPHONE (OUTPATIENT)
Dept: SLEEP MEDICINE | Facility: CLINIC | Age: 45
End: 2025-05-15

## 2025-05-15 NOTE — TELEPHONE ENCOUNTER
Patient needs to be rescheduled for their virtual visit due to Reason for Reschedule: No-Show    Appointment mode: Video  Provider: aleida

## 2025-06-20 ENCOUNTER — HOSPITAL ENCOUNTER (OUTPATIENT)
Dept: CARDIOLOGY | Facility: CLINIC | Age: 45
Discharge: HOME | End: 2025-06-20
Attending: PHYSICIAN ASSISTANT | Admitting: PHYSICIAN ASSISTANT
Payer: COMMERCIAL

## 2025-06-20 DIAGNOSIS — I50.20 HFREF (HEART FAILURE WITH REDUCED EJECTION FRACTION) (H): ICD-10-CM

## 2025-06-20 LAB — LVEF ECHO: NORMAL

## 2025-06-20 PROCEDURE — 999N000208 ECHOCARDIOGRAM COMPLETE

## 2025-06-20 PROCEDURE — 255N000002 HC RX 255 OP 636: Performed by: PHYSICIAN ASSISTANT

## 2025-06-20 PROCEDURE — 93306 TTE W/DOPPLER COMPLETE: CPT | Mod: 26 | Performed by: INTERNAL MEDICINE

## 2025-06-20 RX ADMIN — HUMAN ALBUMIN MICROSPHERES AND PERFLUTREN 9 ML (DILUTED): 10; .22 INJECTION, SOLUTION INTRAVENOUS at 13:28

## 2025-06-23 ENCOUNTER — RESULTS FOLLOW-UP (OUTPATIENT)
Dept: CARDIOLOGY | Facility: CLINIC | Age: 45
End: 2025-06-23

## 2025-07-10 ENCOUNTER — MYC MEDICAL ADVICE (OUTPATIENT)
Dept: FAMILY MEDICINE | Facility: CLINIC | Age: 45
End: 2025-07-10
Payer: COMMERCIAL

## 2025-07-25 DIAGNOSIS — G47.01 INSOMNIA DUE TO MEDICAL CONDITION: ICD-10-CM

## 2025-07-28 RX ORDER — TRAZODONE HYDROCHLORIDE 50 MG/1
50-100 TABLET ORAL AT BEDTIME
Qty: 90 TABLET | Refills: 2 | Status: SHIPPED | OUTPATIENT
Start: 2025-07-28

## 2025-08-27 ENCOUNTER — ANCILLARY PROCEDURE (OUTPATIENT)
Dept: CARDIOLOGY | Facility: CLINIC | Age: 45
End: 2025-08-27
Attending: INTERNAL MEDICINE
Payer: COMMERCIAL

## 2025-08-28 ENCOUNTER — LAB (OUTPATIENT)
Dept: LAB | Facility: CLINIC | Age: 45
End: 2025-08-28
Payer: COMMERCIAL

## 2025-08-28 DIAGNOSIS — I42.8 NON-ISCHEMIC CARDIOMYOPATHY (H): ICD-10-CM

## 2025-08-28 DIAGNOSIS — I50.20 HFREF (HEART FAILURE WITH REDUCED EJECTION FRACTION) (H): ICD-10-CM

## 2025-08-28 DIAGNOSIS — R07.9 CHEST PAIN: ICD-10-CM

## 2025-08-28 LAB
ANION GAP SERPL CALCULATED.3IONS-SCNC: 17 MMOL/L (ref 7–15)
BUN SERPL-MCNC: 18.6 MG/DL (ref 6–20)
CALCIUM SERPL-MCNC: 10.2 MG/DL (ref 8.8–10.4)
CHLORIDE SERPL-SCNC: 99 MMOL/L (ref 98–107)
CREAT SERPL-MCNC: 1.29 MG/DL (ref 0.67–1.17)
D DIMER PPP FEU-MCNC: <0.27 UG/ML FEU (ref 0–0.5)
EGFRCR SERPLBLD CKD-EPI 2021: 70 ML/MIN/1.73M2
GLUCOSE SERPL-MCNC: 102 MG/DL (ref 70–99)
HCO3 SERPL-SCNC: 21 MMOL/L (ref 22–29)
HGB BLD-MCNC: 13.7 G/DL (ref 13.3–17.7)
MCV RBC AUTO: 97.1 FL (ref 78–100)
NT-PROBNP SERPL-MCNC: 122 PG/ML (ref 0–93)
POTASSIUM SERPL-SCNC: 3.6 MMOL/L (ref 3.4–5.3)
SODIUM SERPL-SCNC: 137 MMOL/L (ref 135–145)

## (undated) DEVICE — CATH RF CARD ABL BID JJ THERMO

## (undated) DEVICE — MANIFOLD KIT ANGIO AUTOMATED 014613

## (undated) DEVICE — CABLE PACING ALLIGATOR CLIP 301-CG

## (undated) DEVICE — NEEDLE TRANSSEPTAL 18GA X 98CM 86 DEG

## (undated) DEVICE — INTRODUCER SHEATH VASC CATH 8.5FR CARTO GIDE STH MED D138502

## (undated) DEVICE — TOTE ANGIO CORP PC15AT SAN32CC83O

## (undated) DEVICE — PATCH CARTO 3 EXTERNAL REFERENCE 3D MAPPING CREFP6

## (undated) DEVICE — INTRO GLIDESHEATH SLENDER 6FR 10X45CM 60-1060

## (undated) DEVICE — LINE MONITOR NASAL SMART CAPNOLINE ADULT LONG 12463

## (undated) DEVICE — DEFIB PRO-PADZ LVP LQD GEL ADULT 8900-2105-01

## (undated) DEVICE — CATH SOUNDSTAR 8FRX90CM 10439011

## (undated) DEVICE — TUBE SET SMARKABLATE IRRIGATION

## (undated) DEVICE — SHEATH PRELUDE SNAP 13CM 9FR

## (undated) DEVICE — CATH DIAGNOSTIC RADIAL 5FR TIG 4.0

## (undated) DEVICE — PACK EP SRG PROC LF DISP SAN32EPFSR

## (undated) DEVICE — CATH EP 7FR X 115CM DECANAV CA

## (undated) DEVICE — SLEEVE TR BAND RADIAL COMPRESSION DEVICE 24CM TRB24-REG

## (undated) DEVICE — KIT HAND CONTROL ANGIOTOUCH ACIST 65CM AT-P65

## (undated) DEVICE — PACK PCMKR PERM SRG PROC LF SAN32PC573

## (undated) DEVICE — SYR ANGIO NAMIC CNTRSTINJ ROT ADPT RSVR PALM PD THB GRP FNGR

## (undated) DEVICE — INTRODUCER SHEATH FAST-CATH 9FRX12CM 406116

## (undated) DEVICE — CATH NAV PENTARAY F CURVE

## (undated) RX ORDER — FENTANYL CITRATE 50 UG/ML
INJECTION, SOLUTION INTRAMUSCULAR; INTRAVENOUS
Status: DISPENSED
Start: 2022-04-07

## (undated) RX ORDER — HEPARIN SODIUM 1000 [USP'U]/ML
INJECTION, SOLUTION INTRAVENOUS; SUBCUTANEOUS
Status: DISPENSED
Start: 2021-12-09

## (undated) RX ORDER — PROTAMINE SULFATE 10 MG/ML
INJECTION, SOLUTION INTRAVENOUS
Status: DISPENSED
Start: 2022-09-22

## (undated) RX ORDER — HEPARIN SODIUM 1000 [USP'U]/ML
INJECTION, SOLUTION INTRAVENOUS; SUBCUTANEOUS
Status: DISPENSED
Start: 2022-09-22

## (undated) RX ORDER — HEPARIN SODIUM 200 [USP'U]/100ML
INJECTION, SOLUTION INTRAVENOUS
Status: DISPENSED
Start: 2022-09-22

## (undated) RX ORDER — LIDOCAINE HYDROCHLORIDE 10 MG/ML
INJECTION, SOLUTION EPIDURAL; INFILTRATION; INTRACAUDAL; PERINEURAL
Status: DISPENSED
Start: 2022-09-22

## (undated) RX ORDER — LIDOCAINE HYDROCHLORIDE 10 MG/ML
INJECTION, SOLUTION EPIDURAL; INFILTRATION; INTRACAUDAL; PERINEURAL
Status: DISPENSED
Start: 2022-04-07

## (undated) RX ORDER — VERAPAMIL HYDROCHLORIDE 2.5 MG/ML
INJECTION, SOLUTION INTRAVENOUS
Status: DISPENSED
Start: 2021-12-09

## (undated) RX ORDER — CEFAZOLIN SODIUM 2 G/100ML
INJECTION, SOLUTION INTRAVENOUS
Status: DISPENSED
Start: 2022-04-07

## (undated) RX ORDER — HEPARIN SODIUM 200 [USP'U]/100ML
INJECTION, SOLUTION INTRAVENOUS
Status: DISPENSED
Start: 2021-12-09

## (undated) RX ORDER — FENTANYL CITRATE 0.05 MG/ML
INJECTION, SOLUTION INTRAMUSCULAR; INTRAVENOUS
Status: DISPENSED
Start: 2022-09-22

## (undated) RX ORDER — KETOROLAC TROMETHAMINE 30 MG/ML
INJECTION, SOLUTION INTRAMUSCULAR; INTRAVENOUS
Status: DISPENSED
Start: 2022-09-22

## (undated) RX ORDER — FENTANYL CITRATE 50 UG/ML
INJECTION, SOLUTION INTRAMUSCULAR; INTRAVENOUS
Status: DISPENSED
Start: 2022-09-22

## (undated) RX ORDER — LIDOCAINE HYDROCHLORIDE 10 MG/ML
INJECTION, SOLUTION EPIDURAL; INFILTRATION; INTRACAUDAL; PERINEURAL
Status: DISPENSED
Start: 2021-12-09

## (undated) RX ORDER — FENTANYL CITRATE 50 UG/ML
INJECTION, SOLUTION INTRAMUSCULAR; INTRAVENOUS
Status: DISPENSED
Start: 2021-12-09

## (undated) RX ORDER — BUPIVACAINE HYDROCHLORIDE 2.5 MG/ML
INJECTION, SOLUTION EPIDURAL; INFILTRATION; INTRACAUDAL
Status: DISPENSED
Start: 2022-04-07